# Patient Record
Sex: MALE | Race: WHITE | NOT HISPANIC OR LATINO | Employment: OTHER | URBAN - METROPOLITAN AREA
[De-identification: names, ages, dates, MRNs, and addresses within clinical notes are randomized per-mention and may not be internally consistent; named-entity substitution may affect disease eponyms.]

---

## 2017-01-01 ENCOUNTER — COMMUNICATION - HEALTHEAST (OUTPATIENT)
Dept: INTERNAL MEDICINE | Facility: CLINIC | Age: 72
End: 2017-01-01

## 2017-01-09 ENCOUNTER — RECORDS - HEALTHEAST (OUTPATIENT)
Dept: ADMINISTRATIVE | Facility: OTHER | Age: 72
End: 2017-01-09

## 2017-01-17 ENCOUNTER — COMMUNICATION - HEALTHEAST (OUTPATIENT)
Dept: INTERNAL MEDICINE | Facility: CLINIC | Age: 72
End: 2017-01-17

## 2017-01-23 ENCOUNTER — RECORDS - HEALTHEAST (OUTPATIENT)
Dept: ADMINISTRATIVE | Facility: OTHER | Age: 72
End: 2017-01-23

## 2017-02-11 ENCOUNTER — COMMUNICATION - HEALTHEAST (OUTPATIENT)
Dept: INTERNAL MEDICINE | Facility: CLINIC | Age: 72
End: 2017-02-11

## 2017-02-12 ENCOUNTER — COMMUNICATION - HEALTHEAST (OUTPATIENT)
Dept: INTERNAL MEDICINE | Facility: CLINIC | Age: 72
End: 2017-02-12

## 2017-03-04 ENCOUNTER — COMMUNICATION - HEALTHEAST (OUTPATIENT)
Dept: INTERNAL MEDICINE | Facility: CLINIC | Age: 72
End: 2017-03-04

## 2017-03-28 ENCOUNTER — COMMUNICATION - HEALTHEAST (OUTPATIENT)
Dept: INTERNAL MEDICINE | Facility: CLINIC | Age: 72
End: 2017-03-28

## 2017-04-17 ENCOUNTER — RECORDS - HEALTHEAST (OUTPATIENT)
Dept: ADMINISTRATIVE | Facility: OTHER | Age: 72
End: 2017-04-17

## 2017-04-19 ENCOUNTER — COMMUNICATION - HEALTHEAST (OUTPATIENT)
Dept: INTERNAL MEDICINE | Facility: CLINIC | Age: 72
End: 2017-04-19

## 2017-04-29 ENCOUNTER — AMBULATORY - HEALTHEAST (OUTPATIENT)
Dept: INTERNAL MEDICINE | Facility: CLINIC | Age: 72
End: 2017-04-29

## 2017-04-29 ENCOUNTER — COMMUNICATION - HEALTHEAST (OUTPATIENT)
Dept: SCHEDULING | Facility: CLINIC | Age: 72
End: 2017-04-29

## 2017-05-15 ENCOUNTER — COMMUNICATION - HEALTHEAST (OUTPATIENT)
Dept: INTERNAL MEDICINE | Facility: CLINIC | Age: 72
End: 2017-05-15

## 2017-06-01 ENCOUNTER — COMMUNICATION - HEALTHEAST (OUTPATIENT)
Dept: INTERNAL MEDICINE | Facility: CLINIC | Age: 72
End: 2017-06-01

## 2017-06-19 ENCOUNTER — COMMUNICATION - HEALTHEAST (OUTPATIENT)
Dept: INTERNAL MEDICINE | Facility: CLINIC | Age: 72
End: 2017-06-19

## 2017-07-01 ENCOUNTER — COMMUNICATION - HEALTHEAST (OUTPATIENT)
Dept: INTERNAL MEDICINE | Facility: CLINIC | Age: 72
End: 2017-07-01

## 2017-08-11 ENCOUNTER — COMMUNICATION - HEALTHEAST (OUTPATIENT)
Dept: INTERNAL MEDICINE | Facility: CLINIC | Age: 72
End: 2017-08-11

## 2017-08-21 ENCOUNTER — COMMUNICATION - HEALTHEAST (OUTPATIENT)
Dept: INTERNAL MEDICINE | Facility: CLINIC | Age: 72
End: 2017-08-21

## 2017-08-30 ENCOUNTER — AMBULATORY - HEALTHEAST (OUTPATIENT)
Dept: INTERNAL MEDICINE | Facility: CLINIC | Age: 72
End: 2017-08-30

## 2017-08-30 ENCOUNTER — COMMUNICATION - HEALTHEAST (OUTPATIENT)
Dept: SCHEDULING | Facility: CLINIC | Age: 72
End: 2017-08-30

## 2017-08-30 ENCOUNTER — OFFICE VISIT - HEALTHEAST (OUTPATIENT)
Dept: INTERNAL MEDICINE | Facility: CLINIC | Age: 72
End: 2017-08-30

## 2017-08-30 DIAGNOSIS — D64.9 ANEMIA: ICD-10-CM

## 2017-08-30 DIAGNOSIS — L82.1 SEBORRHEIC KERATOSIS: ICD-10-CM

## 2017-08-30 DIAGNOSIS — R31.29 MICROSCOPIC HEMATURIA: ICD-10-CM

## 2017-08-30 DIAGNOSIS — Z00.00 PREVENTATIVE HEALTH CARE: ICD-10-CM

## 2017-08-30 DIAGNOSIS — M79.2 NEUROPATHIC PAIN, LEG, LEFT: ICD-10-CM

## 2017-08-30 DIAGNOSIS — I10 ESSENTIAL HYPERTENSION: ICD-10-CM

## 2017-08-30 DIAGNOSIS — K21.9 GERD (GASTROESOPHAGEAL REFLUX DISEASE): ICD-10-CM

## 2017-08-30 LAB
CHOLEST SERPL-MCNC: 172 MG/DL
FASTING STATUS PATIENT QL REPORTED: YES
HDLC SERPL-MCNC: 32 MG/DL
LDLC SERPL CALC-MCNC: 88 MG/DL
PSA SERPL-MCNC: 4.2 NG/ML (ref 0–6.5)
TRIGL SERPL-MCNC: 259 MG/DL

## 2017-08-31 ENCOUNTER — COMMUNICATION - HEALTHEAST (OUTPATIENT)
Dept: INTERNAL MEDICINE | Facility: CLINIC | Age: 72
End: 2017-08-31

## 2017-09-09 ENCOUNTER — COMMUNICATION - HEALTHEAST (OUTPATIENT)
Dept: INTERNAL MEDICINE | Facility: CLINIC | Age: 72
End: 2017-09-09

## 2017-09-10 ENCOUNTER — COMMUNICATION - HEALTHEAST (OUTPATIENT)
Dept: INTERNAL MEDICINE | Facility: CLINIC | Age: 72
End: 2017-09-10

## 2017-09-11 ENCOUNTER — COMMUNICATION - HEALTHEAST (OUTPATIENT)
Dept: INTERNAL MEDICINE | Facility: CLINIC | Age: 72
End: 2017-09-11

## 2017-10-21 ENCOUNTER — COMMUNICATION - HEALTHEAST (OUTPATIENT)
Dept: INTERNAL MEDICINE | Facility: CLINIC | Age: 72
End: 2017-10-21

## 2017-12-14 ENCOUNTER — COMMUNICATION - HEALTHEAST (OUTPATIENT)
Dept: INTERNAL MEDICINE | Facility: CLINIC | Age: 72
End: 2017-12-14

## 2017-12-14 DIAGNOSIS — K21.9 GERD (GASTROESOPHAGEAL REFLUX DISEASE): ICD-10-CM

## 2018-02-11 ENCOUNTER — COMMUNICATION - HEALTHEAST (OUTPATIENT)
Dept: INTERNAL MEDICINE | Facility: CLINIC | Age: 73
End: 2018-02-11

## 2018-02-26 ENCOUNTER — RECORDS - HEALTHEAST (OUTPATIENT)
Dept: ADMINISTRATIVE | Facility: OTHER | Age: 73
End: 2018-02-26

## 2018-02-26 ENCOUNTER — COMMUNICATION - HEALTHEAST (OUTPATIENT)
Dept: INTERNAL MEDICINE | Facility: CLINIC | Age: 73
End: 2018-02-26

## 2018-03-06 ENCOUNTER — RECORDS - HEALTHEAST (OUTPATIENT)
Dept: ADMINISTRATIVE | Facility: OTHER | Age: 73
End: 2018-03-06

## 2018-03-08 ENCOUNTER — COMMUNICATION - HEALTHEAST (OUTPATIENT)
Dept: INTERNAL MEDICINE | Facility: CLINIC | Age: 73
End: 2018-03-08

## 2018-03-16 ENCOUNTER — COMMUNICATION - HEALTHEAST (OUTPATIENT)
Dept: INTERNAL MEDICINE | Facility: CLINIC | Age: 73
End: 2018-03-16

## 2018-03-20 ENCOUNTER — COMMUNICATION - HEALTHEAST (OUTPATIENT)
Dept: INTERNAL MEDICINE | Facility: CLINIC | Age: 73
End: 2018-03-20

## 2018-03-23 ENCOUNTER — RECORDS - HEALTHEAST (OUTPATIENT)
Dept: ADMINISTRATIVE | Facility: OTHER | Age: 73
End: 2018-03-23

## 2018-05-01 ENCOUNTER — COMMUNICATION - HEALTHEAST (OUTPATIENT)
Dept: INTERNAL MEDICINE | Facility: CLINIC | Age: 73
End: 2018-05-01

## 2018-05-01 DIAGNOSIS — G47.00 INSOMNIA: ICD-10-CM

## 2018-05-01 DIAGNOSIS — G25.81 RLS (RESTLESS LEGS SYNDROME): ICD-10-CM

## 2018-05-08 ENCOUNTER — COMMUNICATION - HEALTHEAST (OUTPATIENT)
Dept: INTERNAL MEDICINE | Facility: CLINIC | Age: 73
End: 2018-05-08

## 2018-05-15 ENCOUNTER — AMBULATORY - HEALTHEAST (OUTPATIENT)
Dept: INTERNAL MEDICINE | Facility: CLINIC | Age: 73
End: 2018-05-15

## 2018-05-16 ENCOUNTER — OFFICE VISIT - HEALTHEAST (OUTPATIENT)
Dept: INTERNAL MEDICINE | Facility: CLINIC | Age: 73
End: 2018-05-16

## 2018-05-16 DIAGNOSIS — K62.5 RECTAL BLEEDING: ICD-10-CM

## 2018-05-16 DIAGNOSIS — M25.812 SHOULDER IMPINGEMENT, LEFT: ICD-10-CM

## 2018-05-16 LAB
ANION GAP SERPL CALCULATED.3IONS-SCNC: 9 MMOL/L (ref 5–18)
BASOPHILS # BLD AUTO: 0.1 THOU/UL (ref 0–0.2)
BASOPHILS NFR BLD AUTO: 1 % (ref 0–2)
BUN SERPL-MCNC: 23 MG/DL (ref 8–28)
CALCIUM SERPL-MCNC: 9.4 MG/DL (ref 8.5–10.5)
CHLORIDE BLD-SCNC: 107 MMOL/L (ref 98–107)
CO2 SERPL-SCNC: 23 MMOL/L (ref 22–31)
CREAT SERPL-MCNC: 1 MG/DL (ref 0.7–1.3)
EOSINOPHIL # BLD AUTO: 0.1 THOU/UL (ref 0–0.4)
EOSINOPHIL NFR BLD AUTO: 2 % (ref 0–6)
ERYTHROCYTE [DISTWIDTH] IN BLOOD BY AUTOMATED COUNT: 12.5 % (ref 11–14.5)
GFR SERPL CREATININE-BSD FRML MDRD: >60 ML/MIN/1.73M2
GLUCOSE BLD-MCNC: 100 MG/DL (ref 70–125)
HCT VFR BLD AUTO: 44.2 % (ref 40–54)
HGB BLD-MCNC: 14.9 G/DL (ref 14–18)
LYMPHOCYTES # BLD AUTO: 1 THOU/UL (ref 0.8–4.4)
LYMPHOCYTES NFR BLD AUTO: 20 % (ref 20–40)
MCH RBC QN AUTO: 31.6 PG (ref 27–34)
MCHC RBC AUTO-ENTMCNC: 33.7 G/DL (ref 32–36)
MCV RBC AUTO: 94 FL (ref 80–100)
MONOCYTES # BLD AUTO: 0.6 THOU/UL (ref 0–0.9)
MONOCYTES NFR BLD AUTO: 12 % (ref 2–10)
NEUTROPHILS # BLD AUTO: 3.3 THOU/UL (ref 2–7.7)
NEUTROPHILS NFR BLD AUTO: 64 % (ref 50–70)
PLATELET # BLD AUTO: 187 THOU/UL (ref 140–440)
PMV BLD AUTO: 10.1 FL (ref 8.5–12.5)
POTASSIUM BLD-SCNC: 4.3 MMOL/L (ref 3.5–5)
RBC # BLD AUTO: 4.71 MILL/UL (ref 4.4–6.2)
SODIUM SERPL-SCNC: 139 MMOL/L (ref 136–145)
WBC: 5.1 THOU/UL (ref 4–11)

## 2018-05-16 ASSESSMENT — MIFFLIN-ST. JEOR: SCORE: 1770.23

## 2018-05-17 LAB
ATRIAL RATE - MUSE: 57 BPM
DIASTOLIC BLOOD PRESSURE - MUSE: NORMAL MMHG
INTERPRETATION ECG - MUSE: NORMAL
P AXIS - MUSE: 32 DEGREES
PR INTERVAL - MUSE: 218 MS
QRS DURATION - MUSE: 92 MS
QT - MUSE: 422 MS
QTC - MUSE: 410 MS
R AXIS - MUSE: -12 DEGREES
SYSTOLIC BLOOD PRESSURE - MUSE: NORMAL MMHG
T AXIS - MUSE: -5 DEGREES
VENTRICULAR RATE- MUSE: 57 BPM

## 2018-05-22 ENCOUNTER — RECORDS - HEALTHEAST (OUTPATIENT)
Dept: ADMINISTRATIVE | Facility: OTHER | Age: 73
End: 2018-05-22

## 2018-06-04 ENCOUNTER — RECORDS - HEALTHEAST (OUTPATIENT)
Dept: ADMINISTRATIVE | Facility: OTHER | Age: 73
End: 2018-06-04

## 2018-06-13 ENCOUNTER — COMMUNICATION - HEALTHEAST (OUTPATIENT)
Dept: INTERNAL MEDICINE | Facility: CLINIC | Age: 73
End: 2018-06-13

## 2018-06-27 ENCOUNTER — COMMUNICATION - HEALTHEAST (OUTPATIENT)
Dept: INTERNAL MEDICINE | Facility: CLINIC | Age: 73
End: 2018-06-27

## 2018-06-27 DIAGNOSIS — G47.00 INSOMNIA: ICD-10-CM

## 2018-07-05 ENCOUNTER — RECORDS - HEALTHEAST (OUTPATIENT)
Dept: ADMINISTRATIVE | Facility: OTHER | Age: 73
End: 2018-07-05

## 2018-07-18 ENCOUNTER — RECORDS - HEALTHEAST (OUTPATIENT)
Dept: ADMINISTRATIVE | Facility: OTHER | Age: 73
End: 2018-07-18

## 2018-07-23 ENCOUNTER — COMMUNICATION - HEALTHEAST (OUTPATIENT)
Dept: INTERNAL MEDICINE | Facility: CLINIC | Age: 73
End: 2018-07-23

## 2018-07-24 ENCOUNTER — AMBULATORY - HEALTHEAST (OUTPATIENT)
Dept: INTERNAL MEDICINE | Facility: CLINIC | Age: 73
End: 2018-07-24

## 2018-07-25 ENCOUNTER — OFFICE VISIT - HEALTHEAST (OUTPATIENT)
Dept: INTERNAL MEDICINE | Facility: CLINIC | Age: 73
End: 2018-07-25

## 2018-07-25 DIAGNOSIS — H26.9 CATARACT: ICD-10-CM

## 2018-07-25 DIAGNOSIS — K21.9 GERD (GASTROESOPHAGEAL REFLUX DISEASE): ICD-10-CM

## 2018-07-25 DIAGNOSIS — G25.81 RLS (RESTLESS LEGS SYNDROME): ICD-10-CM

## 2018-07-25 ASSESSMENT — MIFFLIN-ST. JEOR: SCORE: 1769.68

## 2018-08-14 ENCOUNTER — COMMUNICATION - HEALTHEAST (OUTPATIENT)
Dept: INTERNAL MEDICINE | Facility: CLINIC | Age: 73
End: 2018-08-14

## 2018-08-14 DIAGNOSIS — G47.00 INSOMNIA: ICD-10-CM

## 2018-08-15 ENCOUNTER — RECORDS - HEALTHEAST (OUTPATIENT)
Dept: ADMINISTRATIVE | Facility: OTHER | Age: 73
End: 2018-08-15

## 2018-08-18 ENCOUNTER — COMMUNICATION - HEALTHEAST (OUTPATIENT)
Dept: INTERNAL MEDICINE | Facility: CLINIC | Age: 73
End: 2018-08-18

## 2018-09-17 ENCOUNTER — COMMUNICATION - HEALTHEAST (OUTPATIENT)
Dept: INTERNAL MEDICINE | Facility: CLINIC | Age: 73
End: 2018-09-17

## 2018-09-17 DIAGNOSIS — G25.81 RLS (RESTLESS LEGS SYNDROME): ICD-10-CM

## 2018-09-18 ENCOUNTER — COMMUNICATION - HEALTHEAST (OUTPATIENT)
Dept: INTERNAL MEDICINE | Facility: CLINIC | Age: 73
End: 2018-09-18

## 2018-09-18 DIAGNOSIS — G47.00 INSOMNIA: ICD-10-CM

## 2018-09-19 ENCOUNTER — RECORDS - HEALTHEAST (OUTPATIENT)
Dept: ADMINISTRATIVE | Facility: OTHER | Age: 73
End: 2018-09-19

## 2018-12-05 ENCOUNTER — RECORDS - HEALTHEAST (OUTPATIENT)
Dept: ADMINISTRATIVE | Facility: OTHER | Age: 73
End: 2018-12-05

## 2018-12-14 ENCOUNTER — COMMUNICATION - HEALTHEAST (OUTPATIENT)
Dept: INTERNAL MEDICINE | Facility: CLINIC | Age: 73
End: 2018-12-14

## 2018-12-14 DIAGNOSIS — K21.9 GERD (GASTROESOPHAGEAL REFLUX DISEASE): ICD-10-CM

## 2019-02-28 ENCOUNTER — MEDICAL CORRESPONDENCE (OUTPATIENT)
Dept: HEALTH INFORMATION MANAGEMENT | Facility: CLINIC | Age: 74
End: 2019-02-28

## 2019-02-28 ENCOUNTER — TRANSFERRED RECORDS (OUTPATIENT)
Dept: HEALTH INFORMATION MANAGEMENT | Facility: CLINIC | Age: 74
End: 2019-02-28

## 2019-03-07 ENCOUNTER — OFFICE VISIT (OUTPATIENT)
Dept: OPHTHALMOLOGY | Facility: CLINIC | Age: 74
End: 2019-03-07
Attending: OPHTHALMOLOGY
Payer: COMMERCIAL

## 2019-03-07 DIAGNOSIS — Z96.1 PSEUDOPHAKIA OF BOTH EYES: Primary | ICD-10-CM

## 2019-03-07 DIAGNOSIS — H35.40 PERIPHERAL RETINAL THINNING: ICD-10-CM

## 2019-03-07 PROCEDURE — 92250 FUNDUS PHOTOGRAPHY W/I&R: CPT | Mod: ZF | Performed by: OPHTHALMOLOGY

## 2019-03-07 PROCEDURE — G0463 HOSPITAL OUTPT CLINIC VISIT: HCPCS | Mod: ZF

## 2019-03-07 PROCEDURE — 92134 CPTRZ OPH DX IMG PST SGM RTA: CPT | Mod: ZF | Performed by: OPHTHALMOLOGY

## 2019-03-07 ASSESSMENT — VISUAL ACUITY
OS_SC: 20/50
OS_SC+: +2
METHOD: SNELLEN - LINEAR
OS_PH_SC: 20/25
OD_SC: 20/20

## 2019-03-07 ASSESSMENT — CUP TO DISC RATIO
OS_RATIO: 0.55
OD_RATIO: 0.45

## 2019-03-07 ASSESSMENT — TONOMETRY
OS_IOP_MMHG: 15
OD_IOP_MMHG: 18
IOP_METHOD: TONOPEN

## 2019-03-07 ASSESSMENT — CONF VISUAL FIELD
OS_NORMAL: 1
OD_NORMAL: 1

## 2019-03-07 ASSESSMENT — SLIT LAMP EXAM - LIDS
COMMENTS: NORMAL
COMMENTS: NORMAL

## 2019-03-07 ASSESSMENT — EXTERNAL EXAM - LEFT EYE: OS_EXAM: NORMAL

## 2019-03-07 ASSESSMENT — EXTERNAL EXAM - RIGHT EYE: OD_EXAM: NORMAL

## 2019-03-07 NOTE — LETTER
3/7/2019     RE: Toni Collado  [address] [facility] [address]     Dear Colleague,    Thank you for referring your patient, Toni Collado, to the EYE CLINIC at [facility]. Please see a copy of my visit note below.    I have confirmed the patient's and reviewed Past Medical History, Past Surgical History, Social History, Family History, Problem List, Medication List and agree with Tech note.    CC: consult [facility] eye clinic     HPI: concern retina abnormality left eye.  Patient had mild trauma left eye as 17 year old with baseball     Assessment/plan:   1.  Pseudophakia both eyes    - Optos and Optical Coherence Tomography Scan both eyes show normal retinal nerve fiber layer both eyes and thinner retina left eye compared to right eye.    - Mostly due to changes in outer retina and IS/OS junction abnormalities    - schedule ffERG both eyes     RTC 3-4 weeks after ffERG    Rahel Martinez MD PhD.  Professor & Chair

## 2019-03-07 NOTE — PROGRESS NOTES
I have confirmed the patient's and reviewed Past Medical History, Past Surgical History, Social History, Family History, Problem List, Medication List and agree with Tech note.    CC: consult [facility] eye [facility]     HPI: concern retina abnormality left eye.  Patient had mild trauma left eye as 17 year old with baseball     Assessment/plan:   1.  Pseudophakia both eyes    - Optos and Optical Coherence Tomography Scan both eyes show normal retinal nerve fiber layer both eyes and thinner retina left eye compared to right eye.    - Mostly due to changes in outer retina and IS/OS junction abnormalities    - schedule ffERG both eyes     RTC 3-4 weeks after ffERG    Rahel Martinez MD PhD.  Professor & Chair

## 2019-03-07 NOTE — NURSING NOTE
Chief Complaints and History of Present Illnesses   Patient presents with     Retinal Evaluation     Chief Complaint(s) and History of Present Illness(es)     Retinal Evaluation     Laterality: both eyes    Quality: missing vision and hazy    Severity: severe    Frequency: constantly    Associated symptoms: floaters and photophobia.  Negative for flashes and eye pain    Pain scale: 0/10              Comments     Referred Dr Yu /[facility]  LE vision look like milky haze, letters are missing or washed out in a word x 6-8 months  Latanoprost every day RESHMA Eastman COA 10:32 AM March 7, 2019

## 2019-03-12 ENCOUNTER — COMMUNICATION - HEALTHEAST (OUTPATIENT)
Dept: INTERNAL MEDICINE | Facility: CLINIC | Age: 74
End: 2019-03-12

## 2019-03-12 DIAGNOSIS — K21.9 GERD (GASTROESOPHAGEAL REFLUX DISEASE): ICD-10-CM

## 2019-03-18 DIAGNOSIS — H35.40 PERIPHERAL RETINAL THINNING: Primary | ICD-10-CM

## 2019-03-18 DIAGNOSIS — H35.40 PERIPHERAL RETINAL DEGENERATION: ICD-10-CM

## 2019-03-19 ENCOUNTER — ALLIED HEALTH/NURSE VISIT (OUTPATIENT)
Dept: OPHTHALMOLOGY | Facility: CLINIC | Age: 74
End: 2019-03-19
Attending: OPHTHALMOLOGY
Payer: COMMERCIAL

## 2019-03-19 DIAGNOSIS — H35.40 PERIPHERAL RETINAL THINNING: ICD-10-CM

## 2019-03-19 DIAGNOSIS — H35.50 RETINAL DYSTROPHY: Primary | ICD-10-CM

## 2019-03-19 PROCEDURE — 40000269 ZZH STATISTIC NO CHARGE FACILITY FEE: Mod: ZF

## 2019-03-19 PROCEDURE — 92274 MULTIFOCAL ERG W/I&R: CPT | Mod: ZF

## 2019-03-19 RX ORDER — MELOXICAM 15 MG/1
1 TABLET ORAL DAILY PRN
Refills: 0 | COMMUNITY
Start: 2018-09-18 | End: 2022-03-09

## 2019-03-19 RX ORDER — TRAZODONE HYDROCHLORIDE 100 MG/1
1 TABLET ORAL AT BEDTIME
Refills: 2 | COMMUNITY
Start: 2019-02-03 | End: 2022-05-09

## 2019-03-19 RX ORDER — CITALOPRAM HYDROBROMIDE 40 MG/1
1 TABLET ORAL DAILY
COMMUNITY
Start: 2019-03-12 | End: 2022-03-08

## 2019-03-19 RX ORDER — PRAMIPEXOLE DIHYDROCHLORIDE 0.5 MG/1
1 TABLET ORAL AT BEDTIME
COMMUNITY
Start: 2019-03-12 | End: 2022-03-08

## 2019-03-19 ASSESSMENT — VISUAL ACUITY
METHOD: SNELLEN - LINEAR
OS_SC: 20/50
OS_PH_SC: 20/30
OD_SC+: -1
OS_SC+: +
OS_PH_SC+: +
OD_SC: 20/20

## 2019-03-19 NOTE — NURSING NOTE
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  PATY w/[facility] 03-07-19.  No interval changes.  Left eye w/\"washed out\" vision.  No difficulty w/color vision.  Very light sensitive, right>>left.  Using \"HD\" gls to help reduce glare/flare from car headlights and to help reduce discomfort d/t light sensitivity.  Even uses sungls indoors d/t light sensitivity.      About 1 1/2 to 2 yrs ago, he noted that he could see clearly w/right eye but that left eye field of vision had decreased in size so that he had to move eye/head to read a complete line of words.  Maybe the left side of the line/page was better visualized, otherwise vision seemed to be \"milky\" throughout.  S/P CE/IOL both eyes 07 and  with no improvement to being possibly worse left eye.  Eye MDs thought maybe glaucoma at one time.    H/O baseball to face at age 17, breaking nose, but no other eye involvement.  Both eyes alternating dominant. Good vision and equal both eyes; very first gls only when presbyopic.  Travel[place] vacation outside [place] ([place], [place], [place], [place] 6 yrs ago and [place] recently[place]  [place] in last 10 yrs ([place], [place], [place], [place], [place]).  No major health concerns.  Single incident of fever (104 degrees) yrs and yrs ago.      Scheduled to return to Ret[facility] ([facility]) 04-03-19; will await results.  "

## 2019-03-19 NOTE — PROGRESS NOTES
"3/19/2019    STANDARD ERG REPORT    Referring: Dr. Gutierrez Pereira    RE:  Toni Collado  MRN:  [id number]  :  1945    ERG Date:  3/19/2019    CLINICAL HISTORY: Toni Collado is a 73 year old year-old patient with diagnosis of retinal thinning left eye, referred by Dr. SPENCER for a full field electroretinogram (ERG).The patient repots  Left eye with \"washed out\" vision.  No difficulty with color vision.  Very light sensitive, right>>left.  Using \"HD\" gls to help reduce glare/flare from car headlights and to help reduce discomfort due to light sensitivity.  About 1 1/2 to 2 yrs ago, he noted that he could see clearly with right eye but that left eye field of vision had decreased in size so that he had to move eye/head to read a complete line of words.  Maybe the left side of the line/page was better visualized, otherwise vision seemed to be \"milky\" throughout.  S/P CE/IOL both eyes  and  with no improvement to being possibly worse left eye.    IMPRESSION:  Suspicious for cone-liyah dystrophy                  Visual Acuity Right Eye : 20/20-1       w/o gls & +IOL    Visual Acuity Left Eye : 20/50+, PH 20/30+      w/o gls & +IOL                         ALL AVERAGED  Data for Full-Field ERG Right Eye   Left Eye    Dark-Adapted Patient Normal Patient   0.01 ERG (liyah) amplitude( v) 144* 59.1-302.6 52   0.01 ERG (liyah) implicit time(ms) 79* 69.7-108.3 70.7   MMMMM      3.0 ERG (combined) a-wave amplitude( v) -123 -207.0 to -59.6 -119   3.0 ERG (combined) a-wave implicit time(ms) 16.6 14.2-21.4 17.5   3.0 ERG (combined) b-wave amplitude( v) 230 99.6-401.4 87   3.0 ERG (combined) b-wave implicit time(ms) 49.1 30.1-54.2 49.1   MMMMM      3.0 Oscillatory Potentials   Present     Light-Adapted      3.0 Flicker (30-Hz) amplitude( v) 32(38) 71.8-121.8 10(12)   3.0 Flicker (30-Hz) implicit time(ms) 26.6(59.9) 19.4-34.3 33.3(67.4)         3.0 ERG (cone) a-wave amplitude( v) -12 -50.5 to -17.3 -13   3.0 ERG (cone) " a-wave implicit time(ms) 13.3 15.1-19.2 16.6   3.0 ERG (cone) b-wave amplitude( v) 39 80.8-168.1 11   3.0 ERG (cone) b-wave implicit time(ms) 30 25.3-33.3 25            * =manipulated cursors           parentheses=cursors at selected second peaks      INTERPRETATION:      This full field electroretinogram was performed according to ISCEV standards using JellynoteION E3 system and DTL fiber recording electrodes. The patient tolerated the testing well. The waveforms are fairly reproducible and well formed. The normative values provided above represent the 95% confidence limits for a normal individual the age of the patient. The patient s responses are averaged.   There is asymmetry of the responses in between both eyes. In addition for the DA 3.0 and 10.0ERGs there seem to be a negative deflection just after b-wave of unclear significace.  In dark adapted conditions, The liyah-specific responses have normal amplitude and implicit time right eye and reduced amplitude with normal implicit time left eye. The maximal response, a combined liyah and cone responses, have normal amplitude and implicit time right eye and a reduced b-wave amplitude with normal implicit time left eye.  The bright flash (scotopic 10.0) response is not electronegative.  The oscillatory potentials are present bilaterally, but reduced left eye.     In light adapted conditions, the 30-Hz flicker responses have a decreased amplitude and the implicit time is normal in both eyes.  The single photopic response have a decreased amplitude and and the implicit time is normal in both eyes.    Conclusion: This electroretinogram is not normal and shows asymmetry of the responses between both eyes, with the right eye having greater amplitudes compared to left eye. There is decreased amplitude mainly of the cone responses in both eyes. 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 The differential diagnosis includes:  post-inflammatory retinopathy, toxic retinopathy and Autoimmune Retinopathy. Consideration could be given to drawing blood for the retinal dystrophy panel, with hopes of determining the mutations accounting for this retinal dystrophy.    Clinical correlation is recommended.    A  repeat electroretinogram could be considered in 1-2 years or earlier if the clinical situation changes.     Dear Gutierrez, thank you for the opportunity to provide electrophysiologic services for this patient.  Please do not hesitate to call if there should be any questions regarding these results.    Mary Short MD     Retina Service   Department of Ophthalmology & Visual Neurosciences   [facility]  Phone: [phone]   Fax: [phone]

## 2019-03-19 NOTE — LETTER
"3/19/2019    STANDARD ERG REPORT    Referring: Dr. Gutierrez Martinez    RE:  Toni Collado  MRN:  [id number]  :  1945    ERG Date:  3/19/2019    CLINICAL HISTORY: Toni Collado is a 73-year-old patient with diagnosis of retinal thinning left eye, referred by Dr. SPENCER for a full-field electroretinogram (ERG).The patient reports left eye with \"washed out\" vision. No difficulty with color vision.  Very light sensitive, right>>left.  Using \"HD\" gls to help reduce glare/flare from car headlights and to help reduce discomfort due to light sensitivity.  About 1 1/2 to 2 yrs ago, he noted that he could see clearly with right eye but that left eye field of vision had decreased in size so that he had to move eye/head to read a complete line of words.  Maybe the left side of the line/page was better visualized, otherwise vision seemed to be \"milky\" throughout.  S/P CE/IOL both eyes  and  with no improvement to being possibly worse left eye.    IMPRESSION:  Suspicious for cone-liyah dystrophy                  Visual Acuity: Right Eye: 20/20-1       w/o gls & +IOL      Left Eye: 20/50+, PH 20/30+      w/o gls & +IOL                                             ALL AVERAGED  Data for Full-Field ERG Right Eye   Left Eye    Dark-Adapted Patient Normal Patient   0.01 ERG (liyah) amplitude( v) 144* 59.1-302.6 52   0.01 ERG (liyah) implicit time(ms) 79* 69.7-108.3 70.7   MMMMM      3.0 ERG (combined) a-wave amplitude( v) -123 -207.0 to -59.6 -119   3.0 ERG (combined) a-wave implicit time(ms) 16.6 14.2-21.4 17.5   3.0 ERG (combined) b-wave amplitude( v) 230 99.6-401.4 87   3.0 ERG (combined) b-wave implicit time(ms) 49.1 30.1-54.2 49.1   MMMMM      3.0 Oscillatory Potentials  Present  Present Reduced     Light-Adapted      3.0 Flicker (30-Hz) amplitude( v) 32(38) 71.8-121.8 10(12)   3.0 Flicker (30-Hz) implicit time(ms) 26.6(59.9) 19.4-34.3 33.3(67.4)         3.0 ERG (cone) a-wave amplitude( v) -12 -50.5 to -17.3 -13   3.0 " ERG (cone) a-wave implicit time(ms) 13.3 15.1-19.2 16.6   3.0 ERG (cone) b-wave amplitude( v) 39 80.8-168.1 11   3.0 ERG (cone) b-wave implicit time(ms) 30 25.3-33.3 25                    * =manipulated cursors                   parentheses=cursors at selected second peaks      INTERPRETATION:  This full-field electroretinogram was performed according to ISCEV standards using the [employer]Rocket E3 system and DTL fiber-recording electrodes. The patient tolerated the testing well. The waveforms are fairly reproducible and well formed. The normative values provided above represent the 95% confidence limits for a normal individual the age of the patient. The patient s responses are averaged. There is asymmetry of the responses in between both eyes. In addition for the DA 3.0 and 10.0 ERGs, there seem to be a negative deflection just after b-wave of unclear significance.    In dark-adapted conditions, the liyah-specific responses have normal amplitude and implicit time right eye and reduced amplitude with normal implicit time left eye. The maximal response, a combined liyah and cone response, has normal amplitude and implicit time right eye and a reduced b-wave amplitude with normal implicit time left eye. The bright flash (scotopic 10.0) response is not electronegative. The oscillatory potentials are present bilaterally, but reduced left eye.   In light-adapted conditions, the 30-Hz flicker responses have a decreased amplitude and the implicit time is normal in both eyes.  The single photopic response has a decreased amplitude and the implicit time is normal in both eyes.    CONCLUSION: This electroretinogram is not normal and shows asymmetry of the responses between both eyes, with the right eye having greater amplitudes compared to left eye. There is decreased amplitude mainly of the cone responses in both eyes. 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 The differential  diagnoses include: post-inflammatory retinopathy, toxic retinopathy, and autoimmune retinopathy. Consideration could be given to drawing blood for the retinal dystrophy panel with hopes of determining the mutations accounting for this retinal dystrophy. Clinical correlation is recommended.    A repeat electroretinogram could be considered in 1-2 years, or earlier if the clinical situation changes.     Gutierrez, thank you for the opportunity to provide electrophysiologic services for this patient.  Please do not hesitate to call if there should be any questions regarding these results.    Sincerely,    Mary Short MD     Retina Service   Department of Ophthalmology & Visual Neurosciences   [facility]  Phone: [phone]   Fax: [phone]     ccl:  MD Alexy Huston MD

## 2019-03-29 ENCOUNTER — COMMUNICATION - HEALTHEAST (OUTPATIENT)
Dept: INTERNAL MEDICINE | Facility: CLINIC | Age: 74
End: 2019-03-29

## 2019-04-03 ENCOUNTER — OFFICE VISIT (OUTPATIENT)
Dept: OPHTHALMOLOGY | Facility: CLINIC | Age: 74
End: 2019-04-03
Attending: OPHTHALMOLOGY
Payer: COMMERCIAL

## 2019-04-03 DIAGNOSIS — H35.50 RETINAL DYSTROPHY: Primary | ICD-10-CM

## 2019-04-03 PROCEDURE — G0463 HOSPITAL OUTPT CLINIC VISIT: HCPCS | Mod: ZF

## 2019-04-03 ASSESSMENT — TONOMETRY
OD_IOP_MMHG: 18
IOP_METHOD: TONOPEN
OS_IOP_MMHG: 20

## 2019-04-03 ASSESSMENT — VISUAL ACUITY
OS_SC: 20/40
OD_SC: 20/20
METHOD: SNELLEN - LINEAR
OS_PH_SC: 20/25

## 2019-04-03 ASSESSMENT — CONF VISUAL FIELD
OS_NORMAL: 1
OD_NORMAL: 1

## 2019-04-03 NOTE — PROGRESS NOTES
"here for ffERG results  Discussed for ten minutes and patient will return to clinic one year for repeat testing.    Return to clinic one year for Exam/OCT and fundus autofluorescence (30 degrees) and may do optos fluorescein angiography transits left eye to explore retinal thinning further     Rahel Martinez MD PhD.  Professor & Chair      STANDARD ERG REPORT     Referring: Dr. Gutierrez Martinez     RE:        Toni Collado  MRN:    [id number]  :     1945     ERG Date:  3/19/2019     CLINICAL HISTORY: Toni Collado is a 73-year-old patient with diagnosis of retinal thinning left eye, referred by Dr. SPENCER for a full-field electroretinogram (ERG).The patient reports left eye with \"washed out\" vision. No difficulty with color vision.  Very light sensitive, right>>left.  Using \"HD\" gls to help reduce glare/flare from car headlights and to help reduce discomfort due to light sensitivity.  About 1 1/2 to 2 yrs ago, he noted that he could see clearly with right eye but that left eye field of vision had decreased in size so that he had to move eye/head to read a complete line of words.  Maybe the left side of the line/page was better visualized, otherwise vision seemed to be \"milky\" throughout.  S/P CE/IOL both eyes  and  with no improvement to being possibly worse left eye.     IMPRESSION:  Suspicious for cone-liyah dystrophy                                                                                                                                                                                               Visual Acuity:   Right Eye:         20/20-1                                                           w/o gls & +IOL                                 Left Eye:            20/50+, PH 20/30+                                                          w/o gls & +IOL                                                                                                                                       "                ALL AVERAGED  Data for Full-Field ERG Right Eye   Left Eye    Dark-Adapted Patient Normal Patient   0.01 ERG (liyah) amplitude( v) 144* 59.1-302.6 52   0.01 ERG (liyah) implicit time(ms) 79* 69.7-108.3 70.7   MMMMM         3.0 ERG (combined) a-wave amplitude( v) -123 -207.0 to -59.6 -119   3.0 ERG (combined) a-wave implicit time(ms) 16.6 14.2-21.4 17.5   3.0 ERG (combined) b-wave amplitude( v) 230 99.6-401.4 87   3.0 ERG (combined) b-wave implicit time(ms) 49.1 30.1-54.2 49.1   MMMMM         3.0 Oscillatory Potentials  Present  Present Reduced     Light-Adapted         3.0 Flicker (30-Hz) amplitude( v) 32(38) 71.8-121.8 10(12)   3.0 Flicker (30-Hz) implicit time(ms) 26.6(59.9) 19.4-34.3 33.3(67.4)             3.0 ERG (cone) a-wave amplitude( v) -12 -50.5 to -17.3 -13   3.0 ERG (cone) a-wave implicit time(ms) 13.3 15.1-19.2 16.6   3.0 ERG (cone) b-wave amplitude( v) 39 80.8-168.1 11   3.0 ERG (cone) b-wave implicit time(ms) 30 25.3-33.3 25                    * =manipulated cursors                   parentheses=cursors at selected second peaks        INTERPRETATION:  This full-field electroretinogram was performed according to ISCEV standards using the ESPION E3 system and DTL fiber-recording electrodes. The patient tolerated the testing well. The waveforms are fairly reproducible and well formed. The normative values provided above represent the 95% confidence limits for a normal individual the age of the patient. The patient s responses are averaged. There is asymmetry of the responses in between both eyes. In addition for the DA 3.0 and 10.0 ERGs, there seem to be a negative deflection just after b-wave of unclear significance.     In dark-adapted conditions, the liyah-specific responses have normal amplitude and implicit time right eye and reduced amplitude with normal implicit time left eye. The maximal response, a combined liyah and cone response, has normal amplitude and implicit time right eye and a  reduced b-wave amplitude with normal implicit time left eye. The bright flash (scotopic 10.0) response is not electronegative. The oscillatory potentials are present bilaterally, but reduced left eye.   In light-adapted conditions, the 30-Hz flicker responses have a decreased amplitude and the implicit time is normal in both eyes.  The single photopic response has a decreased amplitude and the implicit time is normal in both eyes.     CONCLUSION: This electroretinogram is not normal and shows asymmetry of the responses between both eyes, with the right eye having greater amplitudes compared to left eye. There is decreased amplitude mainly of the cone responses in both eyes. 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 The differential diagnoses include: post-inflammatory retinopathy, toxic retinopathy, and autoimmune retinopathy. Consideration could be given to drawing blood for the retinal dystrophy panel with hopes of determining the mutations accounting for this retinal dystrophy. Clinical correlation is recommended.     A repeat electroretinogram could be considered in 1-2 years, or earlier if the clinical situation changes.     Gutierrez, thank you for the opportunity to provide electrophysiologic services for this patient.  Please do not hesitate to call if there should be any questions regarding these results.     Sincerely,     Mary Short MD     Retina Service   Department of Ophthalmology & Visual Neurosciences   [facility]  Phone: [phone]   Fax: [phone]

## 2019-04-03 NOTE — NURSING NOTE
Chief Complaints and History of Present Illnesses   Patient presents with     Follow Up     Chief Complaint(s) and History of Present Illness(es)     Follow Up     Laterality: both eyes    Associated symptoms: floaters and glare.  Negative for eye pain and flashes    Pain scale: 0/10              Comments     Discuss fferg results  Latanoprost every day RESHMA KEMP 9:37 AM April 3, 2019

## 2019-06-11 ENCOUNTER — COMMUNICATION - HEALTHEAST (OUTPATIENT)
Dept: INTERNAL MEDICINE | Facility: CLINIC | Age: 74
End: 2019-06-11

## 2019-06-11 DIAGNOSIS — G25.81 RLS (RESTLESS LEGS SYNDROME): ICD-10-CM

## 2019-07-18 ENCOUNTER — OFFICE VISIT - HEALTHEAST (OUTPATIENT)
Dept: INTERNAL MEDICINE | Facility: CLINIC | Age: 74
End: 2019-07-18

## 2019-07-18 ENCOUNTER — COMMUNICATION - HEALTHEAST (OUTPATIENT)
Dept: INTERNAL MEDICINE | Facility: CLINIC | Age: 74
End: 2019-07-18

## 2019-07-18 DIAGNOSIS — G25.81 RLS (RESTLESS LEGS SYNDROME): ICD-10-CM

## 2019-07-18 DIAGNOSIS — G47.33 OSA (OBSTRUCTIVE SLEEP APNEA): ICD-10-CM

## 2019-07-18 DIAGNOSIS — M25.812 SHOULDER IMPINGEMENT, LEFT: ICD-10-CM

## 2019-07-18 DIAGNOSIS — I10 ESSENTIAL HYPERTENSION: ICD-10-CM

## 2019-07-18 DIAGNOSIS — K62.5 RECTAL BLEEDING: ICD-10-CM

## 2019-07-18 DIAGNOSIS — G47.00 INSOMNIA: ICD-10-CM

## 2019-07-18 DIAGNOSIS — K21.9 GERD (GASTROESOPHAGEAL REFLUX DISEASE): ICD-10-CM

## 2019-07-18 DIAGNOSIS — Z00.00 MEDICARE ANNUAL WELLNESS VISIT, SUBSEQUENT: ICD-10-CM

## 2019-07-18 DIAGNOSIS — Z12.5 ENCOUNTER FOR SCREENING FOR MALIGNANT NEOPLASM OF PROSTATE: ICD-10-CM

## 2019-07-18 DIAGNOSIS — E66.812 OBESITY, CLASS II, BMI 35-39.9: ICD-10-CM

## 2019-07-18 LAB
ALBUMIN SERPL-MCNC: 4.2 G/DL (ref 3.5–5)
ALBUMIN UR-MCNC: NEGATIVE MG/DL
ALP SERPL-CCNC: 67 U/L (ref 45–120)
ALT SERPL W P-5'-P-CCNC: 47 U/L (ref 0–45)
ANION GAP SERPL CALCULATED.3IONS-SCNC: 11 MMOL/L (ref 5–18)
APPEARANCE UR: CLEAR
AST SERPL W P-5'-P-CCNC: 30 U/L (ref 0–40)
ATRIAL RATE - MUSE: 60 BPM
BACTERIA #/AREA URNS HPF: ABNORMAL HPF
BASOPHILS # BLD AUTO: 0.1 THOU/UL (ref 0–0.2)
BASOPHILS NFR BLD AUTO: 1 % (ref 0–2)
BILIRUB DIRECT SERPL-MCNC: 0.2 MG/DL
BILIRUB SERPL-MCNC: 0.5 MG/DL (ref 0–1)
BILIRUB UR QL STRIP: NEGATIVE
BUN SERPL-MCNC: 15 MG/DL (ref 8–28)
CALCIUM SERPL-MCNC: 9.7 MG/DL (ref 8.5–10.5)
CHLORIDE BLD-SCNC: 104 MMOL/L (ref 98–107)
CHOLEST SERPL-MCNC: 196 MG/DL
CO2 SERPL-SCNC: 24 MMOL/L (ref 22–31)
COLOR UR AUTO: YELLOW
CREAT SERPL-MCNC: 0.93 MG/DL (ref 0.7–1.3)
DIASTOLIC BLOOD PRESSURE - MUSE: NORMAL MMHG
EOSINOPHIL # BLD AUTO: 0.2 THOU/UL (ref 0–0.4)
EOSINOPHIL NFR BLD AUTO: 2 % (ref 0–6)
ERYTHROCYTE [DISTWIDTH] IN BLOOD BY AUTOMATED COUNT: 12.4 % (ref 11–14.5)
ERYTHROCYTE [SEDIMENTATION RATE] IN BLOOD BY WESTERGREN METHOD: 9 MM/HR (ref 0–15)
FASTING STATUS PATIENT QL REPORTED: YES
GFR SERPL CREATININE-BSD FRML MDRD: >60 ML/MIN/1.73M2
GLUCOSE BLD-MCNC: 88 MG/DL (ref 70–125)
GLUCOSE UR STRIP-MCNC: NEGATIVE MG/DL
HCT VFR BLD AUTO: 43.8 % (ref 40–54)
HDLC SERPL-MCNC: 39 MG/DL
HGB BLD-MCNC: 14.9 G/DL (ref 14–18)
HGB UR QL STRIP: NEGATIVE
INTERPRETATION ECG - MUSE: NORMAL
KETONES UR STRIP-MCNC: NEGATIVE MG/DL
LDLC SERPL CALC-MCNC: 116 MG/DL
LEUKOCYTE ESTERASE UR QL STRIP: ABNORMAL
LYMPHOCYTES # BLD AUTO: 1.4 THOU/UL (ref 0.8–4.4)
LYMPHOCYTES NFR BLD AUTO: 21 % (ref 20–40)
MCH RBC QN AUTO: 32 PG (ref 27–34)
MCHC RBC AUTO-ENTMCNC: 34 G/DL (ref 32–36)
MCV RBC AUTO: 94 FL (ref 80–100)
MONOCYTES # BLD AUTO: 0.6 THOU/UL (ref 0–0.9)
MONOCYTES NFR BLD AUTO: 10 % (ref 2–10)
NEUTROPHILS # BLD AUTO: 4.4 THOU/UL (ref 2–7.7)
NEUTROPHILS NFR BLD AUTO: 66 % (ref 50–70)
NITRATE UR QL: NEGATIVE
P AXIS - MUSE: 25 DEGREES
PH UR STRIP: 7 [PH] (ref 5–8)
PLATELET # BLD AUTO: 182 THOU/UL (ref 140–440)
PMV BLD AUTO: 7.8 FL (ref 7–10)
POTASSIUM BLD-SCNC: 4.5 MMOL/L (ref 3.5–5)
PR INTERVAL - MUSE: 210 MS
PROT SERPL-MCNC: 6.6 G/DL (ref 6–8)
PSA SERPL-MCNC: 6.3 NG/ML (ref 0–6.5)
QRS DURATION - MUSE: 94 MS
QT - MUSE: 432 MS
QTC - MUSE: 432 MS
R AXIS - MUSE: -8 DEGREES
RBC # BLD AUTO: 4.66 MILL/UL (ref 4.4–6.2)
RBC #/AREA URNS AUTO: ABNORMAL HPF
SODIUM SERPL-SCNC: 139 MMOL/L (ref 136–145)
SP GR UR STRIP: 1.01 (ref 1–1.03)
SQUAMOUS #/AREA URNS AUTO: ABNORMAL LPF
SYSTOLIC BLOOD PRESSURE - MUSE: NORMAL MMHG
T AXIS - MUSE: -7 DEGREES
TRIGL SERPL-MCNC: 207 MG/DL
TSH SERPL DL<=0.005 MIU/L-ACNC: 1.9 UIU/ML (ref 0.3–5)
UROBILINOGEN UR STRIP-ACNC: ABNORMAL
VENTRICULAR RATE- MUSE: 60 BPM
WBC #/AREA URNS AUTO: ABNORMAL HPF
WBC: 6.6 THOU/UL (ref 4–11)

## 2019-07-18 ASSESSMENT — MIFFLIN-ST. JEOR: SCORE: 1779.32

## 2019-07-19 ENCOUNTER — RECORDS - HEALTHEAST (OUTPATIENT)
Dept: ADMINISTRATIVE | Facility: OTHER | Age: 74
End: 2019-07-19

## 2019-07-19 LAB — BACTERIA SPEC CULT: NO GROWTH

## 2019-08-11 ENCOUNTER — COMMUNICATION - HEALTHEAST (OUTPATIENT)
Dept: INTERNAL MEDICINE | Facility: CLINIC | Age: 74
End: 2019-08-11

## 2019-08-11 DIAGNOSIS — G47.00 INSOMNIA: ICD-10-CM

## 2019-09-08 ENCOUNTER — COMMUNICATION - HEALTHEAST (OUTPATIENT)
Dept: INTERNAL MEDICINE | Facility: CLINIC | Age: 74
End: 2019-09-08

## 2019-09-08 DIAGNOSIS — G25.81 RLS (RESTLESS LEGS SYNDROME): ICD-10-CM

## 2019-09-30 ENCOUNTER — COMMUNICATION - HEALTHEAST (OUTPATIENT)
Dept: INTERNAL MEDICINE | Facility: CLINIC | Age: 74
End: 2019-09-30

## 2019-09-30 DIAGNOSIS — G47.00 INSOMNIA: ICD-10-CM

## 2019-11-11 ENCOUNTER — COMMUNICATION - HEALTHEAST (OUTPATIENT)
Dept: INTERNAL MEDICINE | Facility: CLINIC | Age: 74
End: 2019-11-11

## 2019-11-11 DIAGNOSIS — M25.812 SHOULDER IMPINGEMENT, LEFT: ICD-10-CM

## 2020-02-28 ENCOUNTER — RECORDS - HEALTHEAST (OUTPATIENT)
Dept: ADMINISTRATIVE | Facility: OTHER | Age: 75
End: 2020-02-28

## 2020-03-11 ENCOUNTER — HEALTH MAINTENANCE LETTER (OUTPATIENT)
Age: 75
End: 2020-03-11

## 2020-03-26 ENCOUNTER — COMMUNICATION - HEALTHEAST (OUTPATIENT)
Dept: INTERNAL MEDICINE | Facility: CLINIC | Age: 75
End: 2020-03-26

## 2020-03-26 DIAGNOSIS — M25.812 SHOULDER IMPINGEMENT, LEFT: ICD-10-CM

## 2020-07-19 ENCOUNTER — COMMUNICATION - HEALTHEAST (OUTPATIENT)
Dept: INTERNAL MEDICINE | Facility: CLINIC | Age: 75
End: 2020-07-19

## 2020-07-19 DIAGNOSIS — G47.00 INSOMNIA: ICD-10-CM

## 2020-09-07 ENCOUNTER — COMMUNICATION - HEALTHEAST (OUTPATIENT)
Dept: INTERNAL MEDICINE | Facility: CLINIC | Age: 75
End: 2020-09-07

## 2020-09-07 DIAGNOSIS — G47.00 INSOMNIA: ICD-10-CM

## 2020-09-07 DIAGNOSIS — G25.81 RLS (RESTLESS LEGS SYNDROME): ICD-10-CM

## 2020-09-27 ENCOUNTER — COMMUNICATION - HEALTHEAST (OUTPATIENT)
Dept: INTERNAL MEDICINE | Facility: CLINIC | Age: 75
End: 2020-09-27

## 2020-09-27 DIAGNOSIS — K21.9 GERD (GASTROESOPHAGEAL REFLUX DISEASE): ICD-10-CM

## 2020-09-30 ENCOUNTER — COMMUNICATION - HEALTHEAST (OUTPATIENT)
Dept: INTERNAL MEDICINE | Facility: CLINIC | Age: 75
End: 2020-09-30

## 2020-09-30 DIAGNOSIS — M25.812 SHOULDER IMPINGEMENT, LEFT: ICD-10-CM

## 2020-12-05 ENCOUNTER — COMMUNICATION - HEALTHEAST (OUTPATIENT)
Dept: INTERNAL MEDICINE | Facility: CLINIC | Age: 75
End: 2020-12-05

## 2020-12-05 DIAGNOSIS — K21.9 GERD (GASTROESOPHAGEAL REFLUX DISEASE): ICD-10-CM

## 2020-12-05 DIAGNOSIS — G47.00 INSOMNIA: ICD-10-CM

## 2020-12-05 DIAGNOSIS — G25.81 RLS (RESTLESS LEGS SYNDROME): ICD-10-CM

## 2020-12-07 ENCOUNTER — TRANSFERRED RECORDS (OUTPATIENT)
Dept: HEALTH INFORMATION MANAGEMENT | Facility: CLINIC | Age: 75
End: 2020-12-07

## 2020-12-16 ENCOUNTER — OFFICE VISIT - HEALTHEAST (OUTPATIENT)
Dept: INTERNAL MEDICINE | Facility: CLINIC | Age: 75
End: 2020-12-16

## 2020-12-16 DIAGNOSIS — G47.9 SLEEP DISORDER: ICD-10-CM

## 2020-12-16 DIAGNOSIS — E66.01 MORBID OBESITY (H): ICD-10-CM

## 2020-12-16 DIAGNOSIS — G47.33 OSA (OBSTRUCTIVE SLEEP APNEA): ICD-10-CM

## 2020-12-16 DIAGNOSIS — R97.20 ELEVATED PROSTATE SPECIFIC ANTIGEN (PSA): ICD-10-CM

## 2021-01-03 ENCOUNTER — HEALTH MAINTENANCE LETTER (OUTPATIENT)
Age: 76
End: 2021-01-03

## 2021-03-16 ENCOUNTER — COMMUNICATION - HEALTHEAST (OUTPATIENT)
Dept: FAMILY MEDICINE | Facility: CLINIC | Age: 76
End: 2021-03-16

## 2021-03-16 DIAGNOSIS — M25.812 SHOULDER IMPINGEMENT, LEFT: ICD-10-CM

## 2021-03-19 ENCOUNTER — COMMUNICATION - HEALTHEAST (OUTPATIENT)
Dept: INTERNAL MEDICINE | Facility: CLINIC | Age: 76
End: 2021-03-19

## 2021-03-19 DIAGNOSIS — G47.00 INSOMNIA: ICD-10-CM

## 2021-03-19 DIAGNOSIS — G25.81 RLS (RESTLESS LEGS SYNDROME): ICD-10-CM

## 2021-03-19 DIAGNOSIS — K21.9 GERD (GASTROESOPHAGEAL REFLUX DISEASE): ICD-10-CM

## 2021-04-12 ENCOUNTER — OFFICE VISIT - HEALTHEAST (OUTPATIENT)
Dept: INTERNAL MEDICINE | Facility: CLINIC | Age: 76
End: 2021-04-12

## 2021-04-12 DIAGNOSIS — Z86.0100 HISTORY OF COLONIC POLYPS: ICD-10-CM

## 2021-04-12 DIAGNOSIS — Z11.4 ENCOUNTER FOR SCREENING FOR HIV: ICD-10-CM

## 2021-04-12 DIAGNOSIS — Z11.59 ENCOUNTER FOR HEPATITIS C SCREENING TEST FOR LOW RISK PATIENT: ICD-10-CM

## 2021-04-12 DIAGNOSIS — K21.9 GASTROESOPHAGEAL REFLUX DISEASE WITHOUT ESOPHAGITIS: ICD-10-CM

## 2021-04-12 DIAGNOSIS — Z13.220 SCREENING FOR HYPERLIPIDEMIA: ICD-10-CM

## 2021-04-12 DIAGNOSIS — H93.13 TINNITUS OF BOTH EARS: ICD-10-CM

## 2021-04-12 DIAGNOSIS — E66.01 MORBID OBESITY (H): ICD-10-CM

## 2021-04-12 DIAGNOSIS — G47.33 OSA (OBSTRUCTIVE SLEEP APNEA): ICD-10-CM

## 2021-04-12 DIAGNOSIS — G25.81 RLS (RESTLESS LEGS SYNDROME): ICD-10-CM

## 2021-04-12 LAB
ALBUMIN SERPL-MCNC: 4.4 G/DL (ref 3.5–5)
ALP SERPL-CCNC: 64 U/L (ref 45–120)
ALT SERPL W P-5'-P-CCNC: 39 U/L (ref 0–45)
ANION GAP SERPL CALCULATED.3IONS-SCNC: 13 MMOL/L (ref 5–18)
AST SERPL W P-5'-P-CCNC: 28 U/L (ref 0–40)
BASOPHILS # BLD AUTO: 0.1 THOU/UL (ref 0–0.2)
BASOPHILS NFR BLD AUTO: 1 % (ref 0–2)
BILIRUB SERPL-MCNC: 0.7 MG/DL (ref 0–1)
BUN SERPL-MCNC: 16 MG/DL (ref 8–28)
CALCIUM SERPL-MCNC: 9.5 MG/DL (ref 8.5–10.5)
CHLORIDE BLD-SCNC: 102 MMOL/L (ref 98–107)
CHOLEST SERPL-MCNC: 202 MG/DL
CO2 SERPL-SCNC: 25 MMOL/L (ref 22–31)
CREAT SERPL-MCNC: 1.1 MG/DL (ref 0.7–1.3)
EOSINOPHIL # BLD AUTO: 0.1 THOU/UL (ref 0–0.4)
EOSINOPHIL NFR BLD AUTO: 2 % (ref 0–6)
ERYTHROCYTE [DISTWIDTH] IN BLOOD BY AUTOMATED COUNT: 12.9 % (ref 11–14.5)
FASTING STATUS PATIENT QL REPORTED: YES
GFR SERPL CREATININE-BSD FRML MDRD: >60 ML/MIN/1.73M2
GLUCOSE BLD-MCNC: 91 MG/DL (ref 70–125)
HCT VFR BLD AUTO: 45 % (ref 40–54)
HDLC SERPL-MCNC: 39 MG/DL
HGB BLD-MCNC: 15.1 G/DL (ref 14–18)
HIV 1+2 AB+HIV1 P24 AG SERPL QL IA: NEGATIVE
IMM GRANULOCYTES # BLD: 0 THOU/UL
IMM GRANULOCYTES NFR BLD: 0 %
LDLC SERPL CALC-MCNC: 130 MG/DL
LYMPHOCYTES # BLD AUTO: 1.3 THOU/UL (ref 0.8–4.4)
LYMPHOCYTES NFR BLD AUTO: 19 % (ref 20–40)
MCH RBC QN AUTO: 31.1 PG (ref 27–34)
MCHC RBC AUTO-ENTMCNC: 33.6 G/DL (ref 32–36)
MCV RBC AUTO: 93 FL (ref 80–100)
MONOCYTES # BLD AUTO: 0.7 THOU/UL (ref 0–0.9)
MONOCYTES NFR BLD AUTO: 10 % (ref 2–10)
NEUTROPHILS # BLD AUTO: 4.5 THOU/UL (ref 2–7.7)
NEUTROPHILS NFR BLD AUTO: 68 % (ref 50–70)
PLATELET # BLD AUTO: 191 THOU/UL (ref 140–440)
PMV BLD AUTO: 9.1 FL (ref 7–10)
POTASSIUM BLD-SCNC: 4.3 MMOL/L (ref 3.5–5)
PROT SERPL-MCNC: 7.3 G/DL (ref 6–8)
RBC # BLD AUTO: 4.86 MILL/UL (ref 4.4–6.2)
SODIUM SERPL-SCNC: 140 MMOL/L (ref 136–145)
TRIGL SERPL-MCNC: 165 MG/DL
WBC: 6.6 THOU/UL (ref 4–11)

## 2021-04-12 ASSESSMENT — MIFFLIN-ST. JEOR: SCORE: 1774.79

## 2021-04-13 LAB — HCV AB SERPL QL IA: NEGATIVE

## 2021-04-14 ENCOUNTER — COMMUNICATION - HEALTHEAST (OUTPATIENT)
Dept: INTERNAL MEDICINE | Facility: CLINIC | Age: 76
End: 2021-04-14

## 2021-04-25 ENCOUNTER — HEALTH MAINTENANCE LETTER (OUTPATIENT)
Age: 76
End: 2021-04-25

## 2021-05-27 NOTE — TELEPHONE ENCOUNTER
"Orders being requested: C-pap machine  Reason service is needed/diagnosis: Insomnia- Patient stated my machine displayed the message \"This motor has exceeded its life limit.\" and I fear it will break down soon as it has been used for 15 years.   When are orders needed by: asap  Where to send Orders: [facility]   Okay to leave detailed message?  Yes    Please is also scheduling an appointment for a RHM to discuss if needed in this appointment.       "

## 2021-05-27 NOTE — TELEPHONE ENCOUNTER
Spoke with Lady at [facility], the patient is able to get a new CPAP machine, but will need a face to face visit with Dr. Corrales, that states something about how the patient uses and benefits from his CPAP use.  Then he will need a prescription for a new CPAP machine with all the supplies.      Spoke with the patient and relayed message from [facility].  He verbalized understanding and has scheduled an annual wellness visit with Dr. Corrales for 5/22/19.  Patient had no further questions at this time.  Teagan MARI CMA/LYLE....................11:04 AM

## 2021-05-28 ENCOUNTER — RECORDS - HEALTHEAST (OUTPATIENT)
Dept: ADMINISTRATIVE | Facility: CLINIC | Age: 76
End: 2021-05-28

## 2021-05-29 NOTE — TELEPHONE ENCOUNTER
RN cannot approve Refill Request    RN can NOT refill this medication overdue for office visits and/or labs.    Marty Medina, [facility] Triage/Med Refill 6/12/2019    Requested Prescriptions   Pending Prescriptions Disp Refills     pramipexole (MIRAPEX) 0.5 MG tablet [Pharmacy Med Name: PRAMIPEXOLE 0.5MG TABLETS] 180 tablet 0     Sig: TAKE 1 TABLET BY MOUTH TWICE DAILY FOR RESTLESS LEG       Parkinson's Meds I Refill Protocol Failed - 6/11/2019 12:24 PM        Failed - PCP or prescribing provider visit in past 6 months      Last office visit with prescriber/PCP: Visit date not found OR same dept: Visit date not found OR same specialty: 8/30/2017 Carlos Corrales MD Last physical: Visit date not found Last MTM visit: Visit date not found     Next appt within 3 mo: Visit date not found  Next physical within 3 mo: Visit date not found  Prescriber OR PCP: Carlos Corrales MD  Last diagnosis associated with med order: There are no diagnoses linked to this encounter.  If protocol passes may refill for 6 months if within 3 months of last provider visit (or a total of 9 months).

## 2021-05-30 NOTE — TELEPHONE ENCOUNTER
Who is calling:  The patient   Reason for Call:  The patient states that he is at [facility]. The patient states that the visit notes need to state that he uses the CPAP and benefits from the CPAP. The patient states that [facility] will be faxing over an order to Dr. Corrales that needs to be signed and also faxed to Rhonda, along with visit notes.   Date of last appointment with primary care: 4/3/2019  Okay to leave a detailed message: Yes

## 2021-05-30 NOTE — PROGRESS NOTES
Assessment and Plan:        1. Medicare annual wellness visit, subsequent  Completed  - [facility](CBC and Differential)  - Erythrocyte Sedimentation Rate  - Urinalysis-UC if Indicated  - Basic Metabolic Panel  - Hepatic Profile  - Lipid Cascade  - Thyroid Goodwell  - PSA (Prostatic-Specific Antigen), Annual Screen  - Electrocardiogram Perform - Clinic  - [facility] (CBC with Diff)    2. Insomnia  Chronic.  Doing well.  This is also helping his neck pain  - traZODone (DESYREL) 100 MG tablet; Take 1 tablet (100 mg total) by mouth at bedtime.  Dispense: 90 tablet; Refill: 3    3. GERD (gastroesophageal reflux disease)  Controlled.  - omeprazole (PRILOSEC) 20 MG capsule; Take 1 capsule (20 mg total) by mouth daily.  Dispense: 90 capsule; Refill: 3    4. Shoulder impingement, left  May restart meloxicam as needed no contraindication  - meloxicam (MOBIC) 15 MG tablet; Take 1 tablet (15 mg total) by mouth daily as needed for pain.  Dispense: 90 tablet; Refill: 0    5. Essential hypertension  Normotensive.  I would like him to check his blood pressure weekly  - Electrocardiogram Perform - Clinic    6. RLS (restless legs syndrome)  Good response to Mirapex unremarkable exam  - pramipexole (MIRAPEX) 0.5 MG tablet; TAKE 1 TABLET BY MOUTH  DAILY FOR RESTLESS LEG  Dispense: 180 tablet; Refill: 0    7. Encounter for screening for malignant neoplasm of prostate   Unremarkable exam  - PSA (Prostatic-Specific Antigen), Annual Screen    8. Rectal bleeding  May be every 4 to 6 months.  He needs colonoscopy.  He did not do last year  - Ambulatory referral for Colonoscopy    9. BRITTNI (obstructive sleep apnea)  Needs new CPAP machine-Rx written    10. Obesity, Class II, BMI 35-39.9  Diet exercise weight loss-discussed    The patient's current medical problems were reviewed.      In addition to the wellness examination additional time was spent addressing the following listed problems.   Above issues    In doing so, this provider spent greater than 25  min. face-to-face time with the patient and/or his family.  More than half this time was spent in counseling and or coordination of care which was consistent with the nature of this patient's problems which are listed and described in the assessment and plan.      May start meloxicam  Laboratory  Routine ECG  Colonoscopy  Reassured regarding spider varicosities.  Medication refill  CPAP Rx-machine was 20 years-he is using it faithfully and it is helping    Carlos Corrales MD  Internal medicine  [facility]  [phone]  [email or web address]      The following health maintenance schedule was reviewed with the patient and provided in printed form in the after visit summary:   Health Maintenance   Topic Date Due     PNEUMOCOCCAL POLYSACCHARIDE VACCINE AGE 65 AND OVER  09/09/2011     PNEUMOCOCCAL CONJUGATE VACCINE FOR ADULTS (PCV13 OR PREVNAR)  09/09/2011     ZOSTER VACCINES (2 of 3) 02/04/2013     TD 18+ HE  09/09/2014     FALL RISK ASSESSMENT  05/16/2019     INFLUENZA VACCINE RULE BASED (1) 08/01/2019     ADVANCE DIRECTIVES DISCUSSED WITH PATIENT  11/13/2020     COLONOSCOPY  04/25/2024        Subjective:   Chief Complaint: Toni Collado is an 73 y.o. male here for an Annual Wellness visit.   HPI: In for interval exam    Feeling well    Active retired  He is having more neck pain and some hand pain and shoulder pain.  This is old.  Meloxicam which he is taking intermittently helps.  No contraindication to taking it more frequently.  He had some neck injections in the past.    Cataract surgery went well.  Right eye perfect left eye improved but not as much as right    Hypertension-There are no cardiovascular, respiratory, neurologic complaints.No claudication.There is no orthostasis.Patient is compliant with medications.  Medications reviewed.   No side effects from medication.  Sleep apnea-CPAP machine regularly use is beneficial.  Machine 20 years old.  Noticed came up on  machine end of life    GERD-controlled with PPI    Restless leg syndrome.  Taking Mirapex without sedation side effects or sudden sleep.  Very effective.    Review of Systems: Does have intermittent rectal bleeding  Maybe every 4 to 6 months  Painless  Red  When wipes  Did not have colonoscopy as recommended last year    No hematuria    Please see above.  The rest of the review of systems are negative for all systems.    Patient Care Team:  Carlos Corrales MD as PCP - General (Internal Medicine)     Patient Active Problem List   Diagnosis     Insomnia     RLS (restless legs syndrome)     Past Medical History:   Diagnosis Date     Bulbar polio 1952    residual dysphagia     Bulbar polio 1952     Cervical osteoarthritis      Chronic anxiety      Chronic depression      Familial tremor      GERD (gastroesophageal reflux disease)      GERD (gastroesophageal reflux disease)      Hallux limitus of left foot      Obesity      RLS (restless legs syndrome)      Sleep apnea, obstructive     CPAP     Tremor       Past Surgical History:   Procedure Laterality Date     RHINOPLASTY       SEPTOPLASTY       SHOULDER SURGERY Right     reconstructive surgery with hardware     torn meniscus Left     knee arthroscopy     TOTAL KNEE ARTHROPLASTY Left     unicompartmental     UMBILICAL HERNIA REPAIR        Family History   Problem Relation Age of Onset     Heart failure Father         ihd     Heart failure Mother         valvular heart disease     Cancer Brother         renal     Heart failure Brother         ihd     Thyroid disease Sister      Myelodysplastic syndrome Father       Social History     Socioeconomic History     Marital status:      Spouse name: Not on file     Number of children: Not on file     Years of education: Not on file     Highest education level: Not on file   Occupational History     Not on file   Social Needs     Financial resource strain: Not on file     Food insecurity:     Worry: Not on file      "Inability: Not on file     Transportation needs:     Medical: Not on file     Non-medical: Not on file   Tobacco Use     Smoking status: Former Smoker     Last attempt to quit: 1975     Years since quittin.6     Smokeless tobacco: Never Used   Substance and Sexual Activity     Alcohol use: Yes     Comment: occasional     Drug use: No     Sexual activity: Not on file   Lifestyle     Physical activity:     Days per week: Not on file     Minutes per session: Not on file     Stress: Not on file   Relationships     Social connections:     Talks on phone: Not on file     Gets together: Not on file     Attends Taoist service: Not on file     Active member of club or organization: Not on file     Attends meetings of clubs or organizations: Not on file     Relationship status: Not on file     Intimate partner violence:     Fear of current or ex partner: Not on file     Emotionally abused: Not on file     Physically abused: Not on file     Forced sexual activity: Not on file   Other Topics Concern     Not on file   Social History Narrative    WIFE-EJSSIE RETIRED    SON [employer]ERMAN [employer]ODR[employer]GUEZDADIANA    RETIRED , [place]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 90 tablet 3     multivitamin therapeutic (THERAGRAN) tablet Take 1 tablet by mouth daily.       omeprazole (PRILOSEC) 20 MG capsule TAKE 1 CAPSULE BY MOUTH DAILY 90 capsule 1     pramipexole (MIRAPEX) 0.5 MG tablet TAKE 1 TABLET BY MOUTH TWICE DAILY FOR RESTLESS LEG (Patient taking differently: TAKE 1 TABLET BY MOUTH  DAILY FOR RESTLESS LEG) 180 tablet 0     traZODone (DESYREL) 100 MG tablet Take 1 tablet (100 mg total) by mouth at bedtime. 90 tablet 3     No current facility-administered medications for this visit.       Objective:   Vital Signs:   Visit Vitals  /78   Pulse 65   Ht 5' 6.5\" " (1.689 m)   Wt (!) 240 lb (108.9 kg)   SpO2 96%   BMI 38.16 kg/m         VisionScreening:  No exam data present     PHYSICAL EXAM  Very pleasant  Well-groomed    Patient is overweight    Skin: Normal. No rash or lesion  Lymph Nodes: None palpable-including neck, axilla, inguinal, epitrochlear.  Head:  Normocephalic.    Eyes: Midline.  Equal size., full ROM.  External exams normal.  No icterus  Ears:  Normal pinnae, canals, and TM's.    Nose:  Patent, without deformity.    Throat:  Moist mucous membranes without lesions, erythema, or exudate.    Neck: No palpable masses, lymphadenopathy or tenderness.No thyromegaly or goiter.  No thyroid nodule.  Carotid Arteries:  No Bruit.  Carotid upstroke normal  Chest Wall: No deformity or pain elicited on compression.  Respiratory:  Normal respiratory effort.  Lungs are clear with good breath sounds.  No dullness.  No wheezing.  Heart: Regular rhythm.  Normal sounding S1, S2 without S3, S4, murmurs, rubs, or gallops.    Abdomen:  The abdomen was rounded, soft and nontender without guarding rebound or masses.  There are normal bowel sounds.  There is no hepatosplenomegaly.  There is no palpable enlargement of the aorta.  Genitalia:  Circumcised male without penile or testicular lesions.  No hernia.  Rectal/Prostate:  No external lesions.  Sphincter tone normal.  No palpable rectal lesions.  There are no hemorrhoids noted   Prostate 2/4 BPH without nodule, smooth, nontender without palpable lesions.    Extremities:  Full ROM without limitation, deformity or edema.    4+ pulses.  Patient has superficial spider varicosities.  Neurologic-intact- No focal deficit.  Speech clear.  Coordination normal.  Strength symmetric  Some osteoarthritic changes.  No synovitis        Assessment Results 7/18/2019   Activities of Daily Living No help needed   Instrumental Activities of Daily Living No help needed   Get Up and Go Score Less than 12 seconds   Mini Cog Total Score 5   Some recent data  might be hidden     A Mini-Cog score of 0-2 suggests the possibility of dementia, score of 3-5 suggests no dementia    Identified Health Risks:     The patient was provided with written information regarding signs of hearing loss.  Information regarding advance directives (living hirsch), including where he can download the appropriate form, was provided to the patient via the AVS.

## 2021-05-30 NOTE — TELEPHONE ENCOUNTER
Requested information along with ov have been faxed. Patient has been notified.    Amena Espinal, CMA

## 2021-05-31 NOTE — TELEPHONE ENCOUNTER
Refill Approved    Rx renewed per Medication Renewal Policy. Medication was last renewed on 7/18/2019 with 3 refills. ? Should have refills remaining. Order retransmitted.  Last office visit: 7/18/2019 with PCP Dr COURTNEY Corrales.      Gloria Moeller, [facility] Triage/Med Refill 8/11/2019     Requested Prescriptions   Pending Prescriptions Disp Refills     traZODone (DESYREL) 100 MG tablet [Pharmacy Med Name: TRAZODONE 100MG TABLETS] 90 tablet 0     Sig: TAKE 1 TABLET(100 MG) BY MOUTH AT BEDTIME       Tricyclics/Misc Antidepressant/Antianxiety Meds Refill Protocol Passed - 8/11/2019  3:17 AM        Passed - PCP or prescribing provider visit in last year     Last office visit with prescriber/PCP: 8/30/2017 Carlos Corrales MD OR same dept: Visit date not found OR same specialty: 8/30/2017 Carlos Corrales MD  Last physical: 7/18/2019 Last MTM visit: Visit date not found   Next visit within 3 mo: Visit date not found  Next physical within 3 mo: Visit date not found  Prescriber OR PCP: Carlos Corrales MD  Last diagnosis associated with med order: 1. Insomnia  - traZODone (DESYREL) 100 MG tablet [Pharmacy Med Name: TRAZODONE 100MG TABLETS]; TAKE 1 TABLET(100 MG) BY MOUTH AT BEDTIME  Dispense: 90 tablet; Refill: 0    If protocol passes may refill for 12 months if within 3 months of last provider visit (or a total of 15 months).

## 2021-06-01 VITALS — HEIGHT: 67 IN | BODY MASS INDEX: 37.22 KG/M2 | WEIGHT: 237.12 LBS

## 2021-06-01 VITALS — WEIGHT: 237 LBS | BODY MASS INDEX: 37.2 KG/M2 | HEIGHT: 67 IN

## 2021-06-01 NOTE — TELEPHONE ENCOUNTER
Refill Approved    Rx renewed per Medication Renewal Policy. Medication was last renewed on 7/18/19  OV 7/18/19 .    Dawna Unger, [facility] Triage/Med Refill 9/8/2019     Requested Prescriptions   Pending Prescriptions Disp Refills     pramipexole (MIRAPEX) 0.5 MG tablet [Pharmacy Med Name: PRAMIPEXOLE 0.5MG TABLETS] 180 tablet 0     Sig: TAKE 1 TABLET BY MOUTH TWICE DAILY FOR RESTLESS LEG       Parkinson's Meds I Refill Protocol Passed - 9/8/2019 12:50 PM        Passed - PCP or prescribing provider visit in past 6 months      Last office visit with prescriber/PCP: Visit date not found OR same dept: Visit date not found OR same specialty: 8/30/2017 Carlos Corrales MD Last physical: 7/18/2019 Last MTM visit: Visit date not found     Next appt within 3 mo: Visit date not found  Next physical within 3 mo: Visit date not found  Prescriber OR PCP: Carlos Corrales MD  Last diagnosis associated with med order: There are no diagnoses linked to this encounter.  If protocol passes may refill for 6 months if within 3 months of last provider visit (or a total of 9 months).

## 2021-06-01 NOTE — TELEPHONE ENCOUNTER
Refill Approved    Rx renewed per Medication Renewal Policy. Medication was last renewed on 9/18/18.    Stacy Rucker, [facility] Triage/Med Refill 9/30/2019     Requested Prescriptions   Pending Prescriptions Disp Refills     citalopram (CELEXA) 40 MG tablet 90 tablet 3     Sig: Take 1 tablet (40 mg total) by mouth daily.       SSRI Refill Protocol  Passed - 9/30/2019  2:20 PM        Passed - PCP or prescribing provider visit in last year     Last office visit with prescriber/PCP: 8/30/2017 Carlos Corrales MD OR same dept: Visit date not found OR same specialty: 8/30/2017 Carlos Corrales MD  Last physical: 7/18/2019 Last MTM visit: Visit date not found   Next visit within 3 mo: Visit date not found  Next physical within 3 mo: Visit date not found  Prescriber OR PCP: Carlos Corrales MD  Last diagnosis associated with med order: 1. Insomnia  - citalopram (CELEXA) 40 MG tablet; Take 1 tablet (40 mg total) by mouth daily.  Dispense: 90 tablet; Refill: 3    If protocol passes may refill for 12 months if within 3 months of last provider visit (or a total of 15 months).

## 2021-06-03 VITALS — BODY MASS INDEX: 37.67 KG/M2 | WEIGHT: 240 LBS | HEIGHT: 67 IN

## 2021-06-03 NOTE — TELEPHONE ENCOUNTER
RN cannot approve Refill Request    RN can NOT refill this medication med is not covered by policy/route to provider. Last office visit: 8/30/2017 Carlos Corrales MD Last Physical: 7/18/2019 Last MTM visit: Visit date not found Last visit same specialty: 8/30/2017 Carlos Corrales MD.  Next visit within 3 mo: Visit date not found  Next physical within 3 mo: Visit date not found      Carmen Morgan, [facility] Triage/Med Refill 11/11/2019    Requested Prescriptions   Pending Prescriptions Disp Refills     meloxicam (MOBIC) 15 MG tablet [Pharmacy Med Name: MELOXICAM 15MG TABLETS] 90 tablet 0     Sig: TAKE 1 TABLET(15 MG) BY MOUTH DAILY AS NEEDED FOR PAIN       There is no refill protocol information for this order

## 2021-06-05 VITALS
HEIGHT: 67 IN | HEART RATE: 64 BPM | DIASTOLIC BLOOD PRESSURE: 80 MMHG | SYSTOLIC BLOOD PRESSURE: 130 MMHG | OXYGEN SATURATION: 94 % | WEIGHT: 239 LBS | BODY MASS INDEX: 37.51 KG/M2

## 2021-06-07 NOTE — TELEPHONE ENCOUNTER
RN cannot approve Refill Request    RN can NOT refill this medication med is not covered by policy/route to provider     . Last office visit: 8/30/2017 Carlos Corrales MD Last Physical: 7/18/2019 Last MTM visit: Visit date not found Last visit same specialty: 8/30/2017 Carlos Corrales MD.  Next visit within 3 mo: Visit date not found  Next physical within 3 mo: Visit date not found      Stacy Rucker, [facility] Triage/Med Refill 3/27/2020    Requested Prescriptions   Pending Prescriptions Disp Refills     meloxicam (MOBIC) 15 MG tablet [Pharmacy Med Name: MELOXICAM 15MG TABLETS] 90 tablet 0     Sig: TAKE 1 TABLET(15 MG) BY MOUTH DAILY AS NEEDED FOR PAIN       There is no refill protocol information for this order

## 2021-06-09 NOTE — TELEPHONE ENCOUNTER
Former patient of Dr COURTNEY Corrales  & has not established care with another provider.  Please assign refill request to covering provider per Clinic standard process.    RN cannot approve Refill Request    RN can NOT refill this medication Provider no longer at this clinic.  . Last office visit: 8/30/2017 Carlos Corrales MD Last Physical: 7/18/2019 Last MTM visit: Visit date not found Last visit same specialty: 8/30/2017 Carlos Corrales MD.  Next visit within 3 mo: Visit date not found  Next physical within 3 mo: Visit date not found      Gloria Moeller, [facility] Triage/Med Refill 7/19/2020    Requested Prescriptions   Pending Prescriptions Disp Refills     traZODone (DESYREL) 100 MG tablet [Pharmacy Med Name: TRAZODONE 100MG TABLETS] 90 tablet 3     Sig: TAKE 1 TABLET(100 MG) BY MOUTH AT BEDTIME       Tricyclics/Misc Antidepressant/Antianxiety Meds Refill Protocol Failed - 7/19/2020 11:10 AM        Failed - PCP or prescribing provider visit in last year     Last office visit with prescriber/PCP: 8/30/2017 Carlos Corrales MD OR same dept: Visit date not found OR same specialty: 8/30/2017 Carlos Corrales MD  Last physical: 7/18/2019 Last MTM visit: Visit date not found   Next visit within 3 mo: Visit date not found  Next physical within 3 mo: Visit date not found  Prescriber OR PCP: Carlos Corrales MD  Last diagnosis associated with med order: 1. Insomnia  - traZODone (DESYREL) 100 MG tablet [Pharmacy Med Name: TRAZODONE 100MG TABLETS]; TAKE 1 TABLET(100 MG) BY MOUTH AT BEDTIME  Dispense: 90 tablet; Refill: 3    If protocol passes may refill for 12 months if within 3 months of last provider visit (or a total of 15 months).

## 2021-06-11 NOTE — TELEPHONE ENCOUNTER
RN cannot approve Refill Request    RN can NOT refill this medication Protocol failed and NO refill given. Last office visit: 8/30/2017 Carlos Corrales MD Last Physical: 7/18/2019 Last MTM visit: Visit date not found Last visit same specialty: 8/30/2017 Carlos Corrales MD.  Next visit within 3 mo: Visit date not found  Next physical within 3 mo: Visit date not found      Stacy Rucker, [facility] Connection Triage/Med Refill 9/8/2020    Requested Prescriptions   Pending Prescriptions Disp Refills     pramipexole (MIRAPEX) 0.5 MG tablet [Pharmacy Med Name: PRAMIPEXOLE 0.5MG TABLETS] 180 tablet 0     Sig: TAKE 1 TABLET BY MOUTH TWICE DAILY FOR RESTLESS LEG       Parkinson's Meds I Refill Protocol Failed - 9/7/2020  5:07 PM        Failed - PCP or prescribing provider visit in past 6 months      Last office visit with prescriber/PCP: Visit date not found OR same dept: Visit date not found OR same specialty: 8/30/2017 Carlos Corrales MD Last physical: Visit date not found Last MTM visit: Visit date not found     Next appt within 3 mo: Visit date not found  Next physical within 3 mo: Visit date not found  Prescriber OR PCP: Carlos Corrales MD  Last diagnosis associated with med order: 1. Insomnia  - citalopram (CELEXA) 40 MG tablet [Pharmacy Med Name: CITALOPRAM 40MG TABLETS]; TAKE 1 TABLET(40 MG) BY MOUTH DAILY  Dispense: 90 tablet; Refill: 3    If protocol passes may refill for 6 months if within 3 months of last provider visit (or a total of 9 months).             Pramipexole/Ropinirole Refill Protocol Failed - 9/7/2020  5:07 PM        Failed - PCP or prescribing provider visit in past 6 months      Last office visit with prescriber/PCP: Visit date not found OR same dept: Visit date not found OR same specialty: 8/30/2017 Carlos Corrales MD Last physical: Visit date not found Last MTM visit: Visit date not found         Next appt within 3 mo: Visit date not found  Next physical within 3 mo: Visit date not  found  Prescriber OR PCP: Carlos Corrales MD  Last diagnosis associated with med order: 1. Insomnia  - citalopram (CELEXA) 40 MG tablet [Pharmacy Med Name: CITALOPRAM 40MG TABLETS]; TAKE 1 TABLET(40 MG) BY MOUTH DAILY  Dispense: 90 tablet; Refill: 3     If protocol passes may refill for 6 months if within 3 months of last provider visit (or a total of 9 months).                 citalopram (CELEXA) 40 MG tablet [Pharmacy Med Name: CITALOPRAM 40MG TABLETS] 90 tablet 3     Sig: TAKE 1 TABLET(40 MG) BY MOUTH DAILY       SSRI Refill Protocol  Failed - 9/7/2020  5:07 PM        Failed - PCP or prescribing provider visit in last year     Last office visit with prescriber/PCP: 8/30/2017 Carlos Corrales MD OR same dept: Visit date not found OR same specialty: 8/30/2017 Carlos Corrales MD  Last physical: 7/18/2019 Last MTM visit: Visit date not found   Next visit within 3 mo: Visit date not found  Next physical within 3 mo: Visit date not found  Prescriber OR PCP: Carlos Corrales MD  Last diagnosis associated with med order: 1. Insomnia  - citalopram (CELEXA) 40 MG tablet [Pharmacy Med Name: CITALOPRAM 40MG TABLETS]; TAKE 1 TABLET(40 MG) BY MOUTH DAILY  Dispense: 90 tablet; Refill: 3    If protocol passes may refill for 12 months if within 3 months of last provider visit (or a total of 15 months).

## 2021-06-11 NOTE — TELEPHONE ENCOUNTER
RN cannot approve Refill Request    RN can NOT refill this medication No established PCP. Last office visit: 8/30/2017 Carlos Corrales MD Last Physical: 7/18/2019 Last MTM visit: Visit date not found Last visit same specialty: 8/30/2017 Carlos Corrales MD.  Next visit within 3 mo: Visit date not found  Next physical within 3 mo: Visit date not found      Radha Fabian, [facility] Triage/Med Refill 9/28/2020    Requested Prescriptions   Pending Prescriptions Disp Refills     omeprazole (PRILOSEC) 20 MG capsule [Pharmacy Med Name: OMEPRAZOLE 20MG CAPSULES] 90 capsule 3     Sig: TAKE 1 CAPSULE(20 MG) BY MOUTH DAILY       GI Medications Refill Protocol Failed - 9/27/2020  7:17 PM        Failed - PCP or prescribing provider visit in last 12 or next 3 months.     Last office visit with prescriber/PCP: 8/30/2017 Carlos Corrales MD OR same dept: Visit date not found OR same specialty: 8/30/2017 Carlos Corrales MD  Last physical: 7/18/2019 Last MTM visit: Visit date not found   Next visit within 3 mo: Visit date not found  Next physical within 3 mo: Visit date not found  Prescriber OR PCP: Carlos Corrales MD  Last diagnosis associated with med order: 1. GERD (gastroesophageal reflux disease)  - omeprazole (PRILOSEC) 20 MG capsule [Pharmacy Med Name: OMEPRAZOLE 20MG CAPSULES]; TAKE 1 CAPSULE(20 MG) BY MOUTH DAILY  Dispense: 90 capsule; Refill: 3    If protocol passes may refill for 12 months if within 3 months of last provider visit (or a total of 15 months).

## 2021-06-11 NOTE — TELEPHONE ENCOUNTER
Former patient of Dr COURTNEY Corrales & has not established care with another provider.  Please assign refill request to covering provider per Clinic standard process.    RN cannot approve Refill Request    RN can NOT refill this medication med is not covered by policy/route to provider. Last office visit: 8/30/2017 Carlos Corrales MD Last Physical: 7/18/2019 Last MTM visit: Visit date not found Last visit same specialty: 8/30/2017 Carlos Corrales MD.  Next visit within 3 mo: Visit date not found  Next physical within 3 mo: Visit date not found      Gloria Moeller, [facility] Triage/Med Refill 9/30/2020    Requested Prescriptions   Pending Prescriptions Disp Refills     meloxicam (MOBIC) 15 MG tablet [Pharmacy Med Name: MELOXICAM 15MG TABLETS] 90 tablet 0     Sig: TAKE 1 TABLET(15 MG) BY MOUTH DAILY AS NEEDED FOR PAIN       There is no refill protocol information for this order

## 2021-06-12 NOTE — PROGRESS NOTES
OFFICE VISIT NOTE  Toni Collado   71 y.o. male            Assessment/Plan for  Toni Collado is a 71 y.o. male.  No Patient Care Coordination Note on file.       1. Essential hypertension  Controlled    2. GERD (gastroesophageal reflux disease)  Concerns about omeprazole.  Change to Zantac.  Off PPI he has terrific reflux.  Wife uses Carafate  - ranitidine (ZANTAC) 300 MG tablet; Take 1 tablet (300 mg total) by mouth at bedtime.  Dispense: 30 tablet; Refill: 1    3. Neuropathic pain, leg, left  On gabapentin-may discontinue if desires    4. Seborrheic keratosis  Reassured.  He has a lot of moles.  Suggest he has preventative visit with dermatologist    5. Preventative health care  Lab to be drawn  Schedule wellness exam  - HM1(CBC and Differential)  - Basic Metabolic Panel  - Hepatic Profile  - Lipid Cascade  - Thyroid Bandera  - PSA (Prostatic-Specific Antigen), Annual Screen  - Urinalysis-UC if Indicated  - HM1 (CBC with Diff)  - Culture, Urine         Plan:  Same medication  Routine lab  DC omeprazole  Start Zantac 301 at bedtime  Recommendation consultation complete body exam  Recommend wellness exam    There are no Patient Instructions on file for this visit.    Diagnoses and all orders for this visit:    Essential hypertension    GERD (gastroesophageal reflux disease)  -     ranitidine (ZANTAC) 300 MG tablet; Take 1 tablet (300 mg total) by mouth at bedtime.  Dispense: 30 tablet; Refill: 1    Neuropathic pain, leg, left    Seborrheic keratosis    Preventative health care  -     HM1(CBC and Differential)  -     Basic Metabolic Panel  -     Hepatic Profile  -     Lipid Cascade  -     Thyroid Bandera  -     PSA (Prostatic-Specific Antigen), Annual Screen  -     Urinalysis-UC if Indicated  -     HM1 (CBC with Diff)  -     Culture, Urine        Medications after visit  Current Outpatient Prescriptions   Medication Sig Dispense Refill     aspirin 81 MG EC tablet Take 81 mg by mouth daily.       citalopram  (CELEXA) 40 MG tablet TAKE 1 TABLET BY MOUTH DAILY 90 tablet 3     gabapentin (NEURONTIN) 300 MG capsule Take 1 capsule (300 mg total) by mouth at bedtime. 5 capsule 0     meloxicam (MOBIC) 15 MG tablet TAKE 1 TABLET BY MOUTH DAILY 90 tablet 0     multivitamin therapeutic (THERAGRAN) tablet Take 1 tablet by mouth daily.       omeprazole (PRILOSEC) 20 MG capsule TAKE 1 CAPSULE BY MOUTH DAILY 90 capsule 0     pramipexole (MIRAPEX) 0.5 MG tablet Take 0.5 mg by mouth bedtime.       ranitidine (ZANTAC) 300 MG tablet Take 1 tablet (300 mg total) by mouth at bedtime. 30 tablet 1     traZODone (DESYREL) 50 MG tablet Take 100 mg by mouth bedtime.        No current facility-administered medications for this visit.                       Carlos Corrales MD  Internal medicine  [facility]  [phone]  [email or web address]      This is an electronically verified report by Carlos Corrales M.D.  (Note created with Dragon voice recognition and unintended spelling errors and word substitutions may occur)               Subjective:   Chief Complaint:  No chief complaint on file.    Have not seen for some time  His foot surgery went well    Patient is retired  Enjoying [facility]  Sold home  Will be traveling in the trailer this winter  Excited about the adventure    Hypertension-There are no cardiovascular, respiratory, neurologic complaints.No claudication.There is no orthostasis.Patient is compliant with medications.  Medications reviewed.   No side effects from medication.    Patient doing well from emotional standpoint on citalopram, gabapentin, trazodone.    He does have a left leg neuropathy.  Is not sure the gabapentin has helped much.  This was discussed.    Review of Systems:     Extensive 10-point review of systems was performed. Please see the HPI for problem specific pertinent review of systems.     Patient does note appetite good bowels are regular    Otherwise, the following systems are  noncontributory including constitutional, eyes, ears, nose and throat, cardiovascular, respiratory, gastrointestinal, genitourinary, musculoskeletal,neurological, skin and/or breast, endocrine, hematologic/lymph, allergic/immunologic and psychiatric.              Medications:  Current Outpatient Prescriptions on File Prior to Visit   Medication Sig     aspirin 81 MG EC tablet Take 81 mg by mouth daily.     citalopram (CELEXA) 40 MG tablet TAKE 1 TABLET BY MOUTH DAILY     gabapentin (NEURONTIN) 300 MG capsule Take 1 capsule (300 mg total) by mouth at bedtime.     meloxicam (MOBIC) 15 MG tablet TAKE 1 TABLET BY MOUTH DAILY     multivitamin therapeutic (THERAGRAN) tablet Take 1 tablet by mouth daily.     omeprazole (PRILOSEC) 20 MG capsule TAKE 1 CAPSULE BY MOUTH DAILY     pramipexole (MIRAPEX) 0.5 MG tablet Take 0.5 mg by mouth bedtime.     traZODone (DESYREL) 50 MG tablet Take 100 mg by mouth bedtime.      [DISCONTINUED] citalopram (CELEXA) 40 MG tablet TAKE 1 TABLET BY MOUTH DAILY     [DISCONTINUED] citalopram (CELEXA) 40 MG tablet Take 1 tablet (40 mg total) by mouth daily.     [DISCONTINUED] gabapentin (NEURONTIN) 300 MG capsule TAKE 2 CAPSULES BY MOUTH EVERY NIGHT AT BEDTIME     [DISCONTINUED] meloxicam (MOBIC) 15 MG tablet TAKE 1 TABLET BY MOUTH DAILY     [DISCONTINUED] metoprolol succinate (TOPROL-XL) 50 MG 24 hr tablet Take 50 mg by mouth daily.     [DISCONTINUED] pramipexole (MIRAPEX) 0.5 MG tablet TAKE 1 TABLET BY MOUTH TWICE DAILY FOR RESTLESS LEG     [DISCONTINUED] traZODone (DESYREL) 100 MG tablet TAKE 1 TABLET BY MOUTH EVERY NIGHT AT BEDTIME     [DISCONTINUED] traZODone (DESYREL) 100 MG tablet TAKE 1 TABLET BY MOUTH EVERY NIGHT AT BEDTIME     No current facility-administered medications on file prior to visit.             Allergies:  Allergies   Allergen Reactions     Amoxicillin Other (See Comments)     Facial and neck flushing, felt hot       PSFHx: Tobacco Status:  He  reports that he quit smoking  about 41 years ago. He does not have any smokeless tobacco history on file.   Alcohol Status:    History   Alcohol Use     Yes     Comment: occasional       reports that he quit smoking about 41 years ago. He does not have any smokeless tobacco history on file. He reports that he drinks alcohol. He reports that he does not use illicit drugs.    Objective:    /80  Weight:   Wt Readings from Last 3 Encounters:   11/19/15 (!) 235 lb (106.6 kg)   11/13/15 (!) 235 lb 0.6 oz (106.6 kg)     BP Readings from Last 3 Encounters:   08/30/17 124/80   11/13/15 126/70         General-appears well, no acute distress.  Skin: Normal. No rash or lesion  Head:  Normocephalic, symmetric  Speech-clear  Eyes: Eyes midline full EOM.  External exams normal.  No icterus  Neck:  No palpable masses, lymphadenopathy or tenderness. No thyromegaly or goiter  Carotid Arteries:  Equal pulsations bilateral.No Bruit, normal upstroke  Chest Wall: No deformity or pain elicited on compression.  Respiratory:  Normal respiratory effort.  Lungs are clear with good breath sounds.  No dullness.  No wheezing.  Heart: Regular rhythm.  Normal sounding S1, S2 without S3, S4, murmurs, rubs, or gallops.  Extremities-no edema excellent pulses.         Review of clinical lab tests  Lab Results   Component Value Date    WBC 4.9 08/30/2017    HGB 12.4 (L) 08/30/2017    HCT 36.3 (L) 08/30/2017     08/30/2017     11/13/2015    K 4.2 11/13/2015     11/13/2015    CREATININE 1.04 11/13/2015    BUN 23 (H) 11/13/2015    CO2 25 11/13/2015    INR 1.12 (H) 12/22/2011       Glucose   Date/Time Value Ref Range Status   11/13/2015 08:53  74 - 125 mg/dL Final   12/22/2011 08:25  70 - 125 mg/dL Final     Comment:          Fasting Glucose reference range is 70-99 mg/dL per       American Diabetes Association (ADA) guidelines.     Recent Results (from the past 24 hour(s))   Urinalysis-UC if Indicated   Result Value Ref Range    Color, UA Yellow  Colorless, Yellow, Straw, Light Yellow    Clarity, UA Clear Clear    Glucose, UA Negative Negative    Bilirubin, UA Negative Negative    Ketones, UA Negative Negative    Specific Gravity, UA 1.010 1.005 - 1.030    Blood, UA Large (!) Negative    pH, UA 7.0 5.0 - 8.0    Protein, UA Negative Negative mg/dL    Urobilinogen, UA 0.2 E.U./dL 0.2 E.U./dL, 1.0 E.U./dL    Nitrite, UA Negative Negative    Leukocytes, UA Trace (!) Negative    Bacteria, UA Few (!) None Seen hpf    RBC, UA 5-10 (!) None Seen, 0-2 hpf    WBC, UA 0-5 None Seen, 0-5 hpf    Squam Epithel, UA 0-5 None Seen, 0-5 lpf   HM1 (CBC with Diff)   Result Value Ref Range    WBC 4.9 4.0 - 11.0 thou/uL    RBC 3.94 (L) 4.40 - 6.20 mill/uL    Hemoglobin 12.4 (L) 14.0 - 18.0 g/dL    Hematocrit 36.3 (L) 40.0 - 54.0 %    MCV 92 80 - 100 fL    MCH 31.6 27.0 - 34.0 pg    MCHC 34.2 32.0 - 36.0 g/dL    RDW 13.1 11.0 - 14.5 %    Platelets 156 140 - 440 thou/uL    MPV 7.7 7.0 - 10.0 fL    Neutrophils % 65 50 - 70 %    Lymphocytes % 19 (L) 20 - 40 %    Monocytes % 12 (H) 2 - 10 %    Eosinophils % 3 0 - 6 %    Basophils % 1 0 - 2 %    Neutrophils Absolute 3.2 2.0 - 7.7 thou/uL    Lymphocytes Absolute 0.9 0.8 - 4.4 thou/uL    Monocytes Absolute 0.6 0.0 - 0.9 thou/uL    Eosinophils Absolute 0.2 0.0 - 0.4 thou/uL    Basophils Absolute 0.0 0.0 - 0.2 thou/uL     It is noted that the patient pinched himself or providing years urinary specimen he had some bleeding.  This will be rechecked.  We will also add a ferritin.  Colonoscopy -2014  Does have GERD      Results for ELENI MEDEIROS (MRN [id number]) as of 8/30/2017 15:41   Ref. Range 12/19/2011 06:18 12/19/2011 18:16 12/20/2011 06:39 12/22/2011 20:25 12/22/2011 21:40 11/13/2015 08:53 8/30/2017 10:01   Hemoglobin Latest Ref Range: 14.0 - 18.0 g/dL  13.4 (L) 13.1 (L) 13.3 (L)  15.0 12.4 (L)

## 2021-06-12 NOTE — PROGRESS NOTES
FYI  Patient pinched himself while providing a specimen and had some bleeding.  Will need to recheck UA in 2 weeks.  If persistent hematuriaâ  evaluate

## 2021-06-13 NOTE — PROGRESS NOTES
"Toni Collado is a 75 y.o. male who is being evaluated via a billable telephone visit.      The patient has been notified of following:     \"This telephone visit will be conducted via a call between you and your physician/provider. We have found that certain health care needs can be provided without the need for a physical exam.  This service lets us provide the care you need with a short phone conversation.  If a prescription is necessary we can send it directly to your pharmacy.  If lab work is needed we can place an order for that and you can then stop by our lab to have the test done at a later time.    Telephone visits are billed at different rates depending on your insurance coverage. During this emergency period, for some insurers they may be billed the same as an in-person visit.  Please reach out to your insurance provider with any questions.    If during the course of the call the physician/provider feels a telephone visit is not appropriate, you will not be charged for this service.\"    Patient has given verbal consent to a Telephone visit? Yes Valentine Rasheed CMA    What phone number would you like to be contacted at? [phone]    Patient would like to receive their AVS by AVS Preference: Raimundo.    Additional provider notes:   To establish care.  He feels well.  No voiding symptoms.  No new problems.  He works out every other day and tolerates that well.  Medications reviewed and refilled.  He wants AWV and will see in the spring and prn.     Assessment/Plan:    1. Sleep disorder  Ongoing use of trazodone.     2. Morbid obesity    3. BRITTNI (obstructive sleep apnea)  Using CPAP     4. Elevated prostate specific antigen (PSA)  Asymptomatic.      Phone call duration:  9 minutes    Payam Toney MD  "

## 2021-06-13 NOTE — TELEPHONE ENCOUNTER
RN cannot approve Refill Request    RN can NOT refill this medication No established PCP. Last office visit: Visit date not found Last Physical: Visit date not found Last MTM visit: Visit date not found Last visit same specialty: 8/30/2017 Carlos Corrales MD.  Next visit within 3 mo: Visit date not found  Next physical within 3 mo: Visit date not found      Radha Fabian, [facility] Triage/Med Refill 12/5/2020    Requested Prescriptions   Pending Prescriptions Disp Refills     citalopram (CELEXA) 40 MG tablet [Pharmacy Med Name: CITALOPRAM 40MG TABLETS] 90 tablet 0     Sig: TAKE 1 TABLET(40 MG) BY MOUTH DAILY       SS[facility] Refill Protocol  Failed - 12/5/2020  8:55 AM        Failed - PCP or prescribing provider visit in last year     Last office visit with prescriber/PCP: Visit date not found OR same dept: Visit date not found OR same specialty: 8/30/2017 Carlos Corrales MD  Last physical: Visit date not found Last MTM visit: Visit date not found   Next visit within 3 mo: Visit date not found  Next physical within 3 mo: Visit date not found  Prescriber OR PCP: Cesar Lewis MD  Last diagnosis associated with med order: 1. Insomnia  - citalopram (CELEXA) 40 MG tablet [Pharmacy Med Name: CITALOPRAM 40MG TABLETS]; TAKE 1 TABLET(40 MG) BY MOUTH DAILY  Dispense: 90 tablet; Refill: 0    2. RLS (restless legs syndrome)  - pramipexole (MIRAPEX) 0.5 MG tablet [Pharmacy Med Name: PRAMIPEXOLE 0.5MG TABLETS]; TAKE 1 TABLET BY MOUTH TWICE DAILY FOR RESTLESS LEG  Dispense: 180 tablet; Refill: 0    If protocol passes may refill for 12 months if within 3 months of last provider visit (or a total of 15 months).                pramipexole (MIRAPEX) 0.5 MG tablet [Pharmacy Med Name: PRAMIPEXOLE 0.5MG TABLETS] 180 tablet 0     Sig: TAKE 1 TABLET BY MOUTH TWICE DAILY FOR RESTLESS LEG.       Parkinson's Meds I Refill Protocol Failed - 12/5/2020  8:55 AM        Failed - PCP or prescribing provider visit in past 6 months       Last office visit with prescriber/PCP: Visit date not found OR same dept: Visit date not found OR same specialty: 8/30/2017 Carlos Corrales MD Last physical: Visit date not found Last MTM visit: Visit date not found     Next appt within 3 mo: Visit date not found  Next physical within 3 mo: Visit date not found  Prescriber OR PCP: Cesar Lewis MD  Last diagnosis associated with med order: 1. Insomnia  - citalopram (CELEXA) 40 MG tablet [Pharmacy Med Name: CITALOPRAM 40MG TABLETS]; TAKE 1 TABLET(40 MG) BY MOUTH DAILY  Dispense: 90 tablet; Refill: 0    2. RLS (restless legs syndrome)  - pramipexole (MIRAPEX) 0.5 MG tablet [Pharmacy Med Name: PRAMIPEXOLE 0.5MG TABLETS]; TAKE 1 TABLET BY MOUTH TWICE DAILY FOR RESTLESS LEG  Dispense: 180 tablet; Refill: 0    If protocol passes may refill for 6 months if within 3 months of last provider visit (or a total of 9 months).             Pramipexole/Ropinirole Refill Protocol Failed - 12/5/2020  8:55 AM        Failed - PCP or prescribing provider visit in past 6 months      Last office visit with prescriber/PCP: Visit date not found OR same dept: Visit date not found OR same specialty: 8/30/2017 Carlos Corrales MD Last physical: Visit date not found Last MTM visit: Visit date not found         Next appt within 3 mo: Visit date not found  Next physical within 3 mo: Visit date not found  Prescriber OR PCP: Cesar Lewis MD  Last diagnosis associated with med order: 1. Insomnia  - citalopram (CELEXA) 40 MG tablet [Pharmacy Med Name: CITALOPRAM 40MG TABLETS]; TAKE 1 TABLET(40 MG) BY MOUTH DAILY  Dispense: 90 tablet; Refill: 0    2. RLS (restless legs syndrome)  - pramipexole (MIRAPEX) 0.5 MG tablet [Pharmacy Med Name: PRAMIPEXOLE 0.5MG TABLETS]; TAKE 1 TABLET BY MOUTH TWICE DAILY FOR RESTLESS LEG  Dispense: 180 tablet; Refill: 0     If protocol passes may refill for 6 months if within 3 months of last provider visit (or a total of 9 months).

## 2021-06-15 NOTE — TELEPHONE ENCOUNTER
Medication: Meloxicam  Last Date Filled: 10/01/20  Last appointment addressing medication: 12/16/20  Last B/P:  BP Readings from Last 3 Encounters:   07/18/19 138/88   07/25/18 132/78   05/16/18 122/74     Last labs pertaining to refill:07/18/19      Pend medication and associate diagnosis before routing to Provider for review.       If patient has not been seen in over 1 year, pend 30 day supply and notify patient they are due for an appointment before any further refills.

## 2021-06-16 PROBLEM — K21.9 GASTROESOPHAGEAL REFLUX DISEASE WITHOUT ESOPHAGITIS: Status: ACTIVE | Noted: 2021-04-12

## 2021-06-16 PROBLEM — G47.9 SLEEP DISORDER: Status: ACTIVE | Noted: 2020-12-16

## 2021-06-16 PROBLEM — H93.13 TINNITUS OF BOTH EARS: Status: ACTIVE | Noted: 2021-04-12

## 2021-06-16 PROBLEM — Z86.0100 HISTORY OF COLONIC POLYPS: Status: ACTIVE | Noted: 2021-04-12

## 2021-06-16 PROBLEM — G25.81 RLS (RESTLESS LEGS SYNDROME): Status: ACTIVE | Noted: 2018-05-01

## 2021-06-16 PROBLEM — R97.20 ELEVATED PROSTATE SPECIFIC ANTIGEN (PSA): Status: ACTIVE | Noted: 2020-12-16

## 2021-06-16 PROBLEM — E66.01 MORBID OBESITY (H): Status: ACTIVE | Noted: 2020-12-16

## 2021-06-16 NOTE — TELEPHONE ENCOUNTER
"Patient calling - says he has been trying to get refills of medications.  Was a past patient of Dr. Corrales.  Had a virtual visit with Dr. Toney in December to establish care.  Was told Dr. Toney will refill his medications.  Patient is planning to schedule physical this spring with Dr. Toney    Medications - citalopram, omeprazole, pramipexole and trazodone - placed through nurse triage protocol failed.  Page placed to on-call provider to get okay to refill.    5:42 placed page to on-call provider Dr. Payam Toney via Smart Web.  Per Dr. Downing.  Per Dr. Toney - okay to fill all four medications for 90 day supply with 3 refills.    Refills sent to pharmacy.    5\"51 pm Patient notified that refills were sent.    Bridget Caballero RN  Triage Nurse Advisor    "

## 2021-06-16 NOTE — TELEPHONE ENCOUNTER
Reason for Call:  Medication or medication refill:    Do you use a [facility]?  Name of the pharmacy and phone number for the current request:   [facility]    Name of the medication requested:  Omeprazole  Trazadone  Citalopram  Mirapex  PATIENT IS COMPLETELY OUT OF THE ABOVE MEDS      Other request: ASPA    Can we leave a detailed message on this number? Yes    Phone number patient can be reached at: Cell number on file:    Telephone Information:   Mobile [phone]       Best Time: ASAP    Call taken on 3/19/2021 at 1:50 PM by Leeann Duffy

## 2021-06-16 NOTE — PROGRESS NOTES
Assessment and Plan:     Patient has been advised of split billing requirements and indicates understanding: Yes     1. BRITTNI (obstructive sleep apnea)  Ongoing CPAP  - HM1(CBC and Differential)  - Comprehensive Metabolic Panel    2. RLS (restless legs syndrome)  On Mirapex.     3. Obesity (BMI 35.0-39.9)  We discussed weight loss target, and healthy exercise.     4. Gastroesophageal reflux disease   On chronic PPI therapy.     5. History of colonic polyps  Normal colonoscopy 2016.  Due 2026.     6. Encounter for screening for HIV  - HIV Antigen/Antibody Screening [place]    7. Encounter for hepatitis C screening test for low risk patient  - Hepatitis C Antibody (Anti-HCV)    8. Screening for hyperlipidemia  - Lipid Profile    9. Tinnitus of both ears  Chronic symptoms, no progression.     10. Elevated PSA  4.2 2017, 6.2 2/2019,  Asymptomatic.  Will follow.    11. Immunization  Td and pneumovax 23 today.         The patient's current medical problems were reviewed.    I have had an Advance Directives discussion with the patient.  The following health maintenance schedule was reviewed with the patient and provided in printed form in the after visit summary:   Health Maintenance Due   Topic Date Due     HEPATITIS C SCREENING  Never done     Pneumococcal Vaccine: 65+ Years (1 of 1 - PPSV23) Never done     TD 18+ HE  09/09/2014     MEDICARE ANNUAL WELLNESS VISIT  07/18/2020     ADVANCE CARE PLANNING  11/13/2020     ZOSTER VACCINES (3 of 3) 10/29/2020      Subjective:   Chief Complaint: Toni Collado is an 75 y.o. male here for an Annual Wellness visit.   HPI:  He feels well overall. No new dyspnea or cough, or back or knee or hip pain. No voiding or bladder problems. Using CPAP.    Review of Systems:  Please see above.  The rest of the review of systems are negative for all systems.    Patient Care Team:  Payam Toney MD as Assigned PCP     Patient Active Problem List   Diagnosis     RLS (restless legs syndrome)      BRITTNI (obstructive sleep apnea)     Sleep disorder     Obesity (BMI 35.0-39.9) with comorbidity (H)     Elevated prostate specific antigen (PSA)     Gastroesophageal reflux disease without esophagitis     History of colonic polyps     Past Medical History:   Diagnosis Date     Adenomatous colon polyp 07/2019     Bulbar polio 1952    residual dysphagia     Cervical osteoarthritis      Chronic anxiety      Chronic depression      Elevated prostate specific antigen (PSA) 12/16/2020     Familial tremor      Gastroesophageal reflux disease without esophagitis 04/12/2021     Hallux limitus of left foot      Localized, primary osteoarthritis of hand      Obesity      RLS (restless legs syndrome)      Sleep apnea, obstructive     CPAP     Sleep disorder 12/16/2020     Tremor       Past Surgical History:   Procedure Laterality Date     KNEE ARTHROSCOPY       RADIOFREQUENCY ABLATION NERVES      cervical spine     RHINOPLASTY       SEPTOPLASTY       SHOULDER SURGERY Right     reconstructive surgery with hardware     TOTAL KNEE ARTHROPLASTY Left     unicompartmental     UMBILICAL HERNIA REPAIR        Family History   Problem Relation Age of Onset     Heart failure Father         ihd     Myelodysplastic syndrome Father      Heart failure Mother         valvular heart disease     Cancer Brother         renal     Heart failure Brother         ihd     Thyroid disease Sister       Social History     Socioeconomic History     Marital status:      Spouse name: Not on file     Number of children: Not on file     Years of education: Not on file     Highest education level: Not on file   Occupational History     Not on file   Social Needs     Financial resource strain: Not on file     Food insecurity     Worry: Not on file     Inability: Not on file     Transportation needs     Medical: Not on file     Non-medical: Not on file   Tobacco Use     Smoking status: Former Smoker     Quit date: 11/19/1975     Years since quitting:  "45.4     Smokeless tobacco: Never Used   Substance and Sexual Activity     Alcohol use: Yes     Comment: occasional     Drug use: No     Sexual activity: Not on file   Lifestyle     Physical activity     Days per week: Not on file     Minutes per session: Not on file     Stress: Not on file   Relationships     Social connections     Talks on phone: Not on file     Gets together: Not on file     Attends Buddhism service: Not on file     Active member of club or organization: Not on file     Attends meetings of clubs or organizations: Not on file     Relationship status: Not on file     Intimate partner violence     Fear of current or ex partner: Not on file     Emotionally abused: Not on file     Physically abused: Not on file     Forced sexual activity: Not on file   Other Topics Concern     Not on file   Social History Narrative    WIFE-JESSIE RETIRED    [employer]    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 90 tablet 3     clindamycin (CLEOCIN) 150 MG capsule Take 150 mg by mouth 3 (three) times a day.       multivitamin therapeutic (THERAGRAN) tablet Take 1 tablet by mouth daily.       omeprazole (PRILOSEC) 20 MG capsule TAKE 1 CAPSULE(20 MG) BY MOUTH DAILY 90 capsule 3     pramipexole (MIRAPEX) 0.5 MG tablet TAKE 1 TABLET BY MOUTH TWICE DAILY FOR RESTLESS  tablet 3     traZODone (DESYREL) 100 MG tablet Take 1 tablet (100 mg total) by mouth at bedtime. 90 tablet 3     meloxicam (MOBIC) 15 MG tablet TAKE 1 TABLET(15 MG) BY MOUTH DAILY AS NEEDED FOR PAIN 90 tablet 3     Objective:   Vital Signs:   Visit Vitals  /80 (Patient Site: Left Arm, Patient Position: Sitting, Cuff Size: Adult Regular)   Pulse 64   Ht 5' 6.5\" (1.689 m)   Wt (!) 239 lb (108.4 kg)   SpO2 94%   BMI 38.00 kg/m       PHYSICAL " "EXAM:  General Appearance: In no acute distress  /80 (Patient Site: Left Arm, Patient Position: Sitting, Cuff Size: Adult Regular)   Pulse 64   Ht 5' 6.5\" (1.689 m)   Wt (!) 239 lb (108.4 kg)   SpO2 94%   BMI 38.00 kg/m    NECK:  without cervical or axillary adenopathy  RESPIRATORY: Clear   CARDIOVASCULAR: S1, S2  ABDOMEN: soft, flat, and non-tender  EXTREMITIES: No joint swelling, no ulcer or edema  NEUROLOGIC: No arm or leg  weakness, speech is clear, gait is normal  PSYCHIATRIC: Oriented X 3, without confusion, behavior and affect normal, thinking is clear    Assessment Results 4/12/2021   Activities of Daily Living No help needed   Instrumental Activities of Daily Living No help needed   Get Up and Go Score -   Mini Cog Total Score 5   Some recent data might be hidden     A Mini-Cog score of 0-2 suggests the possibility of dementia, score of 3-5 suggests no dementia    Identified Health Risks:     Information regarding advance directives (living hirsch), including where he can download the appropriate form, was provided to the patient via the AVS.       "

## 2021-06-17 NOTE — PATIENT INSTRUCTIONS - HE
Patient Instructions by Carlos Corrales MD at 7/18/2019 10:40 AM     Author: Carlos Corrales MD Service: -- Author Type: Physician    Filed: 7/18/2019 11:21 AM Encounter Date: 7/18/2019 Status: Signed    : Carlos Corrales MD (Physician)         Patient Education   Signs of Hearing Loss  Hearing loss is a problem shared by many people. In fact, it is one of the most common health conditions, particularly as people age. Most people over age 65 have some hearing loss, and by age 80, almost everyone does. Because hearing loss usually occurs slowly over the years, you may not realize your hearing ability has gotten worse.       Have your hearing checked  Contact your [facility] care provider if you:    Have to strain to hear normal conversation.    Have to watch other peoples faces very carefully to follow what theyre saying.    Need to ask people to repeat what theyve said.    Often misunderstand what people are saying.    Turn the volume of the television or radio up so high that others complain.    Feel that people are mumbling when theyre talking to you.    Find that the effort to hear leaves you feeling tired and irritated.    Notice, when using the phone, that you hear better with 1 ear than the other.    1464-6476 The [employer]. [address] [place] [address], [place] [address]. All rights reserved. This information is not intended as a substitute for professional medical care. Always follow your healthcare professional's instructions.         Patient Education   Understanding Advance Care Planning  Advance care planning is the process of deciding ones own future medical care. It helps ensure that if you cant speak for yourself, your wishes can still be carried out. The plan is a series of legal documents that note a persons wishes. The documents vary by state. Advance care planning may be done when a person has a serious illness that is expected to get worse. It may be done before major surgery. And it  can help you and your family be prepared in case of a major illness or injury. Advance care planning helps with making decisions at these times.       A health care proxy is a person who acts as the voice of a patient when the patient cant speak for himself or herself. The name of this role varies by state. It may be called a Durable Medical Power of  or Durable Power of  for Healthcare. It may be called an agent, surrogate, or advocate. Or it may be called a representative or decision maker. It is an official duty that is identified by a legal document. The document also varies by state.    Why Is Advance Care Planning Important?  If a person communicates their healthcare wishes:    They will be given medical care that matches their values and goals.    Their family members will not be forced to make decisions in a crisis with no guidance.  Creating a Plan  Making an advance care plan is often done in 3 steps:    Thinking about ones wishes. To create an advance care plan, you should think about what kind of medical treatment you would want if you lose the ability to communicate. Are there any situations in which you would refuse or stop treatment? Are there therapies you would want or not want? And whom do you want to make decisions for you? There are many places to learn more about how to plan for your care. Ask your doctor or  for resources.    Picking a health care proxy. This means choosing a trusted person to speak for you only when you cant speak for yourself. When you cannot make medical decisions, your proxy makes sure the instructions in your advance care plan are followed. A proxy does not make decisions based on his or her own opinions. They must put aside those opinions and values if needed, and carry out your wishes.    Filling out the legal documents. There are several kinds of legal documents for advance care planning. Each one tells health care providers your wishes. The  documents may vary by state. They must be signed and may need to be witnessed or notarized. You can cancel or change them whenever you wish. Depending on your state, the documents may include a Healthcare Proxy form, Living Will, Durable Medical Power of , Advance Directive, or others.  The Familys Role  The best help a family can give is to support their loved ones wishes. Open and honest communication is vital. Family should express any concerns they have about the patients choices while the patient can still make decisions.    1571-1068 The [employer]. [address] [place] [address]. All rights reserved. This information is not intended as a substitute for professional medical care. Always follow your healthcare professional's instructions.         Also, [email or web address] offers a free, downloadable health care directive that allows you to share your treatment choices and personal preferences if you cannot communicate your wishes. It also allows you to appoint another person (called a health care agent) to make health care decisions if you are unable to do so. You can download an advance directive by going here: [email or web address]     Patient Education   Personalized Prevention Plan  You are due for the preventive services outlined below.  Your care team is available to assist you in scheduling these services.  If you have already completed any of these items, please share that information with your care team to update in your medical record.  Health Maintenance   Topic Date Due   ? PNEUMOCOCCAL POLYSACCHARIDE VACCINE AGE 65 AND OVER  09/09/2011   ? PNEUMOCOCCAL CONJUGATE VACCINE FOR ADULTS (PCV13 OR PREVNAR)  09/09/2011   ? ZOSTER VACCINES (2 of 3) 02/04/2013   ? TD 18+ HE  09/09/2014   ? FALL RISK ASSESSMENT  05/16/2019   ? INFLUENZA VACCINE RULE BASED (1) 08/01/2019   ? ADVANCE DIRECTIVES DISCUSSED WITH PATIENT  11/13/2020   ? COLONOSCOPY   04/25/2024

## 2021-06-18 NOTE — PROGRESS NOTES
Preoperative Exam    Scheduled Procedure: L shoulder arthroscopy and decompression for impingent syndrome  Surgery Date:  05/22/18  Surgery Location: [facility], fax [phone]    Surgeon:  Dr Moncada    Assessment/Plan:     1. Shoulder impingement, left     - meloxicam (MOBIC) 15 MG tablet; Take 1 tablet (15 mg total) by mouth daily as needed for pain.  Dispense: 90 tablet; Refill: 0  - HM1(CBC and Differential)  - Basic Metabolic Panel  - Electrocardiogram Perform - Clinic  - HM1 (CBC with Diff)     2.  2 isolated episodes of rectal bleeding.  He needs colonoscopy.  Patient notes he will schedule this.  After the surgery.        Plan 1.  Medically cleared for surgery  2.  Hold aspirin  3.  Hold meloxicam  4.  Continue other medication.    Dr. Encarnacion thank you for this consultation.  If you have any questions or concerns please do not hesitate to contact me    Carlos Corrales MD  Internal medicine  [facility]  [phone]  [email or web address]                Surgical Procedure Risk: Low (reported cardiac risk generally < 1%)  Have you had prior anesthesia?: Yes  Have you or any family members had a previous anesthesia reaction:  No  Do you or any family members have a history of a clotting or bleeding disorder?: No  Cardiac Risk Assessment: no increased risk for major cardiac complications    Patient approved for surgery with general or local anesthesia.    Please Note:  Patient uses a CPAP machine.    Functional Status: Independent  Patient plans to recover at home with family.     Subjective:      Toni Collado is a 72 y.o. male who presents for a preoperative consultation.  Left shoulder impingement  Has tolerated surgery in past without complications    All other systems reviewed and are negative, other than those listed in the HPI.    Pertinent History  Do you have difficulty breathing or chest pain after walking up a flight of  stairs: No  History of obstructive sleep apnea: Yes: yes. uses cpap  Steroid use in the last 6 months: No  Frequent Aspirin/NSAID use: Yes: on mobic  Prior Blood Transfusion: No  Prior Blood Transfusion Reaction: No  If for some reason prior to, during or after the procedure, if it is medically indicated, would you be willing to have a blood transfusion?:  There is no transfusion refusal.    Current Outpatient Prescriptions   Medication Sig Dispense Refill     citalopram (CELEXA) 40 MG tablet TAKE 1 TABLET BY MOUTH DAILY 90 tablet 3     meloxicam (MOBIC) 15 MG tablet TAKE 1 TABLET BY MOUTH DAILY 90 tablet 0     multivitamin therapeutic (THERAGRAN) tablet Take 1 tablet by mouth daily.       omeprazole (PRILOSEC) 20 MG capsule TAKE 1 CAPSULE BY MOUTH DAILY 90 capsule 2     pramipexole (MIRAPEX) 0.5 MG tablet TAKE 1 TABLET BY MOUTH TWICE DAILY FOR RESTLESS  tablet 0     traZODone (DESYREL) 100 MG tablet TAKE 1 TABLET BY MOUTH EVERY NIGHT AT BEDTIME 90 tablet 0     aspirin 81 MG EC tablet Take 81 mg by mouth daily.       gabapentin (NEURONTIN) 300 MG capsule Take 1 capsule (300 mg total) by mouth at bedtime. 5 capsule 0     pramipexole (MIRAPEX) 0.5 MG tablet Take 0.5 mg by mouth bedtime.       pramipexole (MIRAPEX) 0.5 MG tablet TAKE 1 TABLET BY MOUTH TWICE DAILY FOR RESTLESS  tablet 0     ranitidine (ZANTAC) 300 MG tablet Take 1 tablet (300 mg total) by mouth at bedtime. 30 tablet 1     traZODone (DESYREL) 50 MG tablet Take 100 mg by mouth bedtime.        No current facility-administered medications for this visit.         Allergies   Allergen Reactions     Amoxicillin Other (See Comments)     Facial and neck flushing, felt hot       Patient Active Problem List   Diagnosis     Insomnia     RLS (restless legs syndrome)       Past Medical History:   Diagnosis Date     Bulbar polio 1952    residual dysphagia     Bulbar polio 1952     Cervical osteoarthritis      Chronic anxiety      Chronic depression       "Familial tremor      GERD (gastroesophageal reflux disease)      GERD (gastroesophageal reflux disease)      Hallux limitus of left foot      Obesity      RLS (restless legs syndrome)      Sleep apnea, obstructive     CPAP     Tremor        Past Surgical History:   Procedure Laterality Date     RHINOPLASTY       SEPTOPLASTY       SHOULDER SURGERY Right     reconstructive surgery with hardware     torn meniscus Left     knee arthroscopy     TOTAL KNEE ARTHROPLASTY Left     unicompartmental     UMBILICAL HERNIA REPAIR         Social History     Social History     Marital status:      Spouse name: N/A     Number of children: N/A     Years of education: N/A     Occupational History     Not on file.     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 No rash or lesion  Lymph Nodes: None palpable-including neck, axilla, inguinal, epitrochlear.  Head:  Normocephalic.    Eyes: Midline.  Equal size., full ROM.  External exams normal.  No icterus  Ears:  Normal pinnae, canals, and TM's.    Nose:  Patent, without deformity.    Throat:  Moist mucous membranes without lesions, erythema, or exudate.  Normal dentition  Neck: Neck is thick no palpable masses, lymphadenopathy or tenderness.No thyromegaly or goiter.  No thyroid nodule.  Carotid Arteries:  " No Bruit.  Carotid upstroke normal  Chest Wall: No deformity or pain elicited on compression.  Respiratory:  Normal respiratory effort.  Lungs are clear with good breath sounds.  No dullness.  No wheezing.  Heart: Regular rhythm.  Normal sounding S1, S2 without S3, S4, murmurs, rubs, or gallops.    Abdomen:  The abdomen was flat, soft and nontender without guarding rebound or masses.  There are normal bowel sounds.  There is no hepatosplenomegaly.  There is no palpable enlargement of the aorta.    Extremities:  Full ROM without limitation, deformity or edema.    4+ pulses   There are no Patient Instructions on file for this visit.    EKG: Normal sinus rhythm.  Mild sinus bradycardia rate 57.  Normal cardiogram.    Labs:  Laboratory ordered      Recent Results (from the past 24 hour(s))   Electrocardiogram Perform - Clinic    Collection Time: 05/16/18 11:29 AM   Result Value Ref Range    SYSTOLIC BLOOD PRESSURE  mmHg    DIASTOLIC BLOOD PRESSURE  mmHg    VENTRICULAR RATE 57 BPM    ATRIAL RATE 57 BPM    P-R INTERVAL 218 ms    QRS DURATION 92 ms    Q-T INTERVAL 422 ms    QTC CALCULATION (BEZET) 410 ms    P Axis 32 degrees    R AXIS -12 degrees    T AXIS -5 degrees    MUSE DIAGNOSIS       Sinus bradycardia with 1st degree A-V block  Otherwise normal ECG  No previous ECGs available         Immunization History   Administered Date(s) Administered     Hep A, historic 01/20/2011, 03/06/2015     Hep B, historic 01/20/2011, 03/06/2015     Influenza high dose, seasonal 11/13/2015, 10/19/2016     Td,adult,historic,unspecified 09/09/2004     ZOSTER, LIVE 12/10/2012           Electronically signed by Carlos Corrales MD 05/16/18 10:29 AM

## 2021-06-18 NOTE — PATIENT INSTRUCTIONS - HE
Patient Instructions by Payam Toney MD at 4/12/2021  2:25 PM     Author: Payam Toney MD Service: -- Author Type: Physician    Filed: 4/12/2021  2:53 PM Encounter Date: 4/12/2021 Status: Signed    : Payam Toney MD (Physician)         Patient Education   Signs of Hearing Loss  Hearing loss is a problem shared by many people. In fact, it is one of the most common health conditions, particularly as people age. Most people over age 65 have some hearing loss, and by age 80, almost everyone does. Because hearing loss usually occurs slowly over the years, you may not realize your hearing ability has gotten worse.       Have your hearing checked  Contact your [facility] care provider if you:    Have to strain to hear normal conversation.    Have to watch other peoples faces very carefully to follow what theyre saying.    Need to ask people to repeat what theyve said.    Often misunderstand what people are saying.    Turn the volume of the television or radio up so high that others complain.    Feel that people are mumbling when theyre talking to you.    Find that the effort to hear leaves you feeling tired and irritated.    Notice, when using the phone, that you hear better with 1 ear than the other.    0726-1398 [employer]. [address] [place] [address]. All rights reserved. This information is not intended as a substitute for professional medical care. Always follow your healthcare professional's instructions.         Patient Education   Understanding Advance Care Planning  Advance care planning is the process of deciding ones own future medical care. It helps ensure that if you cant speak for yourself, your wishes can still be carried out. The plan is a series of legal documents that note a persons wishes. The documents vary by state. Advance care planning may be done when a person has a serious illness that is expected to get worse. It may be done before major surgery. And it  can help you and your family be prepared in case of a major illness or injury. Advance care planning helps with making decisions at these times.       A health care proxy is a person who acts as the voice of a patient when the patient cant speak for himself or herself. The name of this role varies by state. It may be called a Durable Medical Power of  or Durable Power of  for Healthcare. It may be called an agent, surrogate, or advocate. Or it may be called a representative or decision maker. It is an official duty that is identified by a legal document. The document also varies by state.    Why Is Advance Care Planning Important?  If a person communicates their healthcare wishes:    They will be given medical care that matches their values and goals.    Their family members will not be forced to make decisions in a crisis with no guidance.  Creating a Plan  Making an advance care plan is often done in 3 steps:    Thinking about ones wishes. To create an advance care plan, you should think about what kind of medical treatment you would want if you lose the ability to communicate. Are there any situations in which you would refuse or stop treatment? Are there therapies you would want or not want? And whom do you want to make decisions for you? There are many places to learn more about how to plan for your care. Ask your doctor or  for resources.    Picking a health care proxy. This means choosing a trusted person to speak for you only when you cant speak for yourself. When you cannot make medical decisions, your proxy makes sure the instructions in your advance care plan are followed. A proxy does not make decisions based on his or her own opinions. They must put aside those opinions and values if needed, and carry out your wishes.    Filling out the legal documents. There are several kinds of legal documents for advance care planning. Each one tells health care providers your wishes. The  documents may vary by state. They must be signed and may need to be witnessed or notarized. You can cancel or change them whenever you wish. Depending on your state, the documents may include a Healthcare Proxy form, Living Will, Durable Medical Power of , Advance Directive, or others.  The Familys Role  The best help a family can give is to support their loved ones wishes. Open and honest communication is vital. Family should express any concerns they have about the patients choices while the patient can still make decisions.    6091-8708 The [employer]. [address] [place] [address]. All rights reserved. This information is not intended as a substitute for professional medical care. Always follow your healthcare professional's instructions.         Also, [facility] offers a free, downloadable health care directive that allows you to share your treatment choices and personal preferences if you cannot communicate your wishes. It also allows you to appoint another person (called a health care agent) to make health care decisions if you are unable to do so. You can download an advance directive by going here: [email or web address]     Patient Education   Personalized Prevention Plan  You are due for the preventive services outlined below.  Your care team is available to assist you in scheduling these services.  If you have already completed any of these items, please share that information with your care team to update in your medical record.  Health Maintenance Due   Topic Date Due   ? HEPATITIS C SCREENING  Never done   ? Pneumococcal Vaccine: 65+ Years (1 of 1 - PPSV23) Never done   ? TD 18+ HE  09/09/2014   ? MEDICARE ANNUAL WELLNESS VISIT  07/18/2020   ? ADVANCE CARE PLANNING  11/13/2020   ? ZOSTER VACCINES (3 of 3) 10/29/2020

## 2021-06-19 NOTE — PROGRESS NOTES
Preoperative Exam    Scheduled Procedure: L cataract followed by right cataract in 2 weeks  Surgery Date:  07/26/18  Surgery Location: [facility]    Surgeon:  Dr Chan    Assessment/Plan:     1. Cataract  Bilateral  Healthy man  Medically cleared for surgery  Does not require lab or ECG    Restless leg syndrome.  On Rx    3. GERD (gastroesophageal reflux disease)  Controlled with Rx    Surgical Procedure Risk: Low (reported cardiac risk generally < 1%)  Have you had prior anesthesia?: Yes  Have you or any family members had a previous anesthesia reaction:  No  Do you or any family members have a history of a clotting or bleeding disorder?: No  Cardiac Risk Assessment: no increased risk for major cardiac complications    Patient approved for surgery with general or local anesthesia.    Please Note:  Patient uses a CPAP machine.    Functional Status: Independent  Patient plans to recover at home with family.         Carlos Corrales MD  Internal medicine  [facility]  [phone]  [email or web address]       Subjective:      Toni Collado is a 72 y.o. male who presents for a preoperative consultation.  He is to have sequential cataract surgery.  For surgery left eye.  Dr. Ayleen Chan  He has been feeling well    No recent infection    He has recovered from his left shoulder surgery    GERD-on treatment with PPI.  Minimal if any reflux.  Food goes down easily.  No dysphasia with either liquids or solids.  No coughing.  No aspiration. No chest pain or epigastric pain. No indigestion or dyspepsia.  No melena nor hematochezia.  No hoarseness.  No side effects from medication.  No diarrhea or headache.    All other systems reviewed and are negative, other than those listed in the HPI.    Pertinent History  Do you have difficulty breathing or chest pain after walking up a flight of stairs: No  History of obstructive sleep apnea: Yes: Uses cpap  Steroid use in the last  6 months: No  Frequent Aspirin/NSAID use: No  Prior Blood Transfusion: No  Prior Blood Transfusion Reaction: No  If for some reason prior to, during or after the procedure, if it is medically indicated, would you be willing to have a blood transfusion?:  There is no transfusion refusal.    Current Outpatient Prescriptions   Medication Sig Dispense Refill     citalopram (CELEXA) 40 MG tablet TAKE 1 TABLET BY MOUTH DAILY 90 tablet 0     meloxicam (MOBIC) 15 MG tablet Take 1 tablet (15 mg total) by mouth daily as needed for pain. 90 tablet 0     multivitamin therapeutic (THERAGRAN) tablet Take 1 tablet by mouth daily.       omeprazole (PRILOSEC) 20 MG capsule TAKE 1 CAPSULE BY MOUTH DAILY 90 capsule 2     pramipexole (MIRAPEX) 0.5 MG tablet TAKE 1 TABLET BY MOUTH TWICE DAILY FOR RESTLESS  tablet 0     traZODone (DESYREL) 100 MG tablet TAKE 1 TABLET BY MOUTH EVERY NIGHT AT BEDTIME 90 tablet 0     No current facility-administered medications for this visit.         Allergies   Allergen Reactions     Amoxicillin Other (See Comments)     Facial and neck flushing, felt hot       Patient Active Problem List   Diagnosis     Insomnia     RLS (restless legs syndrome)       Past Medical History:   Diagnosis Date     Bulbar polio 1952    residual dysphagia     Bulbar polio 1952     Cervical osteoarthritis      Chronic anxiety      Chronic depression      Familial tremor      GERD (gastroesophageal reflux disease)      GERD (gastroesophageal reflux disease)      Hallux limitus of left foot      Obesity      RLS (restless legs syndrome)      Sleep apnea, obstructive     CPAP     Tremor        Past Surgical History:   Procedure Laterality Date     RHINOPLASTY       SEPTOPLASTY       SHOULDER SURGERY Right     reconstructive surgery with hardware     torn meniscus Left     knee arthroscopy     TOTAL KNEE ARTHROPLASTY Left     unicompartmental     UMBILICAL HERNIA REPAIR         Social History     Social History     Marital  "status:      Spouse name: N/A     Number of children: N/A     Years of education: N/A     Occupational History     Not on file.     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 No rash or lesion  Head:  Normocephalic, symmetric  Speech-clear  Eyes: Eyes midline full EOM.  External exams normal.  No icterus  Throat normal  Neck:  No palpable masses, lymphadenopathy or tenderness. No thyromegaly or goiter  Carotid Arteries:  Equal pulsations bilateral.No Bruit, normal upstroke  Chest Wall: No deformity or pain elicited on compression.  Respiratory:  Normal respiratory effort.  Lungs are clear with good breath sounds.  No dullness.  No wheezing.  Heart: Regular rhythm.  Normal sounding S1, S2 without S3, S4, murmurs, rubs, or gallops.  Extremities-no edema good pulses  Gait normal.       There are no Patient Instructions on file for this visit.        Labs:      Immunization History   Administered Date(s) Administered     Hep A, historic 01/20/2011, 03/06/2015     Hep B, historic 01/20/2011, 03/06/2015     Influenza high dose, seasonal 11/13/2015, 10/19/2016     Td,adult,historic,unspecified 09/09/2004     ZOSTER, LIVE 12/10/2012     Lab Results   Component Value Date    WBC 5.1 05/16/2018    HGB 14.9 05/16/2018    HCT 44.2 05/16/2018     05/16/2018    CHOL 172 08/30/2017 "    TRIG 259 (H) 08/30/2017    HDL 32 (L) 08/30/2017    ALT 31 08/30/2017    AST 26 08/30/2017     05/16/2018    K 4.3 05/16/2018     05/16/2018    CREATININE 1.00 05/16/2018    BUN 23 05/16/2018    CO2 23 05/16/2018    TSH 2.80 08/30/2017    PSA 4.2 08/30/2017    INR 1.12 (H) 12/22/2011     Glucose 100          Electronically signed by Carlos Corrales MD 07/25/18 9:31 AM

## 2021-06-21 NOTE — LETTER
Letter by Payam Toney MD at      Author: Payam Toney MD Service: -- Author Type: --    Filed:  Encounter Date: 4/14/2021 Status: (Other)         Toni Collado  [address] [facility] [address]     April 14, 2021     Dear Mr. Collado,    Below are the results from your recent visit:    Resulted Orders   Comprehensive Metabolic Panel   Result Value Ref Range    Sodium 140 136 - 145 mmol/L    Potassium 4.3 3.5 - 5.0 mmol/L    Chloride 102 98 - 107 mmol/L    CO2 25 22 - 31 mmol/L    Anion Gap, Calculation 13 5 - 18 mmol/L    Glucose 91 70 - 125 mg/dL    BUN 16 8 - 28 mg/dL    Creatinine 1.10 0.70 - 1.30 mg/dL    GFR MDRD Af Amer >60 >60 mL/min/1.73m2    GFR MDRD Non Af Amer >60 >60 mL/min/1.73m2    Bilirubin, Total 0.7 0.0 - 1.0 mg/dL    Calcium 9.5 8.5 - 10.5 mg/dL    Protein, Total 7.3 6.0 - 8.0 g/dL    Albumin 4.4 3.5 - 5.0 g/dL    Alkaline Phosphatase 64 45 - 120 U/L    AST 28 0 - 40 U/L    ALT 39 0 - 45 U/L    Narrative    Fasting Glucose reference range is 70-99 mg/dL per  American Diabetes Association (ADA) guidelines.   Lipid Profile   Result Value Ref Range    Triglycerides 165 (H) <=149 mg/dL    Cholesterol 202 (H) <=199 mg/dL    LDL Calculated 130 (H) <=129 mg/dL    HDL Cholesterol 39 (L) >=40 mg/dL    Patient Fasting > 8hrs? Yes    HIV Antigen/Antibody Screening Cascade   Result Value Ref Range    HIV Antigen / Antibody Negative Negative    Narrative    Method is Abbott HIV Ag/Ab for the detection of HIV p24 antigen, HIV-1 antibodies and HIV-2 antibodies.   Hepatitis C Antibody (Anti-HCV)   Result Value Ref Range    Hepatitis C Ab Negative Negative   HM1 (CBC with Diff)   Result Value Ref Range    WBC 6.6 4.0 - 11.0 thou/uL    RBC 4.86 4.40 - 6.20 mill/uL    Hemoglobin 15.1 14.0 - 18.0 g/dL    Hematocrit 45.0 40.0 - 54.0 %    MCV 93 80 - 100 fL    MCH 31.1 27.0 - 34.0 pg    MCHC 33.6 32.0 - 36.0 g/dL    RDW 12.9 11.0 - 14.5 %    Platelets 191 140 - 440 thou/uL    MPV 9.1 7.0 - 10.0 fL     Neutrophils % 68 50 - 70 %    Lymphocytes % 19 (L) 20 - 40 %    Monocytes % 10 2 - 10 %    Eosinophils % 2 0 - 6 %    Basophils % 1 0 - 2 %    Immature Granulocyte % 0 <=0 %    Neutrophils Absolute 4.5 2.0 - 7.7 thou/uL    Lymphocytes Absolute 1.3 0.8 - 4.4 thou/uL    Monocytes Absolute 0.7 0.0 - 0.9 thou/uL    Eosinophils Absolute 0.1 0.0 - 0.4 thou/uL    Basophils Absolute 0.1 0.0 - 0.2 thou/uL    Immature Granulocyte Absolute 0.0 <=0.0 thou/uL       Your tests are satisfactory.  The cholesterol tests are elevated some, a bit more than previous. They are not high enough to clearly recommend medication treatment.  Diet and exercise, of course, would help.  We can follow this over time.     Please call with questions or contact us using [facility].    Sincerely,        Electronically signed by Payam Toney MD

## 2021-06-25 NOTE — TELEPHONE ENCOUNTER
Refill Approved    Rx renewed per Medication Renewal Policy. Medication was last renewed on 12/16/18.    Saul Gabriel, [facility] Triage/Med Refill 3/16/2019     Requested Prescriptions   Pending Prescriptions Disp Refills     omeprazole (PRILOSEC) 20 MG capsule [Pharmacy Med Name: OMEPRAZOLE 20MG CAPSULES] 90 capsule 0     Sig: TAKE 1 CAPSULE BY MOUTH DAILY    GI Medications Refill Protocol Passed - 3/12/2019 12:50 PM       Passed - PCP or prescribing provider visit in last 12 or next 3 months.    Last office visit with prescriber/PCP: 8/30/2017 Carlos Corrales MD OR same dept: Visit date not found OR same specialty: 8/30/2017 Carlos Corrales MD  Last physical: 7/25/2018 Last MTM visit: Visit date not found   Next visit within 3 mo: Visit date not found  Next physical within 3 mo: Visit date not found  Prescriber OR PCP: aCrlos Corrales MD  Last diagnosis associated with med order: 1. GERD (gastroesophageal reflux disease)  - omeprazole (PRILOSEC) 20 MG capsule [Pharmacy Med Name: OMEPRAZOLE 20MG CAPSULES]; TAKE 1 CAPSULE BY MOUTH DAILY  Dispense: 90 capsule; Refill: 0    If protocol passes may refill for 12 months if within 3 months of last provider visit (or a total of 15 months).

## 2021-07-03 NOTE — ADDENDUM NOTE
Addendum Note by Matt Agarwal MLT at 9/5/2017  7:33 AM     Author: Matt Agarwal MLT Service: -- Author Type:     Filed: 9/5/2017  7:33 AM Encounter Date: 8/30/2017 Status: Signed    : Matt Agarwal MLT ()    Addended by: MATT AGARWAL on: 9/5/2017 07:33 AM        Modules accepted: Orders

## 2021-08-24 ENCOUNTER — NURSE TRIAGE (OUTPATIENT)
Dept: NURSING | Facility: CLINIC | Age: 76
End: 2021-08-24

## 2021-08-24 NOTE — TELEPHONE ENCOUNTER
Pt is calling.    Was on vacation 4 weeks ago. Now for the last 4 weeks he has been having bouts of dizziness, vertigo.  Gets up to stand. Walked into the kitchen to take his medications. Took his meds, and then started to become dizzy, had to hang on to something. Was unable to walk. When this happens, it occur for only seconds to 1 minute, then goes away.  He has bouts of this off and on. Has to get up slowly.  It is now occurring when he is sitting up in bed, or if he rolls over in bed, or bends to pick things when sitting.  Normal vital signs. Denies hypertension, HA, nausea, vomiting, URI symptoms, ear pain.   Care advice reviewed. When to call back reviewed per care advice, and he verbalized understanding. I advised him to schedule an appointment to be seen for evaluation. If new symptoms occur or this does not go away, I encouraged him to call back sooner.    COVID 19 Nurse Triage Plan/Patient Instructions    Please be aware that novel coronavirus (COVID-19) may be circulating in the community. If you develop symptoms such as fever, cough, or SOB or if you have concerns about the presence of another infection including coronavirus (COVID-19), please contact your health care provider or visit [email or web address].     Disposition/Instructions    In-Person Visit with provider recommended. Reference Visit Selection Guide.    Thank you for taking steps to prevent the spread of this virus.  o Limit your contact with others.  o Wear a simple mask to cover your cough.  o Wash your hands well and often.    Resources    [facility]: About COVID-19: [email or web address]    CDC: What to Do If You're Sick: www.cdc.gov/coronavirus/2019-ncov/about/steps-when-sick.html    CDC: Ending Home Isolation: www.cdc.gov/coronavirus/2019-ncov/hcp/disposition-in-home-patients.html     CDC: Caring for Someone: www.cdc.gov/coronavirus/2019-ncov/if-you-are-sick/care-for-someone.html     [facility]: Interim Guidance for  Hospital Discharge to Home: www.health.[facility].[place]/diseases/coronavirus/hcp/hospdischarge.pdf    [facility] clinical trials (COVID-19 research studies): [email or web address]/umn-clinical-trials     Below are the COVID-19 hotlines at the [place] [facility] ([facility]). Interpreters are available.   o For health questions: Call 713-141-7194 or 1-900.480.6139 (7 a.m. to 7 p.m.)  o For questions about schools and childcare: Call 468-859-3490 or 1-100.168.3135 (7 a.m. to 7 p.m.)     Reason for Disposition    Dizziness not present now, but is a chronic symptom (recurrent or ongoing AND lasting > 4 weeks)    Additional Information    Negative: Shock suspected (e.g., cold/pale/clammy skin, too weak to stand, low BP, rapid pulse)    Negative: Difficult to awaken or acting confused (e.g., disoriented, slurred speech)    Negative: Fainted, and still feels dizzy afterwards    Negative: Severe difficulty breathing (e.g., struggling for each breath, speaks in single words)    Negative: Overdose (accidental or intentional) of medications    Negative: New neurologic deficit that is present now: * Weakness of the face, arm, or leg on one side of the body * Numbness of the face, arm, or leg on one side of the body * Loss of speech or garbled speech    Negative: Heart beating < 50 beats per minute OR > 140 beats per minute    Negative: Sounds like a life-threatening emergency to the triager    Negative: Chest pain    Negative: Rectal bleeding, bloody stool, or tarry-black stool    Negative: Vomiting is the main symptom    Negative: Diarrhea is the main symptom    Negative: Headache is the main symptom    Negative: Heat exhaustion suspected (i.e., dehydration from heat exposure)    Negative: Patient states that he/she is having an anxiety/panic attack    Negative: SEVERE dizziness (e.g., unable to stand, requires support to walk, feels like passing out now)    Negative: SEVERE headache or neck pain    Negative:  Spinning or tilting sensation (vertigo) present now and one or more stroke risk factors (i.e., hypertension, diabetes, prior stroke/TIA, heart attack, age over 60) (Exception: prior physician evaluation for this AND no different/worse than usual)    Negative: Loss of vision or double vision    Negative: Extra heart beats OR irregular heart beating (i.e., 'palpitations')    Negative: Difficulty breathing    Negative: Drinking very little and has signs of dehydration (e.g., no urine > 12 hours, very dry mouth, very lightheaded)    Negative: Follows bleeding (e.g., stomach, rectum, vagina) (Exception: became dizzy from sight of small amount blood)    Negative: Patient sounds very sick or weak to the triager    Negative: Lightheadedness (dizziness) present now, after 2 hours of rest and fluids    Negative: Spinning or tilting sensation (vertigo) present now    Negative: Fever > 103 F (39.4 C)    Negative: Fever > 100.0 F (37.8 C) and has diabetes mellitus or a weak immune system (e.g., HIV positive, cancer chemotherapy, organ transplant, splenectomy, chronic steroids)    Negative: Vomiting occurs with dizziness    Negative: Patient wants to be seen    Negative: Taking a medicine that could cause dizziness (e.g., blood pressure medications, diuretics)    Negative: Diabetes    Protocols used: DIZZINESS-A-OH    Raeann Nelson RN  [facility] Nurse Advisor  8/24/2021 at 2:51 PM

## 2021-10-10 ENCOUNTER — HEALTH MAINTENANCE LETTER (OUTPATIENT)
Age: 76
End: 2021-10-10

## 2022-03-08 DIAGNOSIS — G25.81 RLS (RESTLESS LEGS SYNDROME): Primary | ICD-10-CM

## 2022-03-08 DIAGNOSIS — G47.00 INSOMNIA: ICD-10-CM

## 2022-03-08 DIAGNOSIS — K21.9 GERD (GASTROESOPHAGEAL REFLUX DISEASE): ICD-10-CM

## 2022-03-09 NOTE — TELEPHONE ENCOUNTER
Outpatient Medication Detail     Disp Refills Start End SARAH   pramipexole (MIRAPEX) 0.5 MG tablet 180 tablet 3 3/19/2021  No   Sig: TAKE 1 TABLET BY MOUTH TWICE DAILY FOR RESTLESS LEG   Sent to pharmacy as: pramipexole 0.5 mg tablet (MIRAPEX)   Cosign for Ordering: Accepted by Payam Toney MD on 3/20/2021 10:16 AM   E-Prescribing Status: Receipt confirmed by pharmacy (3/19/2021  5:50 PM CDT)       pramipexole (MIRAPEX) 0.5 MG tablet [[id number]]    Electronically signed by: Bridget Caballero RN on 03/19/21 1749 Status: Active   Ordering user: Bridget Caballero RN 03/19/21 1749 Ordering provider: Payam Toney MD   Authorized by: Payam Toney MD   Cosigning events  Electronically cosigned by Payam Toney MD 03/20/21 1016 for Ordering   Frequency:  03/19/21 - Until Discontinued Released by: Bridget Caballero RN 03/19/21 1749   Diagnoses  RLS (restless legs syndrome) [G25.81]     Outpatient Medication Detail     Disp Refills Start End SARAH   citalopram (CELEXA) 40 MG tablet 90 tablet 3 3/19/2021  No   Sig - Route: Take 1 tablet (40 mg total) by mouth daily. - Oral   Sent to pharmacy as: citalopram 40 mg tablet (celeXA)   Cosign for Ordering: Accepted by Payam Toney MD on 3/20/2021 10:16 AM   E-Prescribing Status: Receipt confirmed by pharmacy (3/19/2021  5:50 PM CDT)       citalopram (CELEXA) 40 MG tablet [[id number]]    Electronically signed by: Bridget Caballero RN on 03/19/21 1749 Status: Active   Ordering user: Bridget Caballero RN 03/19/21 1749 Ordering provider: Payam Toney MD   Authorized by: Payam Toney MD   Cosigning events  Electronically cosigned by Payam Toney MD 03/20/21 1016 for Ordering   Frequency: DAILY 03/19/21 - Until Discontinued Released by: Bridget Caballero, RN 03/19/21 1749   Diagnoses  Insomnia [G47.00]     Outpatient Medication Detail     Disp Refills Start End SARAH   omeprazole (PRILOSEC) 20 MG capsule 90 capsule 3 3/19/2021  No   Sig: TAKE 1 CAPSULE(20 MG) BY MOUTH  "DAILY   Sent to pharmacy as: omeprazole 20 mg capsule,delayed release (PriLOSEC)   Cosign for Ordering: Accepted by Payam Toney MD on 3/20/2021 10:16 AM   E-Prescribing Status: Receipt confirmed by pharmacy (3/19/2021  5:50 PM CDT)       omeprazole (PRILOSEC) 20 MG capsule [[id number]]    Electronically signed by: Bridget Caballero RN on 03/19/21 1749 Status: Active   Ordering user: Bridget Caballero RN 03/19/21 1749 Ordering provider: Payam Toney MD   Authorized by: Payam Toney MD   Cosigning events  Electronically cosigned by Payam Toney MD 03/20/21 1016 for Ordering   Frequency:  03/19/21 - Until Discontinued Released by: Bridget Caballero RN 03/19/21 1749   Diagnoses  GERD (gastroesophageal reflux disease) [K21.9]       Former patient of [facility] & has not established care with another provider.  Please assign refill request to covering provider per clinic standard process.    Routing refill request to provider for review/approval because:  Patient needs to be seen because it has been more than 6 months since last office visit.  No PCP    Last office visit provider:  4/12/21     Requested Prescriptions   Pending Prescriptions Disp Refills     pramipexole (MIRAPEX) 0.5 MG tablet 180 tablet      Sig: Take 1 tablet (0.5 mg) by mouth At Bedtime For restless leg syndrome.       Antiparkinson's Agents Protocol Failed - 3/8/2022  8:38 AM        Failed - Recent (6 mo) or future (30 days) visit within the authorizing provider's specialty     Patient had office visit in the last 6 months or has a visit in the next 30 days with authorizing provider or within the authorizing provider's specialty.  See \"Patient Info\" tab in inbasket, or \"Choose Columns\" in Meds & Orders section of the refill encounter.            Passed - Blood pressure under 140/90 in past 12 months     BP Readings from Last 3 Encounters:   04/12/21 130/80                 Passed - CBC on record in past 12 months     Recent Labs   Lab Test " "04/12/21  1519   WBC 6.6   RBC 4.86   HGB 15.1   HCT 45.0                    Passed - ALT on record in past 12 months         Recent Labs   Lab Test 04/12/21  1519   ALT 39             Passed - Serum Creatinine on file in past 12 months     Recent Labs   Lab Test 04/12/21  1519   CR 1.10       Ok to refill medication if creatinine is low          Passed - Medication is active on med list        Passed - Patient is age 18 or older           citalopram (CELEXA) 40 MG tablet 90 tablet      Sig: Take 1 tablet (40 mg) by mouth daily       SSRIs Protocol Passed - 3/8/2022  8:38 AM        Passed - Recent (12 mo) or future (30 days) visit within the authorizing provider's specialty     Patient has had an office visit with the authorizing provider or a provider within the authorizing providers department within the previous 12 mos or has a future within next 30 days. See \"Patient Info\" tab in inbasket, or \"Choose Columns\" in Meds & Orders section of the refill encounter.              Passed - Medication is active on med list        Passed - Patient is age 18 or older           omeprazole (PRILOSEC) 20 MG DR capsule 90 capsule      Sig: Take 1 capsule (20 mg) by mouth daily       PPI Protocol Passed - 3/8/2022  8:38 AM        Passed - Not on Clopidogrel (unless Pantoprazole ordered)        Passed - No diagnosis of osteoporosis on record        Passed - Recent (12 mo) or future (30 days) visit within the authorizing provider's specialty     Patient has had an office visit with the authorizing provider or a provider within the authorizing providers department within the previous 12 mos or has a future within next 30 days. See \"Patient Info\" tab in inbasket, or \"Choose Columns\" in Meds & Orders section of the refill encounter.              Passed - Medication is active on med list        Passed - Patient is age 18 or older             Vijay Chavez RN 03/09/22 10:49 AM  "

## 2022-03-12 RX ORDER — PRAMIPEXOLE DIHYDROCHLORIDE 0.5 MG/1
0.5 TABLET ORAL AT BEDTIME
Qty: 180 TABLET | Refills: 3 | Status: SHIPPED | OUTPATIENT
Start: 2022-03-12 | End: 2022-05-09

## 2022-03-12 RX ORDER — CITALOPRAM HYDROBROMIDE 40 MG/1
40 TABLET ORAL DAILY
Qty: 90 TABLET | Refills: 3 | Status: SHIPPED | OUTPATIENT
Start: 2022-03-12 | End: 2023-03-17

## 2022-05-09 DIAGNOSIS — G25.81 RLS (RESTLESS LEGS SYNDROME): ICD-10-CM

## 2022-05-09 RX ORDER — PRAMIPEXOLE DIHYDROCHLORIDE 0.5 MG/1
0.5 TABLET ORAL AT BEDTIME
Qty: 180 TABLET | Refills: 3 | Status: SHIPPED | OUTPATIENT
Start: 2022-05-09 | End: 2022-05-17

## 2022-05-09 RX ORDER — TRAZODONE HYDROCHLORIDE 100 MG/1
100 TABLET ORAL AT BEDTIME
Qty: 90 TABLET | Refills: 3 | Status: SHIPPED | OUTPATIENT
Start: 2022-05-09 | End: 2023-05-09

## 2022-05-09 NOTE — TELEPHONE ENCOUNTER
Reason for Call:  Medication or medication refill:    Do you use a [facility]?  Name of the pharmacy and phone number for the current request:    [facility] [place], [place]    Name of the medication requested:   Pramipexole 0.5mg 1 tab twice daily #180  Trazodone      Other request: n/a    Can we leave a detailed message on this number? YES    Phone number patient can be reached at: Home number on file [phone] (home)    Best Time: anytime    Call taken on 5/9/2022 at 9:52 AM by Leeann Duffy

## 2022-05-17 DIAGNOSIS — G25.81 RLS (RESTLESS LEGS SYNDROME): ICD-10-CM

## 2022-05-18 RX ORDER — PRAMIPEXOLE DIHYDROCHLORIDE 0.5 MG/1
1 TABLET ORAL AT BEDTIME
Qty: 180 TABLET | Refills: 3 | Status: SHIPPED | OUTPATIENT
Start: 2022-05-18 | End: 2023-06-05

## 2022-05-21 ENCOUNTER — HEALTH MAINTENANCE LETTER (OUTPATIENT)
Age: 77
End: 2022-05-21

## 2022-06-10 DIAGNOSIS — M47.812 OSTEOARTHRITIS OF CERVICAL SPINE, UNSPECIFIED SPINAL OSTEOARTHRITIS COMPLICATION STATUS: ICD-10-CM

## 2022-06-11 NOTE — TELEPHONE ENCOUNTER
"Routing refill request to provider for review/approval because:  Labs not current:  AST, ALT, CBC, Cr  Patient needs to be seen because it has been more than 1 year since last office visit.    Last Written Prescription Date:  3/12/22  Last Fill Quantity: 90,  # refills: 0   Last office visit provider:  4/12/21     Requested Prescriptions   Pending Prescriptions Disp Refills     meloxicam (MOBIC) 15 MG tablet 90 tablet 0     Sig: Take 1 tablet (15 mg) by mouth daily as needed for pain       NSAID Medications Failed - 6/10/2022  1:35 PM        Failed - Blood pressure under 140/90 in past 12 months     BP Readings from Last 3 Encounters:   04/12/21 130/80                 Failed - Normal ALT on file in past 12 months     Recent Labs   Lab Test 04/12/21  1519   ALT 39             Failed - Normal AST on file in past 12 months     Recent Labs   Lab Test 04/12/21  1519   AST 28             Failed - Patient is age 6-64 years        Failed - Normal CBC on file in past 12 months     Recent Labs   Lab Test 04/12/21  1519   WBC 6.6   RBC 4.86   HGB 15.1   HCT 45.0                    Failed - Normal serum creatinine on file in past 12 months     Recent Labs   Lab Test 04/12/21  1519   CR 1.10       Ok to refill medication if creatinine is low          Passed - Recent (12 mo) or future (30 days) visit within the authorizing provider's specialty     Patient has had an office visit with the authorizing provider or a provider within the authorizing providers department within the previous 12 mos or has a future within next 30 days. See \"Patient Info\" tab in inbasket, or \"Choose Columns\" in Meds & Orders section of the refill encounter.              Passed - Medication is active on med list             Radha Rui 06/11/22 1:03 PM  "

## 2022-06-13 RX ORDER — MELOXICAM 15 MG/1
15 TABLET ORAL DAILY PRN
Qty: 90 TABLET | Refills: 0 | Status: SHIPPED | OUTPATIENT
Start: 2022-06-13 | End: 2022-09-13

## 2022-06-15 ENCOUNTER — OFFICE VISIT (OUTPATIENT)
Dept: INTERNAL MEDICINE | Facility: CLINIC | Age: 77
End: 2022-06-15
Payer: COMMERCIAL

## 2022-06-15 VITALS
BODY MASS INDEX: 36.6 KG/M2 | SYSTOLIC BLOOD PRESSURE: 140 MMHG | HEART RATE: 61 BPM | DIASTOLIC BLOOD PRESSURE: 78 MMHG | OXYGEN SATURATION: 97 % | WEIGHT: 230.2 LBS

## 2022-06-15 DIAGNOSIS — M16.12 PRIMARY OSTEOARTHRITIS OF LEFT HIP: ICD-10-CM

## 2022-06-15 DIAGNOSIS — N52.9 ERECTILE DYSFUNCTION, UNSPECIFIED ERECTILE DYSFUNCTION TYPE: Primary | ICD-10-CM

## 2022-06-15 DIAGNOSIS — Z01.818 PRE-OP EVALUATION: ICD-10-CM

## 2022-06-15 DIAGNOSIS — G47.33 OSA (OBSTRUCTIVE SLEEP APNEA): ICD-10-CM

## 2022-06-15 PROBLEM — E66.01 MORBID OBESITY (H): Status: RESOLVED | Noted: 2020-12-16 | Resolved: 2022-06-15

## 2022-06-15 PROBLEM — G47.9 SLEEP DISORDER: Status: RESOLVED | Noted: 2020-12-16 | Resolved: 2022-06-15

## 2022-06-15 LAB
ABO/RH(D): NORMAL
ANION GAP SERPL CALCULATED.3IONS-SCNC: 13 MMOL/L (ref 5–18)
ANTIBODY SCREEN: NEGATIVE
BUN SERPL-MCNC: 16 MG/DL (ref 8–28)
CALCIUM SERPL-MCNC: 9.5 MG/DL (ref 8.5–10.5)
CHLORIDE BLD-SCNC: 103 MMOL/L (ref 98–107)
CO2 SERPL-SCNC: 23 MMOL/L (ref 22–31)
CREAT SERPL-MCNC: 0.96 MG/DL (ref 0.7–1.3)
ERYTHROCYTE [DISTWIDTH] IN BLOOD BY AUTOMATED COUNT: 12.9 % (ref 10–15)
GFR SERPL CREATININE-BSD FRML MDRD: 82 ML/MIN/1.73M2
GLUCOSE BLD-MCNC: 86 MG/DL (ref 70–125)
HCT VFR BLD AUTO: 44.2 % (ref 40–53)
HGB BLD-MCNC: 15 G/DL (ref 13.3–17.7)
MCH RBC QN AUTO: 30.9 PG (ref 26.5–33)
MCHC RBC AUTO-ENTMCNC: 33.9 G/DL (ref 31.5–36.5)
MCV RBC AUTO: 91 FL (ref 78–100)
PLATELET # BLD AUTO: 188 10E3/UL (ref 150–450)
POTASSIUM BLD-SCNC: 3.8 MMOL/L (ref 3.5–5)
RBC # BLD AUTO: 4.85 10E6/UL (ref 4.4–5.9)
SODIUM SERPL-SCNC: 139 MMOL/L (ref 136–145)
SPECIMEN EXPIRATION DATE: NORMAL
WBC # BLD AUTO: 6.4 10E3/UL (ref 4–11)

## 2022-06-15 PROCEDURE — 86900 BLOOD TYPING SEROLOGIC ABO: CPT | Performed by: INTERNAL MEDICINE

## 2022-06-15 PROCEDURE — 80048 BASIC METABOLIC PNL TOTAL CA: CPT | Performed by: INTERNAL MEDICINE

## 2022-06-15 PROCEDURE — 86901 BLOOD TYPING SEROLOGIC RH(D): CPT | Performed by: INTERNAL MEDICINE

## 2022-06-15 PROCEDURE — 99214 OFFICE O/P EST MOD 30 MIN: CPT | Performed by: INTERNAL MEDICINE

## 2022-06-15 PROCEDURE — 86850 RBC ANTIBODY SCREEN: CPT | Performed by: INTERNAL MEDICINE

## 2022-06-15 PROCEDURE — 36415 COLL VENOUS BLD VENIPUNCTURE: CPT | Performed by: INTERNAL MEDICINE

## 2022-06-15 PROCEDURE — 85027 COMPLETE CBC AUTOMATED: CPT | Performed by: INTERNAL MEDICINE

## 2022-06-15 RX ORDER — TADALAFIL 20 MG/1
20 TABLET ORAL DAILY PRN
Qty: 20 TABLET | Refills: 11 | Status: SHIPPED | OUTPATIENT
Start: 2022-06-15 | End: 2023-06-07

## 2022-06-15 NOTE — PROGRESS NOTES
[facility]  [address] [place] [address]  Phone: [phone]  Fax: [phone]  Primary Provider: Shine Berman  Pre-op Performing Provider: SHINE BERMAN    PREOPERATIVE EVALUATION:  Today's date: 6/15/2022    Toni Collado is a 76 year old male who presents for a preoperative evaluation.    Surgical Information:  Surgery/Procedure: Left hip replacement  Surgery Location: [facility]  Surgeon: Dr Connolly  Surgery Date: 6/22/22  Time of Surgery: TBD  Where patient plans to recover: At home with family  Fax number for surgical facility: [phone]    Type of Anesthesia Anticipated: to be determined    Assessment & Plan     The proposed surgical procedure is considered LOW risk.    Pre-op evaluation  He is medically stable.  I find no contraindication to his proposed total hip arthroplasty and I recommend proceeding as planned.  If his systolic Bp is mildly elevated, that does not pose an additional risk.  He has no history of heart disease, and Bp today is 144/82.    - ABO/Rh type and screen  - CBC with platelets  - Basic metabolic panel  (Ca, Cl, CO2, Creat, Gluc, K, Na, BUN)    BRITTNI (obstructive sleep apnea)  He uses CPAP    Primary osteoarthritis of left hip    RECOMMENDATION:  APPROVAL GIVEN to proceed with proposed procedure, without further diagnostic evaluation.    Subjective     HPI related to upcoming procedure: he consents to having MEREDITH.  He has been well.  Does have intermittent mild cervicalgia and cervical radiculopathy symptoms.  No actual arm or hand weakness.  No unusual dypsnea or cough, or fever, or bowel or bladder symptoms.  Hip osteoarthritis symptoms are significant.     Preop Questions 6/15/2022   1. Have you ever had a heart attack or stroke? No   2. Have you ever had surgery on your heart or blood vessels, such as a stent placement, a coronary artery bypass, or surgery on an artery in your head, neck,  heart, or legs? No   3. Do you have chest pain with activity? No   4. Do you have a history of  heart failure? No   5. Do you currently have a cold, bronchitis or symptoms of other infection? No   6. Do you have a cough, shortness of breath, or wheezing? No   7. Do you or anyone in your family have previous history of blood clots? No   8. Do you or does anyone in your family have a serious bleeding problem such as prolonged bleeding following surgeries or cuts? No   9. Have you ever had problems with anemia or been told to take iron pills? No   10. Have you had any abnormal blood loss such as black, tarry or bloody stools? No   11. Have you ever had a blood transfusion? No   12. Are you willing to have a blood transfusion if it is medically needed before, during, or after your surgery? Yes   13. Have you or any of your relatives ever had problems with anesthesia? No   14. Do you have sleep apnea, excessive snoring or daytime drowsiness? YES    14a. Do you have a CPAP machine? Yes   15. Do you have any artifical heart valves or other implanted medical devices like a pacemaker, defibrillator, or continuous glucose monitor? No   16. Do you have artificial joints? YES    17. Are you allergic to latex? No     Review of Systems  See HPI    Patient Active Problem List    Diagnosis Date Noted     Gastroesophageal reflux disease without esophagitis 04/12/2021     Priority: Medium     History of colonic polyps 04/12/2021     Priority: Medium     Tinnitus of both ears 04/12/2021     Priority: Medium     Sleep disorder 12/16/2020     Priority: Medium     Obesity (BMI 35.0-39.9) with comorbidity (H) 12/16/2020     Priority: Medium     Elevated prostate specific antigen (PSA) 12/16/2020     Priority: Medium     BRITTNI (obstructive sleep apnea) 07/18/2019     Priority: Medium     RLS (restless legs syndrome) 05/01/2018     Priority: Medium      Past Medical History:   Diagnosis Date     Adenomatous colon polyp 07/2019     Arthritis   "   mostly fingers and neck     Bulbar polio     residual dysphagia     Cervical osteoarthritis      Chronic anxiety      Chronic depression      Elevated prostate specific antigen (PSA) 2020     Familial tremor      Gastroesophageal reflux disease without esophagitis 2021     Hallux limitus of left foot      Localized, primary osteoarthritis of hand      Obesity      RLS (restless legs syndrome)      Sleep apnea, obstructive     CPAP     Sleep disorder 2020     Tremor      Past Surgical History:   Procedure Laterality Date     ARTHROSCOPY KNEE       CATARACT IOL, RT/LT Bilateral      and      HERNIA REPAIR, UMBILICAL       KNEE SURGERY Left     \"bone on bone\" d/t arthritis     RADIOFREQUENCY ABLATION NERVES      cervical spine     RHINOPLASTY       SEPTOPLASTY       SHOULDER SURGERY Bilateral     right (pitching injury), left (bone spur)     SHOULDER SURGERY Right     reconstructive surgery with hardware     TOE SURGERY Left     left big toe d/t arthritis     TOTAL KNEE ARTHROPLASTY Left     unicompartmental     Current Outpatient Medications   Medication Sig Dispense Refill     citalopram (CELEXA) 40 MG tablet Take 1 tablet (40 mg) by mouth daily 90 tablet 3     meloxicam (MOBIC) 15 MG tablet Take 1 tablet (15 mg) by mouth daily as needed for pain 90 tablet 0     Multiple Vitamins-Minerals (MULTIVITAMIN PO) Take 1 tablet by mouth daily       omeprazole (PRILOSEC) 20 MG DR capsule Take 1 capsule (20 mg) by mouth daily 90 capsule 3     pramipexole (MIRAPEX) 0.5 MG tablet Take 2 tablets (1 mg) by mouth At Bedtime For restless leg syndrome. 180 tablet 3     traZODone (DESYREL) 100 MG tablet Take 1 tablet (100 mg) by mouth At Bedtime 90 tablet 3       Allergies   Allergen Reactions     Amoxicillin Other (See Comments)     Facial and neck flushing, felt hot        Social History     Tobacco Use     Smoking status: Former Smoker     Quit date: 1975     Years since quittin.6 "     Smokeless tobacco: Never Used   Substance Use Topics     Alcohol use: Yes     Comment: Alcoholic Drinks/day: occasional     Family History   Problem Relation Age of Onset     Heart Failure Father         ihd     Myelodysplastic syndrome Father      Heart Failure Mother         valvular heart disease     Cancer Brother         renal     Heart Failure Brother         ihd     Thyroid Disease Sister      History   Drug Use No     Objective     BP (!) 140/78 (BP Location: Left arm, Patient Position: Sitting, Cuff Size: Adult Large)   Pulse 61   Wt 104.4 kg (230 lb 3.2 oz)   SpO2 97%   BMI 36.60 kg/m      PHYSICAL EXAM:  General Appearance: In no acute distress  BP (!) 140/78 (BP Location: Left arm, Patient Position: Sitting, Cuff Size: Adult Large)   Pulse 61   Wt 104.4 kg (230 lb 3.2 oz)   SpO2 97%   BMI 36.60 kg/m    EYES: Clear, fundi are unremarkable, discs flat   HEENT: nose and throat clear, ears normal   NECK:  without cervical or axillary adenopathy  RESPIRATORY: Clear to auscultation  CARDIOVASCULAR: S1, S2  ABDOMEN: soft, flat, and non-tender, without mass, rebound, or guarding  RECTAL: deferred  GENITOURINARY: normal testes and phallus  EXTREMITIES: No joint swelling, no ulcer or edema  NEUROLOGIC: No arm or leg  weakness, speech is clear, gait is normal  PSYCHIATRIC: Oriented X 3, without confusion, behavior and affect normal, thinking is clear    Diagnostics:  Recent Results (from the past 48 hour(s))   CBC with platelets    Collection Time: 06/15/22  3:15 PM   Result Value Ref Range    WBC Count 6.4 4.0 - 11.0 10e3/uL    RBC Count 4.85 4.40 - 5.90 10e6/uL    Hemoglobin 15.0 13.3 - 17.7 g/dL    Hematocrit 44.2 40.0 - 53.0 %    MCV 91 78 - 100 fL    MCH 30.9 26.5 - 33.0 pg    MCHC 33.9 31.5 - 36.5 g/dL    RDW 12.9 10.0 - 15.0 %    Platelet Count 188 150 - 450 10e3/uL   Basic metabolic panel  (Ca, Cl, CO2, Creat, Gluc, K, Na, BUN)    Collection Time: 06/15/22  3:15 PM   Result Value Ref Range     Sodium 139 136 - 145 mmol/L    Potassium 3.8 3.5 - 5.0 mmol/L    Chloride 103 98 - 107 mmol/L    Carbon Dioxide (CO2) 23 22 - 31 mmol/L    Anion Gap 13 5 - 18 mmol/L    Urea Nitrogen 16 8 - 28 mg/dL    Creatinine 0.96 0.70 - 1.30 mg/dL    Calcium 9.5 8.5 - 10.5 mg/dL    Glucose 86 70 - 125 mg/dL    GFR Estimate 82 >60 mL/min/1.73m2      Revised Cardiac Risk Index (RCRI):  The patient has the following serious cardiovascular risks for perioperative complications:   - No serious cardiac risks = 0 points     RCRI Interpretation: 0 points: Class I (very low risk - 0.4% complication rate)     Signed Electronically by: Payam Toney MD  Copy of this evaluation report is provided to requesting physician.

## 2022-06-30 ENCOUNTER — E-VISIT (OUTPATIENT)
Dept: INTERNAL MEDICINE | Facility: CLINIC | Age: 77
End: 2022-06-30
Payer: COMMERCIAL

## 2022-06-30 DIAGNOSIS — B35.1 ONYCHOMYCOSIS: Primary | ICD-10-CM

## 2022-06-30 PROCEDURE — 99207 PR NON-BILLABLE SERV PER CHARTING: CPT | Performed by: INTERNAL MEDICINE

## 2022-07-01 NOTE — TELEPHONE ENCOUNTER
Provider E-Visit time total (minutes): onychomycosis of toes.  He is s/p hip replacement surgery one week. We discussed the risks and limited benefits of medical treatment, particularly when healing from hip replacement.  He decided not to proceed with treatment at this time.  Dr. Dawna MD

## 2022-09-12 DIAGNOSIS — M47.812 OSTEOARTHRITIS OF CERVICAL SPINE, UNSPECIFIED SPINAL OSTEOARTHRITIS COMPLICATION STATUS: ICD-10-CM

## 2022-09-12 ASSESSMENT — ENCOUNTER SYMPTOMS
FEVER: 0
HEARTBURN: 0
DYSURIA: 0
DIZZINESS: 0
ARTHRALGIAS: 0
NERVOUS/ANXIOUS: 0
EYE PAIN: 0
FREQUENCY: 0
HEADACHES: 0
MYALGIAS: 0
ABDOMINAL PAIN: 0
SHORTNESS OF BREATH: 0
DIARRHEA: 0
HEMATOCHEZIA: 0
WEAKNESS: 0
COUGH: 0
PARESTHESIAS: 0
CHILLS: 0
PALPITATIONS: 0
SORE THROAT: 0
JOINT SWELLING: 0
HEMATURIA: 0
CONSTIPATION: 0

## 2022-09-12 ASSESSMENT — ACTIVITIES OF DAILY LIVING (ADL): CURRENT_FUNCTION: NO ASSISTANCE NEEDED

## 2022-09-13 RX ORDER — MELOXICAM 15 MG/1
15 TABLET ORAL DAILY PRN
Qty: 90 TABLET | Refills: 0 | Status: SHIPPED | OUTPATIENT
Start: 2022-09-13 | End: 2022-12-22

## 2022-09-13 NOTE — TELEPHONE ENCOUNTER
"Routing refill request to provider for review/approval because:  Labs not current:  Multiple  BP not in range.    Last Written Prescription Date:  6/13/22  Last Fill Quantity: 90,  # refills: 0   Last office visit provider:  6/15/22     Requested Prescriptions   Pending Prescriptions Disp Refills     meloxicam (MOBIC) 15 MG tablet 90 tablet 0     Sig: Take 1 tablet (15 mg) by mouth daily as needed for pain       NSAID Medications Failed - 9/13/2022  7:54 AM        Failed - Blood pressure under 140/90 in past 12 months     BP Readings from Last 3 Encounters:   06/15/22 (!) 140/78   04/12/21 130/80                 Failed - Normal ALT on file in past 12 months     Recent Labs   Lab Test 04/12/21  1519   ALT 39             Failed - Normal AST on file in past 12 months     Recent Labs   Lab Test 04/12/21  1519   AST 28             Failed - Patient is age 6-64 years        Passed - Recent (12 mo) or future (30 days) visit within the authorizing provider's specialty     Patient has had an office visit with the authorizing provider or a provider within the authorizing providers department within the previous 12 mos or has a future within next 30 days. See \"Patient Info\" tab in inbasket, or \"Choose Columns\" in Meds & Orders section of the refill encounter.              Passed - Normal CBC on file in past 12 months     Recent Labs   Lab Test 06/15/22  1515   WBC 6.4   RBC 4.85   HGB 15.0   HCT 44.2                    Passed - Medication is active on med list        Passed - Normal serum creatinine on file in past 12 months     Recent Labs   Lab Test 06/15/22  1515   CR 0.96       Ok to refill medication if creatinine is low               Vijay Chavez RN 09/13/22 7:55 AM  "

## 2022-09-18 ENCOUNTER — HEALTH MAINTENANCE LETTER (OUTPATIENT)
Age: 77
End: 2022-09-18

## 2022-09-19 ENCOUNTER — OFFICE VISIT (OUTPATIENT)
Dept: INTERNAL MEDICINE | Facility: CLINIC | Age: 77
End: 2022-09-19
Payer: COMMERCIAL

## 2022-09-19 VITALS
RESPIRATION RATE: 18 BRPM | WEIGHT: 230.6 LBS | TEMPERATURE: 98.4 F | SYSTOLIC BLOOD PRESSURE: 136 MMHG | BODY MASS INDEX: 36.19 KG/M2 | DIASTOLIC BLOOD PRESSURE: 75 MMHG | HEART RATE: 62 BPM | OXYGEN SATURATION: 98 % | HEIGHT: 67 IN

## 2022-09-19 DIAGNOSIS — E66.01 MORBID OBESITY (H): ICD-10-CM

## 2022-09-19 DIAGNOSIS — R73.03 PREDIABETES: ICD-10-CM

## 2022-09-19 DIAGNOSIS — G25.81 RLS (RESTLESS LEGS SYNDROME): ICD-10-CM

## 2022-09-19 DIAGNOSIS — Z13.220 SCREENING FOR HYPERLIPIDEMIA: ICD-10-CM

## 2022-09-19 DIAGNOSIS — G47.33 OSA (OBSTRUCTIVE SLEEP APNEA): Primary | ICD-10-CM

## 2022-09-19 DIAGNOSIS — R97.20 ELEVATED PROSTATE SPECIFIC ANTIGEN (PSA): ICD-10-CM

## 2022-09-19 DIAGNOSIS — Z86.0100 HISTORY OF COLONIC POLYPS: ICD-10-CM

## 2022-09-19 DIAGNOSIS — Z23 ENCOUNTER FOR IMMUNIZATION: ICD-10-CM

## 2022-09-19 DIAGNOSIS — K21.9 GASTROESOPHAGEAL REFLUX DISEASE WITHOUT ESOPHAGITIS: ICD-10-CM

## 2022-09-19 PROBLEM — F32.A CHRONIC DEPRESSION: Status: ACTIVE | Noted: 2022-09-19

## 2022-09-19 PROBLEM — D12.6 ADENOMATOUS COLON POLYP: Status: ACTIVE | Noted: 2019-07-01

## 2022-09-19 PROBLEM — I83.92 VARICOSE VEINS OF LEFT LOWER EXTREMITY: Status: ACTIVE | Noted: 2022-09-19

## 2022-09-19 LAB
ALBUMIN SERPL BCG-MCNC: 4.6 G/DL (ref 3.5–5.2)
ALP SERPL-CCNC: 75 U/L (ref 40–129)
ALT SERPL W P-5'-P-CCNC: 26 U/L (ref 10–50)
ANION GAP SERPL CALCULATED.3IONS-SCNC: 11 MMOL/L (ref 7–15)
AST SERPL W P-5'-P-CCNC: 28 U/L (ref 10–50)
BILIRUB SERPL-MCNC: 0.4 MG/DL
BUN SERPL-MCNC: 19.7 MG/DL (ref 8–23)
CALCIUM SERPL-MCNC: 9.7 MG/DL (ref 8.8–10.2)
CHLORIDE SERPL-SCNC: 102 MMOL/L (ref 98–107)
CHOLEST SERPL-MCNC: 202 MG/DL
CREAT SERPL-MCNC: 1.01 MG/DL (ref 0.67–1.17)
DEPRECATED HCO3 PLAS-SCNC: 25 MMOL/L (ref 22–29)
GFR SERPL CREATININE-BSD FRML MDRD: 77 ML/MIN/1.73M2
GLUCOSE SERPL-MCNC: 101 MG/DL (ref 70–99)
HBA1C MFR BLD: 5.2 % (ref 0–5.6)
HDLC SERPL-MCNC: 40 MG/DL
LDLC SERPL CALC-MCNC: 120 MG/DL
NONHDLC SERPL-MCNC: 162 MG/DL
POTASSIUM SERPL-SCNC: 4.4 MMOL/L (ref 3.4–5.3)
PROT SERPL-MCNC: 7.1 G/DL (ref 6.4–8.3)
SODIUM SERPL-SCNC: 138 MMOL/L (ref 136–145)
TRIGL SERPL-MCNC: 209 MG/DL

## 2022-09-19 PROCEDURE — 99213 OFFICE O/P EST LOW 20 MIN: CPT | Mod: 25 | Performed by: INTERNAL MEDICINE

## 2022-09-19 PROCEDURE — 80061 LIPID PANEL: CPT | Performed by: INTERNAL MEDICINE

## 2022-09-19 PROCEDURE — 90677 PCV20 VACCINE IM: CPT | Performed by: INTERNAL MEDICINE

## 2022-09-19 PROCEDURE — 83036 HEMOGLOBIN GLYCOSYLATED A1C: CPT | Performed by: INTERNAL MEDICINE

## 2022-09-19 PROCEDURE — 36415 COLL VENOUS BLD VENIPUNCTURE: CPT | Performed by: INTERNAL MEDICINE

## 2022-09-19 PROCEDURE — 80053 COMPREHEN METABOLIC PANEL: CPT | Performed by: INTERNAL MEDICINE

## 2022-09-19 PROCEDURE — G0009 ADMIN PNEUMOCOCCAL VACCINE: HCPCS | Performed by: INTERNAL MEDICINE

## 2022-09-19 PROCEDURE — G0439 PPPS, SUBSEQ VISIT: HCPCS | Performed by: INTERNAL MEDICINE

## 2022-09-19 ASSESSMENT — ACTIVITIES OF DAILY LIVING (ADL): CURRENT_FUNCTION: NO ASSISTANCE NEEDED

## 2022-09-19 NOTE — LETTER
September 19, 2022      Toni Collado  [address] [facility] [address]        Dear ,    We are writing to inform you of your test results.    Your tests are good. The slightly elevated glucose (blood sugar) is not significant. The A1c does not reveal pre-diabetes.  The cholesterol levels are only mildly high. Further weight loss will help.  I don't think you need medication for the elevated cholesterol levels.      Resulted Orders   Comprehensive metabolic panel   Result Value Ref Range    Sodium 138 136 - 145 mmol/L    Potassium 4.4 3.4 - 5.3 mmol/L    Chloride 102 98 - 107 mmol/L    Carbon Dioxide (CO2) 25 22 - 29 mmol/L    Anion Gap 11 7 - 15 mmol/L    Urea Nitrogen 19.7 8.0 - 23.0 mg/dL    Creatinine 1.01 0.67 - 1.17 mg/dL    Calcium 9.7 8.8 - 10.2 mg/dL    Glucose 101 (H) 70 - 99 mg/dL    Alkaline Phosphatase 75 40 - 129 U/L    AST 28 10 - 50 U/L    ALT 26 10 - 50 U/L    Protein Total 7.1 6.4 - 8.3 g/dL    Albumin 4.6 3.5 - 5.2 g/dL    Bilirubin Total 0.4 <=1.2 mg/dL    GFR Estimate 77 >60 mL/min/1.73m2      Comment:      Effective December 21, 2021 eGFRcr in adults is calculated using the 2021 CKD-EPI creatinine equation which includes age and gender (Gabbie rosenbaum al., NEJM, DOI: 10.1056/LPWGjm1719623)   Hemoglobin A1c   Result Value Ref Range    Hemoglobin A1C 5.2 0.0 - 5.6 %      Comment:      Normal <5.7%   Prediabetes 5.7-6.4%    Diabetes 6.5% or higher     Note: Adopted from ADA consensus guidelines.   Lipid Profile (Chol, Trig, HDL, LDL calc)   Result Value Ref Range    Cholesterol 202 (H) <200 mg/dL    Triglycerides 209 (H) <150 mg/dL    Direct Measure HDL 40 >=40 mg/dL    LDL Cholesterol Calculated 120 (H) <=100 mg/dL    Non HDL Cholesterol 162 (H) <130 mg/dL    Narrative    Cholesterol  Desirable:  <200 mg/dL    Triglycerides  Normal:  Less than 150 mg/dL  Borderline High:  150-199 mg/dL  High:  200-499 mg/dL  Very High:  Greater than or equal to 500 mg/dL    Direct Measure HDL  Female:   Greater than or equal to 50 mg/dL   Male:  Greater than or equal to 40 mg/dL    LDL Cholesterol  Desirable:  <100mg/dL  Above Desirable:  100-129 mg/dL   Borderline High:  130-159 mg/dL   High:  160-189 mg/dL   Very High:  >= 190 mg/dL    Non HDL Cholesterol  Desirable:  130 mg/dL  Above Desirable:  130-159 mg/dL  Borderline High:  160-189 mg/dL  High:  190-219 mg/dL  Very High:  Greater than or equal to 220 mg/dL       If you have any questions or concerns, please call the clinic at the number listed above.       Sincerely,      Payam Toney MD

## 2022-09-19 NOTE — PROGRESS NOTES
"SUBJECTIVE:   Toni is a 77 year old who presents for Preventive Visit.    HPI:  Has mild, chronic dysphagia, \"I have to be careful\".  Has lost weight - 9 pounds - intentionally and overall feels well.  Uses his CPAP and taking medications as prescribed.  Will be going to the [place] again this winter.  No new bowel or bladder issues.  Mild post void dribbling, unchanged. Mild discomfort LLE varicosities, not new.  Has seasonal and perennial allergic rhinitis.  No unusual dyspnea or cough.      Patient has been advised of split billing requirements and indicates understanding: Yes  Are you in the first 12 months of your Medicare coverage?  No    Healthy Habits:     In general, how would you rate your overall health?  Excellent    Frequency of exercise:  2-3 days/week    Duration of exercise:  15-30 minutes    Do you usually eat at least 4 servings of fruit and vegetables a day, include whole grains    & fiber and avoid regularly eating high fat or \"junk\" foods?  Yes    Taking medications regularly:  Yes    Ability to successfully perform activities of daily living:  No assistance needed    Home Safety:  No safety concerns identified    Hearing Impairment:  Difficulty following a conversation in a noisy restaurant or crowded room, feel that people are mumbling or not speaking clearly, need to ask people to speak up or repeat themselves, difficulty understanding soft or whispered speech and difficulty understanding speech on the telephone    In the past 6 months, have you been bothered by leaking of urine?  No    In general, how would you rate your overall mental or emotional health?  Excellent      PHQ-2 Total Score: 0    Additional concerns today:  No    Do you feel safe in your environment? Yes    Have you ever done Advance Care Planning? (For example, a Health Directive, POLST, or a discussion with a medical provider or your loved ones about your wishes): No, advance care planning information given to patient " to review.  Patient declined advance care planning discussion at this time.    Cognitive Screening   1) Repeat 3 items (Leader, Season, Table)    2) Clock draw: NORMAL  3) 3 item recall: Recalls 3 objects  Results: 3 items recalled: COGNITIVE IMPAIRMENT LESS LIKELY    Mini-CogTM Copyright S Neil. Licensed by the author for use in [facility]; reprinted with permission (anabelle@[facility]). All rights reserved.      Do you have sleep apnea, excessive snoring or daytime drowsiness?: yes    Reviewed and updated as needed this visit by clinical staff   Tobacco             Reviewed and updated as needed this visit by Provider     Social History     Tobacco Use     Smoking status: Former Smoker     Quit date: 1975     Years since quittin.8     Smokeless tobacco: Never Used   Substance Use Topics     Alcohol use: Yes     Comment: Alcoholic Drinks/day: occasional     Alcohol Use 2022   Prescreen: >3 drinks/day or >7 drinks/week? No     Current providers sharing in care for this patient include:   Patient Care Team:  Payam Toney MD as PCP - General (Internal Medicine)  Kurtis Raya MD as MD (Ophthalmology)  Alexy Yu MD as Referring Physician (Ophthalmology)    The following health maintenance items are reviewed in Epic and correct as of today:  Health Maintenance   Topic Date Due     ANNUAL REVIEW OF HM ORDERS  Never done     MEDICARE ANNUAL WELLNESS VISIT  2022     INFLUENZA VACCINE (1) 2022     Pneumococcal Vaccine: 65+ Years (2 - PCV) 2022     FALL RISK ASSESSMENT  2023     LIPID  [id number]     ADVANCE CARE PLANNING  2026     DTAP/TDAP/TD IMMUNIZATION (2 - Td or Tdap) 2031     HEPATITIS C SCREENING  Completed     PHQ-2 (once per calendar year)  Completed     ZOSTER IMMUNIZATION  Completed     COVID-19 Vaccine  Completed     IPV IMMUNIZATION  Aged Out     MENINGITIS IMMUNIZATION  Aged Out     HEPATITIS B IMMUNIZATION  Aged Out  "    See HPI    OBJECTIVE:     PHYSICAL EXAM:  General Appearance: In no acute distress  /75 (BP Location: Left arm, Patient Position: Sitting, Cuff Size: Adult Large)   Pulse 62   Temp 98.4  F (36.9  C) (Oral)   Resp 18   Ht 1.689 m (5' 6.5\")   Wt 104.6 kg (230 lb 9.6 oz)   SpO2 98%   BMI 36.66 kg/m    EYES: Clear, fundi are unremarkable, discs flat   HEENT: nose and throat clear, ears normal   NECK:  without cervical or axillary adenopathy  RESPIRATORY: Clear  CARDIOVASCULAR: S1, S2  ABDOMEN: soft, flat, and non-tender  EXTREMITIES: No joint swelling, no ulcer or edema, LLE calf varicosities, no inflammation or thrombosis  NEUROLOGIC: No arm or leg  weakness, speech is clear, gait is normal    ASSESSMENT / PLAN:     Toni was seen today for physical and recheck medication.    Diagnoses and all orders for this visit:    BRITTNI (obstructive sleep apnea)  Ongoing CPAP    Morbid obesity  Improving with intentional weight loss     Gastroesophageal reflux disease without esophagitis  Symptoms are controlled with PPI therapy.   -     Comprehensive metabolic panel    History of colonic polyps  He is due and wants to have colonoscopy on return next May    RLS (restless legs syndrome)  satifactory ongoing treatment.     Encounter for immunization  -     Pneumococcal 20 Valent Conjugate (PCV20)    Prediabetes  He wants screening, given his obesity.   -     Hemoglobin A1c; Future    Screening for hyperlipidemia  -     Lipid Profile (Chol, Trig, HDL, LDL calc); Future    Dysphagia  Mild, intermittent, for solids.  He is very careful with chewing, and cutting into small pieces.  Will get EGD on return from [place].      COUNSELING:  Reviewed preventive health counseling, as reflected in patient instructions       Regular exercise       Healthy diet/nutrition       Colon cancer screening    Estimated body mass index is 36.66 kg/m  as calculated from the following:    Height as of this encounter: 1.689 m (5' " "6.5\").    Weight as of this encounter: 104.6 kg (230 lb 9.6 oz).    Weight management plan: Discussed healthy diet and exercise guidelines    He reports that he quit smoking about 46 years ago. He has never used smokeless tobacco.      Appropriate preventive services were discussed with this patient, including applicable screening as appropriate for cardiovascular disease, diabetes, osteopenia/osteoporosis, and glaucoma.  As appropriate for age/gender, discussed screening for colorectal cancer, prostate cancer, breast cancer, and cervical cancer. Checklist reviewing preventive services available has been given to the patient.    Reviewed patients plan of care and provided an AVS. The Basic Care Plan (routine screening as documented in Health Maintenance) for Toni meets the Care Plan requirement. This Care Plan has been established and reviewed with the Patient.    Counseling Resources:  ATP IV Guidelines  Pooled Cohorts Equation Calculator  Breast Cancer Risk Calculator  Breast Cancer: Medication to Reduce Risk  FRAX Risk Assessment  ICSI Preventive Guidelines  Dietary Guidelines for Americans, 2010  USDA's MyPlate  ASA Prophylaxis  Lung CA Screening    Payam Toney MD  [facility]    Identified Health Risks:  "

## 2022-09-29 ENCOUNTER — MYC MEDICAL ADVICE (OUTPATIENT)
Dept: INTERNAL MEDICINE | Facility: CLINIC | Age: 77
End: 2022-09-29

## 2022-10-13 ENCOUNTER — VIRTUAL VISIT (OUTPATIENT)
Dept: FAMILY MEDICINE | Facility: CLINIC | Age: 77
End: 2022-10-13
Payer: COMMERCIAL

## 2022-10-13 DIAGNOSIS — R05.9 COUGH, UNSPECIFIED TYPE: ICD-10-CM

## 2022-10-13 DIAGNOSIS — U07.1 SARS-COV-2 POSITIVE: Primary | ICD-10-CM

## 2022-10-13 PROCEDURE — 99213 OFFICE O/P EST LOW 20 MIN: CPT | Mod: CS | Performed by: FAMILY MEDICINE

## 2022-10-13 RX ORDER — CODEINE PHOSPHATE AND GUAIFENESIN 10; 100 MG/5ML; MG/5ML
1-2 SOLUTION ORAL EVERY 4 HOURS PRN
Qty: 120 ML | Refills: 0 | Status: SHIPPED | OUTPATIENT
Start: 2022-10-13 | End: 2023-06-07

## 2022-10-13 ASSESSMENT — ENCOUNTER SYMPTOMS
RHINORRHEA: 1
PSYCHIATRIC NEGATIVE: 1
ENDOCRINE NEGATIVE: 1
WHEEZING: 0
NEUROLOGICAL NEGATIVE: 1
GASTROINTESTINAL NEGATIVE: 1
CONSTITUTIONAL NEGATIVE: 1
ALLERGIC/IMMUNOLOGIC NEGATIVE: 1
SHORTNESS OF BREATH: 0
COUGH: 1
HEMATOLOGIC/LYMPHATIC NEGATIVE: 1
CARDIOVASCULAR NEGATIVE: 1
MUSCULOSKELETAL NEGATIVE: 1

## 2022-10-13 NOTE — PROGRESS NOTES
Toni is a 77 year old who is being evaluated via a billable video visit.      How would you like to obtain your AVS? MyChart  If the video visit is dropped, the invitation should be resent by: Text to cell phone: [phone]  Will anyone else be joining your video visit? No        Assessment & Plan     SARS-CoV-2 positive  Patient is a 77 yr old male here for cough, fatigue, nasal congestion. He reports that he tested positive for COVID. He is open to the antiviral. Recommend rest, increase in fluids. Also patient to monitor oxygen levels.  - nirmatrelvir and ritonavir (PAXLOVID) therapy pack; Take 3 tablets by mouth 2 times daily for 5 days    Cough, unspecified type  - guaiFENesin-codeine (ROBITUSSIN AC) 100-10 MG/5ML solution; Take 5-10 mLs by mouth every 4 hours as needed for cough    [id number]}     FUTURE APPOINTMENTS:       - Follow-up visit in 2 weeks or sooner as needed.    Return in about 2 weeks (around 10/27/2022), or if symptoms worsen or fail to improve, for Follow up.    Joel Myers MD  [facility]    Subjective   Toni is a 77 year old, presenting for the following health issues:  Covid Concern (Tested positive for Covid 2 days ago , Symptoms started 2-3 days ago . Cough is hurting his chest)      HPI       COVID-19 Symptom Review  How many days ago did these symptoms start? 2-3 days ago     Are any of the following symptoms significant for you?    New or worsening difficulty breathing? No    Worsening cough? Yes, I am coughing up mucus.and sometimes it is dry . Cough is causing pain in his chest when he coughs .     Fever or chills? Yes, the highest temperature was 100.3 - was yesterday . No fever this A.M.     Headache: YES- minor     Sore throat: YES- first thing in A.M. then gets better    Chest pain: YES- only when coughing     Diarrhea: No    Body aches? YES    What treatments has patient tried? none   Does patient live in a nursing home, group home, or  shelter? No  Does patient have a way to get food/medications during quarantined? Yes, I have a friend or family member who can help me.            Review of Systems   Constitutional: Negative.    HENT: Positive for congestion and rhinorrhea.    Respiratory: Positive for cough. Negative for shortness of breath and wheezing.    Cardiovascular: Negative.    Gastrointestinal: Negative.    Endocrine: Negative.    Genitourinary: Negative.    Musculoskeletal: Negative.    Skin: Negative.    Allergic/Immunologic: Negative.    Neurological: Negative.    Hematological: Negative.    Psychiatric/Behavioral: Negative.             Objective           Vitals:  No vitals were obtained today due to virtual visit.    Physical Exam   GENERAL: Healthy, alert and no distress  EYES: Eyes grossly normal to inspection.  No discharge or erythema, or obvious scleral/conjunctival abnormalities.  RESP: No audible wheeze, cough, or visible cyanosis.  No visible retractions or increased work of breathing.    SKIN: Visible skin clear. No significant rash, abnormal pigmentation or lesions.  PSYCH: Mentation appears normal, affect normal/bright, judgement and insight intact, normal speech and appearance well-groomed.            Video-Visit Details    Video Start Time: 9:52 AM    Type of service:  Video Visit    Video End Time:9:56 AM    Originating Location (pt. Location): Home    Distant Location (provider location):  [facility]     Platform used for Video Visit: [employer]

## 2022-12-21 DIAGNOSIS — M47.812 OSTEOARTHRITIS OF CERVICAL SPINE, UNSPECIFIED SPINAL OSTEOARTHRITIS COMPLICATION STATUS: ICD-10-CM

## 2022-12-22 RX ORDER — MELOXICAM 15 MG/1
15 TABLET ORAL DAILY PRN
Qty: 90 TABLET | Refills: 2 | Status: ON HOLD | OUTPATIENT
Start: 2022-12-22 | End: 2024-07-16

## 2022-12-22 NOTE — TELEPHONE ENCOUNTER
"Routing refill request to provider for review/approval because:  Age warning    Last Written Prescription Date:  9/13/22  Last Fill Quantity: 90,  # refills: 0   Last office visit provider:  9/19/22     Requested Prescriptions   Pending Prescriptions Disp Refills     meloxicam (MOBIC) 15 MG tablet 90 tablet 0     Sig: Take 1 tablet (15 mg) by mouth daily as needed for pain       NSAID Medications Failed - 12/22/2022  8:48 AM        Failed - Patient is age 6-64 years        Passed - Blood pressure under 140/90 in past 12 months     BP Readings from Last 3 Encounters:   09/19/22 136/75   06/15/22 (!) 140/78   04/12/21 130/80                 Passed - Normal ALT on file in past 12 months     Recent Labs   Lab Test 09/19/22  1052   ALT 26             Passed - Normal AST on file in past 12 months     Recent Labs   Lab Test 09/19/22  1052   AST 28             Passed - Recent (12 mo) or future (30 days) visit within the authorizing provider's specialty     Patient has had an office visit with the authorizing provider or a provider within the authorizing providers department within the previous 12 mos or has a future within next 30 days. See \"Patient Info\" tab in inbasket, or \"Choose Columns\" in Meds & Orders section of the refill encounter.              Passed - Normal CBC on file in past 12 months     Recent Labs   Lab Test 06/15/22  1515   WBC 6.4   RBC 4.85   HGB 15.0   HCT 44.2                    Passed - Medication is active on med list        Passed - Normal serum creatinine on file in past 12 months     Recent Labs   Lab Test 09/19/22  1052   CR 1.01       Ok to refill medication if creatinine is low               Vijay Chavez, RN 12/22/22 8:49 AM  "

## 2023-03-17 DIAGNOSIS — G47.00 INSOMNIA: ICD-10-CM

## 2023-03-17 RX ORDER — CITALOPRAM HYDROBROMIDE 40 MG/1
40 TABLET ORAL DAILY
Qty: 90 TABLET | Refills: 2 | Status: SHIPPED | OUTPATIENT
Start: 2023-03-17 | End: 2023-09-07

## 2023-03-17 NOTE — TELEPHONE ENCOUNTER
"Last Written Prescription Date:  3/12/22  Last Fill Quantity: 90,  # refills: 3   Last office visit provider:  10/13/22     Requested Prescriptions   Pending Prescriptions Disp Refills     citalopram (CELEXA) 40 MG tablet 90 tablet 3     Sig: Take 1 tablet (40 mg) by mouth daily       SSRIs Protocol Passed - 3/17/2023 12:07 PM        Passed - Recent (12 mo) or future (30 days) visit within the authorizing provider's specialty     Patient has had an office visit with the authorizing provider or a provider within the authorizing providers department within the previous 12 mos or has a future within next 30 days. See \"Patient Info\" tab in inbasket, or \"Choose Columns\" in Meds & Orders section of the refill encounter.              Passed - Medication is active on med list        Passed - Patient is age 18 or older           Overridden by Payam Toney MD on Dec 22, 2022 8:52 AM   Drug-Drug   1. SELECTIVE SEROTONIN REUPTAKE INHIBITORS / NSAIDS [Level: Major]   Other Orders: meloxicam (MOBIC) 15 MG tablet         Overridden by Payam Toney MD on Sep 13, 2022 3:51 PM   Drug-Drug   1. SELECTIVE SEROTONIN REUPTAKE INHIBITORS / NSAIDS [Level: Major]   Other Orders: meloxicam (MOBIC) 15 MG tablet         Overridden by Payam Toney MD on Jun 13, 2022 11:07 AM   Drug-Drug   1. SELECTIVE SEROTONIN REUPTAKE INHIBITORS / NSAIDS [Level: Major]   Other Orders: meloxicam (MOBIC) 15 MG tablet         Overridden by Payam Toney MD on May 9, 2022 4:08 PM   Drug-Drug   1. SELECTIVE SEROTONIN REUPTAKE INHIBITORS / SEROTONERGIC NON-OPIOID CNS DEPRESSANTS [Level: Major]   Other Orders: traZODone (DESYREL) 100 MG tablet         Overridden by Payam Toney MD on Mar 12, 2022 10:11 AM   Drug-Drug   1. SELECTIVE SEROTONIN REUPTAKE INHIBITORS / NSAIDS [Level: Major]   Other Orders: meloxicam (MOBIC) 15 MG tablet         Overridden by Payam Toney MD on Mar 12, 2022 10:11 AM   Drug-Drug   1. SELECTIVE SEROTONIN REUPTAKE " INHIBITORS / NSAIDS [Level: Major]   Other Orders: meloxicam (MOBIC) 15 MG tablet         Overridden by Payam Toney MD on Mar 12, 2022 10:10 AM   Drug-Drug   1. SELECTIVE SEROTONIN REUPTAKE INHIBITORS / SEROTONERGIC NON-OPIOID CNS DEPRESSANTS [Level: Major]   Other Orders: traZODone (DESYREL) 100 MG tablet      2. SELECTIVE SEROTONIN REUPTAKE INHIBITORS / NSAIDS [Level: Major]   Other Orders: meloxicam (MOBIC) 15 MG tablet        LEONEL ESPAÑA RN 03/17/23 3:22 PM

## 2023-05-09 DIAGNOSIS — K21.9 GERD (GASTROESOPHAGEAL REFLUX DISEASE): ICD-10-CM

## 2023-05-09 DIAGNOSIS — G25.81 RLS (RESTLESS LEGS SYNDROME): ICD-10-CM

## 2023-05-09 RX ORDER — TRAZODONE HYDROCHLORIDE 100 MG/1
100 TABLET ORAL AT BEDTIME
Qty: 90 TABLET | Refills: 1 | Status: SHIPPED | OUTPATIENT
Start: 2023-05-09 | End: 2023-10-23

## 2023-05-09 NOTE — TELEPHONE ENCOUNTER
"Prescription approved per [facility] Refill Protocol.    Last Written Prescription Date:  3/1/22 - Dr. Toney   Last Fill Quantity: 90,  # refills: 3   Last office visit provider:  9/19/2022 - physical with Dr. Toney      Requested Prescriptions   Pending Prescriptions Disp Refills     omeprazole (PRILOSEC) 20 MG DR capsule 90 capsule 3     Sig: Take 1 capsule (20 mg) by mouth daily       PPI Protocol Passed - 5/9/2023  9:56 AM        Passed - Not on Clopidogrel (unless Pantoprazole ordered)        Passed - No diagnosis of osteoporosis on record        Passed - Recent (12 mo) or future (30 days) visit within the authorizing provider's specialty     Patient has had an office visit with the authorizing provider or a provider within the authorizing providers department within the previous 12 mos or has a future within next 30 days. See \"Patient Info\" tab in inbasket, or \"Choose Columns\" in Meds & Orders section of the refill encounter.              Passed - Medication is active on med list        Passed - Patient is age 18 or older        "

## 2023-05-09 NOTE — TELEPHONE ENCOUNTER
"Routing refill request to provider for review/approval because:  Medication interaction - needs provider review.    Drug-Drug: citalopram and traZODone  Additive serotonergic effects may occur during coadministration of selective serotonin reuptake inhibitors (SSRIs) and Serotonergic Non-Opioid CNS Depressants, and the risk of developing serotonin syndrome may be increased.  DetailsOverride reason  Press F5 to access options      traZODone (DESYREL) 100 MG tablet  Prescription. Reordered. Long-term.  citalopram (CELEXA) 40 MG tabletPrescription. Active. Long-term.        Last Written Prescription Date:  5/9/22 - Dr. Toney  Last Fill Quantity: 90,  # refills: 3   Last office visit provider:  9/19/2022 - physical with Dr. Toney         traZODone (DESYREL) 100 MG tablet 90 tablet 3     Sig: Take 1 tablet (100 mg) by mouth At Bedtime       Serotonin Modulators Passed - 5/9/2023  9:56 AM        Passed - Recent (12 mo) or future (30 days) visit within the authorizing provider's specialty     Patient has had an office visit with the authorizing provider or a provider within the authorizing providers department within the previous 12 mos or has a future within next 30 days. See \"Patient Info\" tab in inbasket, or \"Choose Columns\" in Meds & Orders section of the refill encounter.              Passed - Medication is active on med list        Passed - Patient is age 18 or older             Clarissa Norris RN 05/09/23 11:48 AM  "

## 2023-06-05 DIAGNOSIS — G25.81 RLS (RESTLESS LEGS SYNDROME): ICD-10-CM

## 2023-06-05 RX ORDER — PRAMIPEXOLE DIHYDROCHLORIDE 1 MG/1
1 TABLET ORAL 2 TIMES DAILY
Qty: 180 TABLET | Refills: 3 | Status: SHIPPED | OUTPATIENT
Start: 2023-06-05 | End: 2024-06-09

## 2023-06-05 NOTE — TELEPHONE ENCOUNTER
"Routing refill request to provider for review/approval because:  Patient reports taking differently than sig    Last Written Prescription Date:  5/18/2022  Last Fill Quantity: 180,  # refills: 3   Last office visit provider:  9/19/2022     Requested Prescriptions   Pending Prescriptions Disp Refills     pramipexole (MIRAPEX) 0.5 MG tablet 180 tablet 3     Sig: Take 2 tablets (1 mg) by mouth At Bedtime For restless leg syndrome.       Antiparkinson's Agents Protocol Failed - 6/5/2023  2:55 PM        Failed - Recent (6 mo) or future (30 days) visit within the authorizing provider's specialty     Patient had office visit in the last 6 months or has a visit in the next 30 days with authorizing provider or within the authorizing provider's specialty.  See \"Patient Info\" tab in inbasket, or \"Choose Columns\" in Meds & Orders section of the refill encounter.            Passed - Blood pressure under 140/90 in past 12 months     BP Readings from Last 3 Encounters:   09/19/22 136/75   06/15/22 (!) 140/78   04/12/21 130/80                 Passed - CBC on record in past 12 months     Recent Labs   Lab Test 06/15/22  1515   WBC 6.4   RBC 4.85   HGB 15.0   HCT 44.2                    Passed - ALT on record in past 12 months         Recent Labs   Lab Test 09/19/22  1052   ALT 26             Passed - Serum Creatinine on file in past 12 months     Recent Labs   Lab Test 09/19/22  1052   CR 1.01       Ok to refill medication if creatinine is low          Passed - Medication is active on med list        Passed - Patient is age 18 or older             Naveed Calderón RN 06/05/23 3:20 PM  "

## 2023-06-07 ENCOUNTER — VIRTUAL VISIT (OUTPATIENT)
Dept: PEDIATRICS | Facility: CLINIC | Age: 78
End: 2023-06-07
Payer: COMMERCIAL

## 2023-06-07 DIAGNOSIS — H81.13 BENIGN PAROXYSMAL POSITIONAL VERTIGO DUE TO BILATERAL VESTIBULAR DISORDER: Primary | ICD-10-CM

## 2023-06-07 DIAGNOSIS — F32.A CHRONIC DEPRESSION: ICD-10-CM

## 2023-06-07 DIAGNOSIS — E66.01 MORBID OBESITY (H): ICD-10-CM

## 2023-06-07 DIAGNOSIS — Z13.220 SCREENING CHOLESTEROL LEVEL: ICD-10-CM

## 2023-06-07 DIAGNOSIS — Z12.11 SPECIAL SCREENING FOR MALIGNANT NEOPLASMS, COLON: ICD-10-CM

## 2023-06-07 DIAGNOSIS — G47.00 INSOMNIA, UNSPECIFIED TYPE: ICD-10-CM

## 2023-06-07 DIAGNOSIS — R13.12 OROPHARYNGEAL DYSPHAGIA: ICD-10-CM

## 2023-06-07 DIAGNOSIS — F41.9 ANXIETY: ICD-10-CM

## 2023-06-07 DIAGNOSIS — A80.30: ICD-10-CM

## 2023-06-07 PROBLEM — Z01.818 PRE-OP EVALUATION: Status: RESOLVED | Noted: 2022-06-15 | Resolved: 2023-06-07

## 2023-06-07 PROCEDURE — 99214 OFFICE O/P EST MOD 30 MIN: CPT | Mod: VID | Performed by: INTERNAL MEDICINE

## 2023-06-07 RX ORDER — CITALOPRAM HYDROBROMIDE 20 MG/1
20 TABLET ORAL DAILY
Qty: 90 TABLET | Refills: 3 | Status: SHIPPED | OUTPATIENT
Start: 2023-06-07 | End: 2023-11-07

## 2023-06-07 RX ORDER — MECLIZINE HYDROCHLORIDE 25 MG/1
25 TABLET ORAL 3 TIMES DAILY PRN
Qty: 30 TABLET | Refills: 2 | Status: SHIPPED | OUTPATIENT
Start: 2023-06-07 | End: 2023-11-22

## 2023-06-07 ASSESSMENT — ANXIETY QUESTIONNAIRES
GAD7 TOTAL SCORE: 2
1. FEELING NERVOUS, ANXIOUS, OR ON EDGE: NOT AT ALL
7. FEELING AFRAID AS IF SOMETHING AWFUL MIGHT HAPPEN: NOT AT ALL
IF YOU CHECKED OFF ANY PROBLEMS ON THIS QUESTIONNAIRE, HOW DIFFICULT HAVE THESE PROBLEMS MADE IT FOR YOU TO DO YOUR WORK, TAKE CARE OF THINGS AT HOME, OR GET ALONG WITH OTHER PEOPLE: NOT DIFFICULT AT ALL
2. NOT BEING ABLE TO STOP OR CONTROL WORRYING: NOT AT ALL
6. BECOMING EASILY ANNOYED OR IRRITABLE: NOT AT ALL
GAD7 TOTAL SCORE: 2
5. BEING SO RESTLESS THAT IT IS HARD TO SIT STILL: SEVERAL DAYS
3. WORRYING TOO MUCH ABOUT DIFFERENT THINGS: NOT AT ALL

## 2023-06-07 ASSESSMENT — PATIENT HEALTH QUESTIONNAIRE - PHQ9: 5. POOR APPETITE OR OVEREATING: SEVERAL DAYS

## 2023-06-07 NOTE — PROGRESS NOTES
"Toni is a 77 year old who is being evaluated via a billable video visit.      How would you like to obtain your AVS? MyChart  If the video visit is dropped, the invitation should be resent by: Text to cell phone: [phone]  Will anyone else be joining your video visit? No      New patient.      Has a concern today.  Gets some \"crystals that break off in his ear\", and cause him some intermittent dizziness. This has been going on and off for last 1 year, once weekly. No meds.  No over-the-counter medications.  Lasts for a few minutes only. Does Epley maneuvers, and this helps.  This last time, did the maneuver 4 times before it went away.    Also, he has additional complaints of trouble swallowing; Has history of bulbar polio.  Would like referral to gastroenterology.       Assessment & Plan     Benign paroxysmal positional vertigo due to bilateral vestibular disorder    - meclizine (ANTIVERT) 25 MG tablet; Take 1 tablet (25 mg) by mouth 3 times daily as needed for dizziness  Trial of meclizine; already did physical therapy and does Epley.    Bulbar polio  Mostly resolved symptoms of dysarthria; may be contributing to his dysmotility issuese; but will refer to esophagogastroduodenoscopy anyways.     Chronic depression  Excellent BETO today; will taper to 20 mg and follow up in 3-5 months at complete physcial exam.     Morbid obesity (H)  Weight loss succesful.     Oropharyngeal dysphagia  As above.   - Adult GI  Referral - Procedure Only; Future    Special screening for malignant neoplasms, colon    - Colonoscopy Screening  Referral; Future    Screening cholesterol level    - Lipid panel reflex to direct LDL Fasting; Future  - Comprehensive metabolic panel (BMP + Alb, Alk Phos, ALT, AST, Total. Bili, TP); Future  - Hemoglobin A1c; Future             BMI:   Estimated body mass index is 36.66 kg/m  as calculated from the following:    Height as of 9/19/22: 1.689 m (5' 6.5\").    Weight as of " "9/19/22: 104.6 kg (230 lb 9.6 oz).   Weight management plan: Patient was referred to their PCP to discuss a diet and exercise plan.    Patient Instructions   It was good talking with you earlier today.  Here's a summary of what we discussed:    1)  You are due for COVID bivalent booster.     2)  You're due for a Colonoscopy; they will call you to set it up.     3).  Set up a fasting lab draw. You can schedule this by calling  or using your [employer] account, if you have it..    4)  Let's try some meclizine as needed for dizziness.    5)  They will contact you for an upper esophagogastroduodenoscopy.  Avoid alcohol, caffeine, tobacco, spicy foods, citrus foods, aspirin and anti-inflammatories like ibuprofen (\"Advil\" and \"Motrin\") or naproxen (\"Aleve\").     6)  Let's decrease your citalopram to 20 mg daily.      Follow-up in 3-6 months for a recheck.      Yordan Hernandez MD  Internal Medicine and Pediatrics         Yordan Hernandez MD  [facility] MONY Muñoz is a 77 year old, presenting for the following health issues:  No chief complaint on file.        9/19/2022     9:13 AM   Additional Questions   Roomed by evelin   Accompanied by aurora     HPI             Review of Systems   CONSTITUTIONAL: NEGATIVE for fever, chills, change in weight  INTEGUMENTARY/SKIN: NEGATIVE for worrisome rashes, moles or lesions  EYES: NEGATIVE for vision changes or irritation  ENT/MOUTH: NEGATIVE for ear, mouth and throat problems  RESP: NEGATIVE for significant cough or SOB  BREAST: NEGATIVE for masses, tenderness or discharge  CV: NEGATIVE for chest pain, palpitations or peripheral edema  GI: NEGATIVE for nausea, abdominal pain, heartburn, or change in bowel habits  : NEGATIVE for frequency, dysuria, or hematuria  MUSCULOSKELETAL: NEGATIVE for significant arthralgias or myalgia  NEURO: NEGATIVE for weakness, dizziness or paresthesias  ENDOCRINE: NEGATIVE for temperature intolerance, skin/hair " changes  HEME: NEGATIVE for bleeding problems  PSYCHIATRIC: NEGATIVE for changes in mood or affect      Objective           Vitals:  No vitals were obtained today due to virtual visit.    Physical Exam   GENERAL: Healthy, alert and no distress  EYES: Eyes grossly normal to inspection.  No discharge or erythema, or obvious scleral/conjunctival abnormalities.  RESP: No audible wheeze, cough, or visible cyanosis.  No visible retractions or increased work of breathing.    SKIN: Visible skin clear. No significant rash, abnormal pigmentation or lesions.  NEURO: Cranial nerves grossly intact.  Mentation and speech appropriate for age.  PSYCH: Mentation appears normal, affect normal/bright, judgement and insight intact, normal speech and appearance well-groomed.                Video-Visit Details    Type of service:  Video Visit   Video Start Time: 8:30  Video End Time:8:58 AM    Originating Location (pt. Location): Home    Distant Location (provider location):  Off-site  Platform used for Video Visit: [employer]

## 2023-06-07 NOTE — PATIENT INSTRUCTIONS
"It was good talking with you earlier today.  Here's a summary of what we discussed:    1)  You are due for COVID bivalent booster.     2)  You're due for a Colonoscopy; they will call you to set it up.     3).  Set up a fasting lab draw. You can schedule this by calling  or using your [facility] account, if you have it..    4)  Let's try some meclizine as needed for dizziness.    5)  They will contact you for an upper esophagogastroduodenoscopy.  Avoid alcohol, caffeine, tobacco, spicy foods, citrus foods, aspirin and anti-inflammatories like ibuprofen (\"Advil\" and \"Motrin\") or naproxen (\"Aleve\").     6)  Let's decrease your citalopram to 20 mg daily.      Follow-up in 3-6 months for a recheck.      Yordan Hernandez MD  Internal Medicine and Pediatrics     "

## 2023-07-13 ENCOUNTER — TELEPHONE (OUTPATIENT)
Dept: GASTROENTEROLOGY | Facility: CLINIC | Age: 78
End: 2023-07-13
Payer: COMMERCIAL

## 2023-07-13 NOTE — TELEPHONE ENCOUNTER
"Endoscopy Scheduling Screen    Have you had a positive Covid test in the last 14 days?  No    Are you active on MyChart?   Yes    What insurance is in the chart?  Other:  [employer]     Ordering/Referring Provider: VICKI KELLER   (If ordering provider performs procedure, schedule with ordering provider unless otherwise instructed. )    BMI: Estimated body mass index is 36.66 kg/m  as calculated from the following:    Height as of 9/19/22: 1.689 m (5' 6.5\").    Weight as of 9/19/22: 104.6 kg (230 lb 9.6 oz).     Sedation Ordered  moderate sedation.   If patient BMI > 50 do not schedule in [facility].    Are you taking any prescription medications for pain?   No    Are you taking methadone or Suboxone?  No    Do you have a history of malignant hyperthermia or adverse reaction to anesthesia?  No    (Females) Are you currently pregnant?   No     Have you been diagnosed or told you have pulmonary hypertension?   No    Do you have an LVAD?  No    Have you been told you have moderate to severe sleep apnea?  Yes (RN Review required for scheduling unless scheduling in Hospital.)    Have you been told you have COPD, asthma, or any other lung disease?  No    Do you have any heart conditions?  No     Have you ever had or are you awaiting a heart or lung transplant?   No    Have you had a stroke or transient ischemic attack (TIA aka \"mini stroke\" in the last 6 months?   No    Have you been diagnosed with or been told you have cirrhosis of the liver?   No    Are you currently on dialysis?   No    Do you need assistance transferring?   No    BMI: Estimated body mass index is 36.66 kg/m  as calculated from the following:    Height as of 9/19/22: 1.689 m (5' 6.5\").    Weight as of 9/19/22: 104.6 kg (230 lb 9.6 oz).     Is patients BMI > 40 and scheduling location [facility]?  No    Do you take the medication Phentermine, Ozempic or Wegovy?  No    Do you take the medication Naltrexone?  No    Do you take blood thinners?  No      Prep   Are " you currently on dialysis or do you have chronic kidney disease?  No    Do you have a diagnosis of diabetes?  No    Do you have a diagnosis of cystic fibrosis (CF)?  No    On a regular basis do you go 3 -5 days between bowel movements?  No    BMI > 40?  No    Preferred Pharmacy:    [id number] - [place], [place] - [address] [facility]  [address] [address] [facility] [address] [place] [address]  Phone: [phone] Fax: [phone]      Final Scheduling Details   Colonoscopy prep sent?  MiraLAX (No Mag Citrate)    Procedure scheduled  Colonoscopy / Upper endoscopy (EGD)    Surgeon:  TAMI      Date of procedure:  09/13/2023     Schedule PAC:   No    Location  [facility]    Sedation   Moderate Sedation    Patient Reminders:    You will receive a call from a Nurse to review instructions and health history.  This assessment must be completed prior to your procedure.  Failure to complete the Nurse assessment may result in the procedure being cancelled.       On the day of your procedure, please designate an adult(s) who can drive you home stay with you for the next 24 hours. The medicines used in the exam will make you sleepy. You will not be able to drive.       You cannot take public transportation, ride share services, or non-medical taxi service without a responsible caregiver.  Medical transport services are allowed with the requirement that a responsible caregiver will receive you at your destination.  We require that drivers and caregivers are confirmed prior to your procedure.

## 2023-07-19 ENCOUNTER — LAB (OUTPATIENT)
Dept: LAB | Facility: CLINIC | Age: 78
End: 2023-07-19
Payer: COMMERCIAL

## 2023-07-19 DIAGNOSIS — Z13.220 SCREENING CHOLESTEROL LEVEL: ICD-10-CM

## 2023-07-19 LAB
ALBUMIN SERPL BCG-MCNC: 4.4 G/DL (ref 3.5–5.2)
ALP SERPL-CCNC: 60 U/L (ref 40–129)
ALT SERPL W P-5'-P-CCNC: 27 U/L (ref 0–70)
ANION GAP SERPL CALCULATED.3IONS-SCNC: 12 MMOL/L (ref 7–15)
AST SERPL W P-5'-P-CCNC: 33 U/L (ref 0–45)
BILIRUB SERPL-MCNC: 0.5 MG/DL
BUN SERPL-MCNC: 19.8 MG/DL (ref 8–23)
CALCIUM SERPL-MCNC: 9.3 MG/DL (ref 8.8–10.2)
CHLORIDE SERPL-SCNC: 105 MMOL/L (ref 98–107)
CHOLEST SERPL-MCNC: 189 MG/DL
CREAT SERPL-MCNC: 1.09 MG/DL (ref 0.67–1.17)
DEPRECATED HCO3 PLAS-SCNC: 23 MMOL/L (ref 22–29)
GFR SERPL CREATININE-BSD FRML MDRD: 70 ML/MIN/1.73M2
GLUCOSE SERPL-MCNC: 96 MG/DL (ref 70–99)
HBA1C MFR BLD: 5.4 % (ref 0–5.6)
HDLC SERPL-MCNC: 39 MG/DL
LDLC SERPL CALC-MCNC: 120 MG/DL
NONHDLC SERPL-MCNC: 150 MG/DL
POTASSIUM SERPL-SCNC: 4.3 MMOL/L (ref 3.4–5.3)
PROT SERPL-MCNC: 6.9 G/DL (ref 6.4–8.3)
SODIUM SERPL-SCNC: 140 MMOL/L (ref 136–145)
TRIGL SERPL-MCNC: 152 MG/DL

## 2023-07-19 PROCEDURE — 80053 COMPREHEN METABOLIC PANEL: CPT

## 2023-07-19 PROCEDURE — 36415 COLL VENOUS BLD VENIPUNCTURE: CPT

## 2023-07-19 PROCEDURE — 83036 HEMOGLOBIN GLYCOSYLATED A1C: CPT

## 2023-07-19 PROCEDURE — 80061 LIPID PANEL: CPT

## 2023-09-07 SDOH — ECONOMIC STABILITY: INCOME INSECURITY: HOW HARD IS IT FOR YOU TO PAY FOR THE VERY BASICS LIKE FOOD, HOUSING, MEDICAL CARE, AND HEATING?: NOT VERY HARD

## 2023-09-07 SDOH — ECONOMIC STABILITY: INCOME INSECURITY: IN THE LAST 12 MONTHS, WAS THERE A TIME WHEN YOU WERE NOT ABLE TO PAY THE MORTGAGE OR RENT ON TIME?: NO

## 2023-09-07 SDOH — ECONOMIC STABILITY: FOOD INSECURITY: WITHIN THE PAST 12 MONTHS, THE FOOD YOU BOUGHT JUST DIDN'T LAST AND YOU DIDN'T HAVE MONEY TO GET MORE.: NEVER TRUE

## 2023-09-07 SDOH — ECONOMIC STABILITY: TRANSPORTATION INSECURITY
IN THE PAST 12 MONTHS, HAS THE LACK OF TRANSPORTATION KEPT YOU FROM MEDICAL APPOINTMENTS OR FROM GETTING MEDICATIONS?: NO

## 2023-09-07 SDOH — ECONOMIC STABILITY: TRANSPORTATION INSECURITY
IN THE PAST 12 MONTHS, HAS LACK OF TRANSPORTATION KEPT YOU FROM MEETINGS, WORK, OR FROM GETTING THINGS NEEDED FOR DAILY LIVING?: NO

## 2023-09-07 SDOH — ECONOMIC STABILITY: FOOD INSECURITY: WITHIN THE PAST 12 MONTHS, YOU WORRIED THAT YOUR FOOD WOULD RUN OUT BEFORE YOU GOT MONEY TO BUY MORE.: NEVER TRUE

## 2023-09-07 SDOH — HEALTH STABILITY: PHYSICAL HEALTH: ON AVERAGE, HOW MANY DAYS PER WEEK DO YOU ENGAGE IN MODERATE TO STRENUOUS EXERCISE (LIKE A BRISK WALK)?: 3 DAYS

## 2023-09-07 SDOH — HEALTH STABILITY: PHYSICAL HEALTH: ON AVERAGE, HOW MANY MINUTES DO YOU ENGAGE IN EXERCISE AT THIS LEVEL?: 50 MIN

## 2023-09-07 ASSESSMENT — SOCIAL DETERMINANTS OF HEALTH (SDOH)
DO YOU BELONG TO ANY CLUBS OR ORGANIZATIONS SUCH AS CHURCH GROUPS UNIONS, FRATERNAL OR ATHLETIC GROUPS, OR SCHOOL GROUPS?: NO
IN A TYPICAL WEEK, HOW MANY TIMES DO YOU TALK ON THE PHONE WITH FAMILY, FRIENDS, OR NEIGHBORS?: MORE THAN THREE TIMES A WEEK
HOW OFTEN DO YOU ATTEND CHURCH OR RELIGIOUS SERVICES?: NEVER
HOW OFTEN DO YOU GET TOGETHER WITH FRIENDS OR RELATIVES?: ONCE A WEEK

## 2023-09-07 ASSESSMENT — LIFESTYLE VARIABLES
AUDIT-C TOTAL SCORE: 0
HOW OFTEN DO YOU HAVE SIX OR MORE DRINKS ON ONE OCCASION: NEVER
SKIP TO QUESTIONS 9-10: 1
HOW OFTEN DO YOU HAVE A DRINK CONTAINING ALCOHOL: NEVER
HOW MANY STANDARD DRINKS CONTAINING ALCOHOL DO YOU HAVE ON A TYPICAL DAY: PATIENT DOES NOT DRINK

## 2023-09-13 ENCOUNTER — HOSPITAL ENCOUNTER (OUTPATIENT)
Facility: CLINIC | Age: 78
Discharge: HOME | End: 2023-09-13
Attending: INTERNAL MEDICINE | Admitting: INTERNAL MEDICINE
Payer: COMMERCIAL

## 2023-09-13 VITALS
HEART RATE: 72 BPM | SYSTOLIC BLOOD PRESSURE: 126 MMHG | OXYGEN SATURATION: 94 % | DIASTOLIC BLOOD PRESSURE: 85 MMHG | TEMPERATURE: 97.5 F | RESPIRATION RATE: 20 BRPM

## 2023-09-13 LAB
COLONOSCOPY: NORMAL
UPPER GI ENDOSCOPY: NORMAL

## 2023-09-13 PROCEDURE — 250N000011 HC RX IP 250 OP 636: Performed by: INTERNAL MEDICINE

## 2023-09-13 PROCEDURE — 99153 MOD SED SAME PHYS/QHP EA: CPT | Performed by: INTERNAL MEDICINE

## 2023-09-13 PROCEDURE — 88305 TISSUE EXAM BY PATHOLOGIST: CPT | Mod: TC | Performed by: INTERNAL MEDICINE

## 2023-09-13 PROCEDURE — G0500 MOD SEDAT ENDO SERVICE >5YRS: HCPCS | Performed by: INTERNAL MEDICINE

## 2023-09-13 PROCEDURE — 250N000013 HC RX MED GY IP 250 OP 250 PS 637: Performed by: INTERNAL MEDICINE

## 2023-09-13 PROCEDURE — 250N000009 HC RX 250: Performed by: INTERNAL MEDICINE

## 2023-09-13 PROCEDURE — 43239 EGD BIOPSY SINGLE/MULTIPLE: CPT | Performed by: INTERNAL MEDICINE

## 2023-09-13 PROCEDURE — 45380 COLONOSCOPY AND BIOPSY: CPT | Mod: PT | Performed by: INTERNAL MEDICINE

## 2023-09-13 RX ORDER — ONDANSETRON 2 MG/ML
4 INJECTION INTRAMUSCULAR; INTRAVENOUS EVERY 6 HOURS PRN
Status: DISCONTINUED | OUTPATIENT
Start: 2023-09-13 | End: 2023-09-13 | Stop reason: HOSPADM

## 2023-09-13 RX ORDER — NALOXONE HYDROCHLORIDE 0.4 MG/ML
0.2 INJECTION, SOLUTION INTRAMUSCULAR; INTRAVENOUS; SUBCUTANEOUS
Status: DISCONTINUED | OUTPATIENT
Start: 2023-09-13 | End: 2023-09-13 | Stop reason: HOSPADM

## 2023-09-13 RX ORDER — PROCHLORPERAZINE MALEATE 5 MG
5 TABLET ORAL EVERY 6 HOURS PRN
Status: DISCONTINUED | OUTPATIENT
Start: 2023-09-13 | End: 2023-09-13 | Stop reason: HOSPADM

## 2023-09-13 RX ORDER — ATROPINE SULFATE 0.1 MG/ML
1 INJECTION INTRAVENOUS
Status: DISCONTINUED | OUTPATIENT
Start: 2023-09-13 | End: 2023-09-13 | Stop reason: HOSPADM

## 2023-09-13 RX ORDER — EPINEPHRINE 1 MG/ML
0.1 INJECTION, SOLUTION INTRAMUSCULAR; SUBCUTANEOUS
Status: DISCONTINUED | OUTPATIENT
Start: 2023-09-13 | End: 2023-09-13 | Stop reason: HOSPADM

## 2023-09-13 RX ORDER — ONDANSETRON 2 MG/ML
4 INJECTION INTRAMUSCULAR; INTRAVENOUS
Status: DISCONTINUED | OUTPATIENT
Start: 2023-09-13 | End: 2023-09-13 | Stop reason: HOSPADM

## 2023-09-13 RX ORDER — NALOXONE HYDROCHLORIDE 0.4 MG/ML
0.4 INJECTION, SOLUTION INTRAMUSCULAR; INTRAVENOUS; SUBCUTANEOUS
Status: DISCONTINUED | OUTPATIENT
Start: 2023-09-13 | End: 2023-09-13 | Stop reason: HOSPADM

## 2023-09-13 RX ORDER — SIMETHICONE 40MG/0.6ML
133 SUSPENSION, DROPS(FINAL DOSAGE FORM)(ML) ORAL
Status: COMPLETED | OUTPATIENT
Start: 2023-09-13 | End: 2023-09-13

## 2023-09-13 RX ORDER — ONDANSETRON 4 MG/1
4 TABLET, ORALLY DISINTEGRATING ORAL EVERY 6 HOURS PRN
Status: DISCONTINUED | OUTPATIENT
Start: 2023-09-13 | End: 2023-09-13 | Stop reason: HOSPADM

## 2023-09-13 RX ORDER — FENTANYL CITRATE 50 UG/ML
50-100 INJECTION, SOLUTION INTRAMUSCULAR; INTRAVENOUS EVERY 5 MIN PRN
Status: DISCONTINUED | OUTPATIENT
Start: 2023-09-13 | End: 2023-09-13 | Stop reason: HOSPADM

## 2023-09-13 RX ORDER — DIPHENHYDRAMINE HYDROCHLORIDE 50 MG/ML
25-50 INJECTION INTRAMUSCULAR; INTRAVENOUS
Status: DISCONTINUED | OUTPATIENT
Start: 2023-09-13 | End: 2023-09-13 | Stop reason: HOSPADM

## 2023-09-13 RX ORDER — FLUMAZENIL 0.1 MG/ML
0.2 INJECTION, SOLUTION INTRAVENOUS
Status: DISCONTINUED | OUTPATIENT
Start: 2023-09-13 | End: 2023-09-13 | Stop reason: HOSPADM

## 2023-09-13 RX ORDER — LIDOCAINE 40 MG/G
CREAM TOPICAL
Status: DISCONTINUED | OUTPATIENT
Start: 2023-09-13 | End: 2023-09-13 | Stop reason: HOSPADM

## 2023-09-13 RX ADMIN — FENTANYL CITRATE 50 MCG: 0.05 INJECTION, SOLUTION INTRAMUSCULAR; INTRAVENOUS at 10:01

## 2023-09-13 RX ADMIN — TOPICAL ANESTHETIC 0.5 ML: 200 SPRAY DENTAL; PERIODONTAL at 10:05

## 2023-09-13 RX ADMIN — FENTANYL CITRATE 25 MCG: 0.05 INJECTION, SOLUTION INTRAMUSCULAR; INTRAVENOUS at 10:30

## 2023-09-13 RX ADMIN — MIDAZOLAM 1 MG: 1 INJECTION INTRAMUSCULAR; INTRAVENOUS at 10:01

## 2023-09-13 RX ADMIN — MIDAZOLAM 1 MG: 1 INJECTION INTRAMUSCULAR; INTRAVENOUS at 10:05

## 2023-09-13 RX ADMIN — Medication 133 MG: at 10:26

## 2023-09-13 ASSESSMENT — ACTIVITIES OF DAILY LIVING (ADL)
ADLS_ACUITY_SCORE: 35
ADLS_ACUITY_SCORE: 35

## 2023-09-13 NOTE — PROCEDURES
"PRE-PROCEDURE H&P    CHIEF COMPLAINT / REASON FOR PROCEDURE:  dysphagia and history of polyps    PERTINENT HISTORY :    Past Medical History:   Diagnosis Date    Adenomatous colon polyp 07/2019    Asymptomatic varicose veins of both lower extremities     Bulbar polio 1952    residual dysphagia    Cervical osteoarthritis     Chronic anxiety     Chronic depression     Familial tremor     Gastroesophageal reflux disease without esophagitis 04/12/2021    Hallux limitus of left foot     Localized, primary osteoarthritis of hand     Oropharyngeal dysphagia     Pre-op evaluation 06/15/2022    Primary osteoarthritis of left hip 06/15/2022    RLS (restless legs syndrome)     Sleep apnea, obstructive     CPAP    Status post total replacement of left hip     Varicose veins of left lower extremity       Past Surgical History:   Procedure Laterality Date    ARTHROSCOPY KNEE      CATARACT IOL, RT/LT Bilateral      and     HERNIA REPAIR, UMBILICAL      KNEE SURGERY Left     \"bone on bone\" d/t arthritis    RADIOFREQUENCY ABLATION NERVES      cervical spine    RHINOPLASTY      SEPTOPLASTY      SHOULDER SURGERY Bilateral     right (pitching injury), left (bone spur)    SHOULDER SURGERY Right     reconstructive surgery with hardware    TOE SURGERY Left     left big toe d/t arthritis    TOTAL KNEE ARTHROPLASTY Left     unicompartmental         Bleeding tendencies:  No    Relevant Family History:  NONE     Relevant Social History:  NONE      A relevant review of systems was performed and was negative      ALLERGIES/SENSITIVITIES:   Allergies   Allergen Reactions    Amoxicillin Other (See Comments)     Facial and neck flushing, felt hot       CURRENT MEDICATIONS:   No current outpatient medications on file.        PRE-SEDATION ASSESSMENT:    Lung Exam:  normal  Heart Exam:  normal  Airway Exam: normal  Previous reaction to anesthesia/sedation:   No  Sedation plan based on assessment: Moderate (conscious) sedation  ASA " Classification:  2 - Mild systemic disease        IMPRESSION:  dysphagia and history of polyps    PLAN:  EGD and Colonoscopy     Yue Cline MD  [facility]  Office: [phone]

## 2023-09-14 LAB
PATH REPORT.COMMENTS IMP SPEC: NORMAL
PATH REPORT.COMMENTS IMP SPEC: NORMAL
PATH REPORT.FINAL DX SPEC: NORMAL
PATH REPORT.GROSS SPEC: NORMAL
PATH REPORT.MICROSCOPIC SPEC OTHER STN: NORMAL
PATH REPORT.RELEVANT HX SPEC: NORMAL
PHOTO IMAGE: NORMAL

## 2023-09-14 PROCEDURE — 88305 TISSUE EXAM BY PATHOLOGIST: CPT | Mod: 26 | Performed by: PATHOLOGY

## 2023-10-13 ENCOUNTER — TRANSFERRED RECORDS (OUTPATIENT)
Dept: HEALTH INFORMATION MANAGEMENT | Facility: CLINIC | Age: 78
End: 2023-10-13
Payer: COMMERCIAL

## 2023-10-17 ENCOUNTER — TRANSCRIBE ORDERS (OUTPATIENT)
Dept: OTHER | Age: 78
End: 2023-10-17

## 2023-10-17 DIAGNOSIS — K21.00 GASTROESOPHAGEAL REFLUX DISEASE WITH ESOPHAGITIS WITHOUT HEMORRHAGE: Primary | ICD-10-CM

## 2023-10-17 DIAGNOSIS — R13.10 DYSPHAGIA, UNSPECIFIED TYPE: ICD-10-CM

## 2023-10-23 DIAGNOSIS — G25.81 RLS (RESTLESS LEGS SYNDROME): ICD-10-CM

## 2023-10-23 RX ORDER — TRAZODONE HYDROCHLORIDE 100 MG/1
100 TABLET ORAL AT BEDTIME
Qty: 90 TABLET | Refills: 0 | Status: SHIPPED | OUTPATIENT
Start: 2023-10-23 | End: 2023-11-07

## 2023-10-31 DIAGNOSIS — K21.9 GERD (GASTROESOPHAGEAL REFLUX DISEASE): ICD-10-CM

## 2023-10-31 SDOH — HEALTH STABILITY: PHYSICAL HEALTH: ON AVERAGE, HOW MANY DAYS PER WEEK DO YOU ENGAGE IN MODERATE TO STRENUOUS EXERCISE (LIKE A BRISK WALK)?: 3 DAYS

## 2023-10-31 SDOH — HEALTH STABILITY: PHYSICAL HEALTH: ON AVERAGE, HOW MANY MINUTES DO YOU ENGAGE IN EXERCISE AT THIS LEVEL?: 50 MIN

## 2023-10-31 ASSESSMENT — SOCIAL DETERMINANTS OF HEALTH (SDOH)
HOW OFTEN DO YOU GET TOGETHER WITH FRIENDS OR RELATIVES?: ONCE A WEEK
IN A TYPICAL WEEK, HOW MANY TIMES DO YOU TALK ON THE PHONE WITH FAMILY, FRIENDS, OR NEIGHBORS?: MORE THAN THREE TIMES A WEEK
DO YOU BELONG TO ANY CLUBS OR ORGANIZATIONS SUCH AS CHURCH GROUPS UNIONS, FRATERNAL OR ATHLETIC GROUPS, OR SCHOOL GROUPS?: NO
HOW OFTEN DO YOU ATTEND CHURCH OR RELIGIOUS SERVICES?: NEVER

## 2023-10-31 ASSESSMENT — ENCOUNTER SYMPTOMS
SHORTNESS OF BREATH: 0
ABDOMINAL PAIN: 0
DIZZINESS: 0
CHILLS: 0
PARESTHESIAS: 0
EYE PAIN: 0
COUGH: 0
HEMATURIA: 0
DIARRHEA: 0
MYALGIAS: 0
JOINT SWELLING: 0
ARTHRALGIAS: 0
HEARTBURN: 0
SORE THROAT: 0
HEADACHES: 0
FEVER: 0
FREQUENCY: 0
DYSURIA: 0
HEMATOCHEZIA: 0
NERVOUS/ANXIOUS: 0
PALPITATIONS: 0
WEAKNESS: 0
NAUSEA: 0
CONSTIPATION: 0

## 2023-10-31 ASSESSMENT — LIFESTYLE VARIABLES
HOW MANY STANDARD DRINKS CONTAINING ALCOHOL DO YOU HAVE ON A TYPICAL DAY: PATIENT DOES NOT DRINK
AUDIT-C TOTAL SCORE: 0
HOW OFTEN DO YOU HAVE A DRINK CONTAINING ALCOHOL: NEVER
SKIP TO QUESTIONS 9-10: 1
HOW OFTEN DO YOU HAVE SIX OR MORE DRINKS ON ONE OCCASION: NEVER

## 2023-10-31 ASSESSMENT — ACTIVITIES OF DAILY LIVING (ADL): CURRENT_FUNCTION: NO ASSISTANCE NEEDED

## 2023-11-07 ENCOUNTER — OFFICE VISIT (OUTPATIENT)
Dept: PEDIATRICS | Facility: CLINIC | Age: 78
End: 2023-11-07
Attending: INTERNAL MEDICINE
Payer: COMMERCIAL

## 2023-11-07 VITALS
WEIGHT: 236.1 LBS | SYSTOLIC BLOOD PRESSURE: 128 MMHG | TEMPERATURE: 97.6 F | HEART RATE: 68 BPM | DIASTOLIC BLOOD PRESSURE: 78 MMHG | BODY MASS INDEX: 37.94 KG/M2 | RESPIRATION RATE: 18 BRPM | HEIGHT: 66 IN | OXYGEN SATURATION: 98 %

## 2023-11-07 DIAGNOSIS — E78.00 HYPERCHOLESTEREMIA: ICD-10-CM

## 2023-11-07 DIAGNOSIS — M19.042 PRIMARY OSTEOARTHRITIS OF BOTH HANDS: ICD-10-CM

## 2023-11-07 DIAGNOSIS — Z29.11 NEED FOR VACCINATION AGAINST RESPIRATORY SYNCYTIAL VIRUS: ICD-10-CM

## 2023-11-07 DIAGNOSIS — G47.33 SLEEP APNEA, OBSTRUCTIVE: ICD-10-CM

## 2023-11-07 DIAGNOSIS — Z23 NEED FOR TDAP VACCINATION: ICD-10-CM

## 2023-11-07 DIAGNOSIS — N52.9 ERECTILE DYSFUNCTION, UNSPECIFIED ERECTILE DYSFUNCTION TYPE: ICD-10-CM

## 2023-11-07 DIAGNOSIS — Z00.00 ENCOUNTER FOR MEDICARE ANNUAL WELLNESS EXAM: Primary | ICD-10-CM

## 2023-11-07 DIAGNOSIS — Z12.5 SCREENING FOR PROSTATE CANCER: ICD-10-CM

## 2023-11-07 DIAGNOSIS — R97.20 ELEVATED PROSTATE SPECIFIC ANTIGEN (PSA): ICD-10-CM

## 2023-11-07 DIAGNOSIS — G25.81 RLS (RESTLESS LEGS SYNDROME): ICD-10-CM

## 2023-11-07 DIAGNOSIS — K21.9 GASTROESOPHAGEAL REFLUX DISEASE WITHOUT ESOPHAGITIS: ICD-10-CM

## 2023-11-07 DIAGNOSIS — M19.041 PRIMARY OSTEOARTHRITIS OF BOTH HANDS: ICD-10-CM

## 2023-11-07 PROBLEM — E66.01 MORBID OBESITY (H): Status: RESOLVED | Noted: 2022-09-19 | Resolved: 2023-11-07

## 2023-11-07 LAB — PSA SERPL DL<=0.01 NG/ML-MCNC: 7.9 NG/ML (ref 0–6.5)

## 2023-11-07 PROCEDURE — 99397 PER PM REEVAL EST PAT 65+ YR: CPT | Performed by: INTERNAL MEDICINE

## 2023-11-07 PROCEDURE — 36415 COLL VENOUS BLD VENIPUNCTURE: CPT | Performed by: INTERNAL MEDICINE

## 2023-11-07 PROCEDURE — G0103 PSA SCREENING: HCPCS | Performed by: INTERNAL MEDICINE

## 2023-11-07 PROCEDURE — 99214 OFFICE O/P EST MOD 30 MIN: CPT | Mod: 25 | Performed by: INTERNAL MEDICINE

## 2023-11-07 RX ORDER — ATORVASTATIN CALCIUM 20 MG/1
20 TABLET, FILM COATED ORAL DAILY
Qty: 90 TABLET | Refills: 3 | Status: SHIPPED | OUTPATIENT
Start: 2023-11-07 | End: 2024-03-04

## 2023-11-07 RX ORDER — TRAZODONE HYDROCHLORIDE 100 MG/1
100 TABLET ORAL AT BEDTIME
Qty: 90 TABLET | Refills: 3 | Status: SHIPPED | OUTPATIENT
Start: 2023-11-07

## 2023-11-07 RX ORDER — SILDENAFIL CITRATE 20 MG/1
40 TABLET ORAL DAILY PRN
Qty: 30 TABLET | Refills: 5 | Status: ON HOLD | OUTPATIENT
Start: 2023-11-07 | End: 2024-07-16

## 2023-11-07 ASSESSMENT — ENCOUNTER SYMPTOMS
WEAKNESS: 0
DIZZINESS: 0
ARTHRALGIAS: 0
CONSTIPATION: 0
NERVOUS/ANXIOUS: 0
ABDOMINAL PAIN: 0
PALPITATIONS: 0
PARESTHESIAS: 0
SHORTNESS OF BREATH: 0
HEADACHES: 0
JOINT SWELLING: 0
HEMATURIA: 0
HEARTBURN: 0
MYALGIAS: 0
CHILLS: 0
EYE PAIN: 0
COUGH: 0
HEMATOCHEZIA: 0
DYSURIA: 0
FREQUENCY: 0
SORE THROAT: 0
NAUSEA: 0
FEVER: 0
DIARRHEA: 0

## 2023-11-07 ASSESSMENT — PAIN SCALES - GENERAL: PAINLEVEL: NO PAIN (0)

## 2023-11-07 ASSESSMENT — ACTIVITIES OF DAILY LIVING (ADL): CURRENT_FUNCTION: NO ASSISTANCE NEEDED

## 2023-11-07 NOTE — PROGRESS NOTES
"SUBJECTIVE:   Toni is a 78 year old who presents for Preventive Visit.      11/7/2023     1:47 PM   Additional Questions   Roomed by Marie ROMAN   Accompanied by HELENA         11/7/2023     1:47 PM   Patient Reported Additional Medications   Patient reports taking the following new medications None       Are you in the first 12 months of your Medicare coverage?  No    Healthy Habits:     In general, how would you rate your overall health?  Good    Frequency of exercise:  2-3 days/week    Do you usually eat at least 4 servings of fruit and vegetables a day, include whole grains    & fiber and avoid regularly eating high fat or \"junk\" foods?  Yes    Barriers to taking medications:  Other    Ability to successfully perform activities of daily living:  No assistance needed    Home Safety:  No safety concerns identified    Hearing Impairment:  Difficulty following a conversation in a noisy restaurant or crowded room, need to ask people to speak up or repeat themselves and difficulty understanding soft or whispered speech    In the past 6 months, have you been bothered by leaking of urine?  No    In general, how would you rate your overall mental or emotional health?  Good    Additional concerns today:  No      Today's PHQ-2 Score:       11/6/2023     9:16 AM   PHQ-2 ( 1999 [employer])   Q1: Little interest or pleasure in doing things 0   Q2: Feeling down, depressed or hopeless 0   PHQ-2 Score 0   Q1: Little interest or pleasure in doing things Not at all   Q2: Feeling down, depressed or hopeless Not at all   PHQ-2 Score 0       Has not done the barium swallow test.     Has anxiety.  Is tapering off the citalopram; well control.     Exercises by walking and weights.      Diet is generally healthy. Lots of fruit and nuts.  Lots of fiber.      Seeing dentist and eye doc.  Left eye issues; feels like light is \"washed out.\"    No issues with hearing.   Has ongoing tinnitus, and issues with higher pitches.     The 10-year ASCVD risk " score (Opal MACDONALD, et al., 2019) is: 29.2%    Values used to calculate the score:      Age: 78 years      Sex: Male      Is Non- : No      Diabetic: No      Tobacco smoker: No      Systolic Blood Pressure: 128 mmHg      Is BP treated: No      HDL Cholesterol: 39 mg/dL      Total Cholesterol: 189 mg/dL     Have you ever done Advance Care Planning? (For example, a Health Directive, POLST, or a discussion with a medical provider or your loved ones about your wishes): No, advance care planning information given to patient to review.  Patient plans to discuss their wishes with loved ones or provider.         Fall risk  Fallen 2 or more times in the past year?: Yes  Any fall with injury in the past year?: No    Cognitive Screening   1) Repeat 3 items (Leader, Season, Table)    2) Clock draw: NORMAL  3) 3 item recall:Recalls 2 objects   Results: NORMAL clock, 1-2 items recalled: COGNITIVE IMPAIRMENT LESS LIKELY    Mini-CogTM Copyright ELIZA Trejo. Licensed by the author for use in [facility]; reprinted with permission (anabelle@[employer]). All rights reserved.      Do you have sleep apnea, excessive snoring or daytime drowsiness? : no    Reviewed and updated as needed this visit by clinical staff   Tobacco  Allergies  Meds              Reviewed and updated as needed this visit by Provider                 Social History     Tobacco Use     Smoking status: Former     Types: Cigarettes     Quit date: 1975     Years since quittin.0     Smokeless tobacco: Never   Substance Use Topics     Alcohol use: Not Currently             10/31/2023    11:14 AM   Alcohol Use   Prescreen: >3 drinks/day or >7 drinks/week? No     Do you have a current opioid prescription? No  Do you use any other controlled substances or medications that are not prescribed by a provider? None        Patient Care Team:  Yordan Hernandez MD as PCP - General (Internal Medicine - Pediatrics)  Kurtis Raya MD as  MD (Ophthalmology)  Alexy Yu MD as Referring Physician (Ophthalmology)  Payam Toney MD as Assigned PCP    The following health maintenance items are reviewed in Epic and correct as of today:  Health Maintenance   Topic Date Due     RSV VACCINE (Pregnancy & 60+) (1 - 1-dose 60+ series) Never done     DTAP/TDAP/TD IMMUNIZATION (1 - Tdap) 04/13/2021     INFLUENZA VACCINE (1) 09/01/2023     COVID-19 Vaccine (5 - 2023-24 season) 09/01/2023     MEDICARE ANNUAL WELLNESS VISIT  09/19/2023     ANNUAL REVIEW OF HM ORDERS  10/13/2023     FALL RISK ASSESSMENT  11/07/2024     ADVANCE CARE PLANNING  09/19/2027     LIPID  07/19/2028     HEPATITIS C SCREENING  Completed     PHQ-2 (once per calendar year)  Completed     Pneumococcal Vaccine: 65+ Years  Completed     ZOSTER IMMUNIZATION  Completed     IPV IMMUNIZATION  Aged Out     HPV IMMUNIZATION  Aged Out     MENINGITIS IMMUNIZATION  Aged Out     RSV MONOCLONAL ANTIBODY  Aged Out     COLORECTAL CANCER SCREENING  Discontinued     Lab work is in process  Labs reviewed in EPIC          Review of Systems   Constitutional:  Negative for chills and fever.   HENT:  Positive for hearing loss. Negative for congestion, ear pain and sore throat.    Eyes:  Negative for pain and visual disturbance.   Respiratory:  Negative for cough and shortness of breath.    Cardiovascular:  Negative for chest pain, palpitations and peripheral edema.   Gastrointestinal:  Negative for abdominal pain, constipation, diarrhea, heartburn, hematochezia and nausea.   Genitourinary:  Positive for impotence and urgency. Negative for dysuria, frequency, genital sores, hematuria and penile discharge.   Musculoskeletal:  Negative for arthralgias, joint swelling and myalgias.   Skin:  Negative for rash.   Neurological:  Negative for dizziness, weakness, headaches and paresthesias.   Psychiatric/Behavioral:  Negative for mood changes. The patient is not nervous/anxious.          OBJECTIVE:   /78  "(BP Location: Right arm, Patient Position: Sitting, Cuff Size: Adult Large)   Pulse 68   Temp 97.6  F (36.4  C) (Tympanic)   Resp 18   Ht 1.676 m (5' 6\")   Wt 107.1 kg (236 lb 1.6 oz)   SpO2 98%   BMI 38.11 kg/m   Estimated body mass index is 38.11 kg/m  as calculated from the following:    Height as of this encounter: 1.676 m (5' 6\").    Weight as of this encounter: 107.1 kg (236 lb 1.6 oz).  Physical Exam  GENERAL: healthy, alert and no distress  EYES: Eyes grossly normal to inspection, PERRL and conjunctivae and sclerae normal  HENT: ear canals and TM's normal, nose and mouth without ulcers or lesions  NECK: no adenopathy, no asymmetry, masses, or scars and thyroid normal to palpation  RESP: lungs clear to auscultation - no rales, rhonchi or wheezes  CV: regular rate and rhythm, normal S1 S2, no S3 or S4, no murmur, click or rub, no peripheral edema and peripheral pulses strong  ABDOMEN: soft, nontender, no hepatosplenomegaly, no masses and bowel sounds normal  MS: no gross musculoskeletal defects noted, no edema  SKIN: no suspicious lesions or rashes  NEURO: Normal strength and tone, mentation intact and speech normal  PSYCH: mentation appears normal, affect normal/bright    Diagnostic Test Results:  Labs reviewed in Epic    ASSESSMENT / PLAN:   (Z00.00) Encounter for Medicare annual wellness exam  (primary encounter diagnosis)  Comment:   Plan: Discussed diet, exercise, testicular self exam, blood pressure, cholesterol, and need for cancer surveillance at appropriate ages.     (Z23) Need for Tdap vaccination  Comment:   Plan:     (Z29.11) Need for vaccination against respiratory syncytial virus  Comment:   Plan:     (M19.041,  M19.042) Primary osteoarthritis of both hands  Comment:   Plan: Orthopedic  Referral, OFFICE/OUTPT         VISIT,EST,LEVL IV        Limiting range of motion.  Follow up with orthopedics for possible injection of finger joints.     (Z12.5) Screening for prostate " cancer  Comment:   Plan: PSA, screen        High in 2019, but never followed up.     (E78.00) Hypercholesteremia  Comment:   Plan: atorvastatin (LIPITOR) 20 MG tablet,         OFFICE/OUTPT VISIT,EST,LEVL IV        Begin statin, given his ASCVD risk.     (G25.81) RLS (restless legs syndrome)  Comment:   Plan: traZODone (DESYREL) 100 MG tablet, OFFICE/OUTPT        VISIT,EST,LEVL IV        Ongoing mirapex and trazodone.     (G47.33) Sleep apnea, obstructive  Comment:   Plan: OFFICE/OUTPT VISIT,EST,LEVL IV        On continuous positive airway pressure.     (K21.9) Gastroesophageal reflux disease without esophagitis  Comment:   Plan: omeprazole (PRILOSEC) 20 MG DR capsule,         OFFICE/OUTPT VISIT,EST,LEVL IV        Tolerating proton pump inhibitor well.     (N52.9) Erectile dysfunction, unspecified erectile dysfunction type  Comment:   Plan: sildenafil (REVATIO) 20 MG tablet, OFFICE/OUTPT        VISIT,EST,LEVL IV        As needed sildenafil.     (Z68.38) BMI 38.0-38.9,adult  Comment:   Plan: weight loss discussed and encouraged.     Patient has been advised of split billing requirements and indicates understanding: Yes      COUNSELING:  Reviewed preventive health counseling, as reflected in patient instructions       Regular exercise       Healthy diet/nutrition       Vision screening       Hearing screening        He reports that he quit smoking about 48 years ago. His smoking use included cigarettes. He has never used smokeless tobacco.      Appropriate preventive services were discussed with this patient, including applicable screening as appropriate for fall prevention, nutrition, physical activity, Tobacco-use cessation, weight loss and cognition.  Checklist reviewing preventive services available has been given to the patient.    Reviewed patients plan of care and provided an AVS. The Basic Care Plan (routine screening as documented in Health Maintenance) for Toni meets the Care Plan requirement. This Care  Plan has been established and reviewed with the Patient.          Yordan Hernandez MD  [facility]    Identified Health Risks:  I have reviewed Opioid Use Disorder and Substance Use Disorder risk factors and made any needed referrals.   The patient was provided with written information regarding signs of hearing loss.

## 2023-11-07 NOTE — LETTER
November 9, 2023      Toni Collado  [address] [facility] [address]        Toni,     Your prostate specific antigen (PSA) was indeed high again, at 7.9.  Usually with these kinds of elevations I have you see a urologist.  If it's okay with you, I'll place a referral and they will be contacting you soon to schedule.     Hope you're well.     Yordan Hernandez MD   Internal Medicine and Pediatrics           Resulted Orders   PSA, screen   Result Value Ref Range    Prostate Specific Antigen Screen 7.90 (H) 0.00 - 6.50 ng/mL    Narrative    This result is obtained using the Roche Elecsys total PSA method on the jania e801 immunoassay analyzer. Results obtained with different assay methods or kits cannot be used interchangeably.

## 2023-11-07 NOTE — PATIENT INSTRUCTIONS
"Let's stop the citalopram.    Let's begin you on a low dose of atorvastatin, 20 mg daily.    Up to date on your colonoscopy.    Prostate specific antigen (PSA) labs today.    Lab work today:  We can do labs in the exam room today, or you can get them done downstairs in the lab.      If you are going downstairs:  Directions:  As you walk through the first floor, you'll see (on the right) first the pharmacy, then some bathrooms, then the \"Lab and Imaging\" area. Give them your name at the window there and wait for them to call you.     Shots are all up to date.        Patient Education   Personalized Prevention Plan  You are due for the preventive services outlined below.  Your care team is available to assist you in scheduling these services.  If you have already completed any of these items, please share that information with your care team to update in your medical record.  Health Maintenance Due   Topic Date Due    Diptheria Tetanus Pertussis (DTAP/TDAP/TD) Vaccine (1 - Tdap) 04/13/2021    Flu Vaccine (1) 09/01/2023    COVID-19 Vaccine (5 - 2023-24 season) 09/01/2023    Annual Wellness Visit  09/19/2023    ANNUAL REVIEW OF HM ORDERS  10/13/2023     Hearing Loss: Care Instructions  Overview     Hearing loss is a sudden or slow decrease in how well you hear. It can range from slight to profound. Permanent hearing loss can occur with aging. It also can happen when you are exposed long-term to loud noise. Examples include listening to loud music, riding motorcycles, or being around other loud machines.  Hearing loss can affect your work and home life. It can make you feel lonely or depressed. You may feel that you have lost your independence. But hearing aids and other devices can help you hear better and feel connected to others.  Follow-up care is a key part of your treatment and safety. Be sure to make and go to all appointments, and call your doctor if you are having problems. It's also a good idea to know your " test results and keep a list of the medicines you take.  How can you care for yourself at home?  Avoid loud noises whenever possible. This helps keep your hearing from getting worse.  Always wear hearing protection around loud noises.  Wear a hearing aid as directed.  A professional can help you pick a hearing aid that will work best for you.  You can also get hearing aids over the counter for mild to moderate hearing loss.  Have hearing tests as your doctor suggests. They can show whether your hearing has changed. Your hearing aid may need to be adjusted.  Use other devices as needed. These may include:  Telephone amplifiers and hearing aids that can connect to a television, stereo, radio, or microphone.  Devices that use lights or vibrations. These alert you to the doorbell, a ringing telephone, or a baby monitor.  Television closed-captioning. This shows the words at the bottom of the screen. Most new TVs can do this.  TTY (text telephone). This lets you type messages back and forth on the telephone instead of talking or listening. These devices are also called TDD. When messages are typed on the keyboard, they are sent over the phone line to a receiving TTY. The message is shown on a monitor.  Use text messaging, social media, and email if it is hard for you to communicate by telephone.  Try to learn a listening technique called speechreading. It is not lipreading. You pay attention to people's gestures, expressions, posture, and tone of voice. These clues can help you understand what a person is saying. Face the person you are talking to, and have them face you. Make sure the lighting is good. You need to see the other person's face clearly.  Think about counseling if you need help to adjust to your hearing loss.  When should you call for help?  Watch closely for changes in your health, and be sure to contact your doctor if:    You think your hearing is getting worse.     You have new symptoms, such as  "dizziness or nausea.   Where can you learn more?  Go to https://www.[employer].net/patiented  Enter R798 in the search box to learn more about \"Hearing Loss: Care Instructions.\"  Current as of: February 28, 2023               Content Version: 13.8    2468-4778 [employer].   Care instructions adapted under license by your healthcare professional. If you have questions about a medical condition or this instruction, always ask your healthcare professional. [employer], [employer] disclaims any warranty or liability for your use of this information.         "

## 2023-11-09 DIAGNOSIS — K21.9 GASTROESOPHAGEAL REFLUX DISEASE WITHOUT ESOPHAGITIS: ICD-10-CM

## 2023-11-10 NOTE — PROGRESS NOTES
ASSESSMENT & PLAN    Toni was seen today for pain and pain.    Diagnoses and all orders for this visit:    Primary osteoarthritis of both hands  -     Orthopedic  Referral  -     XR Hand Bilateral G/E 3 Views; Future      This issue is chronic and Worsening. Toni presents to clinic today with bilateral finger pain, decreased range of motion due to his overall severe osteoarthritis.  Radiographs taken in clinic today were reviewed with the patient and show osteoarthritis, most severe at the left second PIP joint and at all DIP joints of both hands.  By exam, he has limited range of motion at all of those joints due to these known bony changes.  Given the severity of his arthritis, he would gain little benefit from conservative measures such as hand therapy or corticosteroid injections.  We discussed the above findings and the following plan in detail:  -Referral to hand surgery team placed today  -He can continue to use over-the-counter pain medications as needed  -He can follow-up in our clinic as needed      Jr Schmitt [facility] [place]    -----  Chief Complaint   Patient presents with    Right Hand - Pain    Left Hand - Pain       SUBJECTIVE  Toni Collado is a/an 78 year old male who is seen in consultation at the request of  Yordan Hernandez M.D. for evaluation of bilateral hand pain.     The patient is seen by themselves.  The patient is Right handed    Onset: 20+ years(s) ago. Reports insidious onset without acute precipitating event. Overuse and limited motion.   Location of Pain: bilateral - left index finger and left pinky finger are the most painful and has the least ROM  Worsened by: weather changes   Better with: rest   Treatments tried: rest/activity avoidance, ice, heat, other medications: meloxicam, home exercises, and tiger balm/icyhot  Associated symptoms: numbness and tingling    Orthopedic/Surgical history: NO  Social History/Occupation:  "retired      REVIEW OF SYSTEMS:  Review of systems negative unless mentioned in HPI     OBJECTIVE:  /76   Ht 1.676 m (5' 6\")   Wt 107 kg (236 lb)   BMI 38.09 kg/m     General: healthy, alert and in no distress  Skin: no suspicious lesions or rash.  CV: distal perfusion intact   Resp: normal respiratory effort without conversational dyspnea   Psych: normal mood and affect  Gait: NORMAL  Neuro: Normal light sensory exam of B/l Upper extremity     Hand Exam    Left  Right   Inspection No atrophy, erythema , echymosis, or deformity  No atrophy, erythema , echymosis, or deformity    Palpation         Tenderness over    No tenderness to palpation of radial styloid, DRUJ, TFCC, scaphoid/snuffbox   No tenderness to palpation of radial styloid, DRUJ, TFCC, scaphoid/snuffbox   Range of Motion        1st digit IP flex/ext Normal Normal      DIP flexion/extension  Limited ROM all digits Limited Rom all digits      PIP flexion/extension  2nd digit with no motion at PIP due to OA  Normal       MCP flexion/extension Normal Normal   Strength      1st digit IP flex/ext Full Full    DIP flexion/extension Full Full    PIP flexion/extension  Full Full    MCP flexion/extension Full Full   Pain with resisted None None   Vascular   Intact  Intact    Special Tests   1st MCP valgus 0/30 normal   Neg CMC grind  No triggering evident  Able to make 'OK' sign (AIN)  Intact resisted abduction of digits    Sensation Intact to median, ulnar, and radial nerve distributions          RADIOLOGY:  Final results and radiologist's interpretation, available in the [facility] health record.  Images were reviewed with the patient in the office today.  My personal interpretation of the performed imaging: Severe osteophytosis with bony bridging and joint space narrowing of the DIP joints of both hands.  On the left hand there is total joint space loss with bony remodeling and osteophytosis of the second PIP joint.  "

## 2023-11-13 ENCOUNTER — OFFICE VISIT (OUTPATIENT)
Dept: ORTHOPEDICS | Facility: CLINIC | Age: 78
End: 2023-11-13
Attending: INTERNAL MEDICINE
Payer: COMMERCIAL

## 2023-11-13 ENCOUNTER — TELEPHONE (OUTPATIENT)
Dept: ORTHOPEDICS | Facility: CLINIC | Age: 78
End: 2023-11-13

## 2023-11-13 ENCOUNTER — ANCILLARY PROCEDURE (OUTPATIENT)
Dept: GENERAL RADIOLOGY | Facility: CLINIC | Age: 78
End: 2023-11-13
Attending: STUDENT IN AN ORGANIZED HEALTH CARE EDUCATION/TRAINING PROGRAM
Payer: COMMERCIAL

## 2023-11-13 VITALS
SYSTOLIC BLOOD PRESSURE: 136 MMHG | BODY MASS INDEX: 37.93 KG/M2 | WEIGHT: 236 LBS | DIASTOLIC BLOOD PRESSURE: 76 MMHG | HEIGHT: 66 IN

## 2023-11-13 DIAGNOSIS — M19.041 PRIMARY OSTEOARTHRITIS OF BOTH HANDS: Primary | ICD-10-CM

## 2023-11-13 DIAGNOSIS — M19.042 PRIMARY OSTEOARTHRITIS OF BOTH HANDS: ICD-10-CM

## 2023-11-13 DIAGNOSIS — M19.041 PRIMARY OSTEOARTHRITIS OF BOTH HANDS: ICD-10-CM

## 2023-11-13 DIAGNOSIS — M19.042 PRIMARY OSTEOARTHRITIS OF BOTH HANDS: Primary | ICD-10-CM

## 2023-11-13 PROCEDURE — 99203 OFFICE O/P NEW LOW 30 MIN: CPT | Performed by: STUDENT IN AN ORGANIZED HEALTH CARE EDUCATION/TRAINING PROGRAM

## 2023-11-13 PROCEDURE — 73130 X-RAY EXAM OF HAND: CPT | Mod: TC | Performed by: RADIOLOGY

## 2023-11-13 NOTE — TELEPHONE ENCOUNTER
Dr. Schmitt requested staff contact patient to schedule a consultation with Dr. Nails.     Writer called patient and left voicemail for patient to return call.     If patient returns call please schedule them an appointment with Dr. Nails for evaluation for OA of left hand.     Camila Oliver MSA, ATC  Certified Athletic Trainer

## 2023-11-13 NOTE — LETTER
11/13/2023         RE: Toni Collado  [address] [facility] [address]        Dear Colleague,    Thank you for referring your patient, Toni Collado, to the [facility]. Please see a copy of my visit note below.    ASSESSMENT & PLAN    Toni was seen today for pain and pain.    Diagnoses and all orders for this visit:    Primary osteoarthritis of both hands  -     Orthopedic  Referral  -     XR Hand Bilateral G/E 3 Views; Future      This issue is chronic and Worsening. Toni presents to clinic today with bilateral finger pain, decreased range of motion due to his overall severe osteoarthritis.  Radiographs taken in clinic today were reviewed with the patient and show osteoarthritis, most severe at the left second PIP joint and at all DIP joints of both hands.  By exam, he has limited range of motion at all of those joints due to these known bony changes.  Given the severity of his arthritis, he would gain little benefit from conservative measures such as hand therapy or corticosteroid injections.  We discussed the above findings and the following plan in detail:  -Referral to hand surgery team placed today  -He can continue to use over-the-counter pain medications as needed  -He can follow-up in our clinic as needed      Jr Schmitt,   [facility]    -----  Chief Complaint   Patient presents with     Right Hand - Pain     Left Hand - Pain       SUBJECTIVE  Toni Collado is a/an 78 year old male who is seen in consultation at the request of  Yordan Hernandez M.D. for evaluation of bilateral hand pain.     The patient is seen by themselves.  The patient is Right handed    Onset: 20+ years(s) ago. Reports insidious onset without acute precipitating event. Overuse and limited motion.   Location of Pain: bilateral - left index finger and left pinky finger are the most painful and has the least ROM  Worsened by:  "weather changes   Better with: rest   Treatments tried: rest/activity avoidance, ice, heat, other medications: meloxicam, home exercises, and tiger balm/icyhot  Associated symptoms: numbness and tingling    Orthopedic/Surgical history: NO  Social History/Occupation: retired      REVIEW OF SYSTEMS:  Review of systems negative unless mentioned in HPI     OBJECTIVE:  /76   Ht 1.676 m (5' 6\")   Wt 107 kg (236 lb)   BMI 38.09 kg/m     General: healthy, alert and in no distress  Skin: no suspicious lesions or rash.  CV: distal perfusion intact   Resp: normal respiratory effort without conversational dyspnea   Psych: normal mood and affect  Gait: NORMAL  Neuro: Normal light sensory exam of B/l Upper extremity     Hand Exam    Left  Right   Inspection No atrophy, erythema , echymosis, or deformity  No atrophy, erythema , echymosis, or deformity    Palpation         Tenderness over    No tenderness to palpation of radial styloid, DRUJ, TFCC, scaphoid/snuffbox   No tenderness to palpation of radial styloid, DRUJ, TFCC, scaphoid/snuffbox   Range of Motion        1st digit IP flex/ext Normal Normal      DIP flexion/extension  Limited ROM all digits Limited Rom all digits      PIP flexion/extension  2nd digit with no motion at PIP due to OA  Normal       MCP flexion/extension Normal Normal   Strength      1st digit IP flex/ext Full Full    DIP flexion/extension Full Full    PIP flexion/extension  Full Full    MCP flexion/extension Full Full   Pain with resisted None None   Vascular   Intact  Intact    Special Tests   1st MCP valgus 0/30 normal   Neg CMC grind  No triggering evident  Able to make 'OK' sign (AIN)  Intact resisted abduction of digits    Sensation Intact to median, ulnar, and radial nerve distributions          RADIOLOGY:  Final results and radiologist's interpretation, available in the [facility] health record.  Images were reviewed with the patient in the office today.  My personal interpretation of the " performed imaging: Severe osteophytosis with bony bridging and joint space narrowing of the DIP joints of both hands.  On the left hand there is total joint space loss with bony remodeling and osteophytosis of the second PIP joint.      Again, thank you for allowing me to participate in the care of your patient.        Sincerely,        Jr Schmitt, DO

## 2023-11-14 NOTE — TELEPHONE ENCOUNTER
Called and spoke to patient regarding setting up an appointment with Dr Nails. Patient would like the [place] location.    Patient is scheduled on 11/21 @ 8:45 am with Dr Nails in [place].     Patient was appreciative of call.    Chelsie Mejia, ANDRES, LAT, ATC  Certified Athletic Trainer

## 2023-11-20 NOTE — PROGRESS NOTES
Orthopaedic Surgery Hand and Upper Extremity Clinic H&P Note:  Date: Nov 21, 2023    Patient Name: Toni Collado  MRN: [id number]    Consult requested by: Yordan Hernandez MD      CHIEF COMPLAINT: bilateral hand pain    Dominant Hand:right   Occupation: retired       HPI:  Mr. Toni Collado is a 78 year old male right hand dominant who presents with bilateral hand osteoarthritis. His left index finger is most botherosme. It is deviated ulnarly and stiff with ROM. No injury or trauma that he can recall.  It is interfering with his  and golf game.  He also endorses pain at the PIP joint of the left small finger, worsening with cold weather.  The patient also has Heberden's nodes, deformities, and pain at multiple DIP joints.  However his range of motion is relatively well-preserved.  The pain also worsens with cold weather. Treatment to date: Voltaren, IcyHot, meloxicam, BioFreeze.    History of ganglion cyst excision from left wrist 40+ years ago.  Patient enjoys fishing and golfing.      PMH  Diabetes: no  Thyroid Problems: no  Smoking: no      PAST MEDICAL HISTORY:  Past Medical History:   Diagnosis Date    Adenomatous colon polyp 07/2019    Asymptomatic varicose veins of both lower extremities     Bulbar polio 1952    residual dysphagia    Cervical osteoarthritis     Chronic anxiety     Chronic depression     Familial tremor     Gastroesophageal reflux disease without esophagitis 04/12/2021    Hallux limitus of left foot     Localized, primary osteoarthritis of hand     Oropharyngeal dysphagia     Pre-op evaluation 06/15/2022    Primary osteoarthritis of left hip 06/15/2022    RLS (restless legs syndrome)     Sleep apnea, obstructive     CPAP    Status post total replacement of left hip     Varicose veins of left lower extremity        PAST SURGICAL HISTORY:  Past Surgical History:   Procedure Laterality Date    ARTHROSCOPY KNEE      CATARACT IOL, RT/LT Bilateral      and     COLONOSCOPY N/A  "2023    Procedure: Colonoscopy with polypectomies using cold snare;  Surgeon: Yue Cline MD;  Location:  GI    ESOPHAGOSCOPY, GASTROSCOPY, DUODENOSCOPY (EGD), COMBINED N/A 2023    Procedure: Esophagoscopy, gastroscopy, duodenoscopy (EGD), combined with biopsy using cold forcep;  Surgeon: Yue Cline MD;  Location: [facility]    HERNIA REPAIR, UMBILICAL      KNEE SURGERY Left     \"bone on bone\" d/t arthritis    RADIOFREQUENCY ABLATION NERVES      cervical spine    RHINOPLASTY      SEPTOPLASTY      SHOULDER SURGERY Bilateral     right (pitching injury), left (bone spur)    SHOULDER SURGERY Right     reconstructive surgery with hardware    TOE SURGERY Left     left big toe d/t arthritis    TOTAL KNEE ARTHROPLASTY Left     unicompartmental       MEDICATIONS:  Current Outpatient Medications   Medication    atorvastatin (LIPITOR) 20 MG tablet    meclizine (ANTIVERT) 25 MG tablet    meloxicam (MOBIC) 15 MG tablet    Multiple Vitamins-Minerals (MULTIVITAMIN PO)    omeprazole (PRILOSEC) 20 MG DR capsule    pramipexole (MIRAPEX) 1 MG tablet    sildenafil (REVATIO) 20 MG tablet    traZODone (DESYREL) 100 MG tablet     No current facility-administered medications for this visit.       ALLERGIES:     Allergies   Allergen Reactions    Amoxicillin Other (See Comments)     Facial and neck flushing, felt hot    Seasonal Allergies        FAMILY HISTORY:  No pertinent family history    SOCIAL HISTORY:  Social History     Tobacco Use    Smoking status: Former     Types: Cigarettes     Quit date: 1975     Years since quittin.0    Smokeless tobacco: Never   Vaping Use    Vaping Use: Never used   Substance Use Topics    Alcohol use: Not Currently    Drug use: No       The patient's past medical, family, and social history was reviewed and confirmed.    REVIEW OF SYMPTOMS:      General: Negative   Eyes: Negative   Ear, Nose and Throat: Negative   Respiratory: Negative   Cardiovascular: Negative "   Gastrointestinal: Negative   Genito-urinary: Negative   Musculoskeletal: Negative  Neurological: Negative   Psychological: Negative  HEME: Negative   ENDO: Negative   SKIN: Negative    VITALS:  There were no vitals filed for this visit.    EXAM:  General: NAD, A&Ox3  HEENT: NC/AT  CV: RRR by peripheral pulse  Pulmonary: Non-labored breathing on RA  LUE:  Ulnarly deviated deformity and hypertrophy of the left index finger PIP joint.  Range of motion limited from -30 of extension to 45 degrees of flexion.  Tip to palm distance approximately 2.5 to 3 cm  No deformity and full range of motion of left index finger PIP joint, tender to palpation  Radial deviation of the right index DIP joint.  Hypertrophy of multiple Heberden's nodes diffusely throughout other DIP joints.  Intact FDP, FDS, EDC, EPL, FPL, hand intrinsics  Sensation intact to light touch median, radial, ulnar nerve distributions  Warm well-perfused, capillary fill less than 2 seconds    RUE:  Radially deviated deformity of the middle finger DIP joint.  Hypertrophy of multiple Heberden's nodes diffusely throughout other DIP joints.  Intact FDP, FDS, EDC, EPL, FPL, hand intrinsics  Sensation intact to light touch median, radial, ulnar nerve distributions  Warm well-perfused, capillary fill less than 2 seconds       IMAGING:    X-rays of bilateral hands demonstrates ulnarly deviated deformity with severe arthrosis the left index finger PIP joint.  Minimal degenerative change of the left small finger PIP joint.  Diffuse and severe degenerative change arthrosis of all DIP joints with radial deviation of the right middle and left middle DIP joint.       I have personally reviewed the above images and labs.         IMPRESSION AND RECOMMENDATIONS:  Mr. Toni Collado is a 78 year old male right hand dominant with bilateral hand osteoarthritis, most affecting the left index PIP joint and left small finger PIP joint.      Diffuse arthrosis of all DIP joints,  however range of motion is relatively well-preserved, aside from stiffness, these do not bother the patient that much.    I showed the patient his radiographs and discussed treatment options.  For the PIP joint, the patient's main goal is to improve range of motion.  It also gets quite sore with activities.  We discussed treatment options, including steroid injections, PIP joint arthroplasty, and PIP joint fusion.    I discussed the risks and benefits of PIP joint arthroplasty.  I discussed the high risk of postoperative stiffness and implant related complications.  His range of motion is already quite poor, so I think it is hard to make him worse.  I discussed that if it does fail or becomes painful, he would be a candidate for PIP joint fusion.    The patient would like to proceed with surgery. The indications for surgery were discussed with the patient. The benefits, risks, and alternatives of operative management were discussed in detail with the patient. The patient understands that the risks of surgery include, but are not limited to: infection, bleeding, injury to nearby structures (such as nerves, blood vessels, and tendons), hardware failure/irritation or breakage, need for additional surgery, pain, stiffness, scarring, need for rehabilitation, and anesthetic complications.  Patient expressed understanding and elected to proceed with surgery. All questions were answered to the patient's satisfaction.    Case request placed, MAC + local.    The patient does not have much degenerative change seen on radiographs of his left small finger PIP joint.  I therefore have recommended a steroid injection.  After obtaining consent, I cleansed the skin over the dorsum of the small finger PIP joint with chlorhexidine.  I then anesthetized skin with ethyl chloride spray after which I injected 0.5 mL of betamethasone 6 mg/mL and 0.5 cc of 1% lidocaine into the left small finger PIP joint.  Patient tolerated this well  without complication.    I currently do not recommend any intervention for his DIP joints as they are functioning well.  We can consider steroid injections in the future.  He is not interested in infusions at this time.    All questions answered.  The patient voiced understanding and agreement.    Small Joint Injection/Arthrocentesis: L small PIP    Date/Time: 11/21/2023 9:23 AM    Performed by: Jasson Nails MD  Authorized by: Jasson Nails MD  Indications:  Pain  Needle Size:  27 G  Guidance: landmark     Approach:  Lateral  Location:  Small finger    Site:  L small PIP                    Medications:  6 mg betamethasone acet & sod phos 6 (3-3) MG/ML; 0.5 mL lidocaine 1 %        Outcome:  Tolerated well, no immediate complications  Procedure discussed: discussed risks, benefits, and alternatives    Consent Given by:  Patient  Timeout: timeout called immediately prior to procedure    Prep: patient was prepped and draped in usual sterile fashion            Jasson Nails MD    Hand, Upper Extremity & Microvascular Surgery  Department of Orthopaedic Surgery  [facility]

## 2023-11-21 ENCOUNTER — TELEPHONE (OUTPATIENT)
Dept: ORTHOPEDICS | Facility: CLINIC | Age: 78
End: 2023-11-21

## 2023-11-21 ENCOUNTER — OFFICE VISIT (OUTPATIENT)
Dept: ORTHOPEDICS | Facility: CLINIC | Age: 78
End: 2023-11-21
Payer: COMMERCIAL

## 2023-11-21 VITALS
SYSTOLIC BLOOD PRESSURE: 147 MMHG | DIASTOLIC BLOOD PRESSURE: 84 MMHG | HEIGHT: 66 IN | WEIGHT: 236.2 LBS | BODY MASS INDEX: 37.96 KG/M2

## 2023-11-21 DIAGNOSIS — M15.2 OSTEOARTHRITIS OF PROXIMAL INTERPHALANGEAL (PIP) JOINT OF LEFT LITTLE FINGER: ICD-10-CM

## 2023-11-21 DIAGNOSIS — M15.2 OSTEOARTHRITIS OF PROXIMAL INTERPHALANGEAL (PIP) JOINT OF LEFT INDEX FINGER: Primary | ICD-10-CM

## 2023-11-21 PROCEDURE — 20600 DRAIN/INJ JOINT/BURSA W/O US: CPT | Mod: LT | Performed by: STUDENT IN AN ORGANIZED HEALTH CARE EDUCATION/TRAINING PROGRAM

## 2023-11-21 PROCEDURE — 99204 OFFICE O/P NEW MOD 45 MIN: CPT | Mod: 25 | Performed by: STUDENT IN AN ORGANIZED HEALTH CARE EDUCATION/TRAINING PROGRAM

## 2023-11-21 RX ORDER — LIDOCAINE HYDROCHLORIDE 10 MG/ML
0.5 INJECTION, SOLUTION INFILTRATION; PERINEURAL
Status: DISCONTINUED | OUTPATIENT
Start: 2023-11-21 | End: 2024-02-10

## 2023-11-21 RX ORDER — BETAMETHASONE SODIUM PHOSPHATE AND BETAMETHASONE ACETATE 3; 3 MG/ML; MG/ML
6 INJECTION, SUSPENSION INTRA-ARTICULAR; INTRALESIONAL; INTRAMUSCULAR; SOFT TISSUE
Status: DISCONTINUED | OUTPATIENT
Start: 2023-11-21 | End: 2024-02-10

## 2023-11-21 RX ADMIN — LIDOCAINE HYDROCHLORIDE 0.5 ML: 10 INJECTION, SOLUTION INFILTRATION; PERINEURAL at 09:23

## 2023-11-21 RX ADMIN — BETAMETHASONE SODIUM PHOSPHATE AND BETAMETHASONE ACETATE 6 MG: 3; 3 INJECTION, SUSPENSION INTRA-ARTICULAR; INTRALESIONAL; INTRAMUSCULAR; SOFT TISSUE at 09:23

## 2023-11-21 NOTE — TELEPHONE ENCOUNTER
Patient has been scheduled for surgery. Details are below.    Date of Surgery: 06/28/24    Approximate Arrival Time: SURGERY CENTER WILL CALL 3/4 DAYS PRIOR TO CONFIRM A TIME   Surgeon:  DR MAX NGUYỄN     Procedure: ARTHROPLASTY, LEFT INDEX PROXIMAL INTERPHALANGEAL JOINT (Left)     Location: [facility], [address] [place] [address]  Surgery Consult: NA  PreOp Physical: 06/04/24  PostOp: 07/11/24 & HT  Packet Mailed/MyChart Sent: YES   Added to [facility]: YES

## 2023-11-21 NOTE — LETTER
11/21/2023         RE: Toni Collado  [address] [facility] [address]        Dear Colleague,    Thank you for referring your patient, Toni Collado, to the [facility] [place]. Please see a copy of my visit note below.    Orthopaedic Surgery Hand and Upper Extremity Clinic H&P Note:  Date: Nov 21, 2023    Patient Name: Toni Collado  MRN: [id number]    Consult requested by: Yordan Hernandez MD      CHIEF COMPLAINT: bilateral hand pain    Dominant Hand:right   Occupation: retired       HPI:  Mr. Toni Collado is a 78 year old male right hand dominant who presents with bilateral hand osteoarthritis. His left index finger is most botherosme. It is deviated ulnarly and stiff with ROM. No injury or trauma that he can recall.  It is interfering with his  and golf game.  He also endorses pain at the PIP joint of the left small finger, worsening with cold weather.  The patient also has Heberden's nodes, deformities, and pain at multiple DIP joints.  However his range of motion is relatively well-preserved.  The pain also worsens with cold weather. Treatment to date: Voltaren, IcyHot, meloxicam, BioFreeze.    History of ganglion cyst excision from left wrist 40+ years ago.  Patient enjoys fishing and golfing.      PMH  Diabetes: no  Thyroid Problems: no  Smoking: no      PAST MEDICAL HISTORY:  Past Medical History:   Diagnosis Date     Adenomatous colon polyp 07/2019     Asymptomatic varicose veins of both lower extremities      Bulbar polio 1952    residual dysphagia     Cervical osteoarthritis      Chronic anxiety      Chronic depression      Familial tremor      Gastroesophageal reflux disease without esophagitis 04/12/2021     Hallux limitus of left foot      Localized, primary osteoarthritis of hand      Oropharyngeal dysphagia      Pre-op evaluation 06/15/2022     Primary osteoarthritis of left hip 06/15/2022     RLS (restless legs syndrome)      Sleep apnea, obstructive   "   CPAP     Status post total replacement of left hip      Varicose veins of left lower extremity        PAST SURGICAL HISTORY:  Past Surgical History:   Procedure Laterality Date     ARTHROSCOPY KNEE       CATARACT IOL, RT/LT Bilateral      and      COLONOSCOPY N/A 2023    Procedure: Colonoscopy with polypectomies using cold snare;  Surgeon: Yue Cline MD;  Location:  GI     ESOPHAGOSCOPY, GASTROSCOPY, DUODENOSCOPY (EGD), COMBINED N/A 2023    Procedure: Esophagoscopy, gastroscopy, duodenoscopy (EGD), combined with biopsy using cold forcep;  Surgeon: Yue Cline MD;  Location: [facility]     HERNIA REPAIR, UMBILICAL       KNEE SURGERY Left     \"bone on bone\" d/t arthritis     RADIOFREQUENCY ABLATION NERVES      cervical spine     RHINOPLASTY       SEPTOPLASTY       SHOULDER SURGERY Bilateral     right (pitching injury), left (bone spur)     SHOULDER SURGERY Right     reconstructive surgery with hardware     TOE SURGERY Left     left big toe d/t arthritis     TOTAL KNEE ARTHROPLASTY Left     unicompartmental       MEDICATIONS:  Current Outpatient Medications   Medication     atorvastatin (LIPITOR) 20 MG tablet     meclizine (ANTIVERT) 25 MG tablet     meloxicam (MOBIC) 15 MG tablet     Multiple Vitamins-Minerals (MULTIVITAMIN PO)     omeprazole (PRILOSEC) 20 MG DR capsule     pramipexole (MIRAPEX) 1 MG tablet     sildenafil (REVATIO) 20 MG tablet     traZODone (DESYREL) 100 MG tablet     No current facility-administered medications for this visit.       ALLERGIES:     Allergies   Allergen Reactions     Amoxicillin Other (See Comments)     Facial and neck flushing, felt hot     Seasonal Allergies        FAMILY HISTORY:  No pertinent family history    SOCIAL HISTORY:  Social History     Tobacco Use     Smoking status: Former     Types: Cigarettes     Quit date: 1975     Years since quittin.0     Smokeless tobacco: Never   Vaping Use     Vaping Use: Never used "   Substance Use Topics     Alcohol use: Not Currently     Drug use: No       The patient's past medical, family, and social history was reviewed and confirmed.    REVIEW OF SYMPTOMS:      General: Negative   Eyes: Negative   Ear, Nose and Throat: Negative   Respiratory: Negative   Cardiovascular: Negative   Gastrointestinal: Negative   Genito-urinary: Negative   Musculoskeletal: Negative  Neurological: Negative   Psychological: Negative  HEME: Negative   ENDO: Negative   SKIN: Negative    VITALS:  There were no vitals filed for this visit.    EXAM:  General: NAD, A&Ox3  HEENT: NC/AT  CV: RRR by peripheral pulse  Pulmonary: Non-labored breathing on RA  LUE:  Ulnarly deviated deformity and hypertrophy of the left index finger PIP joint.  Range of motion limited from -30 of extension to 45 degrees of flexion.  Tip to palm distance approximately 2.5 to 3 cm  No deformity and full range of motion of left index finger PIP joint, tender to palpation  Radial deviation of the right index DIP joint.  Hypertrophy of multiple Heberden's nodes diffusely throughout other DIP joints.  Intact FDP, FDS, EDC, EPL, FPL, hand intrinsics  Sensation intact to light touch median, radial, ulnar nerve distributions  Warm well-perfused, capillary fill less than 2 seconds    RUE:  Radially deviated deformity of the middle finger DIP joint.  Hypertrophy of multiple Heberden's nodes diffusely throughout other DIP joints.  Intact FDP, FDS, EDC, EPL, FPL, hand intrinsics  Sensation intact to light touch median, radial, ulnar nerve distributions  Warm well-perfused, capillary fill less than 2 seconds       IMAGING:    X-rays of bilateral hands demonstrates ulnarly deviated deformity with severe arthrosis the left index finger PIP joint.  Minimal degenerative change of the left small finger PIP joint.  Diffuse and severe degenerative change arthrosis of all DIP joints with radial deviation of the right middle and left middle DIP joint.       I have  personally reviewed the above images and labs.         IMPRESSION AND RECOMMENDATIONS:  Mr. Toni Collado is a 78 year old male right hand dominant with bilateral hand osteoarthritis, most affecting the left index PIP joint and left small finger PIP joint.      Diffuse arthrosis of all DIP joints, however range of motion is relatively well-preserved, aside from stiffness, these do not bother the patient that much.    I showed the patient his radiographs and discussed treatment options.  For the PIP joint, the patient's main goal is to improve range of motion.  It also gets quite sore with activities.  We discussed treatment options, including steroid injections, PIP joint arthroplasty, and PIP joint fusion.    I discussed the risks and benefits of PIP joint arthroplasty.  I discussed the high risk of postoperative stiffness and implant related complications.  His range of motion is already quite poor, so I think it is hard to make him worse.  I discussed that if it does fail or becomes painful, he would be a candidate for PIP joint fusion.    The patient would like to proceed with surgery. The indications for surgery were discussed with the patient. The benefits, risks, and alternatives of operative management were discussed in detail with the patient. The patient understands that the risks of surgery include, but are not limited to: infection, bleeding, injury to nearby structures (such as nerves, blood vessels, and tendons), hardware failure/irritation or breakage, need for additional surgery, pain, stiffness, scarring, need for rehabilitation, and anesthetic complications.  Patient expressed understanding and elected to proceed with surgery. All questions were answered to the patient's satisfaction.    Case request placed, MAC + local.    The patient does not have much degenerative change seen on radiographs of his left small finger PIP joint.  I therefore have recommended a steroid injection.  After obtaining  consent, I cleansed the skin over the dorsum of the small finger PIP joint with chlorhexidine.  I then anesthetized skin with ethyl chloride spray after which I injected 0.5 mL of betamethasone 6 mg/mL and 0.5 cc of 1% lidocaine into the left small finger PIP joint.  Patient tolerated this well without complication.    I currently do not recommend any intervention for his DIP joints as they are functioning well.  We can consider steroid injections in the future.  He is not interested in infusions at this time.    All questions answered.  The patient voiced understanding and agreement.    Small Joint Injection/Arthrocentesis: L small PIP    Date/Time: 11/21/2023 9:23 AM    Performed by: Jasson Nails MD  Authorized by: Jasson Nails MD  Indications:  Pain  Needle Size:  27 G  Guidance: landmark     Approach:  Lateral  Location:  Small finger    Site:  L small PIP                    Medications:  6 mg betamethasone acet & sod phos 6 (3-3) MG/ML; 0.5 mL lidocaine 1 %        Outcome:  Tolerated well, no immediate complications  Procedure discussed: discussed risks, benefits, and alternatives    Consent Given by:  Patient  Timeout: timeout called immediately prior to procedure    Prep: patient was prepped and draped in usual sterile fashion            Jasson Nails MD    Hand, Upper Extremity & Microvascular Surgery  Department of Orthopaedic Surgery  [facility]          Again, thank you for allowing me to participate in the care of your patient.        Sincerely,        Jasson Nails MD

## 2023-11-22 ENCOUNTER — E-VISIT (OUTPATIENT)
Dept: PEDIATRICS | Facility: CLINIC | Age: 78
End: 2023-11-22
Payer: COMMERCIAL

## 2023-11-22 DIAGNOSIS — H81.13 BENIGN PAROXYSMAL POSITIONAL VERTIGO DUE TO BILATERAL VESTIBULAR DISORDER: ICD-10-CM

## 2023-11-22 DIAGNOSIS — R42 VERTIGO: Primary | ICD-10-CM

## 2023-11-22 PROCEDURE — 99421 OL DIG E/M SVC 5-10 MIN: CPT | Mod: 93 | Performed by: INTERNAL MEDICINE

## 2023-11-22 RX ORDER — MECLIZINE HYDROCHLORIDE 25 MG/1
25 TABLET ORAL 3 TIMES DAILY PRN
Qty: 30 TABLET | Refills: 2 | Status: SHIPPED | OUTPATIENT
Start: 2023-11-22 | End: 2024-02-06

## 2023-11-27 ENCOUNTER — NURSE TRIAGE (OUTPATIENT)
Dept: NURSING | Facility: CLINIC | Age: 78
End: 2023-11-27
Payer: COMMERCIAL

## 2023-11-27 NOTE — TELEPHONE ENCOUNTER
Nurse Triage SBAR    Is this a 2nd Level Triage? YES, LICENSED PRACTITIONER REVIEW IS REQUIRED    Situation: Patient calling with dizziness and lightheadedness.  Consent: not needed    Background: Pt states that he has been experiencing dizziness and lightheadedness for ~ 2 weeks. He thought this may be d/t Atorvastatin as he had just started this med but he quit taking it and is still experiencing the dizziness. Pt has had BPV d/t bilat vestibular disorder but states this seems different and is not as severe.    Assessment: Pt states that this is mostly with position changes but is also happeneing briefly while at rest. Pt has tried Meclazine and PT exercises w/o improvement. Pt has appt sched Fri 12/1.     Does PCP feel this is an appropriate time frame and/or what else would he advise?    Protocol Recommended Disposition:   Go To Office Now    Recommendation: Advised patient to  wait for call back from care team after discussing with PCP . Reviewed concerning symptoms and when to call back.     Routed to provider - please call pt to advise @[phone] (home)       Does the patient meet one of the following criteria for ADS visit consideration? 16+ years old, with an [facility] PCP     TIP  Providers, please consider if this condition is appropriate for management at one of our Acute and Diagnostic Services sites.     If patient is a good candidate, please use dotphrase <dot>triageresponse and select Refer to ADS to document.     Accidentally signed visit please advise and addend.    LEONEL ESPAÑA RN [facility] 11/27/2023 12:46 PM    Reason for Disposition   Lightheadedness (dizziness) present now, after 2 hours of rest and fluids    Additional Information   Negative: SEVERE difficulty breathing (e.g., struggling for each breath, speaks in single words)   Negative: Shock suspected (e.g., cold/pale/clammy skin, too weak to stand, low BP, rapid pulse)   Negative: Difficult to awaken or acting confused  (e.g., disoriented, slurred speech)   Negative: Fainted, and still feels dizzy afterwards   Negative: Overdose (accidental or intentional) of medications   Negative: New neurologic deficit that is present now: * Weakness of the face, arm, or leg on one side of the body * Numbness of the face, arm, or leg on one side of the body * Loss of speech or garbled speech   Negative: Heart beating < 50 beats per minute OR > 140 beats per minute   Negative: Sounds like a life-threatening emergency to the triager   Negative: Chest pain   Negative: Rectal bleeding, bloody stool, or tarry-black stool   Negative: Vomiting is main symptom   Negative: Diarrhea is main symptom   Negative: Headache is main symptom   Negative: Heat exhaustion suspected (i.e., dehydration from heat exposure)   Negative: Patient states that they are having an anxiety or panic attack   Negative: Dizziness from low blood sugar (i.e., < 60 mg/dl or 3.5 mmol/l)   Negative: SEVERE dizziness (e.g., unable to stand, requires support to walk, feels like passing out now)   Negative: SEVERE headache or neck pain   Negative: Spinning or tilting sensation (vertigo) present now and one or more stroke risk factors (i.e., hypertension, diabetes mellitus, prior stroke/TIA, heart attack, age over 60) (Exception: Prior physician evaluation for this AND no different/worse than usual.)   Negative: Neurologic deficit that was brief (now gone), ANY of the following:* Weakness of the face, arm, or leg on one side of the body* Numbness of the face, arm, or leg on one side of the body* Loss of speech or garbled speech   Negative: Loss of vision or double vision  (Exception: Similar to previous migraines.)   Negative: Extra heartbeats, irregular heart beating, or heart is beating very fast (i.e., 'palpitations')   Negative: Difficulty breathing   Negative: Drinking very little and dehydration suspected (e.g., no urine > 12 hours, very dry mouth, very lightheaded)   Negative:  Follows bleeding (e.g., stomach, rectum, vagina)  (Exception: Became dizzy from sight of small amount blood.)   Negative: Patient sounds very sick or weak to the triager    Protocols used: Dizziness-A-OH

## 2023-11-27 NOTE — TELEPHONE ENCOUNTER
Call placed to pt to let him know it's okay to wait until his appt 12/1/23  Pt verbalized  understanding and  agrees to the plan    Thank you  Debi Pastor RN on 11/27/2023 at 2:21 PM

## 2023-12-01 ENCOUNTER — OFFICE VISIT (OUTPATIENT)
Dept: FAMILY MEDICINE | Facility: CLINIC | Age: 78
End: 2023-12-01
Payer: COMMERCIAL

## 2023-12-01 ENCOUNTER — HOSPITAL ENCOUNTER (OUTPATIENT)
Dept: CT IMAGING | Facility: CLINIC | Age: 78
Discharge: HOME | End: 2023-12-01
Attending: PHYSICIAN ASSISTANT | Admitting: PHYSICIAN ASSISTANT
Payer: COMMERCIAL

## 2023-12-01 ENCOUNTER — OFFICE VISIT (OUTPATIENT)
Dept: PEDIATRICS | Facility: CLINIC | Age: 78
End: 2023-12-01
Payer: COMMERCIAL

## 2023-12-01 VITALS
OXYGEN SATURATION: 97 % | RESPIRATION RATE: 18 BRPM | HEART RATE: 68 BPM | BODY MASS INDEX: 37.52 KG/M2 | SYSTOLIC BLOOD PRESSURE: 150 MMHG | DIASTOLIC BLOOD PRESSURE: 84 MMHG | WEIGHT: 236 LBS | TEMPERATURE: 98.1 F

## 2023-12-01 VITALS
RESPIRATION RATE: 18 BRPM | HEART RATE: 63 BPM | BODY MASS INDEX: 37.06 KG/M2 | TEMPERATURE: 98.3 F | SYSTOLIC BLOOD PRESSURE: 130 MMHG | HEIGHT: 67 IN | WEIGHT: 236.1 LBS | DIASTOLIC BLOOD PRESSURE: 80 MMHG | OXYGEN SATURATION: 97 %

## 2023-12-01 DIAGNOSIS — R42 DIZZINESS: ICD-10-CM

## 2023-12-01 DIAGNOSIS — R42 DIZZINESS: Primary | ICD-10-CM

## 2023-12-01 PROBLEM — E66.01 CLASS 2 SEVERE OBESITY DUE TO EXCESS CALORIES WITH SERIOUS COMORBIDITY IN ADULT (H): Status: ACTIVE | Noted: 2023-12-01

## 2023-12-01 PROBLEM — E66.812 CLASS 2 SEVERE OBESITY DUE TO EXCESS CALORIES WITH SERIOUS COMORBIDITY IN ADULT (H): Status: ACTIVE | Noted: 2023-12-01

## 2023-12-01 LAB
ANION GAP SERPL CALCULATED.3IONS-SCNC: 10 MMOL/L (ref 7–15)
BUN SERPL-MCNC: 18.8 MG/DL (ref 8–23)
CALCIUM SERPL-MCNC: 8.8 MG/DL (ref 8.8–10.2)
CHLORIDE SERPL-SCNC: 98 MMOL/L (ref 98–107)
CREAT SERPL-MCNC: 1.03 MG/DL (ref 0.67–1.17)
DEPRECATED HCO3 PLAS-SCNC: 25 MMOL/L (ref 22–29)
EGFRCR SERPLBLD CKD-EPI 2021: 75 ML/MIN/1.73M2
ERYTHROCYTE [DISTWIDTH] IN BLOOD BY AUTOMATED COUNT: 13.2 % (ref 10–15)
GLUCOSE SERPL-MCNC: 101 MG/DL (ref 70–99)
HCT VFR BLD AUTO: 44.1 % (ref 40–53)
HGB BLD-MCNC: 14.8 G/DL (ref 13.3–17.7)
MCH RBC QN AUTO: 31.1 PG (ref 26.5–33)
MCHC RBC AUTO-ENTMCNC: 33.6 G/DL (ref 31.5–36.5)
MCV RBC AUTO: 93 FL (ref 78–100)
PLATELET # BLD AUTO: 161 10E3/UL (ref 150–450)
POTASSIUM SERPL-SCNC: 4.2 MMOL/L (ref 3.4–5.3)
RBC # BLD AUTO: 4.76 10E6/UL (ref 4.4–5.9)
SODIUM SERPL-SCNC: 133 MMOL/L (ref 135–145)
TROPONIN T SERPL HS-MCNC: 16 NG/L
WBC # BLD AUTO: 5.6 10E3/UL (ref 4–11)

## 2023-12-01 PROCEDURE — 70450 CT HEAD/BRAIN W/O DYE: CPT

## 2023-12-01 PROCEDURE — 99207 REFERRAL TO ACUTE AND DIAGNOSTIC SERVICES: CPT | Performed by: GENERAL PRACTICE

## 2023-12-01 PROCEDURE — 84484 ASSAY OF TROPONIN QUANT: CPT | Performed by: PHYSICIAN ASSISTANT

## 2023-12-01 PROCEDURE — 93005 ELECTROCARDIOGRAM TRACING: CPT | Performed by: PHYSICIAN ASSISTANT

## 2023-12-01 PROCEDURE — 99214 OFFICE O/P EST MOD 30 MIN: CPT | Mod: 25 | Performed by: PHYSICIAN ASSISTANT

## 2023-12-01 PROCEDURE — 80048 BASIC METABOLIC PNL TOTAL CA: CPT | Performed by: PHYSICIAN ASSISTANT

## 2023-12-01 PROCEDURE — 85027 COMPLETE CBC AUTOMATED: CPT | Performed by: PHYSICIAN ASSISTANT

## 2023-12-01 PROCEDURE — 36415 COLL VENOUS BLD VENIPUNCTURE: CPT | Performed by: PHYSICIAN ASSISTANT

## 2023-12-01 ASSESSMENT — ENCOUNTER SYMPTOMS
DIZZINESS: 1
LIGHT-HEADEDNESS: 1

## 2023-12-01 ASSESSMENT — PAIN SCALES - GENERAL: PAINLEVEL: NO PAIN (0)

## 2023-12-01 NOTE — PROGRESS NOTES
"  Assessment & Plan     Dizziness  See HPI  Vitals stable today, walking to the clinic accompanied by wife.   Discussed with patient and wife to rule out ischemic stroke and cardiac causes.  Spoke to ADS, will rule out ischemic stroke with heat CT  TTE to rule out ischemic heart disease, less likely.   If work up is negative, can refer to therapy next               BMI:   Estimated body mass index is 37.54 kg/m  as calculated from the following:    Height as of this encounter: 1.689 m (5' 6.5\").    Weight as of this encounter: 107.1 kg (236 lb 1.6 oz).           Tanja Farah MD  [facility] RADHA Turner is a 78 year old, presenting for the following health issues:  Dizziness        12/1/2023     9:12 AM   Additional Questions   Roomed by Citlalli CHAVEZ MA   Accompanied by wife becki         12/1/2023     9:12 AM   Patient Reported Additional Medications   Patient reports taking the following new medications none         Dizziness  For the past 2 weeks, constant lightheadedness  Symptoms have been steady  Chronic tinnitus  Symptoms are different from BPV, tried meclizine and no improvement  Noticed SOB with walking yesterday, no chest pain  Denies weakness  No eye exam for a couple of years and has one in December    Started on atorvastatin, no change in symptoms since stopping  Been taking trazodone for insomnia, will try to cut back if able    History of Present Illness       Reason for visit:  Dizzyness and light headed  Symptom onset:  1-2 weeks ago  Symptoms include:  Dizzyness and light headed  Symptom intensity:  Moderate  Symptom progression:  Staying the same  Had these symptoms before:  No  What makes it worse:  Standing, walking some times worse than others  What makes it better:  No    He eats 2-3 servings of fruits and vegetables daily.He consumes 1 sweetened beverage(s) daily.He exercises with enough effort to increase his heart rate 10 to 19 minutes per day.  He exercises with " "enough effort to increase his heart rate 3 or less days per week.   He is taking medications regularly.                 Review of Systems   Neurological:  Positive for dizziness and light-headedness.   All other systems reviewed and are negative.           Objective    /80 (BP Location: Right arm, Patient Position: Sitting, Cuff Size: Adult Large)   Pulse 63   Temp 98.3  F (36.8  C) (Oral)   Resp 18   Ht 1.689 m (5' 6.5\")   Wt 107.1 kg (236 lb 1.6 oz)   SpO2 97%   BMI 37.54 kg/m    Body mass index is 37.54 kg/m .  Physical Exam  Constitutional:       Appearance: Normal appearance.   HENT:      Head: Normocephalic and atraumatic.      Right Ear: Tympanic membrane, ear canal and external ear normal.      Left Ear: Tympanic membrane, ear canal and external ear normal.      Nose: Nose normal.      Mouth/Throat:      Mouth: Mucous membranes are moist.      Pharynx: Oropharynx is clear.   Eyes:      Extraocular Movements: Extraocular movements intact.      Conjunctiva/sclera: Conjunctivae normal.      Pupils: Pupils are equal, round, and reactive to light.   Cardiovascular:      Rate and Rhythm: Normal rate and regular rhythm.      Pulses: Normal pulses.      Heart sounds: Normal heart sounds.   Pulmonary:      Effort: Pulmonary effort is normal.      Breath sounds: Normal breath sounds.   Abdominal:      General: Abdomen is flat. Bowel sounds are normal.      Palpations: Abdomen is soft.   Musculoskeletal:         General: Normal range of motion.      Cervical back: Normal range of motion and neck supple.   Skin:     General: Skin is warm.      Capillary Refill: Capillary refill takes less than 2 seconds.   Neurological:      General: No focal deficit present.      Mental Status: He is alert and oriented to person, place, and time. Mental status is at baseline.   Psychiatric:         Mood and Affect: Mood normal.         Behavior: Behavior normal.         Thought Content: Thought content normal.         " Judgment: Judgment normal.

## 2023-12-01 NOTE — PROGRESS NOTES
"  {PROVIDER CHARTING PREFERENCE:938335}    Subjective   Johnny is a 78 year old, presenting for the following health issues:  Dizziness (Dizziness X 2 weeks)  {(!) Visit Details have not yet been documented.  Please enter Visit Details and then use this list to pull in documentation. (Optional):430461}    HPI     Dizziness  Onset/Duration: X 2 weeks  Description:   Do you feel faint: YES- during the episode only  Does it feel like the surroundings (bed, room) are moving: No  Unsteady/off balance: YES- occasionally   Have you passed out or fallen: No  Intensity: moderate  Progression of Symptoms: same  Accompanying Signs & Symptoms:  Heart palpitations or chest pain: denies chest pain, while going on a walk with wife, tried to catch up to her and experienced SOB  Nausea, vomiting: YES- light nausea only when dizzy  Weakness or lack of coordination in arms or legs: No  Vision or speech changes: YES- \"foggy vision occasionally\" 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  Tried some head maneuvers at home with no help  Recent bleeding history: No  Any new medications (BP?): YES- Lipitor started X 3 weeks ago, he stopped taking this to see if this was a side effect.  Restarted taking today, as his sx's didn't change or improve when stopping Lipitor  Precipitating factors:   Worse with activity: YES  Worse with head movement: No  Alleviating factors:   Does staying in a fixed position give relief: YES- \"for a while, but then it will hit me again\"  Therapies tried and outcome: Head maneuvers, Meclizine, these didn't help.        Review of Systems   {ROS " COMP (Optional):504033}      Objective    Wt 107 kg (236 lb)   BMI 37.52 kg/m    Body mass index is 37.52 kg/m .  Physical Exam   {Exam List (Optional):108797}    {Diagnostic Test Results (Optional):972392}    {AMBULATORY ATTESTATION (Optional):497993}

## 2023-12-01 NOTE — PROGRESS NOTES
Assessment/Plan:    EKG without arrhythmia or acute ischemic findings; troponin negative. Do not suspect ACS. Pt concerned about possible CAD, advised he should discuss with primary care provider if symptoms persist as a stress test may be needed to rule this out.   CBC, BMP unremarkable.  CT head unremarkable.   Unclear etiology for patient's dizziness at this time; follow up with neurology if symptoms persist. Referral placed.    See patient instructions below.    At the end of the encounter, I discussed results, diagnosis, medications. Discussed red flags for immediate return to clinic/ER, as well as indications for follow up if no improvement. Patient understood and agreed to plan. Patient was stable for discharge.      ICD-10-CM    1. Dizziness  R42 Basic metabolic panel     CBC with platelets     Troponin T, High Sensitivity     EKG 12-lead, tracing only     Basic metabolic panel     CBC with platelets     Troponin T, High Sensitivity     sodium chloride (PF) 0.9% PF flush 3 mL     Adult Neurology  Referral     CANCELED: CT Head w/o & w Contrast            Return in about 2 weeks (around 12/15/2023) for Follow up w/ primary care provider if not better.    HEMANT Charles, MELLISA  [facility]  -----------------------------------------------------------------------------------------------------------------------------------------------------    HPI:  Toni Collado is a 78 year old male who presents for evaluation of the following:    Dizziness  Onset/Duration: X 2 weeks  Description:   Do you feel faint: YES- during the episode only  Does it feel like the surroundings (bed, room) are moving: No  Unsteady/off balance: YES- occasionally   Have you passed out or fallen: No  Intensity: moderate  Progression of Symptoms: same  Accompanying Signs & Symptoms:  Heart palpitations or chest pain: denies chest pain  Nausea, vomiting: YES- light nausea only when dizzy  Weakness or  "lack of coordination in arms or legs: No  Vision or speech changes: No  Numbness or tingling: No  Ringing in ears (Tinnitus): YES- chronic  Hearing Loss: YES-chronic  History:   Head trauma/concussion history: YES- concussion \"a long time ago\" slipped on icy steps  Previous similar symptoms: No, but notes hx of vertigo, \"Crystals in my ears\" these are worse sx's then what he's experiencing now.  Tried some Epley maneuvers at home with no help  Recent bleeding history: No  Any new medications (BP?): YES- Lipitor started X 3 weeks ago, he stopped taking this to see if this was a side effect.  Restarted taking today, as his sx's didn't change or improve when stopping Lipitor  Precipitating factors:   Worse with activity: YES  Worse with head movement: No  Alleviating factors:   Does staying in a fixed position give relief: YES- \"for a while, but then it will hit me again\"  Therapies tried and outcome: Epley maneuvers, Meclizine, these didn't help.    Describes dizziness as feeling like he is \"on a cloud\" and he will be sitting and have to grab onto something to steady himself at times. Dizziness is constant with occasional episodes of increased severity.    Noticed the other day while on a walk with his wife, he felt SOB which was unusual for him.  He has no hx of cardiac disease but family hx of CAD.    Past Medical History:   Diagnosis Date    Adenomatous colon polyp 07/2019    Asymptomatic varicose veins of both lower extremities     Bulbar polio 1952    residual dysphagia    Cervical osteoarthritis     Chronic anxiety     Chronic depression     Familial tremor     Gastroesophageal reflux disease without esophagitis 04/12/2021    Hallux limitus of left foot     Localized, primary osteoarthritis of hand     Oropharyngeal dysphagia     Pre-op evaluation 06/15/2022    Primary osteoarthritis of left hip 06/15/2022    RLS (restless legs syndrome)     Sleep apnea, obstructive     CPAP    Status post total replacement of " left hip     Varicose veins of left lower extremity        Vitals:    12/01/23 1202   BP: (!) 150/84   BP Location: Left arm   Patient Position: Chair   Cuff Size: Adult Large   Pulse: 68   Resp: 18   Temp: 98.1  F (36.7  C)   TempSrc: Oral   SpO2: 97%   Weight: 107 kg (236 lb)       Physical Exam  Vitals and nursing note reviewed.   Cardiovascular:      Rate and Rhythm: Normal rate and regular rhythm.      Heart sounds: Normal heart sounds.   Pulmonary:      Effort: Pulmonary effort is normal.   Neurological:      Mental Status: He is alert.      GCS: GCS eye subscore is 4. GCS verbal subscore is 5. GCS motor subscore is 6.      Cranial Nerves: Cranial nerves 2-12 are intact.      Sensory: Sensation is intact.      Motor: Motor function is intact.      Coordination: Coordination is intact.       Labs/Imaging:  Results for orders placed or performed in visit on 12/01/23 (from the past 24 hour(s))   Basic metabolic panel   Result Value Ref Range    Sodium 133 (L) 135 - 145 mmol/L    Potassium 4.2 3.4 - 5.3 mmol/L    Chloride 98 98 - 107 mmol/L    Carbon Dioxide (CO2) 25 22 - 29 mmol/L    Anion Gap 10 7 - 15 mmol/L    Urea Nitrogen 18.8 8.0 - 23.0 mg/dL    Creatinine 1.03 0.67 - 1.17 mg/dL    GFR Estimate 75 >60 mL/min/1.73m2    Calcium 8.8 8.8 - 10.2 mg/dL    Glucose 101 (H) 70 - 99 mg/dL   CBC with platelets   Result Value Ref Range    WBC Count 5.6 4.0 - 11.0 10e3/uL    RBC Count 4.76 4.40 - 5.90 10e6/uL    Hemoglobin 14.8 13.3 - 17.7 g/dL    Hematocrit 44.1 40.0 - 53.0 %    MCV 93 78 - 100 fL    MCH 31.1 26.5 - 33.0 pg    MCHC 33.6 31.5 - 36.5 g/dL    RDW 13.2 10.0 - 15.0 %    Platelet Count 161 150 - 450 10e3/uL   Troponin T, High Sensitivity   Result Value Ref Range    Troponin T, High Sensitivity 16 <=22 ng/L     No results found for this or any previous visit (from the past 24 hour(s)).    EKG - Reviewed and interpreted by me appears normal, NSR, normal axis, 1st degree AV block, no acute ST/T changes c/w  "ischemia, no LVH by voltage criteria, unchanged from previous tracings    Results for orders placed or performed during the hospital encounter of 12/01/23   CT Head w/o Contrast     Status: None    Narrative    CT HEAD W/O CONTRAST 12/1/2023 1:17 PM    INDICATION: 3 weeks of dizziness- off balance, \"feels like he is on a  cloud\". hx of BPPV, this feels different; Dizziness  TECHNIQUE: CT scan of the head without contrast. Dose reduction  techniques were used.  CONTRAST: None.  COMPARISON: None    FINDINGS:   No intracranial hemorrhage, extraaxial collection, mass effect or CT  evidence of acute infarct.  Normal parenchymal density for age. The  ventricles and sulci are normal for age. Osseous structures are  intact. Unremarkable orbits. Paranasal sinuses are free of significant  disease. Clear mastoid air cells.      Impression    IMPRESSION:  No intracranial hemorrhage, mass, or definite CT evidence of recent  ischemia.    MAMTA LOREDO MD         SYSTEM ID:  [id number]   Results for orders placed or performed in visit on 12/01/23   Basic metabolic panel     Status: Abnormal   Result Value Ref Range    Sodium 133 (L) 135 - 145 mmol/L    Potassium 4.2 3.4 - 5.3 mmol/L    Chloride 98 98 - 107 mmol/L    Carbon Dioxide (CO2) 25 22 - 29 mmol/L    Anion Gap 10 7 - 15 mmol/L    Urea Nitrogen 18.8 8.0 - 23.0 mg/dL    Creatinine 1.03 0.67 - 1.17 mg/dL    GFR Estimate 75 >60 mL/min/1.73m2    Calcium 8.8 8.8 - 10.2 mg/dL    Glucose 101 (H) 70 - 99 mg/dL   CBC with platelets     Status: Normal   Result Value Ref Range    WBC Count 5.6 4.0 - 11.0 10e3/uL    RBC Count 4.76 4.40 - 5.90 10e6/uL    Hemoglobin 14.8 13.3 - 17.7 g/dL    Hematocrit 44.1 40.0 - 53.0 %    MCV 93 78 - 100 fL    MCH 31.1 26.5 - 33.0 pg    MCHC 33.6 31.5 - 36.5 g/dL    RDW 13.2 10.0 - 15.0 %    Platelet Count 161 150 - 450 10e3/uL   Troponin T, High Sensitivity     Status: Normal   Result Value Ref Range    Troponin T, High Sensitivity 16 <=22 ng/L "       There are no Patient Instructions on file for this visit.

## 2023-12-04 ENCOUNTER — HOSPITAL ENCOUNTER (OUTPATIENT)
Dept: GENERAL RADIOLOGY | Facility: CLINIC | Age: 78
Discharge: HOME | End: 2023-12-04
Attending: PHYSICIAN ASSISTANT | Admitting: PHYSICIAN ASSISTANT
Payer: COMMERCIAL

## 2023-12-04 DIAGNOSIS — R13.10 DYSPHAGIA, UNSPECIFIED TYPE: ICD-10-CM

## 2023-12-04 DIAGNOSIS — K21.00 GASTROESOPHAGEAL REFLUX DISEASE WITH ESOPHAGITIS WITHOUT HEMORRHAGE: ICD-10-CM

## 2023-12-04 PROCEDURE — 250N000013 HC RX MED GY IP 250 OP 250 PS 637: Performed by: PHYSICIAN ASSISTANT

## 2023-12-04 PROCEDURE — 74221 X-RAY XM ESOPHAGUS 2CNTRST: CPT

## 2023-12-04 RX ADMIN — ANTACID/ANTIFLATULENT 4 G: 380; 550; 10; 10 GRANULE, EFFERVESCENT ORAL at 08:45

## 2023-12-05 ENCOUNTER — HOSPITAL ENCOUNTER (OUTPATIENT)
Dept: GENERAL RADIOLOGY | Facility: CLINIC | Age: 78
Discharge: HOME | End: 2023-12-05
Attending: PHYSICIAN ASSISTANT
Payer: COMMERCIAL

## 2023-12-05 ENCOUNTER — HOSPITAL ENCOUNTER (OUTPATIENT)
Dept: SPEECH THERAPY | Facility: CLINIC | Age: 78
Discharge: HOME | End: 2023-12-05
Attending: PHYSICIAN ASSISTANT
Payer: COMMERCIAL

## 2023-12-05 DIAGNOSIS — R13.10 DYSPHAGIA, UNSPECIFIED TYPE: ICD-10-CM

## 2023-12-05 DIAGNOSIS — K21.00 GASTROESOPHAGEAL REFLUX DISEASE WITH ESOPHAGITIS WITHOUT HEMORRHAGE: ICD-10-CM

## 2023-12-05 PROCEDURE — 74230 X-RAY XM SWLNG FUNCJ C+: CPT

## 2023-12-05 PROCEDURE — 92611 MOTION FLUOROSCOPY/SWALLOW: CPT | Mod: GN

## 2023-12-05 RX ORDER — BARIUM SULFATE 400 MG/ML
SUSPENSION ORAL ONCE
Status: DISCONTINUED | OUTPATIENT
Start: 2023-12-05 | End: 2023-12-05

## 2023-12-05 NOTE — PROGRESS NOTES
SPEECH LANGUAGE PATHOLOGY EVALUATION    See electronic medical record for Abuse and Falls Screening details.    Subjective Pt presents to OP VFSS with reports of globus sensation at the level of his throat. He acknowledges this occurs significantly with pills but liquids and solids as well. He reports having bulbar polio as a child and had somewhat progressive dysphagia over the years. He completed an esophagram yesterday which reported 1. Small amount of silent aspiration. A video swallowing examination may be helpful for further evaluation. 2. Small hiatal hernia. 3. Mild esophageal dysmotility, possibly related to presbyesophagus.       Presenting condition or subjective complaint:  Dysphagia  Date of onset: 12/5/23     Relevant medical history:   Gastroesophageal reflux disease without esophagitis , bulbar polio with residual dysphagia  Dates & types of surgery:  NA    Prior diagnostic imaging/testing results:     Esophagram on 12/5/23 - which reported 1. Small amount of silent aspiration. A video swallowing examination may be helpful for further evaluation. 2. Small hiatal hernia. 3. Mild esophageal dysmotility, possibly related to presbyesophagus.  VFSS in 2004, unable to locate a report.   Prior therapy history for the same diagnosis, illness or injury: A VFSS was completed in 2004, unable to locate SLP documentation. No apparent SLP services since that time.       Patient goals for therapy: Determine cause and solution for dysphagia      Pain assessment: Pain denied     Objective     SWALLOW EVALUTION  Dysphagia history: Progressive dysphagia with reports of globus sensation, aspiration events (likely daily), and the need to regurgitate and re swallow pills, food, etc.   Current Diet/Method of Nutritional Intake: regular diet        VIDEOFLUOROSCOPIC SWALLOW STUDY  Radiologist: Dr. Taylor  Views Taken: left lateral   Physical location of procedure: [facility]  Patient sitting upright  on chair/stool     VFSS textures trialed:   VFSS Eval: Thin Liquids  Mode of Presentation: cup, straw, self-fed   Order of Presentation: 15, 16, 18, 20, 21 (water w/ barium tablet)  Preparatory Phase: WFL  Oral Phase: WFL  Bolus Location When Swallow Initiated: valleculae  Pharyngeal Phase: impaired hyolaryngel excursion, impaired epiglottic movement, impaired pharyngoesophageal segment opening, residue in pyriform sinus  Rosenbeck's Penetration Aspiration Scale: 2 - contrast enters airway, remains above the vocal cords, no residue remains (penetration)  Response to Aspiration:  NA  Strategies and Compensations: hard swallow, liquid wash, double swallow  Diagnostic Statement: Transient penetration noted t/o exam. Suspect mild pharyngeal deficits d/t hx of bulbar polio, however the potential diverticulum appears the primary deficit impacting passing of material through the upper esophagus.     VFSS Eval: Purees  Mode of Presentation: spoon, self-fed   Order of Presentation: 17  Preparatory Phase: WFL  Oral Phase: WFL  Bolus Location When Swallow Initiated: valleculae  Pharyngeal Phase: impaired hyolaryngel excursion, impaired epiglottic movement, impaired pharyngoesophageal segment opening, residue in pyriform sinus  Rosenbeck s Penetration Aspiration Scale: 1 - no aspiration, contrast does not enter airway  Response to Aspiration:  NA  Strategies and Compensations: liquid wash, double swallow  Diagnostic Statement: No penetration or aspiration. Mild-mod pharyngeal residue. Strategies of consecutive swallows and liquid washes were successful in reducing the residue.     VFSS Eval: Solids  Mode of Presentation: self-fed   Order of Presentation: 19  Preparatory Phase: WFL  Oral Phase: WFL  Bolus Location When Swallow Initiated: valleculae  Pharyngeal Phase: impaired hyolaryngel excursion, impaired epiglottic movement, impaired pharyngoesophageal segment opening, residue in pyriform sinus   Rosenbeck s Penetration  Aspiration Scale: 1 - no aspiration, contrast does not enter airway  Response to Aspiration:  NA  Strategies and Compensations: liquid wash, double swallow  Diagnostic Statement: No penetration or aspiration. Mild-mod pharyngeal residue. Strategies of consecutive swallows and liquid washes were successful in reducing the residue.     ESOPHAGEAL PHASE OF SWALLOW  See esophagram report from 12/4/23. Potential diverticulum impacting pharyngoesophageal phase of swallow.       SWALLOW ASSESSMENT CLINICAL IMPRESSIONS AND RATIONALE  Diet Consistency Recommendations: thin liquids (level 0), regular diet    Recommended Feeding/Eating Techniques: alternate food and liquid intake, double swallow, frequent liquid wash    Medication Administration Recommendations: As per pt preference  Instrumental Assessment Recommendations: instrumental evaluation not recommended at this time,        Assessment & Plan   CLINICAL IMPRESSIONS   Medical Diagnosis:  Dysphagia    Treatment Diagnosis: Dysphagia    Impression/Assessment: Pt is a 78 year old male with dysphagia complaints. The following significant findings have been identified: impaired swallowing, characterized by globus sensation, coughing with intake, and some regurgitation of food, liquids, and pills. Pt participated in VFSS which was notable for pharyngeal residue, primarily in the pyriform sinuses and upper esophagus secondary to what appears to be a diverticulum. This impacts the passing and clearance of liquids and solids through the pharyngoesophageal space which correlates with his symptoms. Transient penetration occurs t/o the evaluation, no aspiration is visualized. Suspect pt does have some aspiration episodes given the results of today's exam. Pharyngeal residue also noted but pt is able to manage this by using consecutive swallows and liquid washes with the occasional need to regurgitate food and re swallow. Pt's swallow is notable for mild pharyngeal deficits  including reduced hyoid excursion and epiglottic inversion - suspect these are baseline for him given hx of bulbar polio. However, the suspected diverticulum is likely the culprit in most of symptoms at this time.      PLAN OF CARE  Treatment Interventions:  No further SLP intervention at this time.    Recommended Referrals to Other Professionals:  ENT for evaluation and potential treatment options of suspected diverticulum.   Education Assessment: Images were reviewed and education was provided to pt during examination. His questions were answered to satisfaction.       Risks and benefits of evaluation/treatment have been explained.   Patient/Family/caregiver agrees with Plan of Care.      Evaluation Time: 20          Signing Clinician: MARYANN Bolanos    [facility]                                                                                   OUTPATIENT SPEECH LANGUAGE PATHOLOGY      PLAN OF TREATMENT FOR OUTPATIENT REHABILITATION   Patient's Last Name, First Name, Toni Coronel YOB: 1945   Provider's Name   [facility]   Medical Record No.  [id number]     Onset Date:  12/5/23 Start of Care Date:  12/5/23     Medical Diagnosis:   Dysphagia      SLP Treatment Diagnosis:   Dysphagia Plan of Treatment   Evaluation only       See note for plan of treatment details and functional goals     Vijaya Romero, MARYANN                         I CERTIFY THE NEED FOR THESE SERVICES FURNISHED UNDER        THIS PLAN OF TREATMENT AND WHILE UNDER MY CARE .             Physician Signature               Date    X_____________________________________________________                  Referring Provider:  Jihan Andrade    Initial Assessment  See Epic Evaluation-

## 2023-12-07 ENCOUNTER — TRANSFERRED RECORDS (OUTPATIENT)
Dept: HEALTH INFORMATION MANAGEMENT | Facility: CLINIC | Age: 78
End: 2023-12-07
Payer: COMMERCIAL

## 2023-12-07 ENCOUNTER — MYC MEDICAL ADVICE (OUTPATIENT)
Dept: PEDIATRICS | Facility: CLINIC | Age: 78
End: 2023-12-07
Payer: COMMERCIAL

## 2023-12-07 DIAGNOSIS — R13.10 DYSPHAGIA, UNSPECIFIED TYPE: Primary | ICD-10-CM

## 2023-12-07 NOTE — TELEPHONE ENCOUNTER
Patient had esophagram and swallow study completed 12/4/23 and 12/5/23. Patient reports still having issues. Any follow up recommendations? Referral to GI?     Jean Paul RENO RN 12/7/2023 at 5:49 PM

## 2023-12-11 ENCOUNTER — TRANSCRIBE ORDERS (OUTPATIENT)
Dept: OTHER | Age: 78
End: 2023-12-11

## 2023-12-11 ENCOUNTER — TRANSFERRED RECORDS (OUTPATIENT)
Dept: HEALTH INFORMATION MANAGEMENT | Facility: CLINIC | Age: 78
End: 2023-12-11
Payer: COMMERCIAL

## 2023-12-11 DIAGNOSIS — Q39.6 ESOPHAGEAL DIVERTICULUM: Primary | ICD-10-CM

## 2023-12-26 DIAGNOSIS — G47.00 INSOMNIA: ICD-10-CM

## 2023-12-27 RX ORDER — CITALOPRAM HYDROBROMIDE 40 MG/1
40 TABLET ORAL DAILY
Qty: 90 TABLET | Refills: 2 | Status: SHIPPED | OUTPATIENT
Start: 2023-12-27 | End: 2023-12-27

## 2023-12-27 NOTE — TELEPHONE ENCOUNTER
Okay.  Then oops!  Can we call the [facility] in [place] and cancel the prescription I just sent in.  Tell them it's been discontinued and to stop sending us refill requests.    Yordan Hernandez MD  Internal Medicine and Pediatrics

## 2023-12-27 NOTE — TELEPHONE ENCOUNTER
Got a refill encounter for celexa, and I refilled it.  However can we see if he has tapered off of this?  He may no longer be on it.

## 2024-01-08 ENCOUNTER — TRANSFERRED RECORDS (OUTPATIENT)
Dept: HEALTH INFORMATION MANAGEMENT | Facility: CLINIC | Age: 79
End: 2024-01-08
Payer: COMMERCIAL

## 2024-01-15 ENCOUNTER — VIRTUAL VISIT (OUTPATIENT)
Dept: UROLOGY | Facility: CLINIC | Age: 79
End: 2024-01-15
Attending: INTERNAL MEDICINE
Payer: COMMERCIAL

## 2024-01-15 DIAGNOSIS — R97.20 ELEVATED PROSTATE SPECIFIC ANTIGEN (PSA): Primary | ICD-10-CM

## 2024-01-15 PROCEDURE — 99203 OFFICE O/P NEW LOW 30 MIN: CPT | Mod: 95 | Performed by: STUDENT IN AN ORGANIZED HEALTH CARE EDUCATION/TRAINING PROGRAM

## 2024-01-15 ASSESSMENT — PAIN SCALES - GENERAL: PAINLEVEL: NO PAIN (0)

## 2024-01-15 NOTE — PATIENT INSTRUCTIONS
PSA to be repeated  I will update results on MyChart  Discussed about further need for testing if PSA continues to be high

## 2024-01-15 NOTE — LETTER
1/15/2024       RE: Toni Collado  [address] [facility] [address]     Dear Colleague,    Thank you for referring your patient, Toni Collado, to the [facility]OLOG[facility] [place] at [facility]. Please see a copy of my visit note below.    Virtual Visit Details    Type of service:  Video Visit   Video Start Time: 10:36 AM  Video End Time:10:43 AM    Originating Location (pt. Location): Home    Distant Location (provider location):  On-site  Platform used for Video Visit: Enoch          Chief Complaint:    Elevated PSA (Prostate Specific Antigen)           Consult or Referral:     Mr. Toni Collado is a 78 year old male seen at the request of Dr. Hernandez.         History of Present Illness:    Toni Collado is a very pleasant 78 year old male who presents with a history of Elevated PSA (Prostate Specific Antigen).      Reviewed previous notes from Dr. Hernandez  History of elevated PSA  No significant lower urinary tract symptoms  No family history of prostate cancer             Past Medical History:     Past Medical History:   Diagnosis Date    Adenomatous colon polyp 07/2019    Asymptomatic varicose veins of both lower extremities     Bulbar polio 1952    residual dysphagia    Cervical osteoarthritis     Chronic anxiety     Chronic depression     Familial tremor     Gastroesophageal reflux disease without esophagitis 04/12/2021    Hallux limitus of left foot     Localized, primary osteoarthritis of hand     Oropharyngeal dysphagia     Pre-op evaluation 06/15/2022    Primary osteoarthritis of left hip 06/15/2022    RLS (restless legs syndrome)     Sleep apnea, obstructive     CPAP    Status post total replacement of left hip     Varicose veins of left lower extremity             Past Surgical History:     Past Surgical History:   Procedure Laterality Date    ARTHROSCOPY KNEE      CATARACT IOL, RT/LT Bilateral      and     COLONOSCOPY  "N/A 9/13/2023    Procedure: Colonoscopy with polypectomies using cold snare;  Surgeon: Yue Cline MD;  Location:  GI    ESOPHAGOSCOPY, GASTROSCOPY, DUODENOSCOPY (EGD), COMBINED N/A 9/13/2023    Procedure: Esophagoscopy, gastroscopy, duodenoscopy (EGD), combined with biopsy using cold forcep;  Surgeon: Yue Cline MD;  Location: [facility]    HERNIA REPAIR, UMBILICAL      KNEE SURGERY Left     \"bone on bone\" d/t arthritis    RADIOFREQUENCY ABLATION NERVES      cervical spine    RHINOPLASTY      SEPTOPLASTY      SHOULDER SURGERY Bilateral     right (pitching injury), left (bone spur)    SHOULDER SURGERY Right     reconstructive surgery with hardware    TOE SURGERY Left     left big toe d/t arthritis    TOTAL KNEE ARTHROPLASTY Left     unicompartmental            Medications     Current Outpatient Medications   Medication    atorvastatin (LIPITOR) 20 MG tablet    meclizine (ANTIVERT) 25 MG tablet    meloxicam (MOBIC) 15 MG tablet    Multiple Vitamins-Minerals (MULTIVITAMIN PO)    omeprazole (PRILOSEC) 20 MG DR capsule    pramipexole (MIRAPEX) 1 MG tablet    sildenafil (REVATIO) 20 MG tablet    traZODone (DESYREL) 100 MG tablet     Current Facility-Administered Medications   Medication    betamethasone acet & sod phos (CELESTONE) injection 6 mg    lidocaine 1 % injection 0.5 mL    sodium chloride (PF) 0.9% PF flush 3 mL            Family History:     Family History   Problem Relation Age of Onset    Heart Failure Mother         valvular heart disease    Heart Failure Father         ihd    Myelodysplastic syndrome Father     Cancer Brother         renal    Heart Failure Brother         ihd    Thyroid Disease Sister     Colon Cancer No family hx of             Social History:     Social History     Socioeconomic History    Marital status:      Spouse name: Not on file    Number of children: Not on file    Years of education: Not on file    Highest education level: Not on file   Occupational " History    Not on file   Tobacco Use    Smoking status: Former     Types: Cigarettes     Quit date: 1975     Years since quittin.1    Smokeless tobacco: Never   Vaping Use    Vaping Use: Never used   Substance and Sexual Activity    Alcohol use: Not Currently    Drug use: No    Sexual activity: Not on file   Other Topics Concern    Not on file   Social History Narrative    WIFE-JESSIE RETIRED SON SHERMAN STEPDAUGHTER RETIRED , [employer] RET[employer] -[employer], takes trailer down to [facility].       Social Determinants of Health     Financial Resource Strain: Low Risk  (2023)    Financial Resource Strain     Within the past 12 months, have you or your family members you live with been unable to get utilities (heat, electricity) when it was really needed?: No   Food Insecurity: Low Risk  (2023)    Food Insecurity     Within the past 12 months, did you worry that your food would run out before you got money to buy more?: No     Within the past 12 months, did the food you bought just not last and you didn t have money to get more?: No   Transportation Needs: Low Risk  (2023)    Transportation Needs     Within the past 12 months, has lack of transportation kept you from medical appointments, getting your medicines, non-medical meetings or appointments, work, or from getting things that you need?: No   Physical Activity: Sufficiently Active (10/31/2023)    Exercise Vital Sign     Days of Exercise per Week: 3 days     Minutes of Exercise per Session: 50 min   Stress: No Stress Concern Present (10/31/2023)    [employer] [place] [employer] - Occupational Stress Questionnaire     Feeling of Stress : Not at all   Social Connections: Moderately Isolated (10/31/2023)    Social Connection and Isolation Panel [NHANES]     Frequency of Communication with Friends and Family: More than three times a week     Frequency of Social Gatherings with Friends  and Family: Once a week     Attends Sabianist Services: Never     Active Member of Clubs or Organizations: No     Attends Club or Organization Meetings: Not on file     Marital Status:    Interpersonal Safety: Low Risk  (11/7/2023)    Interpersonal Safety     Do you feel physically and emotionally safe where you currently live?: Yes     Within the past 12 months, have you been hit, slapped, kicked or otherwise physically hurt by someone?: No     Within the past 12 months, have you been humiliated or emotionally abused in other ways by your partner or ex-partner?: No   Housing Stability: Low Risk  (11/30/2023)    Housing Stability     Do you have housing? : Yes     Are you worried about losing your housing?: No            Allergies:   Amoxicillin and Seasonal allergies         Review of Systems:  From intake questionnaire     Skin: negative  Eyes: negative  Ears/Nose/Throat: negative  Respiratory: No shortness of breath, dyspnea on exertion, cough, or hemoptysis  Cardiovascular: No chest pain or palpitations  Gastrointestinal: negative; no nausea/vomiting, constipation or diarrhea  Genitourinary: as per HPI  Musculoskeletal: negative  Neurologic: negative  Psychiatric: negative  Hematologic/Lymphatic/Immunologic: negative  Endocrine: negative         Physical Exam:   This is a virtual visit    Alert, no acute distress, oriented, conversant    Ears/nose/mouth: mouth:normal, good dentition  Respiratory: no respiratory distress, or pursed lip breathing  Cardiovascular:no obvious jugular venous distension present  Skin: no suspicious lesions or rashes on Visible body parts on the Screen  Neuro: Alert, oriented, speech and mentation normal  Psych: affect and mood normal, alert and oriented to person, place and time      Labs and Pathology:    The following labs were reviewed by me and discussed with the patient:    Significant for   Lab Results   Component Value Date    CR 1.03 12/01/2023    CR 1.09 07/19/2023     CR 1.01 09/19/2022    CR 0.96 06/15/2022    CR 1.10 04/12/2021    CR 0.93 07/18/2019    CR 1.00 05/16/2018     Prostate Specific Antigen Screen   Date Value Ref Range Status   11/07/2023 7.90 (H) 0.00 - 6.50 ng/mL Final   07/18/2019 6.3 0.0 - 6.5 ng/mL Final   08/30/2017 4.2 0.0 - 6.5 ng/mL Final             Imaging:    The following imaging exams were independently viewed and interpreted by me and discussed with patient:               Assessment and Plan:     Assessment:     Elevated prostate specific antigen (PSA)  Discussed in detail about significance of elevated PSA and the risk of prostate cancer  Recommended repeating the PSA in the next available date  If PSA continues to be high we should do further testing like MRI of the prostate and discussed the possible need for a prostate biopsy based on the MRI results  He expressed understanding and was agreeable  Also discussed with him that unless the MRI shows something really significant I would be very keen on considering a biopsy in view of the fact that he is 78 years old and likely does not pose a significant risk in terms of his overall life expectancy if the yield is going to be very low.    - Adult Urology  Referral  - PSA tumor marker [[id number]]    Plan:  MRI prostate if PSA continues to be high    Orders  Orders Placed This Encounter   Procedures    PSA tumor marker [[id number]]         Noel Brannon MD  [facility]OLOGY [facility] [place]                  ==========================    Additional Billing and Coding Information:  Review of external notes as documented above   Review of the result(s) of each unique test - PSA                12 minutes spent by me on the date of the encounter doing chart review, review of test results, interpretation of tests, patient visit, and documentation     ==========================

## 2024-01-15 NOTE — PROGRESS NOTES
Virtual Visit Details    Type of service:  Video Visit   Video Start Time: 10:36 AM  Video End Time:10:43 AM    Originating Location (pt. Location): Home    Distant Location (provider location):  On-site  Platform used for Video Visit: Enoch          Chief Complaint:    Elevated PSA (Prostate Specific Antigen)           Consult or Referral:     Mr. Toni Collado is a 78 year old male seen at the request of Dr. Hernandez.         History of Present Illness:    Toni Collado is a very pleasant 78 year old male who presents with a history of Elevated PSA (Prostate Specific Antigen).      Reviewed previous notes from Dr. Hernandez  History of elevated PSA  No significant lower urinary tract symptoms  No family history of prostate cancer             Past Medical History:     Past Medical History:   Diagnosis Date    Adenomatous colon polyp 07/2019    Asymptomatic varicose veins of both lower extremities     Bulbar polio 1952    residual dysphagia    Cervical osteoarthritis     Chronic anxiety     Chronic depression     Familial tremor     Gastroesophageal reflux disease without esophagitis 04/12/2021    Hallux limitus of left foot     Localized, primary osteoarthritis of hand     Oropharyngeal dysphagia     Pre-op evaluation 06/15/2022    Primary osteoarthritis of left hip 06/15/2022    RLS (restless legs syndrome)     Sleep apnea, obstructive     CPAP    Status post total replacement of left hip     Varicose veins of left lower extremity             Past Surgical History:     Past Surgical History:   Procedure Laterality Date    ARTHROSCOPY KNEE      CATARACT IOL, RT/LT Bilateral      and     COLONOSCOPY N/A 9/13/2023    Procedure: Colonoscopy with polypectomies using cold snare;  Surgeon: Yue Cline MD;  Location:  GI    ESOPHAGOSCOPY, GASTROSCOPY, DUODENOSCOPY (EGD), COMBINED N/A 9/13/2023    Procedure: Esophagoscopy, gastroscopy, duodenoscopy (EGD), combined with biopsy using cold forcep;   "Surgeon: Yeu Cline MD;  Location: [facility]    HERNIA REPAIR, UMBILICAL      KNEE SURGERY Left     \"bone on bone\" d/t arthritis    RADIOFREQUENCY ABLATION NERVES      cervical spine    RHINOPLASTY      SEPTOPLASTY      SHOULDER SURGERY Bilateral     right (pitching injury), left (bone spur)    SHOULDER SURGERY Right     reconstructive surgery with hardware    TOE SURGERY Left     left big toe d/t arthritis    TOTAL KNEE ARTHROPLASTY Left     unicompartmental            Medications     Current Outpatient Medications   Medication    atorvastatin (LIPITOR) 20 MG tablet    meclizine (ANTIVERT) 25 MG tablet    meloxicam (MOBIC) 15 MG tablet    Multiple Vitamins-Minerals (MULTIVITAMIN PO)    omeprazole (PRILOSEC) 20 MG DR capsule    pramipexole (MIRAPEX) 1 MG tablet    sildenafil (REVATIO) 20 MG tablet    traZODone (DESYREL) 100 MG tablet     Current Facility-Administered Medications   Medication    betamethasone acet & sod phos (CELESTONE) injection 6 mg    lidocaine 1 % injection 0.5 mL    sodium chloride (PF) 0.9% PF flush 3 mL            Family History:     Family History   Problem Relation Age of Onset    Heart Failure Mother         valvular heart disease    Heart Failure Father         ihd    Myelodysplastic syndrome Father     Cancer Brother         renal    Heart Failure Brother         ihd    Thyroid Disease Sister     Colon Cancer No family hx of             Social History:     Social History     Socioeconomic History    Marital status:      Spouse name: Not on file    Number of children: Not on file    Years of education: Not on file    Highest education level: Not on file   Occupational History    Not on file   Tobacco Use    Smoking status: Former     Types: Cigarettes     Quit date: 1975     Years since quittin.1    Smokeless tobacco: Never   Vaping Use    Vaping Use: Never used   Substance and Sexual Activity    Alcohol use: Not Currently    Drug use: No    Sexual activity: Not on " file   Other Topics Concern    Not on file   Social History Narrative    WIFE-JESSIE RETIRED SON SHERMAN STEPDAUGHTER RETIRED , [employer], takes trailer down to [facility].       Social Determinants of Health     Financial Resource Strain: Low Risk  (11/30/2023)    Financial Resource Strain     Within the past 12 months, have you or your family members you live with been unable to get utilities (heat, electricity) when it was really needed?: No   Food Insecurity: Low Risk  (11/30/2023)    Food Insecurity     Within the past 12 months, did you worry that your food would run out before you got money to buy more?: No     Within the past 12 months, did the food you bought just not last and you didn t have money to get more?: No   Transportation Needs: Low Risk  (11/30/2023)    Transportation Needs     Within the past 12 months, has lack of transportation kept you from medical appointments, getting your medicines, non-medical meetings or appointments, work, or from getting things that you need?: No   Physical Activity: Sufficiently Active (10/31/2023)    Exercise Vital Sign     Days of Exercise per Week: 3 days     Minutes of Exercise per Session: 50 min   Stress: No Stress Concern Present (10/31/2023)    [employer] [place] [employer] - Occupational Stress Questionnaire     Feeling of Stress : Not at all   Social Connections: Moderately Isolated (10/31/2023)    Social Connection and Isolation Panel [NHANES]     Frequency of Communication with Friends and Family: More than three times a week     Frequency of Social Gatherings with Friends and Family: Once a week     Attends Taoist Services: Never     Active Member of Clubs or Organizations: No     Attends Club or Organization Meetings: Not on file     Marital Status:    Interpersonal Safety: Low Risk  (11/7/2023)    Interpersonal Safety     Do you feel physically and emotionally safe where  you currently live?: Yes     Within the past 12 months, have you been hit, slapped, kicked or otherwise physically hurt by someone?: No     Within the past 12 months, have you been humiliated or emotionally abused in other ways by your partner or ex-partner?: No   Housing Stability: Low Risk  (11/30/2023)    Housing Stability     Do you have housing? : Yes     Are you worried about losing your housing?: No            Allergies:   Amoxicillin and Seasonal allergies         Review of Systems:  From intake questionnaire     Skin: negative  Eyes: negative  Ears/Nose/Throat: negative  Respiratory: No shortness of breath, dyspnea on exertion, cough, or hemoptysis  Cardiovascular: No chest pain or palpitations  Gastrointestinal: negative; no nausea/vomiting, constipation or diarrhea  Genitourinary: as per HPI  Musculoskeletal: negative  Neurologic: negative  Psychiatric: negative  Hematologic/Lymphatic/Immunologic: negative  Endocrine: negative         Physical Exam:   This is a virtual visit    Alert, no acute distress, oriented, conversant    Ears/nose/mouth: mouth:normal, good dentition  Respiratory: no respiratory distress, or pursed lip breathing  Cardiovascular:no obvious jugular venous distension present  Skin: no suspicious lesions or rashes on Visible body parts on the Screen  Neuro: Alert, oriented, speech and mentation normal  Psych: affect and mood normal, alert and oriented to person, place and time      Labs and Pathology:    The following labs were reviewed by me and discussed with the patient:    Significant for   Lab Results   Component Value Date    CR 1.03 12/01/2023    CR 1.09 07/19/2023    CR 1.01 09/19/2022    CR 0.96 06/15/2022    CR 1.10 04/12/2021    CR 0.93 07/18/2019    CR 1.00 05/16/2018     Prostate Specific Antigen Screen   Date Value Ref Range Status   11/07/2023 7.90 (H) 0.00 - 6.50 ng/mL Final   07/18/2019 6.3 0.0 - 6.5 ng/mL Final   08/30/2017 4.2 0.0 - 6.5 ng/mL Final              Imaging:    The following imaging exams were independently viewed and interpreted by me and discussed with patient:               Assessment and Plan:     Assessment:     Elevated prostate specific antigen (PSA)  Discussed in detail about significance of elevated PSA and the risk of prostate cancer  Recommended repeating the PSA in the next available date  If PSA continues to be high we should do further testing like MRI of the prostate and discussed the possible need for a prostate biopsy based on the MRI results  He expressed understanding and was agreeable  Also discussed with him that unless the MRI shows something really significant I would be very keen on considering a biopsy in view of the fact that he is 78 years old and likely does not pose a significant risk in terms of his overall life expectancy if the yield is going to be very low.    - Adult Urology  Referral  - PSA tumor marker [[id number]]    Plan:  MRI prostate if PSA continues to be high    Orders  Orders Placed This Encounter   Procedures    PSA tumor marker [[id number]]         Nole Brannon MD  [facility] UROLOGY CLINIC [place]                  ==========================    Additional Billing and Coding Information:  Review of external notes as documented above   Review of the result(s) of each unique test - PSA                12 minutes spent by me on the date of the encounter doing chart review, review of test results, interpretation of tests, patient visit, and documentation     ==========================

## 2024-01-15 NOTE — NURSING NOTE
Is the patient currently in the state of [place]? YES    Visit mode:VIDEO    If the visit is dropped, the patient can be reconnected by: VIDEO VISIT: Text to cell phone:   Telephone Information:   Mobile [phone]       Will anyone else be joining the visit? NO  (If patient encounters technical issues they should call [phone]    How would you like to obtain your AVS? MyChart    Are changes needed to the allergy or medication list? No    Reason for visit: Consult    Jeannie BRIZUELA

## 2024-01-19 ENCOUNTER — APPOINTMENT (OUTPATIENT)
Dept: LAB | Facility: CLINIC | Age: 79
End: 2024-01-19
Payer: COMMERCIAL

## 2024-01-19 PROCEDURE — 84153 ASSAY OF PSA TOTAL: CPT | Mod: 95 | Performed by: STUDENT IN AN ORGANIZED HEALTH CARE EDUCATION/TRAINING PROGRAM

## 2024-01-19 PROCEDURE — 36415 COLL VENOUS BLD VENIPUNCTURE: CPT | Mod: 95 | Performed by: STUDENT IN AN ORGANIZED HEALTH CARE EDUCATION/TRAINING PROGRAM

## 2024-01-20 LAB — PSA SERPL DL<=0.01 NG/ML-MCNC: 7.72 NG/ML (ref 0–6.5)

## 2024-01-31 PROBLEM — F32.A CHRONIC DEPRESSION: Status: RESOLVED | Noted: 2022-09-19 | Resolved: 2024-01-31

## 2024-01-31 PROBLEM — M16.12 PRIMARY OSTEOARTHRITIS OF LEFT HIP: Status: RESOLVED | Noted: 2022-06-15 | Resolved: 2024-01-31

## 2024-01-31 RX ORDER — METOPROLOL TARTRATE 50 MG
50 TABLET ORAL
COMMUNITY
End: 2024-02-02

## 2024-01-31 RX ORDER — BENZONATATE 200 MG/1
CAPSULE ORAL
COMMUNITY
Start: 2023-03-28 | End: 2024-02-02

## 2024-01-31 RX ORDER — DOXYCYCLINE HYCLATE 100 MG
1 TABLET ORAL
COMMUNITY
Start: 2023-03-28 | End: 2024-02-02

## 2024-01-31 RX ORDER — CHLOPHEDIANOL HCL AND PYRILAMINE MALEATE 12.5; 12.5 MG/5ML; MG/5ML
SOLUTION ORAL
COMMUNITY
Start: 2023-03-28 | End: 2024-02-02

## 2024-01-31 RX ORDER — A/SINGAPORE/GP1908/2015 IVR-180 (AN A/MICHIGAN/45/2015 (H1N1)PDM09-LIKE VIRUS, A/HONG KONG/4801/2014, NYMC X-263B (H3N2) (AN A/HONG KONG/4801/2014-LIKE VIRUS), AND B/BRISBANE/60/2008, WILD TYPE (A B/BRISBANE/60/2008-LIKE VIRUS) 15; 15; 15 UG/.5ML; UG/.5ML; UG/.5ML
INJECTION, SUSPENSION INTRAMUSCULAR
COMMUNITY
Start: 2023-10-18 | End: 2024-02-02

## 2024-01-31 RX ORDER — ASPIRIN 81 MG/1
81 TABLET ORAL
COMMUNITY
End: 2024-02-02

## 2024-01-31 RX ORDER — OMEPRAZOLE 40 MG/1
CAPSULE, DELAYED RELEASE ORAL
COMMUNITY
Start: 2024-01-17 | End: 2024-02-02

## 2024-01-31 RX ORDER — RESPIRATORY SYNCYTIAL VIRUS VACCINE 120MCG/0.5
KIT INTRAMUSCULAR
COMMUNITY
Start: 2023-10-18 | End: 2024-02-02

## 2024-01-31 RX ORDER — COVID-19 VACCINE, MRNA 0.04 MG/.418ML
INJECTION, SUSPENSION INTRAMUSCULAR
COMMUNITY
Start: 2023-10-18 | End: 2024-02-02

## 2024-02-02 ENCOUNTER — NURSE TRIAGE (OUTPATIENT)
Dept: NURSING | Facility: CLINIC | Age: 79
End: 2024-02-02

## 2024-02-02 ENCOUNTER — OFFICE VISIT (OUTPATIENT)
Dept: FAMILY MEDICINE | Facility: CLINIC | Age: 79
End: 2024-02-02
Payer: COMMERCIAL

## 2024-02-02 VITALS
WEIGHT: 239 LBS | OXYGEN SATURATION: 98 % | SYSTOLIC BLOOD PRESSURE: 148 MMHG | DIASTOLIC BLOOD PRESSURE: 82 MMHG | TEMPERATURE: 97.8 F | RESPIRATION RATE: 12 BRPM | HEART RATE: 62 BPM | HEIGHT: 67 IN | BODY MASS INDEX: 37.51 KG/M2

## 2024-02-02 DIAGNOSIS — R07.9 EXERTIONAL CHEST PAIN: ICD-10-CM

## 2024-02-02 DIAGNOSIS — E66.01 CLASS 2 SEVERE OBESITY DUE TO EXCESS CALORIES WITH SERIOUS COMORBIDITY AND BODY MASS INDEX (BMI) OF 38.0 TO 38.9 IN ADULT (H): ICD-10-CM

## 2024-02-02 DIAGNOSIS — G47.33 OSA ON CPAP: ICD-10-CM

## 2024-02-02 DIAGNOSIS — E66.812 CLASS 2 SEVERE OBESITY DUE TO EXCESS CALORIES WITH SERIOUS COMORBIDITY AND BODY MASS INDEX (BMI) OF 38.0 TO 38.9 IN ADULT (H): ICD-10-CM

## 2024-02-02 DIAGNOSIS — I10 ESSENTIAL HYPERTENSION: ICD-10-CM

## 2024-02-02 DIAGNOSIS — K22.5 ZENKER DIVERTICULUM: ICD-10-CM

## 2024-02-02 DIAGNOSIS — Z01.818 PREOPERATIVE EXAMINATION: Primary | ICD-10-CM

## 2024-02-02 PROCEDURE — 93010 ELECTROCARDIOGRAM REPORT: CPT | Performed by: INTERNAL MEDICINE

## 2024-02-02 PROCEDURE — 99214 OFFICE O/P EST MOD 30 MIN: CPT | Mod: 25 | Performed by: FAMILY MEDICINE

## 2024-02-02 PROCEDURE — 93005 ELECTROCARDIOGRAM TRACING: CPT | Performed by: FAMILY MEDICINE

## 2024-02-02 RX ORDER — AMLODIPINE BESYLATE 5 MG/1
5 TABLET ORAL DAILY
Qty: 90 TABLET | Refills: 3 | Status: SHIPPED | OUTPATIENT
Start: 2024-02-02 | End: 2024-02-07

## 2024-02-02 RX ORDER — BRIMONIDINE TARTRATE 2 MG/ML
1 SOLUTION/ DROPS OPHTHALMIC 2 TIMES DAILY
COMMUNITY
Start: 2024-01-25 | End: 2024-08-27

## 2024-02-02 NOTE — PROGRESS NOTES
Preoperative Evaluation  [facility]  [address] [place] [address]  Phone: [phone]  Fax: [phone]  Primary Provider: Yordan Hernandez  Pre-op Performing Provider: TATIANA QUEVEDO  Feb 2, 2024       Johnny is a 78 year old, presenting for the following:  Pre-Op Exam        2/2/2024     9:45 AM   Additional Questions   Roomed by LA     Surgical Information  Surgery/Procedure: ENDOSCOPIC ZENKERS DIVERTICULECTOMY   Surgery Location: [facility]  Surgeon: Dr. Indio Gonzales  Surgery Date: 2/8/24  Time of Surgery: 0715  Where patient plans to recover: At home with family  Fax number for surgical facility: Note does not need to be faxed, will be available electronically in Epic.    Assessment & Plan     The proposed surgical procedure is considered INTERMEDIATE risk.    Preoperative examination  Patient may proceed with Zenker's diverticulum repair.  Patient is low risk for cardiovascular complications.    Zenker diverticulum  Agree with repair.    Essential hypertension  He has previously been on metoprolol, but discontinued years ago.  His pulse is 62, so we will start him on amlodipine 5 mg daily.  - amLODIPine (NORVASC) 5 MG tablet; Take 1 tablet (5 mg) by mouth daily    BRITTNI on CPAP  Controlled.  Continue CPAP    Class 2 severe obesity due to excess calories with serious comorbidity and body mass index (BMI) of 38.0 to 38.9 in adult (H)  Encouraged healthy lifestyle.    Exertional chest pain  EKG with no concerns in clinic.  Lexiscan on 2/5/24: The nuclear stress test is negative for inducible myocardial ischemia or infarction   - NM Lexiscan stress test; Future  - EKG 12-lead, tracing only           Risks and Recommendations  The patient has the following additional risks and recommendations for perioperative complications:   - No identified additional risk factors other than previously addressed    Antiplatelet or Anticoagulation Medication Instructions   - Patient is on no  antiplatelet or anticoagulation medications.    Additional Medication Instructions  Patient is to take all scheduled medications on the day of surgery    Recommendation  APPROVAL GIVEN to proceed with proposed procedure, without further diagnostic evaluation.          Subjective       HPI related to upcoming procedure: Food getting trapped in zenker diverticulum      Chest pain on exertion: Patient is having exertional chest pain with central chest pressure when he walks quickly or ambulates up a flight of stairs.  He states that pressure resolves when he rests and then he is able to start walking again.  Sometimes the pressure does not come back after restarting walking.  He has hypertension, sleep apnea, he is a 78-year-old male with a brother who has had 2 stents at around the age of 50.        1/27/2024    11:04 AM   Preop Questions   1. Have you ever had a heart attack or stroke? No   2. Have you ever had surgery on your heart or blood vessels, such as a stent placement, a coronary artery bypass, or surgery on an artery in your head, neck, heart, or legs? No   3. Do you have chest pain with activity? No   4. Do you have a history of  heart failure? No   5. Do you currently have a cold, bronchitis or symptoms of other infection? No   6. Do you have a cough, shortness of breath, or wheezing? No   7. Do you or anyone in your family have previous history of blood clots? No   8. Do you or does anyone in your family have a serious bleeding problem such as prolonged bleeding following surgeries or cuts? No   9. Have you ever had problems with anemia or been told to take iron pills? No   10. Have you had any abnormal blood loss such as black, tarry or bloody stools? No   11. Have you ever had a blood transfusion? No   12. Are you willing to have a blood transfusion if it is medically needed before, during, or after your surgery? Yes   13. Have you or any of your relatives ever had problems with anesthesia? No   14. Do  you have sleep apnea, excessive snoring or daytime drowsiness? YES - BRITTNI on CPAP   14a. Do you have a CPAP machine? Yes   15. Do you have any artifical heart valves or other implanted medical devices like a pacemaker, defibrillator, or continuous glucose monitor? No   16. Do you have artificial joints? YES - Total left hip, partial left knee, left great toe   17. Are you allergic to latex? No       Health Care Directive  Patient does not have a Health Care Directive or Living Will: Patient states has Advance Directive and will bring in a copy to clinic.    Preoperative Review of    reviewed - no record of controlled substances prescribed.          Patient Active Problem List    Diagnosis Date Noted    Zenker diverticulum 02/02/2024     Priority: Medium    Class 2 severe obesity due to excess calories with serious comorbidity in adult (H) 12/01/2023     Priority: Medium    Primary osteoarthritis of both hands 11/07/2023     Priority: Medium    BMI 38.0-38.9,adult 11/07/2023     Priority: Medium    Benign paroxysmal positional vertigo due to bilateral vestibular disorder 06/07/2023     Priority: Medium    Oropharyngeal dysphagia 09/19/2022     Priority: Medium     From bulbar polio side effects.       Varicose veins of left lower extremity 09/19/2022     Priority: Medium    Gastroesophageal reflux disease without esophagitis 04/12/2021     Priority: Medium    History of colonic polyps 04/12/2021     Priority: Medium    Tinnitus of both ears 04/12/2021     Priority: Medium    Elevated prostate specific antigen (PSA) 12/16/2020     Priority: Medium    BRITTNI on CPAP 07/18/2019     Priority: Medium     CPAP      Adenomatous colon polyp 07/2019     Priority: Medium    RLS (restless legs syndrome) 05/01/2018     Priority: Medium    Bulbar polio 1952     Priority: Medium     residual dysphagia        Past Medical History:   Diagnosis Date    Adenomatous colon polyp 07/2019    Asymptomatic varicose veins of both lower  "extremities     Bulbar polio 1952    residual dysphagia    Cervical osteoarthritis     Chronic anxiety     Chronic depression     Chronic depression 09/19/2022    Familial tremor     Gastroesophageal reflux disease without esophagitis 04/12/2021    Hallux limitus of left foot     Localized, primary osteoarthritis of hand     Oropharyngeal dysphagia     Pre-op evaluation 06/15/2022    Primary osteoarthritis of left hip 06/15/2022    Primary osteoarthritis of left hip 06/15/2022    RLS (restless legs syndrome)     Sleep apnea, obstructive     CPAP    Status post total replacement of left hip     Varicose veins of left lower extremity      Past Surgical History:   Procedure Laterality Date    ABDOMEN SURGERY      ARTHROSCOPY KNEE      CATARACT IOL, RT/LT Bilateral      and     COLONOSCOPY N/A 09/13/2023    Procedure: Colonoscopy with polypectomies using cold snare;  Surgeon: Yue Cline MD;  Location:  GI    ESOPHAGOSCOPY, GASTROSCOPY, DUODENOSCOPY (EGD), COMBINED N/A 09/13/2023    Procedure: Esophagoscopy, gastroscopy, duodenoscopy (EGD), combined with biopsy using cold forcep;  Surgeon: Yue Cline MD;  Location:  GI    HERNIA REPAIR, UMBILICAL      KNEE SURGERY Left     \"bone on bone\" d/t arthritis    RADIOFREQUENCY ABLATION NERVES      cervical spine    RHINOPLASTY      SEPTOPLASTY      SHOULDER SURGERY Bilateral     right (pitching injury), left (bone spur)    SHOULDER SURGERY Right     reconstructive surgery with hardware    TOE SURGERY Left     left big toe d/t arthritis    TOTAL KNEE ARTHROPLASTY Left     unicompartmental     Current Outpatient Medications   Medication Sig Dispense Refill    amLODIPine (NORVASC) 5 MG tablet Take 1 tablet (5 mg) by mouth daily 90 tablet 3    atorvastatin (LIPITOR) 20 MG tablet Take 1 tablet (20 mg) by mouth daily 90 tablet 3    brimonidine (ALPHAGAN) 0.2 % ophthalmic solution Place 1 drop Into the left eye 2 times daily      meloxicam " (MOBIC) 15 MG tablet Take 1 tablet (15 mg) by mouth daily as needed for pain 90 tablet 2    Multiple Vitamins-Minerals (MULTIVITAMIN PO) Take 1 tablet by mouth daily      omeprazole (PRILOSEC) 20 MG DR capsule Take 2 capsules (40 mg) by mouth daily 90 capsule 3    pramipexole (MIRAPEX) 1 MG tablet Take 1 tablet (1 mg) by mouth 2 times daily For restless leg syndrome. 180 tablet 3    sildenafil (REVATIO) 20 MG tablet Take 2 tablets (40 mg) by mouth daily as needed (ED) 30 tablet 5    traZODone (DESYREL) 100 MG tablet Take 1 tablet (100 mg) by mouth at bedtime 90 tablet 3       Allergies   Allergen Reactions    Amoxicillin Other (See Comments) and Unknown     Facial and neck flushing, felt hot    Seasonal Allergies         Social History     Tobacco Use    Smoking status: Former     Types: Cigarettes     Quit date: 1975     Years since quittin.2     Passive exposure: Past    Smokeless tobacco: Never   Substance Use Topics    Alcohol use: Not Currently       History   Drug Use No         Review of Systems    Review of Systems  CONSTITUTIONAL: NEGATIVE for fever, chills, change in weight  INTEGUMENTARY/SKIN: NEGATIVE for worrisome rashes, moles or lesions  EYES: NEGATIVE for vision changes or irritation  ENT/MOUTH: NEGATIVE for ear, mouth and throat problems  RESP:NEGATIVE for significant cough or SOB and cough-non productive  BREAST: NEGATIVE for masses, tenderness or discharge  CV: POSITIVE for chest pain/chest pressure on exertion.  GI: NEGATIVE for nausea, abdominal pain, heartburn, or change in bowel habits  : NEGATIVE for frequency, dysuria, or hematuria  NEURO: NEGATIVE for weakness, dizziness or paresthesias  ENDOCRINE: NEGATIVE for temperature intolerance, skin/hair changes  HEME: NEGATIVE for bleeding problems  PSYCHIATRIC: NEGATIVE for changes in mood or affect  Objective    BP (!) 148/82 (BP Location: Right arm, Patient Position: Sitting, Cuff Size: Adult Large)   Pulse 62   Temp 97.8  F (36.6  " C) (Tympanic)   Resp 12   Ht 1.689 m (5' 6.5\")   Wt 108.4 kg (239 lb)   SpO2 98%   BMI 38.00 kg/m     Estimated body mass index is 38 kg/m  as calculated from the following:    Height as of this encounter: 1.689 m (5' 6.5\").    Weight as of this encounter: 108.4 kg (239 lb).  Physical Exam  GENERAL: alert and no distress  EYES: Eyes grossly normal to inspection, PERRL and conjunctivae and sclerae normal  HENT: ear canals and TM's normal, nose and mouth without ulcers or lesions  NECK: no adenopathy, no asymmetry, masses, or scars  RESP: lungs clear to auscultation - no rales, rhonchi or wheezes  CV: regular rates and rhythm, normal S1 S2, no S3 or S4, no murmur, click or rub, peripheral pulses strong, and trace+ bilateral lower extremity pitting edema to knees    ABDOMEN: soft, nontender, no hepatosplenomegaly, no masses and bowel sounds normal  MS: no gross musculoskeletal defects noted, no edema  SKIN: no suspicious lesions or rashes  NEURO: Normal strength and tone, mentation intact and speech normal  PSYCH: mentation appears normal, affect normal/bright  LYMPH: no cervical, supraclavicular, axillary, or inguinal adenopathy    Recent Labs   Lab Test 12/01/23  1244 07/19/23  1026 09/19/22  1052 06/15/22  1515 06/15/22  1515   HGB 14.8  --   --   --  15.0     --   --   --  188   * 140 138  --  139   POTASSIUM 4.2 4.3 4.4   < > 3.8   CR 1.03 1.09 1.01  --  0.96   A1C  --  5.4 5.2  --   --     < > = values in this interval not displayed.        Diagnostics  No labs were ordered during this visit.   EKG: appears normal, NSR, normal axis, normal intervals, no acute ST/T changes c/w ischemia, no LVH by voltage criteria, unchanged from previous tracings    Revised Cardiac Risk Index (RCRI)  The patient has the following serious cardiovascular risks for perioperative complications:   - No serious cardiac risks = 0 points     RCRI Interpretation: 0 points: Class I (very low risk - 0.4% complication " rate)         Signed Electronically by: Payam Yeh MD  Copy of this evaluation report is provided to requesting physician.

## 2024-02-03 NOTE — TELEPHONE ENCOUNTER
Patient calling. He was Dr. Yeh today and was prescribed amlodipine 5 mg daily for hypertension. He took the first dose and started not feeling well. He checked his blood pressure 150/90, HR was 100. His normal HR is 60's. Normal blood pressure has been 130's -140's over 70's. He was at rest when he started to feel this, and had palpations. He states that he still feels his heart pounding.    MD consult, Dr. SHANNAN Yeh Paged by RN at 7:00 PM  Reason for page: side effects of amlodipine    Provider, Dr. Yeh, returning page to [facility] at 7:01 PM`  Provider Recommendations/Plan:  Try 1/2 tab tomorrow. If it's still a problem, call back.     Instructions relayed to patient, who stated understanding.     Miladys Varela RN  [place] [facility] [facility]  February 2, 2024, 7:12 PM    Reason for Disposition   [1] Taking BP medications AND [2] feels is having side effects (e.g., impotence, cough, dizzy upon standing)    Additional Information   Negative: Difficult to awaken or acting confused (e.g., disoriented, slurred speech)   Negative: SEVERE difficulty breathing (e.g., struggling for each breath, speaks in single words)   Negative: [1] Weakness of the face, arm or leg on one side of the body AND [2] new-onset   Negative: [1] Numbness (i.e., loss of sensation) of the face, arm or leg on one side of the body AND [2] new-onset   Negative: [1] Chest pain lasts > 5 minutes AND [2] history of heart disease (i.e., heart attack, bypass surgery, angina, angioplasty, CHF)   Negative: [1] Chest pain AND [2] took nitrogylcerin AND [3] pain was not relieved   Negative: Sounds like a life-threatening emergency to the triager   Negative: Symptom is main concern (e.g., headache, chest pain)   Negative: Low blood pressure is main concern   Negative: [1] Systolic BP  >= 160 OR Diastolic >= 100 AND [2] cardiac (e.g., breathing difficulty, chest pain) or neurologic symptoms (e.g., new-onset blurred or double vision, unsteady gait)    Negative: [1] Pregnant 20 or more weeks (or postpartum < 6 weeks) AND [2] new hand or face swelling   Negative: [1] Pregnant 20 or more weeks (or postpartum < 6 weeks) AND [2] Systolic BP >= 160 OR Diastolic >= 110   Negative: [1] Systolic BP  >= 200 OR Diastolic >= 120 AND [2] having NO cardiac or neurologic symptoms   Negative: [1] Pregnant 20 or more weeks (or postpartum < 6 weeks) AND [2] Systolic BP  >= 140 OR Diastolic >= 90   Negative: [1] Systolic BP  >= 180 OR Diastolic >= 110 AND [2] missed most recent dose of blood pressure medication   Negative: Systolic BP  >= 180 OR Diastolic >= 110   Negative: Ran out of BP medications   Negative: Systolic BP  >= 160 OR Diastolic >= 100    Protocols used: Blood Pressure - High-A-

## 2024-02-05 ENCOUNTER — NURSE TRIAGE (OUTPATIENT)
Dept: NURSING | Facility: CLINIC | Age: 79
End: 2024-02-05

## 2024-02-05 ENCOUNTER — HOSPITAL ENCOUNTER (OUTPATIENT)
Dept: CARDIOLOGY | Facility: CLINIC | Age: 79
Discharge: HOME | End: 2024-02-05
Attending: FAMILY MEDICINE
Payer: COMMERCIAL

## 2024-02-05 ENCOUNTER — HOSPITAL ENCOUNTER (OUTPATIENT)
Dept: NUCLEAR MEDICINE | Facility: CLINIC | Age: 79
Setting detail: NUCLEAR MEDICINE
Discharge: HOME | End: 2024-02-05
Attending: FAMILY MEDICINE
Payer: COMMERCIAL

## 2024-02-05 VITALS — DIASTOLIC BLOOD PRESSURE: 87 MMHG | HEART RATE: 87 BPM | SYSTOLIC BLOOD PRESSURE: 152 MMHG

## 2024-02-05 DIAGNOSIS — R07.9 EXERTIONAL CHEST PAIN: ICD-10-CM

## 2024-02-05 LAB
ATRIAL RATE - MUSE: 59 BPM
CV STRESS MAX HR HE: 94
DIASTOLIC BLOOD PRESSURE - MUSE: NORMAL MMHG
INTERPRETATION ECG - MUSE: NORMAL
P AXIS - MUSE: 32 DEGREES
PR INTERVAL - MUSE: 240 MS
QRS DURATION - MUSE: 90 MS
QT - MUSE: 412 MS
QTC - MUSE: 407 MS
R AXIS - MUSE: -14 DEGREES
RATE PRESSURE PRODUCT: NORMAL
STRESS ECHO BASELINE DIASTOLIC HE: 87
STRESS ECHO BASELINE HR: 62 BPM
STRESS ECHO BASELINE SYSTOLIC BP: 152
STRESS ECHO CALCULATED PERCENT HR: 66 %
STRESS ECHO LAST STRESS DIASTOLIC BP: 85
STRESS ECHO LAST STRESS SYSTOLIC BP: 145
STRESS ECHO TARGET HR: 143
SYSTOLIC BLOOD PRESSURE - MUSE: NORMAL MMHG
T AXIS - MUSE: 3 DEGREES
VENTRICULAR RATE- MUSE: 59 BPM

## 2024-02-05 PROCEDURE — A9500 TC99M SESTAMIBI: HCPCS | Performed by: FAMILY MEDICINE

## 2024-02-05 PROCEDURE — 343N000001 HC RX 343: Performed by: FAMILY MEDICINE

## 2024-02-05 PROCEDURE — 93016 CV STRESS TEST SUPVJ ONLY: CPT | Performed by: INTERNAL MEDICINE

## 2024-02-05 PROCEDURE — 78452 HT MUSCLE IMAGE SPECT MULT: CPT | Mod: 26 | Performed by: INTERNAL MEDICINE

## 2024-02-05 PROCEDURE — 78452 HT MUSCLE IMAGE SPECT MULT: CPT

## 2024-02-05 PROCEDURE — 93017 CV STRESS TEST TRACING ONLY: CPT

## 2024-02-05 PROCEDURE — 250N000011 HC RX IP 250 OP 636

## 2024-02-05 PROCEDURE — 93018 CV STRESS TEST I&R ONLY: CPT | Performed by: INTERNAL MEDICINE

## 2024-02-05 RX ORDER — REGADENOSON 0.08 MG/ML
INJECTION, SOLUTION INTRAVENOUS
Status: COMPLETED
Start: 2024-02-05 | End: 2024-02-05

## 2024-02-05 RX ORDER — AMINOPHYLLINE 25 MG/ML
50-100 INJECTION, SOLUTION INTRAVENOUS
Status: DISCONTINUED | OUTPATIENT
Start: 2024-02-05 | End: 2024-02-06 | Stop reason: HOSPADM

## 2024-02-05 RX ORDER — ACYCLOVIR 200 MG/1
0-1 CAPSULE ORAL
Status: DISCONTINUED | OUTPATIENT
Start: 2024-02-05 | End: 2024-02-06 | Stop reason: HOSPADM

## 2024-02-05 RX ORDER — REGADENOSON 0.08 MG/ML
0.4 INJECTION, SOLUTION INTRAVENOUS ONCE
Status: COMPLETED | OUTPATIENT
Start: 2024-02-05 | End: 2024-02-05

## 2024-02-05 RX ORDER — ALBUTEROL SULFATE 90 UG/1
2 AEROSOL, METERED RESPIRATORY (INHALATION) EVERY 5 MIN PRN
Status: DISCONTINUED | OUTPATIENT
Start: 2024-02-05 | End: 2024-02-06 | Stop reason: HOSPADM

## 2024-02-05 RX ADMIN — Medication 32.8 MILLICURIE: at 12:31

## 2024-02-05 RX ADMIN — Medication 11 MILLICURIE: at 10:52

## 2024-02-05 RX ADMIN — REGADENOSON 0.4 MG: 0.08 INJECTION, SOLUTION INTRAVENOUS at 12:09

## 2024-02-05 NOTE — TELEPHONE ENCOUNTER
Should proceed with stress test.      Follow up with us in clinic for blood pressure visit within a month or so.

## 2024-02-05 NOTE — TELEPHONE ENCOUNTER
Patient states blood pressure is currently 158/100 and heart rate 71.  Blood pressure is 120/80 and heart rate is normally in the low 60's.  Patient is taking Amlodipine 5 mg and then was reduced in half to 2.5 mg.   Patient denies difficulty breathing, chest pain and blurred vision.  Today patient has a stress test at 11:00 am today.  Patient has concerns about his blood pressure and stress test.  Patient is requesting to speak with clinic.  Please phone patient.    Nurse Triage SBAR    Is this a 2nd Level Triage? NO    Situation: Hypertension    Background: Today blood pressure is 158/100 and denies symptoms.  Patient has a stress test also today scheduled and has concerns about blood pressure.    Assessment: Blood pressure is currently 158/100 and denies chest pain, difficulty breathing and blurred vision.  Patient states that his dosage has just been changed.  Patient states that blood pressure usually runs in the 120's.  Patient is requesting to speak with PCP/Clinic about his blood pressure and heart rate and his upcoming stress test.    Protocol Recommended Disposition:   See in Office Within 2 Weeks    Recommendation: Please phone patient.     Routed to provider    Does the patient meet one of the following criteria for ADS visit consideration? 16+ years old, with an [facility] PCP     TIP  Providers, please consider if this condition is appropriate for management at one of our Acute and Diagnostic Services sites.     If patient is a good candidate, please use dotphrase <dot>triageresponse and select Refer to ADS to document.    Reason for Disposition   Systolic BP >= 130 OR Diastolic >= 80, and is taking BP medications    Additional Information   Negative: Sounds like a life-threatening emergency to the triager   Negative: Systolic BP >= 160 OR Diastolic >= 100, and any cardiac (e.g., breathing difficulty, chest pain) or neurologic symptoms (e.g., new-onset blurred or double vision)   Negative: Pregnant 20 or  more weeks (or postpartum < 6 weeks) with new hand or face swelling   Negative: Pregnant 20 or more weeks (or postpartum < 6 weeks) and Systolic BP >= 160 OR Diastolic >= 110   Negative: Patient sounds very sick or weak to the triager   Negative: Systolic BP >= 200 OR Diastolic >= 120 and having NO cardiac or neurologic symptoms   Negative: Pregnant 20 or more weeks (or postpartum < 6 weeks) with Systolic BP >= 140 OR Diastolic >= 90   Negative: Systolic BP >= 180 OR Diastolic >= 110, and missed most recent dose of blood pressure medication   Negative: Systolic BP >= 180 OR Diastolic >= 110   Negative: Patient wants to be seen   Negative: Ran out of BP medications   Negative: Taking BP medications and feels is having side effects (e.g., impotence, cough, dizziness)   Negative: Systolic BP >= 160 OR Diastolic >= 100   Negative: Systolic BP >= 130 OR Diastolic >= 80, and pregnant    Protocols used: Blood Pressure - High-A-OH

## 2024-02-05 NOTE — TELEPHONE ENCOUNTER
Call placed to pt with message from provider  Pt has been checking his BP at home  Advised pt to check his BP daily     Postponing a week to call pt to check on his BP    Thank you  Debi Pastor RN on 2/5/2024 at 9:31 AM

## 2024-02-08 ENCOUNTER — TRANSFERRED RECORDS (OUTPATIENT)
Dept: HEALTH INFORMATION MANAGEMENT | Facility: CLINIC | Age: 79
End: 2024-02-08

## 2024-02-10 ENCOUNTER — HOSPITAL ENCOUNTER (INPATIENT)
Facility: CLINIC | Age: 79
LOS: 2 days | Discharge: HOME | DRG: 392 | End: 2024-02-12
Attending: STUDENT IN AN ORGANIZED HEALTH CARE EDUCATION/TRAINING PROGRAM | Admitting: INTERNAL MEDICINE
Payer: COMMERCIAL

## 2024-02-10 ENCOUNTER — APPOINTMENT (OUTPATIENT)
Dept: CT IMAGING | Facility: CLINIC | Age: 79
DRG: 392 | End: 2024-02-10
Attending: STUDENT IN AN ORGANIZED HEALTH CARE EDUCATION/TRAINING PROGRAM
Payer: COMMERCIAL

## 2024-02-10 DIAGNOSIS — K22.5 ZENKER DIVERTICULUM: ICD-10-CM

## 2024-02-10 DIAGNOSIS — R22.1 THROAT SWELLING: Primary | ICD-10-CM

## 2024-02-10 DIAGNOSIS — G89.18 ACUTE POST-OPERATIVE PAIN: ICD-10-CM

## 2024-02-10 LAB
ACANTHOCYTES BLD QL SMEAR: NORMAL
ANION GAP SERPL CALCULATED.3IONS-SCNC: 11 MMOL/L (ref 7–15)
AUER BODIES BLD QL SMEAR: NORMAL
BASO STIPL BLD QL SMEAR: NORMAL
BASOPHILS # BLD AUTO: 0.1 10E3/UL (ref 0–0.2)
BASOPHILS NFR BLD AUTO: 1 %
BITE CELLS BLD QL SMEAR: NORMAL
BLISTER CELLS BLD QL SMEAR: NORMAL
BUN SERPL-MCNC: 25.2 MG/DL (ref 8–23)
BURR CELLS BLD QL SMEAR: NORMAL
CALCIUM SERPL-MCNC: 8.9 MG/DL (ref 8.8–10.2)
CHLORIDE SERPL-SCNC: 100 MMOL/L (ref 98–107)
CREAT BLD-MCNC: 1.3 MG/DL (ref 0.7–1.3)
CREAT SERPL-MCNC: 1.17 MG/DL (ref 0.67–1.17)
DACRYOCYTES BLD QL SMEAR: NORMAL
DEPRECATED HCO3 PLAS-SCNC: 26 MMOL/L (ref 22–29)
EGFRCR SERPLBLD CKD-EPI 2021: 57 ML/MIN/1.73M2
EGFRCR SERPLBLD CKD-EPI 2021: 64 ML/MIN/1.73M2
ELLIPTOCYTES BLD QL SMEAR: NORMAL
EOSINOPHIL # BLD AUTO: 0.1 10E3/UL (ref 0–0.7)
EOSINOPHIL NFR BLD AUTO: 1 %
ERYTHROCYTE [DISTWIDTH] IN BLOOD BY AUTOMATED COUNT: 13.2 % (ref 10–15)
FRAGMENTS BLD QL SMEAR: NORMAL
GLUCOSE BLDC GLUCOMTR-MCNC: 101 MG/DL (ref 70–99)
GLUCOSE BLDC GLUCOMTR-MCNC: 126 MG/DL (ref 70–99)
GLUCOSE BLDC GLUCOMTR-MCNC: 77 MG/DL (ref 70–99)
GLUCOSE SERPL-MCNC: 90 MG/DL (ref 70–99)
HCT VFR BLD AUTO: 40.4 % (ref 40–53)
HGB BLD-MCNC: 13.7 G/DL (ref 13.3–17.7)
HGB C CRYSTALS: NORMAL
HOWELL-JOLLY BOD BLD QL SMEAR: NORMAL
IMM GRANULOCYTES # BLD: 0 10E3/UL
IMM GRANULOCYTES NFR BLD: 1 %
LYMPHOCYTES # BLD AUTO: 1.6 10E3/UL (ref 0.8–5.3)
LYMPHOCYTES NFR BLD AUTO: 20 %
MCH RBC QN AUTO: 31.2 PG (ref 26.5–33)
MCHC RBC AUTO-ENTMCNC: 33.9 G/DL (ref 31.5–36.5)
MCV RBC AUTO: 92 FL (ref 78–100)
MONOCYTES # BLD AUTO: 0.9 10E3/UL (ref 0–1.3)
MONOCYTES NFR BLD AUTO: 11 %
NEUTROPHILS # BLD AUTO: 5.5 10E3/UL (ref 1.6–8.3)
NEUTROPHILS NFR BLD AUTO: 66 %
NEUTS HYPERSEG BLD QL SMEAR: NORMAL
NRBC # BLD AUTO: 0 10E3/UL
NRBC BLD AUTO-RTO: 0 /100
PLAT MORPH BLD: NORMAL
PLATELET # BLD AUTO: 168 10E3/UL (ref 150–450)
POLYCHROMASIA BLD QL SMEAR: NORMAL
POTASSIUM SERPL-SCNC: 4 MMOL/L (ref 3.4–5.3)
RBC # BLD AUTO: 4.39 10E6/UL (ref 4.4–5.9)
RBC AGGLUT BLD QL: NORMAL
RBC MORPH BLD: NORMAL
ROULEAUX BLD QL SMEAR: NORMAL
SICKLE CELLS BLD QL SMEAR: NORMAL
SMUDGE CELLS BLD QL SMEAR: NORMAL
SODIUM SERPL-SCNC: 137 MMOL/L (ref 135–145)
SPHEROCYTES BLD QL SMEAR: NORMAL
STOMATOCYTES BLD QL SMEAR: NORMAL
TARGETS BLD QL SMEAR: NORMAL
TOXIC GRANULES BLD QL SMEAR: NORMAL
VARIANT LYMPHS BLD QL SMEAR: NORMAL
WBC # BLD AUTO: 8.3 10E3/UL (ref 4–11)

## 2024-02-10 PROCEDURE — 70491 CT SOFT TISSUE NECK W/DYE: CPT

## 2024-02-10 PROCEDURE — 85025 COMPLETE CBC W/AUTO DIFF WBC: CPT | Performed by: STUDENT IN AN ORGANIZED HEALTH CARE EDUCATION/TRAINING PROGRAM

## 2024-02-10 PROCEDURE — 80048 BASIC METABOLIC PNL TOTAL CA: CPT | Performed by: STUDENT IN AN ORGANIZED HEALTH CARE EDUCATION/TRAINING PROGRAM

## 2024-02-10 PROCEDURE — 96366 THER/PROPH/DIAG IV INF ADDON: CPT

## 2024-02-10 PROCEDURE — 36415 COLL VENOUS BLD VENIPUNCTURE: CPT | Performed by: STUDENT IN AN ORGANIZED HEALTH CARE EDUCATION/TRAINING PROGRAM

## 2024-02-10 PROCEDURE — 250N000009 HC RX 250: Performed by: STUDENT IN AN ORGANIZED HEALTH CARE EDUCATION/TRAINING PROGRAM

## 2024-02-10 PROCEDURE — 96368 THER/DIAG CONCURRENT INF: CPT

## 2024-02-10 PROCEDURE — 87040 BLOOD CULTURE FOR BACTERIA: CPT | Performed by: STUDENT IN AN ORGANIZED HEALTH CARE EDUCATION/TRAINING PROGRAM

## 2024-02-10 PROCEDURE — 250N000013 HC RX MED GY IP 250 OP 250 PS 637: Performed by: INTERNAL MEDICINE

## 2024-02-10 PROCEDURE — 200N000001 HC R&B ICU

## 2024-02-10 PROCEDURE — 258N000003 HC RX IP 258 OP 636: Performed by: INTERNAL MEDICINE

## 2024-02-10 PROCEDURE — 250N000011 HC RX IP 250 OP 636: Performed by: STUDENT IN AN ORGANIZED HEALTH CARE EDUCATION/TRAINING PROGRAM

## 2024-02-10 PROCEDURE — 250N000011 HC RX IP 250 OP 636: Performed by: INTERNAL MEDICINE

## 2024-02-10 PROCEDURE — 82565 ASSAY OF CREATININE: CPT

## 2024-02-10 PROCEDURE — 99285 EMERGENCY DEPT VISIT HI MDM: CPT | Mod: 25

## 2024-02-10 PROCEDURE — 99223 1ST HOSP IP/OBS HIGH 75: CPT | Performed by: INTERNAL MEDICINE

## 2024-02-10 RX ORDER — ONDANSETRON 2 MG/ML
4 INJECTION INTRAMUSCULAR; INTRAVENOUS EVERY 6 HOURS PRN
Status: DISCONTINUED | OUTPATIENT
Start: 2024-02-10 | End: 2024-02-12 | Stop reason: HOSPADM

## 2024-02-10 RX ORDER — SODIUM CHLORIDE, SODIUM LACTATE, POTASSIUM CHLORIDE, CALCIUM CHLORIDE 600; 310; 30; 20 MG/100ML; MG/100ML; MG/100ML; MG/100ML
INJECTION, SOLUTION INTRAVENOUS CONTINUOUS
Status: DISCONTINUED | OUTPATIENT
Start: 2024-02-10 | End: 2024-02-10

## 2024-02-10 RX ORDER — ACETAMINOPHEN 325 MG/1
650 TABLET ORAL EVERY 4 HOURS PRN
Status: DISCONTINUED | OUTPATIENT
Start: 2024-02-10 | End: 2024-02-12 | Stop reason: HOSPADM

## 2024-02-10 RX ORDER — DEXTROSE MONOHYDRATE 25 G/50ML
25-50 INJECTION, SOLUTION INTRAVENOUS
Status: DISCONTINUED | OUTPATIENT
Start: 2024-02-10 | End: 2024-02-10

## 2024-02-10 RX ORDER — TRAZODONE HYDROCHLORIDE 100 MG/1
100 TABLET ORAL AT BEDTIME
Status: DISCONTINUED | OUTPATIENT
Start: 2024-02-10 | End: 2024-02-12 | Stop reason: HOSPADM

## 2024-02-10 RX ORDER — NALOXONE HYDROCHLORIDE 0.4 MG/ML
0.2 INJECTION, SOLUTION INTRAMUSCULAR; INTRAVENOUS; SUBCUTANEOUS
Status: DISCONTINUED | OUTPATIENT
Start: 2024-02-10 | End: 2024-02-12 | Stop reason: HOSPADM

## 2024-02-10 RX ORDER — PRAMIPEXOLE DIHYDROCHLORIDE 0.25 MG/1
1 TABLET ORAL 2 TIMES DAILY
Status: DISCONTINUED | OUTPATIENT
Start: 2024-02-10 | End: 2024-02-12 | Stop reason: HOSPADM

## 2024-02-10 RX ORDER — CEFTRIAXONE 1 G/1
1 INJECTION, POWDER, FOR SOLUTION INTRAMUSCULAR; INTRAVENOUS EVERY 24 HOURS
Status: DISCONTINUED | OUTPATIENT
Start: 2024-02-11 | End: 2024-02-12 | Stop reason: HOSPADM

## 2024-02-10 RX ORDER — NICOTINE POLACRILEX 4 MG
15-30 LOZENGE BUCCAL
Status: DISCONTINUED | OUTPATIENT
Start: 2024-02-10 | End: 2024-02-12 | Stop reason: HOSPADM

## 2024-02-10 RX ORDER — ATORVASTATIN CALCIUM 20 MG/1
20 TABLET, FILM COATED ORAL EVERY EVENING
Status: DISCONTINUED | OUTPATIENT
Start: 2024-02-10 | End: 2024-02-12 | Stop reason: HOSPADM

## 2024-02-10 RX ORDER — PANTOPRAZOLE SODIUM 40 MG/1
40 TABLET, DELAYED RELEASE ORAL DAILY
Status: DISCONTINUED | OUTPATIENT
Start: 2024-02-11 | End: 2024-02-12 | Stop reason: HOSPADM

## 2024-02-10 RX ORDER — IOPAMIDOL 755 MG/ML
500 INJECTION, SOLUTION INTRAVASCULAR ONCE
Status: COMPLETED | OUTPATIENT
Start: 2024-02-10 | End: 2024-02-10

## 2024-02-10 RX ORDER — HYDROMORPHONE HCL IN WATER/PF 6 MG/30 ML
0.2 PATIENT CONTROLLED ANALGESIA SYRINGE INTRAVENOUS
Status: DISCONTINUED | OUTPATIENT
Start: 2024-02-10 | End: 2024-02-12 | Stop reason: HOSPADM

## 2024-02-10 RX ORDER — DEXTROSE MONOHYDRATE 25 G/50ML
25-50 INJECTION, SOLUTION INTRAVENOUS
Status: DISCONTINUED | OUTPATIENT
Start: 2024-02-10 | End: 2024-02-12 | Stop reason: HOSPADM

## 2024-02-10 RX ORDER — METRONIDAZOLE 500 MG/100ML
500 INJECTION, SOLUTION INTRAVENOUS EVERY 8 HOURS
Status: DISCONTINUED | OUTPATIENT
Start: 2024-02-10 | End: 2024-02-11

## 2024-02-10 RX ORDER — METRONIDAZOLE 500 MG/100ML
500 INJECTION, SOLUTION INTRAVENOUS ONCE
Status: COMPLETED | OUTPATIENT
Start: 2024-02-10 | End: 2024-02-10

## 2024-02-10 RX ORDER — AMLODIPINE BESYLATE 2.5 MG/1
2.5 TABLET ORAL DAILY
Status: DISCONTINUED | OUTPATIENT
Start: 2024-02-10 | End: 2024-02-12 | Stop reason: HOSPADM

## 2024-02-10 RX ORDER — CEFTRIAXONE 2 G/1
2 INJECTION, POWDER, FOR SOLUTION INTRAMUSCULAR; INTRAVENOUS ONCE
Status: DISCONTINUED | OUTPATIENT
Start: 2024-02-10 | End: 2024-02-10

## 2024-02-10 RX ORDER — BRIMONIDINE TARTRATE 2 MG/ML
1 SOLUTION/ DROPS OPHTHALMIC 2 TIMES DAILY
Status: DISCONTINUED | OUTPATIENT
Start: 2024-02-10 | End: 2024-02-12 | Stop reason: HOSPADM

## 2024-02-10 RX ORDER — NALOXONE HYDROCHLORIDE 0.4 MG/ML
0.4 INJECTION, SOLUTION INTRAMUSCULAR; INTRAVENOUS; SUBCUTANEOUS
Status: DISCONTINUED | OUTPATIENT
Start: 2024-02-10 | End: 2024-02-12 | Stop reason: HOSPADM

## 2024-02-10 RX ORDER — CEFTRIAXONE 2 G/1
2 INJECTION, POWDER, FOR SOLUTION INTRAMUSCULAR; INTRAVENOUS ONCE
Status: COMPLETED | OUTPATIENT
Start: 2024-02-10 | End: 2024-02-10

## 2024-02-10 RX ORDER — NICOTINE POLACRILEX 4 MG
15-30 LOZENGE BUCCAL
Status: DISCONTINUED | OUTPATIENT
Start: 2024-02-10 | End: 2024-02-10

## 2024-02-10 RX ORDER — ACETAMINOPHEN 325 MG/10.15ML
650 LIQUID ORAL EVERY 4 HOURS PRN
Status: DISCONTINUED | OUTPATIENT
Start: 2024-02-10 | End: 2024-02-12 | Stop reason: HOSPADM

## 2024-02-10 RX ORDER — METRONIDAZOLE 500 MG/100ML
500 INJECTION, SOLUTION INTRAVENOUS ONCE
Status: DISCONTINUED | OUTPATIENT
Start: 2024-02-10 | End: 2024-02-10

## 2024-02-10 RX ORDER — ONDANSETRON 4 MG/1
4 TABLET, ORALLY DISINTEGRATING ORAL EVERY 6 HOURS PRN
Status: DISCONTINUED | OUTPATIENT
Start: 2024-02-10 | End: 2024-02-12 | Stop reason: HOSPADM

## 2024-02-10 RX ADMIN — ATORVASTATIN CALCIUM 20 MG: 20 TABLET, FILM COATED ORAL at 21:08

## 2024-02-10 RX ADMIN — TRAZODONE HYDROCHLORIDE 100 MG: 100 TABLET ORAL at 21:07

## 2024-02-10 RX ADMIN — METRONIDAZOLE 500 MG: 500 INJECTION, SOLUTION INTRAVENOUS at 18:00

## 2024-02-10 RX ADMIN — SODIUM CHLORIDE 65 ML: 9 INJECTION, SOLUTION INTRAVENOUS at 09:26

## 2024-02-10 RX ADMIN — DEXTROSE AND SODIUM CHLORIDE: 5; 900 INJECTION, SOLUTION INTRAVENOUS at 14:16

## 2024-02-10 RX ADMIN — IOPAMIDOL 90 ML: 755 INJECTION, SOLUTION INTRAVENOUS at 09:26

## 2024-02-10 RX ADMIN — METRONIDAZOLE 500 MG: 500 INJECTION, SOLUTION INTRAVENOUS at 10:55

## 2024-02-10 RX ADMIN — PRAMIPEXOLE DIHYDROCHLORIDE 1 MG: 0.25 TABLET ORAL at 21:07

## 2024-02-10 RX ADMIN — AMLODIPINE BESYLATE 2.5 MG: 2.5 TABLET ORAL at 15:31

## 2024-02-10 RX ADMIN — CEFTRIAXONE 2 G: 2 INJECTION, POWDER, FOR SOLUTION INTRAMUSCULAR; INTRAVENOUS at 10:55

## 2024-02-10 RX ADMIN — DEXTROSE AND SODIUM CHLORIDE: 5; 900 INJECTION, SOLUTION INTRAVENOUS at 23:37

## 2024-02-10 RX ADMIN — ACETAMINOPHEN 650 MG: 325 SUSPENSION ORAL at 21:07

## 2024-02-10 ASSESSMENT — ACTIVITIES OF DAILY LIVING (ADL)
ADLS_ACUITY_SCORE: 21
ADLS_ACUITY_SCORE: 20
ADLS_ACUITY_SCORE: 21
ADLS_ACUITY_SCORE: 35
ADLS_ACUITY_SCORE: 21
ADLS_ACUITY_SCORE: 35
ADLS_ACUITY_SCORE: 35
ADLS_ACUITY_SCORE: 21

## 2024-02-10 NOTE — ED TRIAGE NOTES
Pt had surgery on Thursday to repair a pouch in his throat. C/o severe pain. Was given oxycodone elixir, helps a little but makes him very drowsy.      Triage Assessment (Adult)       Row Name 02/10/24 0844          Triage Assessment    Airway WDL WDL        Respiratory WDL    Respiratory WDL WDL        Skin Circulation/Temperature WDL    Skin Circulation/Temperature WDL WDL        Cardiac WDL    Cardiac WDL WDL        Peripheral/Neurovascular WDL    Peripheral Neurovascular WDL WDL        Cognitive/Neuro/Behavioral WDL    Cognitive/Neuro/Behavioral WDL WDL

## 2024-02-10 NOTE — PLAN OF CARE
[id number]    Goal Outcome Evaluation:      Plan of Care Reviewed With: patient    Overall Patient Progress: improvingOverall Patient Progress: improving     ICU End of Shift Summary.  For vital signs and complete assessments, please see documentation flowsheets.     Pertinent assessments: Arrived to the unit this afternoon neurologically a/o to all spheres. Endorses difficulty swallowing and pain, ice chips helping. Bp/MAP wnl. Rhythm sinus with 1AVB. On RA and denies SOB. ENT following. Family updated at bedside.     Major Shift Events: As outlined above  Plan (Upcoming Events): Monitor airway. Gastrografin esophagram tomorrow.   Discharge/Transfer Needs: TBD    Bedside Shift Report Completed : Y  Bedside Safety Check Completed: Y

## 2024-02-10 NOTE — PHARMACY-ADMISSION MEDICATION HISTORY
Pharmacist Admission Medication History    Admission medication history is complete. The information provided in this note is only as accurate as the sources available at the time of the update.    Information Source(s): Patient and CareEverywhere/SureScripts via in-person    Pertinent Information: -    Changes made to PTA medication list:  Added: None  Deleted: None  Changed: None    Allergies reviewed with patient and updates made in EHR: yes    Medication History Completed By: Oliver Pavon [facility] 2/10/2024 12:55 PM    Prior to Admission medications    Medication Sig Last Dose Taking? Auth Provider Long Term End Date   amLODIPine (NORVASC) 5 MG tablet Take 0.5 tablets (2.5 mg) by mouth daily 2/9/2024 at hs Yes Payam Yeh MD Yes    atorvastatin (LIPITOR) 20 MG tablet Take 1 tablet (20 mg) by mouth daily 2/9/2024 at hs Yes Yordan Hernandez MD Yes    brimonidine (ALPHAGAN) 0.2 % ophthalmic solution Place 1 drop Into the left eye 2 times daily 2/10/2024 at x1 Yes Reported, Patient     meloxicam (MOBIC) 15 MG tablet Take 1 tablet (15 mg) by mouth daily as needed for pain Unknown at prn Yes Payam Toney MD Yes    Multiple Vitamins-Minerals (MULTIVITAMIN PO) Take 1 tablet by mouth daily 2/10/2024 at am Yes Reported, Patient     omeprazole (PRILOSEC) 20 MG DR capsule Take 2 capsules (40 mg) by mouth daily 2/10/2024 at am Yes Yordan Hernandez MD     pramipexole (MIRAPEX) 1 MG tablet Take 1 tablet (1 mg) by mouth 2 times daily For restless leg syndrome. 2/10/2024 at x1 Yes Bang Blevins MD Yes 6/4/24   sildenafil (REVATIO) 20 MG tablet Take 2 tablets (40 mg) by mouth daily as needed (ED) Unknown at prn Yes Yordan Hernandez MD Yes    traZODone (DESYREL) 100 MG tablet Take 1 tablet (100 mg) by mouth at bedtime 2/9/2024 at hs Yes Yordan Hernandez MD Yes

## 2024-02-10 NOTE — ED NOTES
"[facility]  ED Nurse Handoff Report    ED Chief complaint: Post-op Problem  . ED Diagnosis:   Final diagnoses:   Throat swelling   Zenker diverticulum   Acute post-operative pain       Allergies:   Allergies   Allergen Reactions    Amoxicillin Other (See Comments) and Unknown     Facial and neck flushing, felt hot    Levonorgestrel-Ethinyl Estrad Itching    Seasonal Allergies        Code Status: Full Code    Activity level - Baseline/Home:  independent.  Activity Level - Current:   independent.   Lift room needed: No.   Bariatric: No   Needed: No   Isolation: No.   Infection: Not Applicable.     Respiratory status: Room air    Vital Signs (within 30 minutes):   Vitals:    02/10/24 0843 02/10/24 1000 02/10/24 1015 02/10/24 1030   BP: (!) 157/84 138/88 134/81 133/83   Pulse: 76   69   Resp: 20      Temp: 98.3  F (36.8  C)      TempSrc: Temporal      SpO2: 93%  94% 93%   Weight: 107.5 kg (237 lb)      Height: 1.676 m (5' 6\")          Cardiac Rhythm:  ,      Pain level:    Patient confused: No.   Patient Falls Risk: patient and family education.   Elimination Status: Has voided     Patient Report - Initial Complaint: Post op problem.   Focused Assessment: Per provider notes:   78 year old male, 3 days s/p Endoscopic Zenker's Diverticulectomy, presenting with a post-op problem. He has had a sore throat since the procedure, but it became significantly worse this morning along with having difficulty swallowing. He describes the pain \"as if he has a ball in his throat\". He attempted to take 10 mg Oxycodone with only slight relief. Another symptom mentioned was a productive cough. He denies hemoptysis, fever, or chills. He currently follows with [facility], and his procedure was performed at [facility] in [place], [place].      Independent Historian:  Independent history was obtained from spouse. They provided information detailed above in HPI.     Abnormal Results:   Labs Ordered " and Resulted from Time of ED Arrival to Time of ED Departure   ISTAT CREATININE POCT - Abnormal       Result Value    Creatinine POCT 1.3      GFR, ESTIMATED POCT 57 (*)    BASIC METABOLIC PANEL   CBC WITH PLATELETS AND DIFFERENTIAL   ISTAT CREATININE POCT   BLOOD CULTURE   BLOOD CULTURE        Soft tissue neck CT w contrast   Final Result   IMPRESSION:    1.  Findings highly concerning for an approximately 2 cm retropharyngeal abscess centered posterior to the right piriform sinus containing small locules of gas with more extensive retropharyngeal edema extending cranially to the level the oropharynx.    This may relate to recent this may relate to recent pharyngeal mucosal injury or aggressive infection. Close clinical follow-up is advised.      Findings were called to Dr. Urena at 2/10/2024 10:03 AM CST by Dr. DESIRE Arredondo.          Treatments provided: See MAR  Family Comments: At bedside  OBS brochure/video discussed/provided to patient:  No  ED Medications:   Medications   cefTRIAXone (ROCEPHIN) 2 g vial to attach to  ml bag for ADULTS or NS 50 ml bag for PEDS (has no administration in time range)   metroNIDAZOLE (FLAGYL) infusion 500 mg (has no administration in time range)   CT Scan Flush (65 mLs Intravenous $Given 2/10/24 0926)   iopamidol (ISOVUE-370) solution 500 mL (90 mLs Intravenous $Given 2/10/24 0926)       Drips infusing:  No  For the majority of the shift this patient was Green.   Interventions performed were na.    Sepsis treatment initiated: No    Cares/treatment/interventions/medications to be completed following ED care: na    ED Nurse Name: Bridget Mccarthy RN  10:41 AM

## 2024-02-10 NOTE — ED PROVIDER NOTES
"History   Chief Complaint:  Post-op Problem       HPI:  Toni Collado is a very pleasant 78 year old male, 3 days s/p rigid cervical esophagoscopy presenting with postop problem. He has had a sore throat since the procedure, but it became significantly worse this morning along with having difficulty swallowing. He describes the pain \"as if he has a ball in his throat\". He attempted to take 10 mg Oxycodone with only slight relief. Another symptom mentioned was a productive cough. He denies hemoptysis, fever, or chills.  He has no difficulty breathing.  He has been able to manage his secretions.      Independent Historian:  Independent history was obtained from spouse. They provided information detailed above in HPI.     Review of External Notes:  I personally reviewed notes from the patient's operative note dated  2/8/24 . This provided me with information regarding patient's recent clinical course.     I personally reviewed the patient's chart, including available medication list and available past medical history, past surgical history, family history, and social history.    Physical Exam   Patient Vitals for the past 24 hrs:   BP Temp Temp src Pulse Resp SpO2 Height Weight   02/10/24 1159 -- -- -- -- -- 92 % -- --   02/10/24 1144 132/72 -- -- 57 -- 95 % -- --   02/10/24 1100 (!) 140/88 -- -- 76 -- 95 % -- --   02/10/24 1045 (!) 131/92 -- -- 66 -- 93 % -- --   02/10/24 1030 133/83 -- -- 69 -- 93 % -- --   02/10/24 1015 134/81 -- -- -- -- 94 % -- --   02/10/24 1000 138/88 -- -- -- -- -- -- --   02/10/24 0843 (!) 157/84 98.3  F (36.8  C) Temporal 76 20 93 % 1.676 m (5' 6\") 107.5 kg (237 lb)      Physical Exam  Vitals and nursing note reviewed.   Constitutional:       Appearance: He is obese. He is not ill-appearing.   HENT:      Mouth/Throat:      Mouth: Mucous membranes are moist.      Pharynx: Oropharynx is clear. No oropharyngeal exudate or posterior oropharyngeal erythema.   Cardiovascular:      Rate and " Rhythm: Normal rate and regular rhythm.   Pulmonary:      Effort: Pulmonary effort is normal.      Breath sounds: Normal breath sounds.   Musculoskeletal:      Cervical back: Normal range of motion and neck supple. No rigidity.   Skin:     General: Skin is warm and dry.   Neurological:      Mental Status: He is alert and oriented to person, place, and time.         Emergency Department Course     ECG:   No ECG performed.     Imaging: Laboratory:   Soft tissue neck CT w contrast   Final Result   IMPRESSION:    1.  Findings highly concerning for an approximately 2 cm retropharyngeal abscess centered posterior to the right piriform sinus containing small locules of gas with more extensive retropharyngeal edema extending cranially to the level the oropharynx.    This may relate to recent this may relate to recent pharyngeal mucosal injury or aggressive infection. Close clinical follow-up is advised.      Findings were called to Dr. Urena at 2/10/2024 10:03 AM CST by Dr. DESIRE Arredondo.         Report(s) per radiology. Labs Ordered and Resulted from Time of ED Arrival to Time of ED Departure   ISTAT CREATININE POCT - Abnormal       Result Value    Creatinine POCT 1.3      GFR, ESTIMATED POCT 57 (*)    BASIC METABOLIC PANEL - Abnormal    Sodium 137      Potassium 4.0      Chloride 100      Carbon Dioxide (CO2) 26      Anion Gap 11      Urea Nitrogen 25.2 (*)     Creatinine 1.17      GFR Estimate 64      Calcium 8.9      Glucose 90     CBC WITH PLATELETS AND DIFFERENTIAL - Abnormal    WBC Count 8.3      RBC Count 4.39 (*)     Hemoglobin 13.7      Hematocrit 40.4      MCV 92      MCH 31.2      MCHC 33.9      RDW 13.2      Platelet Count 168      % Neutrophils 66      % Lymphocytes 20      % Monocytes 11      % Eosinophils 1      % Basophils 1      % Immature Granulocytes 1      NRBCs per 100 WBC 0      Absolute Neutrophils 5.5      Absolute Lymphocytes 1.6      Absolute Monocytes 0.9      Absolute Eosinophils 0.1       Absolute Basophils 0.1      Absolute Immature Granulocytes 0.0      Absolute NRBCs 0.0     RBC AND PLATELET MORPHOLOGY    Platelet Assessment        Value: Automated Count Confirmed. Platelet morphology is normal.    Acanthocytes        Tate Rods        Basophilic Stippling        Bite Cells        Blister Cells        [place] Cells        Elliptocytes        Hgb C Crystals        [place] Bodies        Hypersegmented Neutrophils        Polychromasia        RBC agglutination        RBC Fragments        Reactive Lymphocytes        Rouleaux        Sickle Cells        Smudge Cells        Spherocytes        Stomatocytes        Target Cells        Teardrop Cells        Toxic Neutrophils        RBC Morphology Confirmed RBC Indices     ISTAT CREATININE POCT   BLOOD CULTURE   BLOOD CULTURE           Interventions & Assessments:    Interventions:  Medications   CT Scan Flush (65 mLs Intravenous $Given 2/10/24 0926)   iopamidol (ISOVUE-370) solution 500 mL (90 mLs Intravenous $Given 2/10/24 0926)   cefTRIAXone (ROCEPHIN) 2 g vial to attach to  ml bag for ADULTS or NS 50 ml bag for PEDS (0 g Intravenous Stopped 2/10/24 1204)   metroNIDAZOLE (FLAGYL) infusion 500 mg (0 mg Intravenous Stopped 2/10/24 1204)        Assessments  Independent Interpretations  Consultations/Discussion of Management or Tests  ED Course as of 02/10/24 1232   Sat Feb 10, 2024   0850 I obtained history and examined the patient as noted above.   1001 I discussed the patient's presentation, assessment and plan with Dr. DESIRE Arredondo.     1015 I reassessed the patient, discussed findings and plan of care.     1033 I discussed the patient's presentation, assessment and plan with Dr Erazo of ENT.     1215 I discussed the patient's presentation, workup, assessment, plan and disposition with hospitalist Dr Phillips.     Social Determinants of Health affecting care:   None.     Disposition:  The patient was admitted to the hospital under the care of Dr. Phillips.      Impression & Plan   Medical Decision Making:  Patient with recent instrumentation to esophagus presenting with throat swelling and pain.  Vital signs notable for elevated blood pressure on arrival but otherwise reassuring.  Physical exam is unremarkable.  Initial suspicion was for some oropharyngeal swelling post procedure so I did obtain a CT soft tissue of neck with contrast.  This did raise concern for retropharyngeal abscess per radiology.  I did discuss this with Dr. Campbell of ENT, who felt this was more likely reflective of the patient's existing Zenker diverticulum.  He recommended the patient be made n.p.o. and started on IV antibiotics.  He will see the patient in the hospital.  Patient is handling his secretions and has no difficulty breathing at this time.  I did obtain labs, along with blood cultures.  Patient has no leukocytosis.  Patient has a documented allergy to penicillin so I did start the patient on ceftriaxone and Flagyl.  He will be admitted to hospitalist team, ICU status, for close observation and monitoring, IV antibiotics, evaluation by ENT.     Diagnosis:    ICD-10-CM    1. Throat swelling  R22.1       2. Zenker diverticulum  K22.5       3. Acute post-operative pain  G89.18            Discharge Medications:  New Prescriptions    No medications on file     Scribe Disclosure:  I, Harsh Vasquez, am serving as a scribe at 9:10 AM on 2/10/2024 to document services personally performed by Alex Urena MD based on my observations and the provider's statements to me.     Alex Urena MD  02/10/24 [id number]

## 2024-02-10 NOTE — CONSULTS
ENT Consult    Assessment and Plan  POD #2 from rigid esophagoscopy, admitted for inability to take PO, throat pain, and possible infection/abscess of the hypopharynx. My opinion based on WBC of 8, absence of fevers, and only POD #2, is that it is highly unlikely he has an abscess of the hypopharynx. More likely has trauma related pain to the hypopharynx related to the esophagoscopy.  The area on the CT is likely the gas-filled small Zenkers, and edema. Certainly nothing on CT looks to be in need of surgical drainage.  Regardless, possible early infection related to mucosal trauma, and should continue current antibiotics. Unlikely there is a tear and leak, but cannot assume none, given the findings, so should have a cervical esophagram tomorrow, or if unable over the weekend, then on Monday.  Remain NPO except ice chips until he has confirmation of no leak on esophagram. He has no airway issues, and can go to floor.    CC:   throat pain    HPI:   Toni Collado is a 78 year old male patient with past medical history of hypertension, hyperlipidemia, obstructive sleep apnea, uses CPAP at night, GERD, depression, anxiety, restless leg syndrome, chronic oropharyngeal dysphagia with recent outpatient procedure who presented to emergency room for evaluation for difficulty swallowing. He stated that he has longstanding history of difficulty swallowing. He had modified barium swallow on 12/5/2023 and was diagnosed with esophageal diverticulum. He was seen by ENT and underwent rigid cervical esophagoscopy on 2/8/2023 but unable to complete the procedure because of the anatomy of the neck. He stated that he continued to have worsening symptoms and came to emergency room for evaluation.  Vitals are stable on arrival to emergency room. CBC and BMP is also normal. CT of the neck was done and showed findings highly concerning for an approximately 2 cm retropharyngeal abscess centered posterior to the right piriform sinus. ENT  was consulted and recommended admission for further evaluation. No urgent procedure was planned. He was started on Ceftriaxone and flagyl. He was admitted to ICU for management.      Past Medical History:   Diagnosis Date    Adenomatous colon polyp 07/2019    Asymptomatic varicose veins of both lower extremities     Bulbar polio 1952    residual dysphagia    Cervical osteoarthritis     Chronic anxiety     Chronic depression     Chronic depression 09/19/2022    Familial tremor     Gastroesophageal reflux disease without esophagitis 04/12/2021    Hallux limitus of left foot     Localized, primary osteoarthritis of hand     Oropharyngeal dysphagia     Pre-op evaluation 06/15/2022    Primary osteoarthritis of left hip 06/15/2022    Primary osteoarthritis of left hip 06/15/2022    RLS (restless legs syndrome)     Sleep apnea, obstructive     CPAP    Status post total replacement of left hip     Varicose veins of left lower extremity      Current Facility-Administered Medications   Medication    acetaminophen (TYLENOL) tablet 650 mg    Or    acetaminophen (TYLENOL) solution 650 mg    amLODIPine (NORVASC) tablet 2.5 mg    atorvastatin (LIPITOR) tablet 20 mg    brimonidine (ALPHAGAN) 0.2 % ophthalmic solution 1 drop    [START ON 2/11/2024] cefTRIAXone (ROCEPHIN) 1 g vial to attach to  mL bag for ADULTS or NS 50 mL bag for PEDS    dextrose 5% and 0.9% NaCl infusion    glucose gel 15-30 g    Or    dextrose 50 % injection 25-50 mL    Or    glucagon injection 1 mg    HYDROmorphone (DILAUDID) injection 0.2 mg    metroNIDAZOLE (FLAGYL) infusion 500 mg    naloxone (NARCAN) injection 0.2 mg    Or    naloxone (NARCAN) injection 0.4 mg    Or    naloxone (NARCAN) injection 0.2 mg    Or    naloxone (NARCAN) injection 0.4 mg    ondansetron (ZOFRAN ODT) ODT tab 4 mg    Or    ondansetron (ZOFRAN) injection 4 mg    [START ON 2/11/2024] pantoprazole (PROTONIX) EC tablet 40 mg    pramipexole (MIRAPEX) tablet 1 mg    traZODone (DESYREL)  tablet 100 mg        Allergies   Allergen Reactions    Amoxicillin Other (See Comments) and Unknown     Facial and neck flushing, felt hot    Seasonal Allergies      Social History     Socioeconomic History    Marital status:      Spouse name: Not on file    Number of children: Not on file    Years of education: Not on file    Highest education level: Not on file   Occupational History    Not on file   Tobacco Use    Smoking status: Former     Types: Cigarettes     Quit date: 1975     Years since quittin.2     Passive exposure: Past    Smokeless tobacco: Never   Vaping Use    Vaping Use: Never used   Substance and Sexual Activity    Alcohol use: Not Currently    Drug use: No    Sexual activity: Not Currently     Partners: Female     Birth control/protection: None   Other Topics Concern    Not on file   Social History Narrative    WIFE-JESSIE RETIRED SON SHERMAN 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       Social Determinants of Health     Financial Resource Strain: Low Risk  (2024)    Financial Resource Strain     Within the past 12 months, have you or your family members you live with been unable to get utilities (heat, electricity) when it was really needed?: No   Food Insecurity: Low Risk  (2024)    Food Insecurity     Within the past 12 months, did you worry that your food would run out before you got money to buy more?: No     Within the past 12 months, did the food you bought just not last and you didn t have money to get more?: No   Transportation Needs: Low Risk  (2024)    Transportation Needs     Within the past 12 months, has lack of transportation kept you from medical appointments, getting your medicines, non-medical meetings or appointments, work, or from getting things that you need?: No   Physical Activity: Sufficiently Active (10/31/2023)    Exercise Vital Sign     Days of  "Exercise per Week: 3 days     Minutes of Exercise per Session: 50 min   Stress: No Stress Concern Present (10/31/2023)    [employer] - Occupational Stress Questionnaire     Feeling of Stress : Not at all   Social Connections: Moderately Isolated (10/31/2023)    Social Connection and Isolation Panel [NHANES]     Frequency of Communication with Friends and Family: More than three times a week     Frequency of Social Gatherings with Friends and Family: Once a week     Attends Taoist Services: Never     Active Member of Clubs or Organizations: No     Attends Club or Organization Meetings: Not on file     Marital Status:    Interpersonal Safety: Low Risk  (11/7/2023)    Interpersonal Safety     Do you feel physically and emotionally safe where you currently live?: Yes     Within the past 12 months, have you been hit, slapped, kicked or otherwise physically hurt by someone?: No     Within the past 12 months, have you been humiliated or emotionally abused in other ways by your partner or ex-partner?: No   Housing Stability: Low Risk  (1/27/2024)    Housing Stability     Do you have housing? : Yes     Are you worried about losing your housing?: No     ROS as above    EXAM  /75   Pulse 77   Temp 98  F (36.7  C) (Oral)   Resp 11   Ht 1.676 m (5' 6\")   Wt 109.3 kg (241 lb)   SpO2 93%   BMI 38.90 kg/m    NOSE normal  Eyes, EOMI normal  Voice clear, not hoarse, no stridor or stertor  NEck soft, no crepitus, larynx feels normal  OC/OP: large tongue, no swelling or redness    WBC 8 today    CT NECK  Narrative & Impression   EXAM: CT SOFT TISSUE NECK W CONTRAST  LOCATION: [facility]  DATE: 2/10/2024     INDICATION: Throat swelling and pain  COMPARISON: None.  CONTRAST: 90mL Isovue 370  TECHNIQUE: Routine CT Soft Tissue Neck with IV contrast. Multiplanar reformats. Dose reduction techniques were used.     FINDINGS:      MUCOSAL SPACES/SOFT TISSUES: Mild " mucosal thickening involving the posterior pharyngeal wall with fairly diffuse retropharyngeal edema extending from the oropharynx through the cricoid cartilage. There is a focal collection of retropharyngeal fluid and   gas with faint peripheral enhancement centered posterior to the right piriform sinus highly concerning for retropharyngeal abscess formation. This collection measures at least 2.1 x 1.4 x 2.3 cm in transverse, AP, and craniocaudal dimensions   respectively. Asymmetric effacement of the right piriform sinus. Unremarkable infraglottic trachea. Minor asymmetric stranding of the right parapharyngeal fat. The oral cavity is partially obscured by streak artifact from dental amalgam/restorations.   Visualized floor of mouth structures are unremarkable.     LYMPH NODES: No pathologic lymph nodes by size or morphology criteria.      SALIVARY GLANDS: Normal parotid and submandibular glands.     THYROID: Normal.      VESSELS: Mild atherosclerotic plaque at the carotid bifurcations.     VISUALIZED INTRACRANIAL/ORBITS/SINUSES: Minor senescent change of the included intracranial compartment. Visualized paranasal sinuses and mastoid air cells are clear.     OTHER: Multilevel cervical spondylosis without destructive osseous lesion. The included lung apices are clear.                                                                      IMPRESSION:   1.  Findings highly concerning for an approximately 2 cm retropharyngeal abscess centered posterior to the right piriform sinus containing small locules of gas with more extensive retropharyngeal edema extending cranially to the level the oropharynx.   This may relate to recent this may relate to recent pharyngeal mucosal injury or aggressive infection. Close clinical follow-up is advised.        Leander Erazo M.D.

## 2024-02-10 NOTE — H&P
[facility]    History and Physical  Hospitalist       Date of Admission:  2/10/2024  Date of Service (when I saw the patient): 02/10/24    Assessment & Plan   Toni Collado is a 78 year old male patient with past medical history of hypertension, hyperlipidemia, obstructive sleep apnea, uses CPAP at night, GERD, depression, anxiety, restless leg syndrome, chronic oropharyngeal dysphagia with recent outpatient procedure who presented to emergency room for evaluation for difficulty swallowing. He stated that he has longstanding history of difficulty swallowing. He had modified barium swallow on 12/5/2023 and was diagnosed with esophageal diverticulum. He was seen by ENT and underwent rigid cervical esophagoscopy on 2/8/2023 but unable to complete the procedure because of the anatomy of the neck. He stated that he continued to have worsening symptoms and came to emergency room for evaluation.  Vitals are stable on arrival to emergency room. CBC and BMP is also normal. CT of the neck was done and showed findings highly concerning for an approximately 2 cm retropharyngeal abscess centered posterior to the right piriform sinus. ENT was consulted and recommended admission for further evaluation. No urgent procedure was planned. He was started on Ceftriaxone and flagyl. He was admitted to ICU for management.      Oropharyngeal dysphagia with Zenker's diverticulum  Concern for retropharyngeal abscess  Patient has difficulty swallowing. Patient had recent rigid cervical esophagoscopy on 2/8/2023 with failed attempt to remove the diverticulum. Denies fever. Doesn't look septic. CBC normal. CT neck concerning for retropharyngeal abscess.  No evidence of airway compromise at this time.    --Will continue Ceftriaxone and flagyl  --Will place formal consult for ENT  --Will keep him NPO for now. Will start him on IV fluids    Obstructive sleep apnea  Patient uses CPAP at night. His wife is bringing his CPAP  tonight  --Continue CPAP during sleep    Hypertension  --Will resume pta medication once reviewed by pharmacy    GERD: Will resume PPI    Depression/Anxiety: Will resume his pta meds.     Restless leg syndrome  --will resume mirapex    DVT Prophylaxis: Pneumatic Compression Devices  Code Status: Full Code    Disposition: Expected discharge: anticipate at least 2 nights of hospital course    Sridevi Nunes MD    Primary Care Physician   Yordan Hernandez    Chief Complaint   Difficulty swallowing    History is obtained from the patient    History of Present Illness   Toni Collado is a 78 year old male patient with past medical history of hypertension, hyperlipidemia, obstructive sleep apnea, uses CPAP at night, GERD, depression, anxiety, restless leg syndrome, chronic oropharyngeal dysphagia with recent outpatient procedure who presented to emergency room for evaluation for difficulty swallowing. He stated that he has longstanding history of difficulty swallowing. He had modified barium swallow on 12/5/2023 and was diagnosed with esophageal diverticulum. He was seen by ENT and underwent rigid cervical esophagoscopy on 2/8/2023. Per procedure note by Dr. Gonzales, the procedure was stopped after unsuccessful attempts to visualize the posterior ledge of the diverticulum. Patient stated that he continues to have difficulty swallowing after he went home. Today his symptoms got much worse and he was advised to come to emergency room. He denies fever, cough, shortness of breath. He has no nausea or vomiting.   In the ER, his vital signs showed temperature 98.3, pulse 76. Blood pressure 157/84, oxygen saturation 93% on room air.   Lab workup showed normal BMP and CBC.   CT of the neck was done and showed findings highly concerning for an approximately 2 cm retropharyngeal abscess centered posterior to the right piriform sinus containing small locules of gas with more extensive retropharyngeal edema extending cranially to  "the level the oropharynx. This may relate to recent this may relate to recent pharyngeal mucosal injury or aggressive infection.   ENT was consulted from emergency room. Dr. Erazo recommended admission for further evaluation. He was started on Ceftriaxone and flagyl per ENT. He was admitted to ICU for close monitoring.     Past Medical History    I have reviewed this patient's medical history and updated it with pertinent information if needed.   Past Medical History:   Diagnosis Date    Adenomatous colon polyp 07/2019    Asymptomatic varicose veins of both lower extremities     Bulbar polio 1952    residual dysphagia    Cervical osteoarthritis     Chronic anxiety     Chronic depression     Chronic depression 09/19/2022    Familial tremor     Gastroesophageal reflux disease without esophagitis 04/12/2021    Hallux limitus of left foot     Localized, primary osteoarthritis of hand     Oropharyngeal dysphagia     Pre-op evaluation 06/15/2022    Primary osteoarthritis of left hip 06/15/2022    Primary osteoarthritis of left hip 06/15/2022    RLS (restless legs syndrome)     Sleep apnea, obstructive     CPAP    Status post total replacement of left hip     Varicose veins of left lower extremity        Past Surgical History   I have reviewed this patient's surgical history and updated it with pertinent information if needed.  Past Surgical History:   Procedure Laterality Date    ABDOMEN SURGERY      ARTHROSCOPY KNEE      CATARACT IOL, RT/LT Bilateral      and     COLONOSCOPY N/A 09/13/2023    Procedure: Colonoscopy with polypectomies using cold snare;  Surgeon: Yue Cline MD;  Location:  GI    ESOPHAGOSCOPY, GASTROSCOPY, DUODENOSCOPY (EGD), COMBINED N/A 09/13/2023    Procedure: Esophagoscopy, gastroscopy, duodenoscopy (EGD), combined with biopsy using cold forcep;  Surgeon: Yue Cline MD;  Location:  GI    HERNIA REPAIR, UMBILICAL      KNEE SURGERY Left     \"bone on bone\" d/t " arthritis    RADIOFREQUENCY ABLATION NERVES      cervical spine    RHINOPLASTY      SEPTOPLASTY      SHOULDER SURGERY Bilateral     right (pitching injury), left (bone spur)    SHOULDER SURGERY Right     reconstructive surgery with hardware    TOE SURGERY Left     left big toe d/t arthritis    TOTAL KNEE ARTHROPLASTY Left     unicompartmental       Prior to Admission Medications   Prior to Admission Medications   Prescriptions Last Dose Informant Patient Reported? Taking?   Multiple Vitamins-Minerals (MULTIVITAMIN PO) 2/10/2024 at am  Yes Yes   Sig: Take 1 tablet by mouth daily   amLODIPine (NORVASC) 5 MG tablet 2/9/2024 at hs  No Yes   Sig: Take 0.5 tablets (2.5 mg) by mouth daily   atorvastatin (LIPITOR) 20 MG tablet 2/9/2024 at hs  No Yes   Sig: Take 1 tablet (20 mg) by mouth daily   brimonidine (ALPHAGAN) 0.2 % ophthalmic solution 2/10/2024 at x1  Yes Yes   Sig: Place 1 drop Into the left eye 2 times daily   meloxicam (MOBIC) 15 MG tablet Unknown at prn  No Yes   Sig: Take 1 tablet (15 mg) by mouth daily as needed for pain   omeprazole (PRILOSEC) 20 MG DR capsule 2/10/2024 at am  No Yes   Sig: Take 2 capsules (40 mg) by mouth daily   pramipexole (MIRAPEX) 1 MG tablet 2/10/2024 at x1  No Yes   Sig: Take 1 tablet (1 mg) by mouth 2 times daily For restless leg syndrome.   sildenafil (REVATIO) 20 MG tablet Unknown at prn  No Yes   Sig: Take 2 tablets (40 mg) by mouth daily as needed (ED)   traZODone (DESYREL) 100 MG tablet 2/9/2024 at hs  No Yes   Sig: Take 1 tablet (100 mg) by mouth at bedtime      Facility-Administered Medications: None     Allergies   Allergies   Allergen Reactions    Amoxicillin Other (See Comments) and Unknown     Facial and neck flushing, felt hot    Seasonal Allergies        Social History   I have reviewed this patient's social history and updated it with pertinent information if needed. Toni Collado  reports that he quit smoking about 48 years ago. His smoking use included cigarettes.  He has been exposed to tobacco smoke. He has never used smokeless tobacco. He reports that he does not currently use alcohol. He reports that he does not use drugs.    Family History   I have reviewed this patient's family history and updated it with pertinent information if needed.   Family History   Problem Relation Age of Onset    Heart Failure Mother         valvular heart disease    Heart Failure Father         ihd    Myelodysplastic syndrome Father     Other Cancer Father         Mylodisplasia    Cancer Brother         renal    Other Cancer Brother         Kidney cancer    Heart Failure Brother         ihd    Substance Abuse Brother         Alcoholic    Substance Abuse Maternal Grandfather         Alcoholic    Diabetes Sister     Colon Cancer No family hx of     Thyroid Disease No family hx of        Review of Systems   The 10 point Review of Systems is negative other than noted in the HPI or here. Difficulty swallowing    Physical Exam   Temp: 98.3  F (36.8  C) Temp src: Temporal BP: 115/64 Pulse: 69   Resp: 20 SpO2: 95 %      Vital Signs with Ranges  Temp:  [98.3  F (36.8  C)] 98.3  F (36.8  C)  Pulse:  [57-78] 69  Resp:  [20] 20  BP: (115-157)/(64-92) 115/64  SpO2:  [92 %-95 %] 95 %  237 lbs 0 oz    GEN:  Alert, oriented x 3, appears comfortable, NAD.  HEENT:  Normocephalic/atraumatic, no scleral icterus, no nasal discharge, mouth moist.  CV:  Regular rate and rhythm, no murmur or JVD.  S1 + S2 noted, no S3 or S4.  LUNGS:  Clear to auscultation bilaterally without rales/rhonchi/wheezing/retractions.  Symmetric chest rise on inhalation noted.  ABD:  Active bowel sounds, soft, non-tender/non-distended.  No rebound/guarding/rigidity.  EXT:  No edema or cyanosis.  Hands/feet warm to touch with good signs of peripheral perfusion.  No joint synovitis noted.  SKIN:  Dry to touch, no exanthems noted in the visualized areas.  NEURO:  Symmetric muscle strength, sensation to touch grossly intact.  No new focal  deficits appreciated.    Data   Data reviewed today:  I personally reviewed no images or EKG's today.  Recent Labs   Lab 02/10/24  1024 02/10/24  0908   WBC 8.3  --    HGB 13.7  --    MCV 92  --      --      --    POTASSIUM 4.0  --    CHLORIDE 100  --    CO2 26  --    BUN 25.2*  --    CR 1.17 1.3   ANIONGAP 11  --    NITHIN 8.9  --    GLC 90  --        Recent Results (from the past 24 hour(s))   Soft tissue neck CT w contrast    Narrative    EXAM: CT SOFT TISSUE NECK W CONTRAST  LOCATION: [facility]  DATE: 2/10/2024    INDICATION: Throat swelling and pain  COMPARISON: None.  CONTRAST: 90mL Isovue 370  TECHNIQUE: Routine CT Soft Tissue Neck with IV contrast. Multiplanar reformats. Dose reduction techniques were used.    FINDINGS:     MUCOSAL SPACES/SOFT TISSUES: Mild mucosal thickening involving the posterior pharyngeal wall with fairly diffuse retropharyngeal edema extending from the oropharynx through the cricoid cartilage. There is a focal collection of retropharyngeal fluid and   gas with faint peripheral enhancement centered posterior to the right piriform sinus highly concerning for retropharyngeal abscess formation. This collection measures at least 2.1 x 1.4 x 2.3 cm in transverse, AP, and craniocaudal dimensions   respectively. Asymmetric effacement of the right piriform sinus. Unremarkable infraglottic trachea. Minor asymmetric stranding of the right parapharyngeal fat. The oral cavity is partially obscured by streak artifact from dental amalgam/restorations.   Visualized floor of mouth structures are unremarkable.    LYMPH NODES: No pathologic lymph nodes by size or morphology criteria.     SALIVARY GLANDS: Normal parotid and submandibular glands.    THYROID: Normal.     VESSELS: Mild atherosclerotic plaque at the carotid bifurcations.    VISUALIZED INTRACRANIAL/ORBITS/SINUSES: Minor senescent change of the included intracranial compartment. Visualized paranasal sinuses and  mastoid air cells are clear.    OTHER: Multilevel cervical spondylosis without destructive osseous lesion. The included lung apices are clear.      Impression    IMPRESSION:   1.  Findings highly concerning for an approximately 2 cm retropharyngeal abscess centered posterior to the right piriform sinus containing small locules of gas with more extensive retropharyngeal edema extending cranially to the level the oropharynx.   This may relate to recent this may relate to recent pharyngeal mucosal injury or aggressive infection. Close clinical follow-up is advised.    Findings were called to Dr. Urena at 2/10/2024 10:03 AM CST by Dr. DESIRE Arredondo.

## 2024-02-11 ENCOUNTER — APPOINTMENT (OUTPATIENT)
Dept: GENERAL RADIOLOGY | Facility: CLINIC | Age: 79
DRG: 392 | End: 2024-02-11
Attending: OTOLARYNGOLOGY
Payer: COMMERCIAL

## 2024-02-11 LAB
GLUCOSE BLDC GLUCOMTR-MCNC: 108 MG/DL (ref 70–99)
GLUCOSE BLDC GLUCOMTR-MCNC: 112 MG/DL (ref 70–99)

## 2024-02-11 PROCEDURE — 250N000012 HC RX MED GY IP 250 OP 636 PS 637: Performed by: INTERNAL MEDICINE

## 2024-02-11 PROCEDURE — 250N000011 HC RX IP 250 OP 636: Performed by: INTERNAL MEDICINE

## 2024-02-11 PROCEDURE — 250N000009 HC RX 250: Performed by: INTERNAL MEDICINE

## 2024-02-11 PROCEDURE — 74220 X-RAY XM ESOPHAGUS 1CNTRST: CPT

## 2024-02-11 PROCEDURE — 99232 SBSQ HOSP IP/OBS MODERATE 35: CPT | Performed by: INTERNAL MEDICINE

## 2024-02-11 PROCEDURE — 250N000013 HC RX MED GY IP 250 OP 250 PS 637: Performed by: INTERNAL MEDICINE

## 2024-02-11 PROCEDURE — 120N000001 HC R&B MED SURG/OB

## 2024-02-11 RX ORDER — METRONIDAZOLE 500 MG/100ML
500 INJECTION, SOLUTION INTRAVENOUS EVERY 12 HOURS
Status: DISCONTINUED | OUTPATIENT
Start: 2024-02-11 | End: 2024-02-12 | Stop reason: HOSPADM

## 2024-02-11 RX ADMIN — METRONIDAZOLE 500 MG: 500 INJECTION, SOLUTION INTRAVENOUS at 10:27

## 2024-02-11 RX ADMIN — CEFTRIAXONE 1 G: 1 INJECTION, POWDER, FOR SOLUTION INTRAMUSCULAR; INTRAVENOUS at 09:38

## 2024-02-11 RX ADMIN — DIATRIZOATE MEGLUMINE AND DIATRIZOATE SODIUM 30 ML: 660; 100 SOLUTION ORAL; RECTAL at 09:11

## 2024-02-11 RX ADMIN — DEXAMETHASONE 6 MG: 2 TABLET ORAL at 11:30

## 2024-02-11 RX ADMIN — METRONIDAZOLE 500 MG: 500 INJECTION, SOLUTION INTRAVENOUS at 01:55

## 2024-02-11 RX ADMIN — BRIMONIDINE TARTRATE 1 DROP: 2 SOLUTION/ DROPS OPHTHALMIC at 20:08

## 2024-02-11 RX ADMIN — TRAZODONE HYDROCHLORIDE 100 MG: 100 TABLET ORAL at 22:04

## 2024-02-11 RX ADMIN — METRONIDAZOLE 500 MG: 500 INJECTION, SOLUTION INTRAVENOUS at 22:04

## 2024-02-11 RX ADMIN — PRAMIPEXOLE DIHYDROCHLORIDE 1 MG: 0.25 TABLET ORAL at 09:41

## 2024-02-11 RX ADMIN — PRAMIPEXOLE DIHYDROCHLORIDE 1 MG: 0.25 TABLET ORAL at 20:00

## 2024-02-11 RX ADMIN — PANTOPRAZOLE SODIUM 40 MG: 40 TABLET, DELAYED RELEASE ORAL at 09:42

## 2024-02-11 RX ADMIN — ATORVASTATIN CALCIUM 20 MG: 20 TABLET, FILM COATED ORAL at 20:00

## 2024-02-11 RX ADMIN — BRIMONIDINE TARTRATE 1 DROP: 2 SOLUTION/ DROPS OPHTHALMIC at 09:43

## 2024-02-11 RX ADMIN — AMLODIPINE BESYLATE 2.5 MG: 2.5 TABLET ORAL at 09:42

## 2024-02-11 RX ADMIN — DEXAMETHASONE 6 MG: 2 TABLET ORAL at 20:02

## 2024-02-11 ASSESSMENT — ACTIVITIES OF DAILY LIVING (ADL)
ADLS_ACUITY_SCORE: 21

## 2024-02-11 NOTE — PLAN OF CARE
ICU End of Shift Summary.  For vital signs and complete assessments, please see documentation flowsheets.      Pertinent assessments:   Patient alert and oriented x4, VSS on RA. Pt on home CPAP overnight while sleeping. Tele SR, w/ 1AVB. Pt SBA with ambulation. PIV to right arm with D5/NS @ 100mL/hr. Plan for gastrografin esophagram today.     Discharge/Transfer Needs: TBD     Bedside Shift Report Completed : yes  Bedside Safety Check Completed: yes

## 2024-02-11 NOTE — PLAN OF CARE
Goal Outcome Evaluation:      Plan of Care Reviewed With: patient, spouse        Pt arrived on the unit @1145. A&O x4, vitals stable, independent in the room. Voiding. Denies pain. Diet advanced to regular. Pt is tolerating soft foods. IV rocephin. Q12 dexamethasone, improvement noted in throat swelling. Home CPAP in the room. Plan is home tomorrow.

## 2024-02-11 NOTE — PLAN OF CARE
Goal Outcome Evaluation:      Plan of Care Reviewed With: patient    Overall Patient Progress: improvingOverall Patient Progress: improving  ICU End of Shift Summary.  For vital signs and complete assessments, please see documentation flowsheets.     Pertinent assessments: Neurologically a/o to all spheres and pleasant. Difficulty swallowing and sore throat improving, steroids initiated. Bp/MAP wnl. Rhythm sinus with 1AVB. On RA and protecting his airway. Tolerating clears. ENT following. Family updated at bedside.     Major Shift Events: As outlined above  Plan (Upcoming Events): POC ongoing  Discharge/Transfer Needs: To transfer to [facility], report called and family updated    Bedside Shift Report Completed : y  Bedside Safety Check Completed: y

## 2024-02-11 NOTE — PROGRESS NOTES
[facility]    Hospitalist Progress Note  Provider : Sridevi Nunes MD, MD  Date of Service (when I saw the patient): 02/11/2024    Assessment & Plan   Toni Collado is a 78 year old male patient with past medical history of hypertension, hyperlipidemia, obstructive sleep apnea, uses CPAP at night, GERD, depression, anxiety, restless leg syndrome, chronic oropharyngeal dysphagia with recent outpatient procedure who presented to emergency room for evaluation for difficulty swallowing. He stated that he has longstanding history of difficulty swallowing. He had modified barium swallow on 12/5/2023 and was diagnosed with esophageal diverticulum. He was seen by ENT and underwent rigid cervical esophagoscopy on 2/8/2023 but unable to complete the procedure because of the anatomy of the neck. He stated that he continued to have worsening symptoms and came to emergency room for evaluation.  Vitals are stable on arrival to emergency room. CBC and BMP is also normal. CT of the neck was done and showed findings highly concerning for an approximately 2 cm retropharyngeal abscess centered posterior to the right piriform sinus. ENT was consulted and recommended admission for further evaluation. No urgent procedure was planned. He was started on Ceftriaxone and flagyl. He was admitted to ICU for management.       Oropharyngeal dysphagia with Zenker's diverticulum  Concern for retropharyngeal abscess  Patient has difficulty swallowing. Patient had recent rigid cervical esophagoscopy on 2/8/2023 with failed attempt to remove the diverticulum. Denies fever. Doesn't look septic. CBC normal. CT neck concerning for retropharyngeal abscess.  No evidence of airway compromise at this time.    --Seen by Dr. Erazo. Per ENT, he is highly unlikely that he has an abscess  --Will continue Ceftriaxone and flagyl for now  --Will keep him NPO. Continue IV fluids     Obstructive sleep apnea  Patient uses CPAP at  night.  --Continue CPAP during sleep     Hypertension  --Will continue amlodipine     GERD: Continue omeprazole      Depression/Anxiety: continue trazodone     Restless leg syndrome  --will continue mirapex     DVT Prophylaxis: Pneumatic Compression Devices  Code Status: Full Code    Disposition: Expected discharge in 1-2 days     Sridevi Nunes MD    Interval History   Patient seen and examined. Chart reviewed. Patient stated that he still has difficulty swallowing but feeling a little better. Denies nausea or vomiting. Has no fever. Also denies cough or shortness of breath.     -Data reviewed today: I reviewed all new labs and imaging results over the last 24 hours. I personally reviewed no images or EKG's today.    Physical Exam   Temp: 98.3  F (36.8  C) Temp src: Oral BP: (!) 145/85 Pulse: 76   Resp: 18 SpO2: 95 % O2 Device: None (Room air)    Vitals:    02/10/24 0843 02/10/24 1401   Weight: 107.5 kg (237 lb) 109.3 kg (241 lb)     Vital Signs with Ranges  Temp:  [98  F (36.7  C)-98.3  F (36.8  C)] 98.3  F (36.8  C)  Pulse:  [55-82] 76  Resp:  [11-39] 18  BP: (115-156)/(64-92) 145/85  SpO2:  [85 %-98 %] 95 %  I/O last 3 completed shifts:  In: 1573.33 [I.V.:1573.33]  Out: 1450 [Urine:1450]    GEN:  Alert, oriented x 3, appears comfortable, NAD.  HEENT:  Normocephalic/atraumatic, no scleral icterus, no nasal discharge, mouth moist.  CV:  Regular rate and rhythm, no murmur or JVD.  S1 + S2 noted, no S3 or S4.  LUNGS:  Clear to auscultation bilaterally without rales/rhonchi/wheezing/retractions.  Symmetric chest rise on inhalation noted.  ABD:  Active bowel sounds, soft, non-tender/non-distended.  No rebound/guarding/rigidity.  EXT:  No edema or cyanosis.  Hands/feet warm to touch with good signs of peripheral perfusion.  No joint synovitis noted.  SKIN:  Dry to touch, no exanthems noted in the visualized areas.  NEURO:  Symmetric muscle strength, sensation to touch grossly intact.  No new focal deficits  appreciated.    Medications    dextrose 5% and 0.9% NaCl 100 mL/hr at 02/11/24 0600      amLODIPine  2.5 mg Oral Daily    atorvastatin  20 mg Oral QPM    brimonidine  1 drop Left Eye BID    cefTRIAXone  1 g Intravenous Q24H    metroNIDAZOLE  500 mg Intravenous Q8H    pantoprazole  40 mg Oral Daily    pramipexole  1 mg Oral BID    traZODone  100 mg Oral At Bedtime       Data   Recent Labs   Lab 02/11/24  0757 02/11/24  0347 02/10/24  2332 02/10/24  1401 02/10/24  1024 02/10/24  0908   WBC  --   --   --   --  8.3  --    HGB  --   --   --   --  13.7  --    MCV  --   --   --   --  92  --    PLT  --   --   --   --  168  --    NA  --   --   --   --  137  --    POTASSIUM  --   --   --   --  4.0  --    CHLORIDE  --   --   --   --  100  --    CO2  --   --   --   --  26  --    BUN  --   --   --   --  25.2*  --    CR  --   --   --   --  1.17 1.3   ANIONGAP  --   --   --   --  11  --    NITHIN  --   --   --   --  8.9  --    * 112* 126*   < > 90  --     < > = values in this interval not displayed.       Recent Results (from the past 24 hour(s))   Soft tissue neck CT w contrast    Narrative    EXAM: CT SOFT TISSUE NECK W CONTRAST  LOCATION: [facility]  DATE: 2/10/2024    INDICATION: Throat swelling and pain  COMPARISON: None.  CONTRAST: 90mL Isovue 370  TECHNIQUE: Routine CT Soft Tissue Neck with IV contrast. Multiplanar reformats. Dose reduction techniques were used.    FINDINGS:     MUCOSAL SPACES/SOFT TISSUES: Mild mucosal thickening involving the posterior pharyngeal wall with fairly diffuse retropharyngeal edema extending from the oropharynx through the cricoid cartilage. There is a focal collection of retropharyngeal fluid and   gas with faint peripheral enhancement centered posterior to the right piriform sinus highly concerning for retropharyngeal abscess formation. This collection measures at least 2.1 x 1.4 x 2.3 cm in transverse, AP, and craniocaudal dimensions   respectively. Asymmetric  effacement of the right piriform sinus. Unremarkable infraglottic trachea. Minor asymmetric stranding of the right parapharyngeal fat. The oral cavity is partially obscured by streak artifact from dental amalgam/restorations.   Visualized floor of mouth structures are unremarkable.    LYMPH NODES: No pathologic lymph nodes by size or morphology criteria.     SALIVARY GLANDS: Normal parotid and submandibular glands.    THYROID: Normal.     VESSELS: Mild atherosclerotic plaque at the carotid bifurcations.    VISUALIZED INTRACRANIAL/ORBITS/SINUSES: Minor senescent change of the included intracranial compartment. Visualized paranasal sinuses and mastoid air cells are clear.    OTHER: Multilevel cervical spondylosis without destructive osseous lesion. The included lung apices are clear.      Impression    IMPRESSION:   1.  Findings highly concerning for an approximately 2 cm retropharyngeal abscess centered posterior to the right piriform sinus containing small locules of gas with more extensive retropharyngeal edema extending cranially to the level the oropharynx.   This may relate to recent this may relate to recent pharyngeal mucosal injury or aggressive infection. Close clinical follow-up is advised.    Findings were called to Dr. Urena at 2/10/2024 10:03 AM CST by Dr. DESIRE Arredondo.

## 2024-02-11 NOTE — PROGRESS NOTES
"ENT    Pain in throat is improved about 50%.  He had the gastrograffin esophagram today which was negative for leak. Wants to eat.  Tolerating water now.    EXAM  BP (!) 145/85   Pulse 74   Temp 98.1  F (36.7  C) (Oral)   Resp 22   Ht 1.676 m (5' 6\")   Wt 109.3 kg (241 lb)   SpO2 95%   BMI 38.90 kg/m    OC/OP: normal  Neck normal    Narrative & Impression   EXAM: XR GASTROGRAFIN ESOPHAGRAM  LOCATION: [facility]  DATE: 02/11/2024     INDICATION: Severe throat pain after endoscopy.  COMPARISON: None.  TECHNIQUE: Routine.     FINDINGS:   FLUOROSCOPIC TIME: 1.7 minutes.  NUMBER OF IMAGES: 3 fluoroscopic images and 3 clips.     ESOPHAGUS: Unremarkable single contrast water soluble esophagram. No evidence for contrast leakage. Normal peristalsis. No strictures, masses, or inflammatory changes.                                                                      IMPRESSION:   No evidence for a contrast leak.       IMP/PLAN  Traumatic swelling of hypopharynx from rigid esophagoscopy.  Pain improving. No abscess or major infection, no leak. Suspect the inflamed small zenkers diverticulum looked like an abscess on the CT. Out of abundance of caution should continue antibioitics in case he has a low grade infection, Augmentin is fine. Continue 7 day course at home. Advance diet.  May be discharged to home tomorrow morning. ENT does not need to round tomorrow. Follow up with Dr. Gonzales in 1 week.  OK for a few dose of steroid in hospital. No need for outpatient steroids.    Leander Erazo M.D.    "

## 2024-02-12 VITALS
BODY MASS INDEX: 38.73 KG/M2 | WEIGHT: 241 LBS | OXYGEN SATURATION: 95 % | TEMPERATURE: 97.6 F | HEIGHT: 66 IN | RESPIRATION RATE: 18 BRPM | DIASTOLIC BLOOD PRESSURE: 75 MMHG | SYSTOLIC BLOOD PRESSURE: 149 MMHG | HEART RATE: 76 BPM

## 2024-02-12 PROCEDURE — 99238 HOSP IP/OBS DSCHRG MGMT 30/<: CPT | Performed by: INTERNAL MEDICINE

## 2024-02-12 PROCEDURE — 250N000013 HC RX MED GY IP 250 OP 250 PS 637: Performed by: INTERNAL MEDICINE

## 2024-02-12 PROCEDURE — 250N000012 HC RX MED GY IP 250 OP 636 PS 637: Performed by: INTERNAL MEDICINE

## 2024-02-12 RX ORDER — DEXAMETHASONE 6 MG/1
6 TABLET ORAL
Qty: 3 TABLET | Refills: 0 | Status: ON HOLD | OUTPATIENT
Start: 2024-02-12 | End: 2024-06-25

## 2024-02-12 RX ORDER — CLINDAMYCIN HCL 300 MG
300 CAPSULE ORAL 4 TIMES DAILY
Qty: 16 CAPSULE | Refills: 0 | Status: SHIPPED | OUTPATIENT
Start: 2024-02-12 | End: 2024-02-16

## 2024-02-12 RX ORDER — ACETAMINOPHEN 325 MG/1
650 TABLET ORAL EVERY 4 HOURS PRN
Status: ON HOLD | COMMUNITY
Start: 2024-02-12 | End: 2024-07-16

## 2024-02-12 RX ORDER — SACCHAROMYCES BOULARDII 250 MG
250 CAPSULE ORAL 2 TIMES DAILY
Qty: 14 CAPSULE | Refills: 0 | Status: SHIPPED | OUTPATIENT
Start: 2024-02-12 | End: 2024-02-19

## 2024-02-12 RX ADMIN — AMLODIPINE BESYLATE 2.5 MG: 2.5 TABLET ORAL at 07:49

## 2024-02-12 RX ADMIN — DEXAMETHASONE 6 MG: 2 TABLET ORAL at 07:49

## 2024-02-12 RX ADMIN — PRAMIPEXOLE DIHYDROCHLORIDE 1 MG: 0.25 TABLET ORAL at 07:48

## 2024-02-12 RX ADMIN — PANTOPRAZOLE SODIUM 40 MG: 40 TABLET, DELAYED RELEASE ORAL at 07:49

## 2024-02-12 ASSESSMENT — ACTIVITIES OF DAILY LIVING (ADL)
ADLS_ACUITY_SCORE: 20

## 2024-02-12 NOTE — PROGRESS NOTES
"Patient alert and oriented x4, and in no acute distress. Able to make needs known. On RA. Denies pain.Up independently. Pt on home CPAP overnight while sleeping.  No swallowing issues reported. Family at bedside visiting. POC ongoing.    BP (!) 146/81 (BP Location: Right arm)   Pulse 94   Temp 97.7  F (36.5  C) (Temporal)   Resp 18   Ht 1.676 m (5' 6\")   Wt 109.3 kg (241 lb)   SpO2 93%   BMI 38.90 kg/m       "

## 2024-02-12 NOTE — DISCHARGE SUMMARY
"[facility]  Hospitalist Discharge Summary      Date of Admission:  2/10/2024  Date of Discharge:  2/12/2024  Discharging Provider: Ovidio Ceballos MD  Discharge Service: Hospitalist Service    Discharge Diagnoses   Oropharyngeal dysphagia with Zenker's diverticulum with   Concern for retropharyngeal abscess  Obstructive sleep apnea  Hypertension  GERD  Depression  Anxiety  Restless leg syndrome  Full code      Clinically Significant Risk Factors     # Obesity: Estimated body mass index is 38.9 kg/m  as calculated from the following:    Height as of this encounter: 1.676 m (5' 6\").    Weight as of this encounter: 109.3 kg (241 lb).       Follow-ups Needed After Discharge   Follow-up Appointments     Follow-up and recommended labs and tests       Follow up with primary care provider, Yordan Hernandez, within 7 days for   hospital follow- up.  The following labs/tests are recommended: CBC    Follow up with [facility] Dr. Gonzales in 1 week.   .            Unresulted Labs Ordered in the Past 30 Days of this Admission       Date and Time Order Name Status Description    2/10/2024 10:04 AM Blood Culture Arm, Left Preliminary     2/10/2024 10:04 AM Blood Culture Hand, Left Preliminary         These results will be followed up by Yordan Hernandez      Discharge Disposition   Discharged to home  Condition at discharge: Stable    Hospital Course   Toni Collado is a 78-year-old gentleman with hypertension, hypercholesteremia, BRITTNI using CPAP at night, GERD, depression, anxiety, RLS, chronic oropharyngeal dysphagia who had outpatient rigid cervical esophagoscope be on 2/8/2023 came into [facility] on 2/10/2024 with symptoms of swallowing difficulty.  Patient's CBC was normal CT scan of the neck showed findings highly concerning for approximately 2 cm retropharyngeal abscess centered posterior to the right pyriform sinus sinus.  ENT was consulted and had earlier recommended patient be admitted " to the hospital and started on antibiotics.  Patient was started on ceftriaxone and Flagyl because of penicillin allergy.  Subsequently patient was evaluated by Dr. Castro GEE otolaryngologist who felt that the CT scan finding could represent Zenker's diverticulum nevertheless out of abundant caution prescribed antibiotics to be continued for a total of 7 days.  Patient was prescribed clindamycin in association with Culturelle probiotic regarding this.  ENT did not wanted steroids to be continued after discharge but patient states that steroids have made his pain go away completely and would like to taper it off from 6 mg twice daily to 6 mg once daily for the next 3 days..  Patient is discharged home in a hemodynamically stable condition though the blood pressure was somewhat elevated at 149/75.   He is asked to follow-up with otolaryngologist ENT physician Dr. Gonzales in 1 week.     Consultations This Hospital Stay   ENT IP CONSULT    Code Status   Full Code    Time Spent on this Encounter   I, Ovidio Ceballos MD, personally saw the patient today and spent less than or equal to 30 minutes discharging this patient.       Ovidio Ceballos MD  [facility]  [address] [place] [address]  Phone: [phone]  Fax: [phone]  ______________________________________________________________________    Physical Exam   Vital Signs: Temp: 97.6  F (36.4  C) Temp src: Temporal BP: (!) 149/75 Pulse: 76   Resp: 18 SpO2: 95 % O2 Device: None (Room air)    Weight: 241 lbs 0 oz    GENERAL: Patient does not look in any acute distress  HEENT: EOM+, Conjunctiva is clear   NECK: , no Jugular Venous distention  HEART: S1 S2 regular  Rate and Rhythm,  no murmur,    LUNGS: Respirations are  not laboured, Lungs are clear to auscultation without Crepitations or Wheezing   ABDOMEN: Soft , there is no tenderness ,  Bowel Sounds are  Positive   LOWER LIMBS:  Pedal Edema  Bilaterally   CNS:  Alert,  Oriented  x 3, Moving all the Four Limbs          Primary Care Physician   Yordan Hernandez    Discharge Orders      Reason for your hospital stay    presented to emergency room for evaluation for difficulty swallowing.     Follow-up and recommended labs and tests     Follow up with primary care provider, Yordan Hernandez, within 7 days for hospital follow- up.  The following labs/tests are recommended: CBC    Follow up with [facility] Dr. Gonzales in 1 week.   .     Activity    Your activity upon discharge: activity as tolerated     Diet    Follow this diet upon discharge: Orders Placed This Encounter      Combination Diet Regular Diet       Significant Results and Procedures   Most Recent 3 CBC's:  Recent Labs   Lab Test 02/10/24  1024 12/01/23  1244 06/15/22  1515   WBC 8.3 5.6 6.4   HGB 13.7 14.8 15.0   MCV 92 93 91    161 188     Most Recent 3 BMP's:  Recent Labs   Lab Test 02/11/24  0757 02/11/24  0347 02/10/24  2332 02/10/24  1401 02/10/24  1024 02/10/24  0908 12/01/23  1244 07/19/23  1026   NA  --   --   --   --  137  --  133* 140   POTASSIUM  --   --   --   --  4.0  --  4.2 4.3   CHLORIDE  --   --   --   --  100  --  98 105   CO2  --   --   --   --  26  --  25 23   BUN  --   --   --   --  25.2*  --  18.8 19.8   CR  --   --   --   --  1.17 1.3 1.03 1.09   ANIONGAP  --   --   --   --  11  --  10 12   NITHIN  --   --   --   --  8.9  --  8.8 9.3   * 112* 126*   < > 90  --  101* 96    < > = values in this interval not displayed.   ,   Results for orders placed or performed during the hospital encounter of 02/10/24   Soft tissue neck CT w contrast    Narrative    EXAM: CT SOFT TISSUE NECK W CONTRAST  LOCATION: [facility]  DATE: 2/10/2024    INDICATION: Throat swelling and pain  COMPARISON: None.  CONTRAST: 90mL Isovue 370  TECHNIQUE: Routine CT Soft Tissue Neck with IV contrast. Multiplanar reformats. Dose reduction techniques were used.    FINDINGS:     MUCOSAL SPACES/SOFT TISSUES: Mild  mucosal thickening involving the posterior pharyngeal wall with fairly diffuse retropharyngeal edema extending from the oropharynx through the cricoid cartilage. There is a focal collection of retropharyngeal fluid and   gas with faint peripheral enhancement centered posterior to the right piriform sinus highly concerning for retropharyngeal abscess formation. This collection measures at least 2.1 x 1.4 x 2.3 cm in transverse, AP, and craniocaudal dimensions   respectively. Asymmetric effacement of the right piriform sinus. Unremarkable infraglottic trachea. Minor asymmetric stranding of the right parapharyngeal fat. The oral cavity is partially obscured by streak artifact from dental amalgam/restorations.   Visualized floor of mouth structures are unremarkable.    LYMPH NODES: No pathologic lymph nodes by size or morphology criteria.     SALIVARY GLANDS: Normal parotid and submandibular glands.    THYROID: Normal.     VESSELS: Mild atherosclerotic plaque at the carotid bifurcations.    VISUALIZED INTRACRANIAL/ORBITS/SINUSES: Minor senescent change of the included intracranial compartment. Visualized paranasal sinuses and mastoid air cells are clear.    OTHER: Multilevel cervical spondylosis without destructive osseous lesion. The included lung apices are clear.      Impression    IMPRESSION:   1.  Findings highly concerning for an approximately 2 cm retropharyngeal abscess centered posterior to the right piriform sinus containing small locules of gas with more extensive retropharyngeal edema extending cranially to the level the oropharynx.   This may relate to recent this may relate to recent pharyngeal mucosal injury or aggressive infection. Close clinical follow-up is advised.    Findings were called to Dr. Urena at 2/10/2024 10:03 AM CST by Dr. DESIRE Arredondo.   XR Gastrografin Esophagram    Narrative    EXAM: XR GASTROGRAFIN ESOPHAGRAM  LOCATION: [facility]  DATE: 02/11/2024    INDICATION:  Severe throat pain after endoscopy.  COMPARISON: None.  TECHNIQUE: Routine.    FINDINGS:   FLUOROSCOPIC TIME: 1.7 minutes.  NUMBER OF IMAGES: 3 fluoroscopic images and 3 clips.    ESOPHAGUS: Unremarkable single contrast water soluble esophagram. No evidence for contrast leakage. Normal peristalsis. No strictures, masses, or inflammatory changes.      Impression    IMPRESSION:   No evidence for a contrast leak.           Discharge Medications   Current Discharge Medication List        START taking these medications    Details   acetaminophen (TYLENOL) 325 MG tablet Take 2 tablets (650 mg) by mouth every 4 hours as needed for mild pain    Associated Diagnoses: Acute post-operative pain      clindamycin (CLEOCIN) 300 MG capsule Take 1 capsule (300 mg) by mouth 4 times daily for 4 days  Qty: 16 capsule, Refills: 0    Associated Diagnoses: Zenker diverticulum; Acute post-operative pain      dexAMETHasone (DECADRON) 6 MG tablet Take 1 tablet (6 mg) by mouth daily (with breakfast) for 3 days  Qty: 3 tablet, Refills: 0    Associated Diagnoses: Zenker diverticulum; Acute post-operative pain      saccharomyces boulardii (FLORASTOR) 250 MG capsule Take 1 capsule (250 mg) by mouth 2 times daily for 7 days  Qty: 14 capsule, Refills: 0    Associated Diagnoses: Zenker diverticulum; Acute post-operative pain           CONTINUE these medications which have NOT CHANGED    Details   amLODIPine (NORVASC) 5 MG tablet Take 0.5 tablets (2.5 mg) by mouth daily  Qty: 90 tablet, Refills: 3    Associated Diagnoses: Essential hypertension      atorvastatin (LIPITOR) 20 MG tablet Take 1 tablet (20 mg) by mouth daily  Qty: 90 tablet, Refills: 3    Associated Diagnoses: Hypercholesteremia      brimonidine (ALPHAGAN) 0.2 % ophthalmic solution Place 1 drop Into the left eye 2 times daily      meloxicam (MOBIC) 15 MG tablet Take 1 tablet (15 mg) by mouth daily as needed for pain  Qty: 90 tablet, Refills: 2    Associated Diagnoses: Osteoarthritis of  cervical spine, unspecified spinal osteoarthritis complication status      Multiple Vitamins-Minerals (MULTIVITAMIN PO) Take 1 tablet by mouth daily      omeprazole (PRILOSEC) 20 MG DR capsule Take 2 capsules (40 mg) by mouth daily  Qty: 90 capsule, Refills: 3    Comments: Profile Rx: patient will contact pharmacy when needed  Associated Diagnoses: Gastroesophageal reflux disease without esophagitis      pramipexole (MIRAPEX) 1 MG tablet Take 1 tablet (1 mg) by mouth 2 times daily For restless leg syndrome.  Qty: 180 tablet, Refills: 3    Associated Diagnoses: RLS (restless legs syndrome)      sildenafil (REVATIO) 20 MG tablet Take 2 tablets (40 mg) by mouth daily as needed (ED)  Qty: 30 tablet, Refills: 5    Associated Diagnoses: Erectile dysfunction, unspecified erectile dysfunction type      traZODone (DESYREL) 100 MG tablet Take 1 tablet (100 mg) by mouth at bedtime  Qty: 90 tablet, Refills: 3    Comments: Profile Rx: patient will contact pharmacy when needed  Associated Diagnoses: RLS (restless legs syndrome)           Allergies   Allergies   Allergen Reactions    Amoxicillin Other (See Comments) and Unknown     Facial and neck flushing, felt hot    Seasonal Allergies

## 2024-02-12 NOTE — PROGRESS NOTES
Patient alert and oriented. VSS on room air. Patient denied any pain in throat. Stated swallowing has improved and that he ate his full dinner without problem. Stated steroids have made a huge difference. Patient independent in room. Used home CPAP overnight. Plan to discharge home.

## 2024-02-12 NOTE — PLAN OF CARE
Goal Outcome Evaluation:      Assumed care from 2606-2255     Overall Patient Progress: improvingOverall Patient Progress: improving     Pt A & O x 4, pt reports that he completely pain free. Really feels that the steroids have made a big difference. Denies any swallowing issue. Independent in room. Uses home CPAP at night. VSS. Continues with IV ABX. Plan for discharge tomorrow.

## 2024-02-12 NOTE — PROGRESS NOTES
Discharge instructions reviewed with pt and spouse. Verbalizes understanding.  Prescriptions for oxycodone, florastor,and clindamycin sent to patient's pharmacy in [place]. Personal belongings gathered and sent with pt. Pt ambulated off the unit with spouse, accompanied by support staff.  Leaves for home with spouse.

## 2024-02-15 LAB
BACTERIA BLD CULT: NO GROWTH
BACTERIA BLD CULT: NO GROWTH

## 2024-03-04 ENCOUNTER — MYC MEDICAL ADVICE (OUTPATIENT)
Dept: PEDIATRICS | Facility: CLINIC | Age: 79
End: 2024-03-04
Payer: COMMERCIAL

## 2024-03-04 DIAGNOSIS — E78.00 HYPERCHOLESTEREMIA: Primary | ICD-10-CM

## 2024-03-04 RX ORDER — PRAVASTATIN SODIUM 10 MG
10 TABLET ORAL DAILY
Qty: 90 TABLET | Refills: 3 | Status: SHIPPED | OUTPATIENT
Start: 2024-03-04 | End: 2024-05-29

## 2024-04-29 NOTE — TELEPHONE ENCOUNTER
REFERRAL INFORMATION:  Referring Provider:  Yordan Wheat MD  Referring Clinic:  [facility]  Reason for Visit/Diagnosis: Dysphagia, unspecified type      FUTURE VISIT INFORMATION:  Appointment Date: 5/13/24  Appointment Time: 2:20     NOTES STATUS DETAILS   OFFICE NOTE from Referring Provider Internal MYChart message-12/08/23   OFFICE NOTE from Other Specialist Internal Ov 2/11/24-Sridevi Berrios MD    O12/5/23 Vijaya Romero [facility] DISCHARGE SUMMARY/  ED VISITS Internal ED-admit 2/10/24-2/12/24-[facility]   OPERATIVE REPORT Care Everywhere 2/8/24-RIGID ESOPHAGOSCOPY-[facility]   MEDICATION LIST Internal         ENDOSCOPY  Internal/CE   9/13/23-Yordan Wheat MD   COLONOSCOPY Internal 9/13/23-Yordan wheat MD    8/9/19 -[facility]   PERTINENT LABS Internal    PATHOLOGY REPORTS (RELATED) Internal    IMAGING (CT, MRI, EGD, MRCP, Small Bowel Follow Through/SBT, MR/CT Enterography) Internal XR Gastrrografin Esophagram 2/11/24    CT soft tissue neck 2/10/24    XR video swallow 12/05/23    XR Esophagram 12/04/23    CT head 12/1/23

## 2024-05-13 ENCOUNTER — PRE VISIT (OUTPATIENT)
Dept: GASTROENTEROLOGY | Facility: CLINIC | Age: 79
End: 2024-05-13

## 2024-05-15 ENCOUNTER — APPOINTMENT (OUTPATIENT)
Dept: ULTRASOUND IMAGING | Facility: CLINIC | Age: 79
End: 2024-05-15
Attending: EMERGENCY MEDICINE
Payer: COMMERCIAL

## 2024-05-15 ENCOUNTER — APPOINTMENT (OUTPATIENT)
Dept: GENERAL RADIOLOGY | Facility: CLINIC | Age: 79
End: 2024-05-15
Attending: EMERGENCY MEDICINE
Payer: COMMERCIAL

## 2024-05-15 ENCOUNTER — MYC MEDICAL ADVICE (OUTPATIENT)
Dept: PEDIATRICS | Facility: CLINIC | Age: 79
End: 2024-05-15
Payer: COMMERCIAL

## 2024-05-15 ENCOUNTER — APPOINTMENT (OUTPATIENT)
Dept: CT IMAGING | Facility: CLINIC | Age: 79
End: 2024-05-15
Attending: EMERGENCY MEDICINE
Payer: COMMERCIAL

## 2024-05-15 ENCOUNTER — NURSE TRIAGE (OUTPATIENT)
Dept: PEDIATRICS | Facility: CLINIC | Age: 79
End: 2024-05-15
Payer: COMMERCIAL

## 2024-05-15 ENCOUNTER — HOSPITAL ENCOUNTER (EMERGENCY)
Facility: CLINIC | Age: 79
Discharge: HOME | End: 2024-05-15
Attending: EMERGENCY MEDICINE | Admitting: EMERGENCY MEDICINE
Payer: COMMERCIAL

## 2024-05-15 VITALS
HEART RATE: 65 BPM | WEIGHT: 249.56 LBS | SYSTOLIC BLOOD PRESSURE: 121 MMHG | TEMPERATURE: 98.7 F | BODY MASS INDEX: 39.17 KG/M2 | DIASTOLIC BLOOD PRESSURE: 100 MMHG | OXYGEN SATURATION: 96 % | RESPIRATION RATE: 18 BRPM | HEIGHT: 67 IN

## 2024-05-15 DIAGNOSIS — R06.02 SOB (SHORTNESS OF BREATH): ICD-10-CM

## 2024-05-15 DIAGNOSIS — L03.116 CELLULITIS OF LEFT LOWER EXTREMITY: ICD-10-CM

## 2024-05-15 DIAGNOSIS — N28.1 RENAL CYST: ICD-10-CM

## 2024-05-15 LAB
ANION GAP SERPL CALCULATED.3IONS-SCNC: 11 MMOL/L (ref 7–15)
BASOPHILS # BLD AUTO: 0.1 10E3/UL (ref 0–0.2)
BASOPHILS NFR BLD AUTO: 1 %
BUN SERPL-MCNC: 17.8 MG/DL (ref 8–23)
CALCIUM SERPL-MCNC: 9 MG/DL (ref 8.8–10.2)
CHLORIDE SERPL-SCNC: 105 MMOL/L (ref 98–107)
CREAT SERPL-MCNC: 0.97 MG/DL (ref 0.67–1.17)
D DIMER PPP FEU-MCNC: 0.85 UG/ML FEU (ref 0–0.5)
DEPRECATED HCO3 PLAS-SCNC: 25 MMOL/L (ref 22–29)
EGFRCR SERPLBLD CKD-EPI 2021: 80 ML/MIN/1.73M2
EOSINOPHIL # BLD AUTO: 0.2 10E3/UL (ref 0–0.7)
EOSINOPHIL NFR BLD AUTO: 4 %
ERYTHROCYTE [DISTWIDTH] IN BLOOD BY AUTOMATED COUNT: 13.4 % (ref 10–15)
GLUCOSE SERPL-MCNC: 113 MG/DL (ref 70–99)
HCT VFR BLD AUTO: 36.5 % (ref 40–53)
HGB BLD-MCNC: 12.6 G/DL (ref 13.3–17.7)
HOLD SPECIMEN: NORMAL
HOLD SPECIMEN: NORMAL
IMM GRANULOCYTES # BLD: 0 10E3/UL
IMM GRANULOCYTES NFR BLD: 0 %
LYMPHOCYTES # BLD AUTO: 1.1 10E3/UL (ref 0.8–5.3)
LYMPHOCYTES NFR BLD AUTO: 18 %
MCH RBC QN AUTO: 31.1 PG (ref 26.5–33)
MCHC RBC AUTO-ENTMCNC: 34.5 G/DL (ref 31.5–36.5)
MCV RBC AUTO: 90 FL (ref 78–100)
MONOCYTES # BLD AUTO: 0.7 10E3/UL (ref 0–1.3)
MONOCYTES NFR BLD AUTO: 12 %
NEUTROPHILS # BLD AUTO: 3.8 10E3/UL (ref 1.6–8.3)
NEUTROPHILS NFR BLD AUTO: 65 %
NRBC # BLD AUTO: 0 10E3/UL
NRBC BLD AUTO-RTO: 0 /100
NT-PROBNP SERPL-MCNC: 138 PG/ML (ref 0–1800)
PLATELET # BLD AUTO: 178 10E3/UL (ref 150–450)
POTASSIUM SERPL-SCNC: 4 MMOL/L (ref 3.4–5.3)
RBC # BLD AUTO: 4.05 10E6/UL (ref 4.4–5.9)
SODIUM SERPL-SCNC: 141 MMOL/L (ref 135–145)
TROPONIN T SERPL HS-MCNC: 24 NG/L
TROPONIN T SERPL HS-MCNC: 24 NG/L
WBC # BLD AUTO: 5.9 10E3/UL (ref 4–11)

## 2024-05-15 PROCEDURE — 71275 CT ANGIOGRAPHY CHEST: CPT

## 2024-05-15 PROCEDURE — 99285 EMERGENCY DEPT VISIT HI MDM: CPT | Mod: 25

## 2024-05-15 PROCEDURE — 80048 BASIC METABOLIC PNL TOTAL CA: CPT | Performed by: EMERGENCY MEDICINE

## 2024-05-15 PROCEDURE — 83880 ASSAY OF NATRIURETIC PEPTIDE: CPT | Performed by: EMERGENCY MEDICINE

## 2024-05-15 PROCEDURE — 85048 AUTOMATED LEUKOCYTE COUNT: CPT | Performed by: EMERGENCY MEDICINE

## 2024-05-15 PROCEDURE — 85379 FIBRIN DEGRADATION QUANT: CPT | Performed by: EMERGENCY MEDICINE

## 2024-05-15 PROCEDURE — 93970 EXTREMITY STUDY: CPT

## 2024-05-15 PROCEDURE — 36415 COLL VENOUS BLD VENIPUNCTURE: CPT | Performed by: EMERGENCY MEDICINE

## 2024-05-15 PROCEDURE — 250N000011 HC RX IP 250 OP 636: Performed by: EMERGENCY MEDICINE

## 2024-05-15 PROCEDURE — 96365 THER/PROPH/DIAG IV INF INIT: CPT | Mod: 59

## 2024-05-15 PROCEDURE — 84484 ASSAY OF TROPONIN QUANT: CPT | Performed by: EMERGENCY MEDICINE

## 2024-05-15 PROCEDURE — 93005 ELECTROCARDIOGRAM TRACING: CPT

## 2024-05-15 PROCEDURE — 250N000009 HC RX 250: Performed by: EMERGENCY MEDICINE

## 2024-05-15 PROCEDURE — 71046 X-RAY EXAM CHEST 2 VIEWS: CPT

## 2024-05-15 RX ORDER — IOPAMIDOL 755 MG/ML
500 INJECTION, SOLUTION INTRAVASCULAR ONCE
Status: COMPLETED | OUTPATIENT
Start: 2024-05-15 | End: 2024-05-15

## 2024-05-15 RX ORDER — CEFTRIAXONE 1 G/1
1 INJECTION, POWDER, FOR SOLUTION INTRAMUSCULAR; INTRAVENOUS ONCE
Status: COMPLETED | OUTPATIENT
Start: 2024-05-15 | End: 2024-05-15

## 2024-05-15 RX ORDER — CEPHALEXIN 500 MG/1
500 CAPSULE ORAL 4 TIMES DAILY
Qty: 28 CAPSULE | Refills: 0 | Status: SHIPPED | OUTPATIENT
Start: 2024-05-15 | End: 2024-05-22

## 2024-05-15 RX ADMIN — CEFTRIAXONE 1 G: 1 INJECTION, POWDER, FOR SOLUTION INTRAMUSCULAR; INTRAVENOUS at 19:59

## 2024-05-15 RX ADMIN — IOPAMIDOL 73 ML: 755 INJECTION, SOLUTION INTRAVENOUS at 20:27

## 2024-05-15 RX ADMIN — SODIUM CHLORIDE 90 ML: 9 INJECTION, SOLUTION INTRAVENOUS at 20:27

## 2024-05-15 ASSESSMENT — COLUMBIA-SUICIDE SEVERITY RATING SCALE - C-SSRS
1. IN THE PAST MONTH, HAVE YOU WISHED YOU WERE DEAD OR WISHED YOU COULD GO TO SLEEP AND NOT WAKE UP?: NO
6. HAVE YOU EVER DONE ANYTHING, STARTED TO DO ANYTHING, OR PREPARED TO DO ANYTHING TO END YOUR LIFE?: NO
2. HAVE YOU ACTUALLY HAD ANY THOUGHTS OF KILLING YOURSELF IN THE PAST MONTH?: NO

## 2024-05-15 ASSESSMENT — ACTIVITIES OF DAILY LIVING (ADL)
ADLS_ACUITY_SCORE: 35
ADLS_ACUITY_SCORE: 33
ADLS_ACUITY_SCORE: 35

## 2024-05-15 NOTE — TELEPHONE ENCOUNTER
RN attempted to reach patient to triage. LVMTCB and speak with a triage nurse.     Agueda CAPPS RN on 5/15/2024 at 1:44 PM

## 2024-05-15 NOTE — ED TRIAGE NOTES
SOB with exertion for last 2 weeks. Pain across the top of his chest which comes with exertion as well. + cough. +1 edema BLE.

## 2024-05-15 NOTE — TELEPHONE ENCOUNTER
Patient calls to report chest tightness and heaviness x 2 weeks. This has gotten progressively worse. He reports shortness of breath with exertion such as going up and down the stairs. Pt also reports increased bilateral lower extremity swelling, involving both ankles L>R. He reports his left leg is more swollen due to a recent fall while putting a boat in the water from the boat lift. Pt also reports a cough and GI issues. GI issues have been ongoing for past few months since being diagnosed with diverticulitis. Intermittent diarrhea. Dispo is to call 911 now, patient agreeable to ER but will have his wife drive him.    Phuong Qureshi RN on 5/15/2024 at 3:19 PM      Reason for Disposition   Chest pain   Chest pain lasting longer than 5 minutes and ANY of the following:         Pain is crushing, pressure-like, or heavy         Over 44 years old          Over 30 years old and one cardiac risk factor (e.g diabetes, high blood pressure, high cholesterol, smoker, or family history of heart disease)         History of heart disease (e.g. angina, heart attack, heart failure, bypass surgery, takes nitroglycerin)    Additional Information   Negative: SEVERE difficulty breathing (e.g., struggling for each breath, speaks in single words, pulse > 120)   Negative: Breathing stopped and hasn't returned   Negative: Choking on something   Negative: Bluish (or gray) lips or face   Negative: Difficult to awaken or acting confused (e.g., disoriented, slurred speech)   Negative: Passed out (i.e., fainted, collapsed and was not responding)   Negative: Wheezing started suddenly after medicine, an allergic food, or bee sting   Negative: Stridor (harsh sound while breathing in)   Negative: Slow, shallow and weak breathing   Negative: Sounds like a life-threatening emergency to the triager   Negative: SEVERE difficulty breathing (e.g., struggling for each breath, speaks in single words)   Negative: Difficult to awaken or acting confused  (e.g., disoriented, slurred speech)   Negative: Shock suspected (e.g., cold/pale/clammy skin, too weak to stand, low BP, rapid pulse)   Negative: Passed out (i.e., lost consciousness, collapsed and was not responding)   Negative: Heart beating < 50 beats per minute OR > 140 beats per minute   Negative: Visible sweat on face or sweat dripping down face   Negative: Sounds like a life-threatening emergency to the triager   Negative: Followed an injury to chest   Negative: SEVERE chest pain   Negative: Pain also in shoulder(s) or arm(s) or jaw    Answer Assessment - Initial Assessment Questions  1. RESPIRATORY STATUS: denies wheezing, hard to catch breath with any exertion  2. ONSET: 2 weeks  3. PATTERN Worse with exertion-breathing normally at rest   5. RECURRENT SYMPTOM: Denies  6. CARDIAC HISTORY: Denies  7. LUNG HISTORY: Denies  8. CAUSE: Unsure  9. OTHER SYMPTOMS: Reports: chest heaviness, cough,      Denies: dizziness, chest pain, fever  10. O2 SATURATION MONITOR:  Denies   11. PREGNANCY: n/a  12. TRAVEL: Denies    Answer Assessment - Initial Assessment Questions  1. LOCATION: Chest heaviness/tightness middle of sternum   2. RADIATION: Denies  3. ONSET: 2 weeks ago   4. PATTERN: Intermittent, worse with exertion   5. DURATION: resolves once he sits down and rests  6. SEVERITY: feels pressure more than pain  7. CARDIAC RISK FACTORS:        Reports: HTN, high cholesterol,       Denies: MI, DM, tobacco use, 2 brothers with stent placements   8. PULMONARY RISK FACTORS:  Denies  9. CAUSE:  Unsure   10. OTHER SYMPTOMS:   Reports: cough, dyspnea on exertion, intermittent diarrhea         Denies: dizziness, fever, vomiting    Protocols used: Breathing Difficulty-A-OH, Chest Pain-A-OH

## 2024-05-15 NOTE — ED PROVIDER NOTES
Emergency Department Note      History of Present Illness     Chief Complaint  Shortness of Breath and Chest Pain    HPI  Toni Collado is a 78 year old male with a history of BRITTNI, oropharyngeal dysphagia, and GERD who presents with his wife for an evaluation for shortness of breath and chest pain. He states he feels a heaviness in his chest and dyspnea on exertion for the past two weeks.No SOB at rest or laying flat. Adds that he has bilateral lower extremity edema and notes that the left leg became swollen before the right leg. Reports that he fell off of a dock and scraped his left leg before onset of his symptoms. Notes that he has orthopnea at baseline but has been sleeping fine. He has no history of heart failure, cardiac stents, or blood clots. He is not on blood thinners. He denies fever or chills.     Independent Historian  Wife as detailed above.    Review of External Notes  I reviewed nurse triage note from today when the patient called regarding shortness of breath.  Past Medical History   Medical History and Problem List  Adenomatous colon polyp  Bulbar polio  Cervical osteoarthritis  Chronic anxiety  Depression  GERD  BRITTNI   Restless leg syndrome  Oropharyngeal dysphasia  BPPV  Zenker diverticulum     Medications  Amlodipine  Dexamethasone  Meloxicam  Omeprazole  Pramipexole  Pravastatin  Sildenafil  Trazodone     Surgical History   Cataract removal, bilateral  Umbilical hernia repair  Cervical spine radiofrequency ablation of nerves  Rhinoplasty  Septoplasty  Shoulder surgery, bilateral  Knee surgery, left    Physical Exam   Patient Vitals for the past 24 hrs:   BP Temp Temp src Pulse Resp SpO2 Height Weight   05/15/24 2000 (!) 121/100 -- -- 68 -- 95 % -- --   05/15/24 1930 136/81 -- -- 67 -- 97 % -- --   05/15/24 1845 (!) 158/90 -- -- 68 -- 95 % -- --   05/15/24 1830 (!) 156/85 -- -- 68 -- 96 % -- --   05/15/24 1815 139/79 -- -- 63 -- 97 % -- --   05/15/24 1800 (!) 158/91 -- -- 70 -- 97 % -- --  "  05/15/24 1745 (!) 144/78 -- -- 65 -- 98 % -- --   05/15/24 1730 (!) 142/77 -- -- 67 -- 96 % -- --   05/15/24 1715 (!) 142/76 -- -- 65 -- 97 % -- --   05/15/24 1710 (!) 160/81 -- -- 65 -- 95 % -- --   05/15/24 1700 -- -- -- 69 -- 96 % -- --   05/15/24 1655 (!) 160/83 -- -- 67 -- -- -- --   05/15/24 1551 110/81 98.7  F (37.1  C) Oral 76 18 97 % 1.702 m (5' 7\") 113.2 kg (249 lb 9 oz)     Physical Exam  Vitals reviewed.   Constitutional:       General: He is not in acute distress.     Appearance: He is not ill-appearing.   HENT:      Head: Normocephalic and atraumatic.   Eyes:      Extraocular Movements: Extraocular movements intact.   Cardiovascular:      Rate and Rhythm: Normal rate and regular rhythm.   Pulmonary:      Effort: Pulmonary effort is normal. No respiratory distress.      Breath sounds: Normal breath sounds. No wheezing.   Abdominal:      Palpations: Abdomen is soft.      Tenderness: There is no abdominal tenderness. There is no guarding.   Musculoskeletal:      Cervical back: Normal range of motion.      Right lower leg: Edema present.      Left lower leg: Edema present.      Comments: Left greater than right lower extremity edema.  On the left anterior tibia there is a small abrasion with surrounding erythema and warmth.  No underlying induration or fluctuance.  No underlying bony deformity or tenderness.    Skin:     General: Skin is warm and dry.   Neurological:      Mental Status: He is alert and oriented to person, place, and time.      GCS: GCS eye subscore is 4. GCS verbal subscore is 5. GCS motor subscore is 6.   Psychiatric:         Behavior: Behavior normal.         Diagnostics   Lab Results   Labs Ordered and Resulted from Time of ED Arrival to Time of ED Departure   BASIC METABOLIC PANEL - Abnormal       Result Value    Sodium 141      Potassium 4.0      Chloride 105      Carbon Dioxide (CO2) 25      Anion Gap 11      Urea Nitrogen 17.8      Creatinine 0.97      GFR Estimate 80      Calcium " 9.0      Glucose 113 (*)    TROPONIN T, HIGH SENSITIVITY - Abnormal    Troponin T, High Sensitivity 24 (*)    CBC WITH PLATELETS AND DIFFERENTIAL - Abnormal    WBC Count 5.9      RBC Count 4.05 (*)     Hemoglobin 12.6 (*)     Hematocrit 36.5 (*)     MCV 90      MCH 31.1      MCHC 34.5      RDW 13.4      Platelet Count 178      % Neutrophils 65      % Lymphocytes 18      % Monocytes 12      % Eosinophils 4      % Basophils 1      % Immature Granulocytes 0      NRBCs per 100 WBC 0      Absolute Neutrophils 3.8      Absolute Lymphocytes 1.1      Absolute Monocytes 0.7      Absolute Eosinophils 0.2      Absolute Basophils 0.1      Absolute Immature Granulocytes 0.0      Absolute NRBCs 0.0     TROPONIN T, HIGH SENSITIVITY - Abnormal    Troponin T, High Sensitivity 24 (*)    D DIMER QUANTITATIVE - Abnormal    D-Dimer Quantitative 0.85 (*)    NT PROBNP INPATIENT - Normal    N terminal Pro BNP Inpatient 138         Imaging  CT Chest Pulmonary Embolism w Contrast   Final Result   IMPRESSION:   1.  No pulmonary embolus or other acute findings in the chest.   2.  Partially visualized low-attenuation lesion within the right kidney, probably represents a cyst although is indeterminant. Could consider evaluation with renal ultrasound.         US Lower Extremity Venous Duplex Bilateral   Final Result   IMPRESSION:   1.  No deep venous thrombosis in the bilateral lower extremities.      XR Chest 2 Views   Final Result   IMPRESSION: Heart size and pulmonary vascularity normal. The lungs are clear. Surgical anchor right humeral head.        EKG   ECG taken at 1611, ECG read at 1620  Sinus rhythm with 1st degree A-V block   Rate 70 bpm. MA interval 246 ms. QRS duration 94 ms. QT/QTc 386/416 ms. P-R-T axes 34 -12 12.    Independent Interpretation  CXR shows no pulm edema  ED Course    Medications Administered  Medications   cefTRIAXone (ROCEPHIN) 1 g vial to attach to  mL bag for ADULTS or NS 50 mL bag for PEDS (0 g Intravenous  Stopped 5/15/24 2058)   CT scan flush (90 mLs Intravenous $Given 5/15/24 2027)   iopamidol (ISOVUE-370) solution 500 mL (73 mLs Intravenous $Given 5/15/24 2027)     Procedures  Procedures     Discussion of Management  None    Social Determinants of Health adding to complexity of care  None    ED Course  ED Course as of 05/15/24 2105   Wed May 15, 2024   1721 I obtained history and examined the patient as noted above.    1805 N-Terminal Pro BNP Inpatient: 138   1811 I rechecked and updated the patient.    1953 I rechecked and updated the patient.    1958 I rechecked and updated the patient. The patient reports that he drove 15,000 miles to [place] three weeks ago.   2059 I rechecked and updated the patient.      Medical Decision Making / Diagnosis   CMS Diagnoses: None    MIPS  CT for PE was ordered because the patient had an abnormal d-dimer.    GT Collado is a 78 year old male who presents today with shortness of breath and chest pain.  States that he only has shortness of breath with exertion.  He also notes lower extremity edema left greater than right.  Reports having an abrasion to the left leg after scraping it on something.  Patient hemodynamically stable on arrival no desaturations noted on the monitor.  Cardiac workup ordered prior to my assessment including troponin.  Troponin slightly elevated at 24.  I discussed with patient and wife plan for repeat troponin in 2 hours, chest x-ray, right extremity Doppler to rule out DVT.  They agree with plan of care.  Imaging studies as noted above no DVT seen.  I discussed with patient further.  He does admit that he had a long drive roughly 1500 miles 3 weeks ago.  He has no prior history of DVTs PEs in the past.  I discussed the plan for adding on a D-dimer.  D-dimer was elevated and CT PE study was ordered.  CT PE study as noted above that showed no pulmonary embolism.  Mildly enlarged heart and also calcifications of the coronary arteries noted.  I  discussed these findings with the patient and wife.  Discussed plan for discharge and follow-up with cardiology.  Referral was placed.  Discussed plan for treatment for suspected cellulitis to the left lower extremity.  Keflex was given.  Discussed with him care instructions regarding cellulitis and return precautions including any increased redness, swelling.  Area was outlined with marker by nurse.  They are instructed to follow-up with cardiology as well as PCP.    Disposition  The patient was discharged.     ICD-10 Codes:    ICD-10-CM    1. Renal cyst  N28.1       2. Cellulitis of left lower extremity  L03.116       3. SOB (shortness of breath)  R06.02 Follow-Up with Cardiology           Discharge Medications  New Prescriptions    CEPHALEXIN (KEFLEX) 500 MG CAPSULE    Take 1 capsule (500 mg) by mouth 4 times daily for 7 days         Scribe Disclosure:  I, Andra Mejia, am serving as a scribe at 5:43 PM on 5/15/2024 to document services personally performed by Samantha Villalta DO based on my observations and the provider's statements to me.     Scribe Disclosure:  IJacqueline, am serving as a scribe  at 8:19 PM on 5/15/2024 to document services personally performed by Samantha Villalta DO based on my observations and the provider's statements to me.         Samantha Villalta DO  05/16/24 2036

## 2024-05-16 LAB
ATRIAL RATE - MUSE: 70 BPM
DIASTOLIC BLOOD PRESSURE - MUSE: NORMAL MMHG
INTERPRETATION ECG - MUSE: NORMAL
P AXIS - MUSE: 34 DEGREES
PR INTERVAL - MUSE: 246 MS
QRS DURATION - MUSE: 94 MS
QT - MUSE: 386 MS
QTC - MUSE: 416 MS
R AXIS - MUSE: -12 DEGREES
SYSTOLIC BLOOD PRESSURE - MUSE: NORMAL MMHG
T AXIS - MUSE: 12 DEGREES
VENTRICULAR RATE- MUSE: 70 BPM

## 2024-05-16 NOTE — ED NOTES
Area of injury and redness to L shin outlined with skin marker per MD Villalta. Pt educated on purpose of skin marker and monitoring infection.

## 2024-05-16 NOTE — DISCHARGE INSTRUCTIONS
Take the antibiotics as directed until complete    A cardiology referral was placed, please make an appointment for follow up     Follow-up with your primary care doctor in 1 to 2 days    Return to the ER for any worsening or concerning symptoms

## 2024-05-17 ENCOUNTER — OFFICE VISIT (OUTPATIENT)
Dept: URGENT CARE | Facility: URGENT CARE | Age: 79
End: 2024-05-17
Payer: COMMERCIAL

## 2024-05-17 ENCOUNTER — TELEPHONE (OUTPATIENT)
Dept: PEDIATRICS | Facility: CLINIC | Age: 79
End: 2024-05-17
Payer: COMMERCIAL

## 2024-05-17 VITALS
HEART RATE: 69 BPM | SYSTOLIC BLOOD PRESSURE: 152 MMHG | OXYGEN SATURATION: 97 % | TEMPERATURE: 98.4 F | DIASTOLIC BLOOD PRESSURE: 79 MMHG

## 2024-05-17 DIAGNOSIS — L03.116 CELLULITIS OF LEFT ANTERIOR LOWER LEG: Primary | ICD-10-CM

## 2024-05-17 LAB
BASOPHILS # BLD AUTO: 0 10E3/UL (ref 0–0.2)
BASOPHILS NFR BLD AUTO: 1 %
EOSINOPHIL # BLD AUTO: 0.2 10E3/UL (ref 0–0.7)
EOSINOPHIL NFR BLD AUTO: 4 %
IMM GRANULOCYTES # BLD: 0 10E3/UL
IMM GRANULOCYTES NFR BLD: 0 %
LYMPHOCYTES # BLD AUTO: 1.2 10E3/UL (ref 0.8–5.3)
LYMPHOCYTES NFR BLD AUTO: 18 %
MONOCYTES # BLD AUTO: 0.7 10E3/UL (ref 0–1.3)
MONOCYTES NFR BLD AUTO: 10 %
NEUTROPHILS # BLD AUTO: 4.5 10E3/UL (ref 1.6–8.3)
NEUTROPHILS NFR BLD AUTO: 68 %
WBC # BLD AUTO: 6.6 10E3/UL (ref 4–11)

## 2024-05-17 PROCEDURE — 36415 COLL VENOUS BLD VENIPUNCTURE: CPT | Performed by: PHYSICIAN ASSISTANT

## 2024-05-17 PROCEDURE — 99214 OFFICE O/P EST MOD 30 MIN: CPT | Performed by: PHYSICIAN ASSISTANT

## 2024-05-17 PROCEDURE — 85048 AUTOMATED LEUKOCYTE COUNT: CPT | Performed by: PHYSICIAN ASSISTANT

## 2024-05-17 PROCEDURE — 85004 AUTOMATED DIFF WBC COUNT: CPT | Performed by: PHYSICIAN ASSISTANT

## 2024-05-17 RX ORDER — DOXYCYCLINE 100 MG/1
100 CAPSULE ORAL 2 TIMES DAILY
Qty: 14 CAPSULE | Refills: 0 | Status: SHIPPED | OUTPATIENT
Start: 2024-05-17 | End: 2024-05-24

## 2024-05-17 NOTE — TELEPHONE ENCOUNTER
S-(situation): Pt noticed increased redness around a wound that happened 2 days ago when he fell off a dock and scraped his leg.    B-(background): Went to the ER. IV- ABX and started on Keflex QID for 7 days. Reddened area has spread 2-3 inches outside of the original injury that was marked with a black pen. Pt just noticed today that the red area was spreading    A-(assessment): no drainage, looks more red. No fever.     R-(recommendations): UC?     Routing to PCP    Debi Pastor RN on 5/17/2024 at 2:35 PM

## 2024-05-17 NOTE — TELEPHONE ENCOUNTER
Call placed to pt with message from provider  Pt verbalized understanding and agrees to the plan    Thank you  Debi Pastor RN on 5/17/2024 at 3:08 PM

## 2024-05-28 ENCOUNTER — HOSPITAL ENCOUNTER (OUTPATIENT)
Dept: ULTRASOUND IMAGING | Facility: CLINIC | Age: 79
Discharge: HOME | End: 2024-05-28
Attending: PREVENTIVE MEDICINE | Admitting: PREVENTIVE MEDICINE
Payer: COMMERCIAL

## 2024-05-28 ENCOUNTER — OFFICE VISIT (OUTPATIENT)
Dept: PEDIATRICS | Facility: CLINIC | Age: 79
End: 2024-05-28
Payer: COMMERCIAL

## 2024-05-28 ENCOUNTER — TELEPHONE (OUTPATIENT)
Dept: PEDIATRICS | Facility: CLINIC | Age: 79
End: 2024-05-28

## 2024-05-28 VITALS
OXYGEN SATURATION: 95 % | HEART RATE: 78 BPM | DIASTOLIC BLOOD PRESSURE: 81 MMHG | TEMPERATURE: 97.9 F | RESPIRATION RATE: 18 BRPM | SYSTOLIC BLOOD PRESSURE: 160 MMHG | WEIGHT: 244 LBS | BODY MASS INDEX: 38.22 KG/M2

## 2024-05-28 DIAGNOSIS — R73.01 IFG (IMPAIRED FASTING GLUCOSE): ICD-10-CM

## 2024-05-28 DIAGNOSIS — D64.9 ANEMIA, UNSPECIFIED TYPE: ICD-10-CM

## 2024-05-28 DIAGNOSIS — R60.0 BILATERAL LEG EDEMA: Primary | ICD-10-CM

## 2024-05-28 DIAGNOSIS — M79.89 LEFT LEG SWELLING: ICD-10-CM

## 2024-05-28 LAB
ALBUMIN SERPL BCG-MCNC: 4.2 G/DL (ref 3.5–5.2)
ALP SERPL-CCNC: 74 U/L (ref 40–150)
ALT SERPL W P-5'-P-CCNC: 34 U/L (ref 0–70)
ANION GAP SERPL CALCULATED.3IONS-SCNC: 16 MMOL/L (ref 7–15)
AST SERPL W P-5'-P-CCNC: 32 U/L (ref 0–45)
BASOPHILS # BLD AUTO: 0.1 10E3/UL (ref 0–0.2)
BASOPHILS NFR BLD AUTO: 1 %
BILIRUB SERPL-MCNC: 0.2 MG/DL
BUN SERPL-MCNC: 18.2 MG/DL (ref 8–23)
CALCIUM SERPL-MCNC: 9.1 MG/DL (ref 8.8–10.2)
CHLORIDE SERPL-SCNC: 105 MMOL/L (ref 98–107)
CK SERPL-CCNC: 128 U/L (ref 39–308)
CREAT SERPL-MCNC: 0.97 MG/DL (ref 0.67–1.17)
CRP SERPL-MCNC: 3.15 MG/L
DEPRECATED HCO3 PLAS-SCNC: 22 MMOL/L (ref 22–29)
EGFRCR SERPLBLD CKD-EPI 2021: 80 ML/MIN/1.73M2
EOSINOPHIL # BLD AUTO: 0.2 10E3/UL (ref 0–0.7)
EOSINOPHIL NFR BLD AUTO: 4 %
ERYTHROCYTE [DISTWIDTH] IN BLOOD BY AUTOMATED COUNT: 13.5 % (ref 10–15)
ERYTHROCYTE [SEDIMENTATION RATE] IN BLOOD BY WESTERGREN METHOD: 13 MM/HR (ref 0–20)
GLUCOSE SERPL-MCNC: 112 MG/DL (ref 70–99)
HBA1C MFR BLD: 5.7 %
HCT VFR BLD AUTO: 38.9 % (ref 40–53)
HGB BLD-MCNC: 13.2 G/DL (ref 13.3–17.7)
IMM GRANULOCYTES # BLD: 0 10E3/UL
IMM GRANULOCYTES NFR BLD: 0 %
IRON BINDING CAPACITY (ROCHE): 369 UG/DL (ref 240–430)
IRON SATN MFR SERPL: 12 % (ref 15–46)
IRON SERPL-MCNC: 46 UG/DL (ref 61–157)
LACTATE SERPL-SCNC: 1.5 MMOL/L (ref 0.7–2)
LYMPHOCYTES # BLD AUTO: 1.1 10E3/UL (ref 0.8–5.3)
LYMPHOCYTES NFR BLD AUTO: 19 %
MCH RBC QN AUTO: 30.9 PG (ref 26.5–33)
MCHC RBC AUTO-ENTMCNC: 33.9 G/DL (ref 31.5–36.5)
MCV RBC AUTO: 91 FL (ref 78–100)
MONOCYTES # BLD AUTO: 0.6 10E3/UL (ref 0–1.3)
MONOCYTES NFR BLD AUTO: 11 %
NEUTROPHILS # BLD AUTO: 3.7 10E3/UL (ref 1.6–8.3)
NEUTROPHILS NFR BLD AUTO: 65 %
NRBC # BLD AUTO: 0 10E3/UL
NRBC BLD AUTO-RTO: 0 /100
NT-PROBNP SERPL-MCNC: 158 PG/ML (ref 0–1800)
PLATELET # BLD AUTO: 226 10E3/UL (ref 150–450)
POTASSIUM SERPL-SCNC: 3.8 MMOL/L (ref 3.4–5.3)
PROCALCITONIN SERPL IA-MCNC: 0.05 NG/ML
PROT SERPL-MCNC: 6.8 G/DL (ref 6.4–8.3)
RBC # BLD AUTO: 4.27 10E6/UL (ref 4.4–5.9)
SODIUM SERPL-SCNC: 143 MMOL/L (ref 135–145)
WBC # BLD AUTO: 5.6 10E3/UL (ref 4–11)

## 2024-05-28 PROCEDURE — 83880 ASSAY OF NATRIURETIC PEPTIDE: CPT | Mod: GZ | Performed by: PREVENTIVE MEDICINE

## 2024-05-28 PROCEDURE — 84145 PROCALCITONIN (PCT): CPT | Performed by: PREVENTIVE MEDICINE

## 2024-05-28 PROCEDURE — 82607 VITAMIN B-12: CPT | Performed by: PREVENTIVE MEDICINE

## 2024-05-28 PROCEDURE — 99215 OFFICE O/P EST HI 40 MIN: CPT | Mod: 25 | Performed by: PREVENTIVE MEDICINE

## 2024-05-28 PROCEDURE — 96374 THER/PROPH/DIAG INJ IV PUSH: CPT | Performed by: PREVENTIVE MEDICINE

## 2024-05-28 PROCEDURE — 83550 IRON BINDING TEST: CPT | Performed by: PREVENTIVE MEDICINE

## 2024-05-28 PROCEDURE — 83605 ASSAY OF LACTIC ACID: CPT | Performed by: PREVENTIVE MEDICINE

## 2024-05-28 PROCEDURE — 85652 RBC SED RATE AUTOMATED: CPT | Performed by: PREVENTIVE MEDICINE

## 2024-05-28 PROCEDURE — 36415 COLL VENOUS BLD VENIPUNCTURE: CPT | Performed by: PREVENTIVE MEDICINE

## 2024-05-28 PROCEDURE — 82570 ASSAY OF URINE CREATININE: CPT | Performed by: PREVENTIVE MEDICINE

## 2024-05-28 PROCEDURE — 83540 ASSAY OF IRON: CPT | Performed by: PREVENTIVE MEDICINE

## 2024-05-28 PROCEDURE — 82550 ASSAY OF CK (CPK): CPT | Performed by: PREVENTIVE MEDICINE

## 2024-05-28 PROCEDURE — 85025 COMPLETE CBC W/AUTO DIFF WBC: CPT | Performed by: PREVENTIVE MEDICINE

## 2024-05-28 PROCEDURE — 80053 COMPREHEN METABOLIC PANEL: CPT | Performed by: PREVENTIVE MEDICINE

## 2024-05-28 PROCEDURE — 99417 PROLNG OP E/M EACH 15 MIN: CPT | Performed by: PREVENTIVE MEDICINE

## 2024-05-28 PROCEDURE — 93971 EXTREMITY STUDY: CPT | Mod: LT

## 2024-05-28 PROCEDURE — 82043 UR ALBUMIN QUANTITATIVE: CPT | Performed by: PREVENTIVE MEDICINE

## 2024-05-28 PROCEDURE — 82728 ASSAY OF FERRITIN: CPT | Performed by: PREVENTIVE MEDICINE

## 2024-05-28 PROCEDURE — 86140 C-REACTIVE PROTEIN: CPT | Performed by: PREVENTIVE MEDICINE

## 2024-05-28 PROCEDURE — 83036 HEMOGLOBIN GLYCOSYLATED A1C: CPT | Performed by: PREVENTIVE MEDICINE

## 2024-05-28 RX ORDER — ERTAPENEM 1 G/1
1 INJECTION, POWDER, LYOPHILIZED, FOR SOLUTION INTRAMUSCULAR; INTRAVENOUS ONCE
Status: COMPLETED | OUTPATIENT
Start: 2024-05-28 | End: 2024-05-28

## 2024-05-28 RX ORDER — CLINDAMYCIN HCL 300 MG
300 CAPSULE ORAL 4 TIMES DAILY
Qty: 28 CAPSULE | Refills: 0 | Status: SHIPPED | OUTPATIENT
Start: 2024-05-28 | End: 2024-06-04

## 2024-05-28 RX ORDER — HYDROCHLOROTHIAZIDE 12.5 MG/1
12.5 TABLET ORAL DAILY
Qty: 30 TABLET | Refills: 0 | Status: SHIPPED | OUTPATIENT
Start: 2024-05-28 | End: 2024-05-29

## 2024-05-28 RX ADMIN — ERTAPENEM 1 G: 1 INJECTION, POWDER, LYOPHILIZED, FOR SOLUTION INTRAMUSCULAR; INTRAVENOUS at 15:34

## 2024-05-28 NOTE — PROGRESS NOTES
"Acute and Diagnostic Services Clinic Visit    Assessment & Plan     (R60.0) Bilateral leg edema  (primary encounter diagnosis)  LLE US, Labs all reassuring today  Patient given 1 g IV ertapenem today  Prescribed clindamycin for one week  Suspect mostly related to stasis dermatitis/amlodipine induced edema rather than cellulitis.  Advise stopping amlodipine and starting hydrochlorothiazide 12.5 mg daily instead for bp control.    Also elevate legs and use compression stockings.  Follow up in clinic in one week bp recheck and leg recheck, sooner as needed.  Also repeat bmp on 1 week given start of hydrochlorothiazide.        (D64.9) Anemia, unspecified type  Plan: Iron and iron binding capacity, Ferritin,         Vitamin B12  Noted to be slightly anemia on labs, will check iron and b12 levels    (R73.01) IFG (impaired fasting glucose)  Plan: Hemoglobin A1c  Glucose up as well, A1c pending        79  minutes were spent doing chart review, history and exam, documentation and further activities per the note.      BMI  Estimated body mass index is 38.22 kg/m  as calculated from the following:    Height as of 5/15/24: 1.702 m (5' 7\").    Weight as of this encounter: 110.7 kg (244 lb).         See Patient Instructions    No follow-ups on file.    Subjective   Johnny is a 78 year old, presenting for the following health issues:  Cellulitis (Left lower extremity edema, Cellulitis)    HPI     Musculoskeletal problem/pain  Onset/Duration: X 17 days  Description:       Location: left lower extremity        Joint swelling: YES       Redness: YES       Pain: 1/10       Warmth: YES  Progression of symptoms same intermittent  Accompanying signs and symptoms:       Fevers: no        Numbness/tingling/weakness: no   History        Trauma to the area: YES- scrapped leg on dock, de-boarding a boat        Previous history of Gout: no         Alcohol usage: no         Diuretic use: no         Recent illness: no         Previous similar " problem: no         Previous evaluation: YES  Aggravating factors include: a lot walking and standing, stairs, increased edema  Therapies tried and outcome: Anti Biotics, completed course X 2, IV anti biotics  Have you had any surgeries on your arteries of veins: No    This is a 79 yo man who injured his left leg a couple weeks ago cutting a spot below his left knee.  Was seen in the ED on 5/15, had a negative LLE US and treated for cellulitis with ceftriaxone 1g IV once.  Then was seen in [facility] 5/17 and given a week doxycycline for LLE cellulitis.  He says redness and swelling have improved slightly but not much.  No fevers, minimal pain.        Review of Systems  Constitutional, HEENT, cardiovascular, pulmonary, GI, , musculoskeletal, neuro, skin, endocrine and psych systems are negative, except as otherwise noted.      Objective    Wt 110.7 kg (244 lb)   BMI 38.22 kg/m    Body mass index is 38.22 kg/m .  Physical Exam   GENERAL: alert and no distress  EYES: Eyes grossly normal to inspection, PERRL and conjunctivae and sclerae normal  HENT: ear canals and TM's normal, nose and mouth without ulcers or lesions  NECK: no adenopathy, no asymmetry, masses, or scars  RESP: lungs clear to auscultation - no rales, rhonchi or wheezes  CV: regular rate and rhythm, normal S1 S2, no S3 or S4, no murmur, click or rub, no peripheral edema  ABDOMEN: soft, nontender, no hepatosplenomegaly, no masses and bowel sounds normal  MS: no gross musculoskeletal defects noted, no edema  SKIN: no suspicious lesions or rashes  NEURO: Normal strength and tone, mentation intact and speech normal  PSYCH: mentation appears normal, affect normal/bright  EXTREMITIES:  Warm, well perfused, ble edema L>R, slight warmth and redness below knee on left    Results for orders placed or performed during the hospital encounter of 05/28/24   US Lower Extremity Venous Duplex Left     Status: None    Narrative    EXAM: US LOWER EXTREMITY VENOUS DUPLEX  LEFT  LOCATION: [facility]  DATE: 5/28/2024    INDICATION: Right leg pain and erythema.  COMPARISON: None.  TECHNIQUE: Venous Duplex ultrasound of the left lower extremity with and without compression, augmentation and duplex. Color flow and spectral Doppler with waveform analysis performed.    FINDINGS: Exam includes the common femoral, femoral, popliteal, and contralateral common femoral veins as well as segmentally visualized deep calf veins and greater saphenous vein.     LEFT: No deep vein thrombosis. No superficial thrombophlebitis. No popliteal cyst.    At the area of pain and erythema in the left anterior lateral proximal mid calf there is subcutaneous edema without discrete fluid collection.      Impression    IMPRESSION:  1.  No deep venous thrombosis in the left lower extremity.    2.  No discrete fluid collection to suggest abscess at the area of pain and redness.   Results for orders placed or performed in visit on 05/28/24   Albumin Random Urine Quantitative with Creat Ratio     Status: None   Result Value Ref Range    Creatinine Urine mg/dL 150.8 mg/dL    Albumin Urine mg/L <12.0 mg/L    Albumin Urine mg/g Cr     Lactic acid whole blood     Status: Normal   Result Value Ref Range    Lactic Acid 1.5 0.7 - 2.0 mmol/L   Comprehensive metabolic panel     Status: Abnormal   Result Value Ref Range    Sodium 143 135 - 145 mmol/L    Potassium 3.8 3.4 - 5.3 mmol/L    Carbon Dioxide (CO2) 22 22 - 29 mmol/L    Anion Gap 16 (H) 7 - 15 mmol/L    Urea Nitrogen 18.2 8.0 - 23.0 mg/dL    Creatinine 0.97 0.67 - 1.17 mg/dL    GFR Estimate 80 >60 mL/min/1.73m2    Calcium 9.1 8.8 - 10.2 mg/dL    Chloride 105 98 - 107 mmol/L    Glucose 112 (H) 70 - 99 mg/dL    Alkaline Phosphatase 74 40 - 150 U/L    AST 32 0 - 45 U/L    ALT 34 0 - 70 U/L    Protein Total 6.8 6.4 - 8.3 g/dL    Albumin 4.2 3.5 - 5.2 g/dL    Bilirubin Total 0.2 <=1.2 mg/dL   Procalcitonin     Status: Normal   Result Value Ref Range     Procalcitonin 0.05 <0.50 ng/mL   Erythrocyte sedimentation rate auto     Status: Normal   Result Value Ref Range    Erythrocyte Sedimentation Rate 13 0 - 20 mm/hr   CRP inflammation     Status: Normal   Result Value Ref Range    CRP Inflammation 3.15 <5.00 mg/L   CK total     Status: Normal   Result Value Ref Range     39 - 308 U/L   BNP-N terminal pro     Status: Normal   Result Value Ref Range    N Terminal Pro BNP Outpatient 158 0 - 1,800 pg/mL   CBC with platelets and differential     Status: Abnormal   Result Value Ref Range    WBC Count 5.6 4.0 - 11.0 10e3/uL    RBC Count 4.27 (L) 4.40 - 5.90 10e6/uL    Hemoglobin 13.2 (L) 13.3 - 17.7 g/dL    Hematocrit 38.9 (L) 40.0 - 53.0 %    MCV 91 78 - 100 fL    MCH 30.9 26.5 - 33.0 pg    MCHC 33.9 31.5 - 36.5 g/dL    RDW 13.5 10.0 - 15.0 %    Platelet Count 226 150 - 450 10e3/uL    % Neutrophils 65 %    % Lymphocytes 19 %    % Monocytes 11 %    % Eosinophils 4 %    % Basophils 1 %    % Immature Granulocytes 0 %    NRBCs per 100 WBC 0 <1 /100    Absolute Neutrophils 3.7 1.6 - 8.3 10e3/uL    Absolute Lymphocytes 1.1 0.8 - 5.3 10e3/uL    Absolute Monocytes 0.6 0.0 - 1.3 10e3/uL    Absolute Eosinophils 0.2 0.0 - 0.7 10e3/uL    Absolute Basophils 0.1 0.0 - 0.2 10e3/uL    Absolute Immature Granulocytes 0.0 <=0.4 10e3/uL    Absolute NRBCs 0.0 10e3/uL   Iron and iron binding capacity     Status: Abnormal   Result Value Ref Range    Iron 46 (L) 61 - 157 ug/dL    Iron Binding Capacity 369 240 - 430 ug/dL    Iron Sat Index 12 (L) 15 - 46 %   Ferritin     Status: Normal   Result Value Ref Range    Ferritin 93 31 - 409 ng/mL   Hemoglobin A1c     Status: Abnormal   Result Value Ref Range    Hemoglobin A1C 5.7 (H) <5.7 %   Vitamin B12     Status: Normal   Result Value Ref Range    Vitamin B12 456 232 - 1,245 pg/mL   CBC with platelets differential     Status: Abnormal    Narrative    The following orders were created for panel order CBC with platelets differential.  Procedure                                Abnormality         Status                     ---------                               -----------         ------                     CBC with platelets and d...[[id number]]  Abnormal            Final result                 Please view results for these tests on the individual orders.             Signed Electronically by: Abraham Oneal MD, MD

## 2024-05-28 NOTE — TELEPHONE ENCOUNTER
Pt was seen in ED 5/15 for LLE cellulitis, treated with IV antibiotics.  5/17 was worsening, seen in UC and treated with PO antibiotics.    Pt has finished both antibiotics.  Continues to have symptoms.  Still swollen knee on down, warm to touch, crawling sensation, still red.  Remains about same size as marked in ED (about softball size in diameter).  L Knee swelling (had partial knee replacement a couple of years ago).    Would you like pt to be seen in [facility]?  Please advise.    Routed to EA triage for follow up as well.    Mar Sanchez RN, BSN  [facility]

## 2024-05-28 NOTE — PATIENT INSTRUCTIONS
Thanks for choosing the [facility] for your medical care today.    You were seen for left leg swelling and inflammation    I advise the following...    Stop amlodipine - suspect contributing to leg swelling  Start hydrochlorothiazide for blood pressure instead.  Should help swelling  Elevate legs.  Knee high compression stocking bilaterally  Clindamycin 300 mg four times per day to treat any remaining infection    Follow up in clinic in one week for recheck.

## 2024-05-28 NOTE — TELEPHONE ENCOUNTER
Called [facility], spoke to Dr Oneal.  ADS will see pt, may need ongoing antibiotics.  ADS appt scheduled for 2:30 PM.      Called pt, pt agrees to 2:30 appt at [facility].    Referral to Acute and Diagnostic Services    [phone] ([facility]) [place] [address] [facility], [address], [place] [address]    Transition to Acute & Diagnostic Services Clinic has been discussed with patient, and he agrees with next level of care.   Patient understands that evaluation/treatment at [facility] typically takes significantly longer than in clinic/urgent care (>2 hours).  The [facility] Acute and Diagnostics Services Clinic has been contacted by provider/staff to confirm patient acceptance.         Mar Sanchez RN, BSN  [facility]

## 2024-05-29 ENCOUNTER — OFFICE VISIT (OUTPATIENT)
Dept: CARDIOLOGY | Facility: CLINIC | Age: 79
End: 2024-05-29
Payer: COMMERCIAL

## 2024-05-29 VITALS
HEIGHT: 67 IN | BODY MASS INDEX: 38.01 KG/M2 | SYSTOLIC BLOOD PRESSURE: 140 MMHG | OXYGEN SATURATION: 96 % | WEIGHT: 242.2 LBS | HEART RATE: 73 BPM | DIASTOLIC BLOOD PRESSURE: 86 MMHG

## 2024-05-29 DIAGNOSIS — I25.10 CORONARY ARTERY DISEASE DUE TO CALCIFIED CORONARY LESION: Primary | ICD-10-CM

## 2024-05-29 DIAGNOSIS — I25.84 CORONARY ARTERY DISEASE DUE TO CALCIFIED CORONARY LESION: Primary | ICD-10-CM

## 2024-05-29 DIAGNOSIS — R79.89 ELEVATED TROPONIN I MEASUREMENT: ICD-10-CM

## 2024-05-29 DIAGNOSIS — R06.02 SOB (SHORTNESS OF BREATH): ICD-10-CM

## 2024-05-29 DIAGNOSIS — I10 BENIGN ESSENTIAL HYPERTENSION: ICD-10-CM

## 2024-05-29 DIAGNOSIS — I20.0 ACCELERATING ANGINA (H): ICD-10-CM

## 2024-05-29 LAB
CREAT UR-MCNC: 150.8 MG/DL
FERRITIN SERPL-MCNC: 93 NG/ML (ref 31–409)
MICROALBUMIN UR-MCNC: <12 MG/L
MICROALBUMIN/CREAT UR: NORMAL MG/G{CREAT}
VIT B12 SERPL-MCNC: 456 PG/ML (ref 232–1245)

## 2024-05-29 PROCEDURE — 99205 OFFICE O/P NEW HI 60 MIN: CPT | Performed by: INTERNAL MEDICINE

## 2024-05-29 RX ORDER — ASPIRIN 81 MG/1
81 TABLET ORAL DAILY
Qty: 90 TABLET | Refills: 3 | Status: ON HOLD | OUTPATIENT
Start: 2024-05-29 | End: 2024-07-16

## 2024-05-29 RX ORDER — ROSUVASTATIN CALCIUM 40 MG/1
40 TABLET, COATED ORAL AT BEDTIME
Qty: 90 TABLET | Refills: 3 | Status: SHIPPED | OUTPATIENT
Start: 2024-05-29

## 2024-05-29 RX ORDER — NITROGLYCERIN 0.4 MG/1
TABLET SUBLINGUAL
Qty: 20 TABLET | Refills: 3 | Status: ON HOLD | OUTPATIENT
Start: 2024-05-29 | End: 2024-07-16

## 2024-05-29 RX ORDER — OLMESARTAN MEDOXOMIL AND HYDROCHLOROTHIAZIDE 20/12.5 20; 12.5 MG/1; MG/1
1 TABLET ORAL DAILY
Qty: 90 TABLET | Refills: 3 | Status: SHIPPED | OUTPATIENT
Start: 2024-05-29 | End: 2024-06-06 | Stop reason: SINTOL

## 2024-05-29 NOTE — LETTER
5/29/2024    Yordan Hernandez MD  [address] [place] [address]    RE: Toni Collado       Dear Colleague,     I had the pleasure of seeing Toni Collado in the [facility].      Cardiology Clinic Consultation:    May 29, 2024   Patient Name: Toni Collado  Patient MRN: [id number]    Consult indication: chest pain    HPI:    I had the opportunity to see patient Toni Collado in cardiology clinic for a consultation. Patient is followed by our colleague Yordan Hernandez MD with Primary Care.     As you know patient is a pleasant 78-year-old male with a past medical history significant for obesity, hypertension, dyslipidemia, sleep apnea (on CPAP), Zenker's diverticulum, GERD, recently diagnosed LLE cellulitis, who presents for further evaluation and management of chest pain.    Over the past several months patient has had intermittent chest tightness with physical exertion, specifically when walking for exercise with his wife.  He also notes some chest tightness when climbing stairs.  He suffered an injury to his left leg and so has not been physically active/exercising for the past couple of weeks.  Previously was assessed with a Lexiscan stress test 2/5/2024, the study was technically challenging due to diaphragmatic attenuation but was negative for inducible myocardial ischemia or infarction.  On personal review, I concur that the study was suboptimal due to artifact.  Patient was seen in the ED 5/15/2024 with chest discomfort and dyspnea, these symptoms were more severe than his prior episodes.  Blood pressure was elevated as high as 160/80 mmHg.  ECG personally reviewed, normal sinus rhythm with first-degree AV block, artifact likely from IV pump, no obvious ischemic changes.  High-sensitivity troponin was mildly elevated at 24, subsequent value 24.  Hemoglobin was mildly reduced at 12.6, though subsequent value 5/28 was 13.2.  CT PE study was negative for PE however did  demonstrate mild enlargement of the heart, as well as severe coronary artery calcifications.  On personal review, severe calcifications in the LAD and left circumflex territories.    Assessment and Plan/Recommendations:    # Persistent exertional chest pain concerning for angina, recent ED visit for chest pain with mildly elevated troponin, CT PE study demonstrating severe coronary artery calcifications.  Lexiscan stress test 2/2024 negative however was of lower diagnostic quality due to patient body habitus and associated artifact.  Borderline elevated troponin was flat in trajectory, could be reflective of demand ischemia from hypertension, however with ongoing exertional chest discomfort, I am concerned about clinically significant obstructive coronary artery disease.    - Discussed options for further evaluation and management  - Given overall clinical presentation, recommend cardiac catheterization/coronary angiography and possible intervention.  Discussed risks including but not limited to bleeding complications, stroke, heart attack, death, renal injury leading to dialysis.  Patient voiced understanding and was in agreement.  Due to the ongoing nature of his symptoms and overall clinical presentation, this procedure should be done on an urgent basis, TTE should also be done on an urgent basis.  - BP remains elevated, recently started on hydrochlorothiazide 12.5 mg daily, will change this to a combination olmesartan-hydrochlorothiazide  - Aspirin 81 mg daily, advised to monitor for signs of bleeding  - Change pravastatin to rosuvastatin  - Sublingual nitroglycerin as needed, cautioned on interaction with sildenafil  - BMP and CBC in 1 week  - Advised to refrain from strenuous physical activity and to seek medical care if he develops any severe chest pain, chest pressure, abnormal shortness of breath in the interim  - Follow-up with cardiology KRIS after procedure  Thank you for allowing our team to participate  "in the care of Toni Collado.  Please do not hesitate to call or page me with any questions or concerns.    Sincerely,     Nain Cruz MD, [facility]  Cardiology  May 29, 2024    cc  Samantha Villalta DO  [facility]  [address] [place] [address]    Past Medical History:     Patient Active Problem List   Diagnosis    RLS (restless legs syndrome)    BRITTNI on CPAP    Elevated prostate specific antigen (PSA)    Gastroesophageal reflux disease without esophagitis    History of colonic polyps    Tinnitus of both ears    Oropharyngeal dysphagia    Varicose veins of left lower extremity    Adenomatous colon polyp    Bulbar polio    Benign paroxysmal positional vertigo due to bilateral vestibular disorder    Primary osteoarthritis of both hands    BMI 38.0-38.9,adult    Class 2 severe obesity due to excess calories with serious comorbidity in adult (H)    Zenker diverticulum    Throat swelling    Acute post-operative pain       Past Surgical History:   Past Surgical History:   Procedure Laterality Date    ABDOMEN SURGERY      ARTHROSCOPY KNEE      CATARACT IOL, RT/LT Bilateral      and     COLONOSCOPY N/A 09/13/2023    Procedure: Colonoscopy with polypectomies using cold snare;  Surgeon: Yue Cline MD;  Location:  GI    ESOPHAGOSCOPY, GASTROSCOPY, DUODENOSCOPY (EGD), COMBINED N/A 09/13/2023    Procedure: Esophagoscopy, gastroscopy, duodenoscopy (EGD), combined with biopsy using cold forcep;  Surgeon: Yue Cline MD;  Location:  GI    HERNIA REPAIR, UMBILICAL      KNEE SURGERY Left     \"bone on bone\" d/t arthritis    RADIOFREQUENCY ABLATION NERVES      cervical spine    RHINOPLASTY      SEPTOPLASTY      SHOULDER SURGERY Bilateral     right (pitching injury), left (bone spur)    SHOULDER SURGERY Right     reconstructive surgery with hardware    TOE SURGERY Left     left big toe d/t arthritis    TOTAL KNEE ARTHROPLASTY Left     unicompartmental "       Medications (outpatient):  Current Outpatient Medications   Medication Sig Dispense Refill    acetaminophen (TYLENOL) 325 MG tablet Take 2 tablets (650 mg) by mouth every 4 hours as needed for mild pain      aspirin 81 MG EC tablet Take 1 tablet (81 mg) by mouth daily 90 tablet 3    brimonidine (ALPHAGAN) 0.2 % ophthalmic solution Place 1 drop Into the left eye 2 times daily      clindamycin (CLEOCIN) 300 MG capsule Take 1 capsule (300 mg) by mouth 4 times daily for 7 days 28 capsule 0    meloxicam (MOBIC) 15 MG tablet Take 1 tablet (15 mg) by mouth daily as needed for pain 90 tablet 2    Multiple Vitamins-Minerals (MULTIVITAMIN PO) Take 1 tablet by mouth daily      nitroGLYcerin (NITROSTAT) 0.4 MG sublingual tablet For chest pain place 1 tablet under the tongue every 5 minutes for 3 doses. If symptoms persist 5 minutes after 1st dose call 911. 20 tablet 3    olmesartan-hydrochlorothiazide (BENICAR HCT) 20-12.5 MG tablet Take 1 tablet by mouth daily 90 tablet 3    omeprazole (PRILOSEC) 20 MG DR capsule Take 2 capsules (40 mg) by mouth daily 90 capsule 3    pramipexole (MIRAPEX) 1 MG tablet Take 1 tablet (1 mg) by mouth 2 times daily For restless leg syndrome. 180 tablet 3    rosuvastatin (CRESTOR) 40 MG tablet Take 1 tablet (40 mg) by mouth at bedtime 90 tablet 3    sildenafil (REVATIO) 20 MG tablet Take 2 tablets (40 mg) by mouth daily as needed (ED) 30 tablet 5    traZODone (DESYREL) 100 MG tablet Take 1 tablet (100 mg) by mouth at bedtime 90 tablet 3    dexAMETHasone (DECADRON) 6 MG tablet Take 1 tablet (6 mg) by mouth daily (with breakfast) for 3 days 3 tablet 0       Allergies:  Allergies   Allergen Reactions    Amoxicillin Other (See Comments) and Unknown     Facial and neck flushing, felt hot    Seasonal Allergies        Social History:   History   Drug Use No      History   Smoking Status    Former    Types: Cigarettes   Smokeless Tobacco    Never     Social History    Substance and Sexual Activity      " Alcohol use: Not Currently       Family History:  Family History   Problem Relation Age of Onset    Heart Failure Mother         valvular heart disease    Heart Failure Father         ihd    Myelodysplastic syndrome Father     Other Cancer Father         Mylodisplasia    Cancer Brother         renal    Other Cancer Brother         Kidney cancer    Heart Failure Brother         ihd    Substance Abuse Brother         Alcoholic    Substance Abuse Maternal Grandfather         Alcoholic    Diabetes Sister     Colon Cancer No family hx of     Thyroid Disease No family hx of        Review of Systems:   A comprehensive 12 system review of systems was carried out.  Pertinent positives and negatives are noted above. Otherwise negative for contributory information.    Objective & Physical Exam:  BP (!) 140/86   Pulse 73   Ht 1.702 m (5' 7\")   Wt 109.9 kg (242 lb 3.2 oz)   SpO2 96%   BMI 37.93 kg/m    Wt Readings from Last 2 Encounters:   05/29/24 109.9 kg (242 lb 3.2 oz)   05/28/24 110.7 kg (244 lb)     Body mass index is 37.93 kg/m .   Body surface area is 2.28 meters squared.    Constitutional: appears stated age, in no apparent distress, appears to be well nourished  Pulmonary: clear to auscultation bilaterally, no wheezes, no rales, no increased work of breathing  Cardiovascular: JVP normal, regular rate, regular rhythm, normal S1 and S2, no S3, S4, no murmur appreciated  Gastrointestinal: no guarding, non-rigid   Neurologic: awake, alert, moves all extremities    Data reviewed:  Lab Results   Component Value Date    WBC 5.6 05/28/2024    RBC 4.27 (L) 05/28/2024    HGB 13.2 (L) 05/28/2024    HCT 38.9 (L) 05/28/2024    MCV 91 05/28/2024    MCH 30.9 05/28/2024    MCHC 33.9 05/28/2024    RDW 13.5 05/28/2024     05/28/2024     Sodium   Date Value Ref Range Status   05/28/2024 143 135 - 145 mmol/L Final     Comment:     Reference intervals for this test were updated on 09/26/2023 to more accurately reflect our " healthy population. There may be differences in the flagging of prior results with similar values performed with this method. Interpretation of those prior results can be made in the context of the updated reference intervals.      Potassium   Date Value Ref Range Status   05/28/2024 3.8 3.4 - 5.3 mmol/L Final   06/15/2022 3.8 3.5 - 5.0 mmol/L Final     Chloride   Date Value Ref Range Status   05/28/2024 105 98 - 107 mmol/L Final   06/15/2022 103 98 - 107 mmol/L Final     Carbon Dioxide (CO2)   Date Value Ref Range Status   05/28/2024 22 22 - 29 mmol/L Final   06/15/2022 23 22 - 31 mmol/L Final     Anion Gap   Date Value Ref Range Status   05/28/2024 16 (H) 7 - 15 mmol/L Final   06/15/2022 13 5 - 18 mmol/L Final     Glucose   Date Value Ref Range Status   05/28/2024 112 (H) 70 - 99 mg/dL Final   06/15/2022 86 70 - 125 mg/dL Final     GLUCOSE BY METER POCT   Date Value Ref Range Status   02/11/2024 108 (H) 70 - 99 mg/dL Final     Urea Nitrogen   Date Value Ref Range Status   05/28/2024 18.2 8.0 - 23.0 mg/dL Final   06/15/2022 16 8 - 28 mg/dL Final     Creatinine   Date Value Ref Range Status   05/28/2024 0.97 0.67 - 1.17 mg/dL Final     GFR Estimate   Date Value Ref Range Status   05/28/2024 80 >60 mL/min/1.73m2 Final   04/12/2021 >60 >60 mL/min/1.73m2 Final     GFR, ESTIMATED POCT   Date Value Ref Range Status   02/10/2024 57 (L) >60 mL/min/1.73m2 Final     Calcium   Date Value Ref Range Status   05/28/2024 9.1 8.8 - 10.2 mg/dL Final     Bilirubin Total   Date Value Ref Range Status   05/28/2024 0.2 <=1.2 mg/dL Final     Alkaline Phosphatase   Date Value Ref Range Status   05/28/2024 74 40 - 150 U/L Final     ALT   Date Value Ref Range Status   05/28/2024 34 0 - 70 U/L Final     Comment:     Reference intervals for this test were updated on 6/12/2023 to more accurately reflect our healthy population. There may be differences in the flagging of prior results with similar values performed with this method.  Interpretation of those prior results can be made in the context of the updated reference intervals.       AST   Date Value Ref Range Status   05/28/2024 32 0 - 45 U/L Final     Comment:     Reference intervals for this test were updated on 6/12/2023 to more accurately reflect our healthy population. There may be differences in the flagging of prior results with similar values performed with this method. Interpretation of those prior results can be made in the context of the updated reference intervals.     Recent Labs   Lab Test 07/19/23  1026 09/19/22  1052   CHOL 189 202*   HDL 39* 40   * 120*   TRIG 152* 209*      Lab Results   Component Value Date    A1C 5.7 05/28/2024    A1C 5.4 07/19/2023    A1C 5.2 09/19/2022        Thank you for allowing me to participate in the care of your patient.      Sincerely,     Nain Cruz MD     [facility]  cc:   Samantha Villalta DO  [facility]  [address] [place] [address]

## 2024-05-29 NOTE — PROGRESS NOTES
Cardiology Clinic Consultation:    May 29, 2024   Patient Name: Toni Collado  Patient MRN: [id number]    Consult indication: chest pain    HPI:    I had the opportunity to see patient Toni Collado in cardiology clinic for a consultation. Patient is followed by our colleague Yordan Hernandez MD with Primary Care.     As you know patient is a pleasant 78-year-old male with a past medical history significant for obesity, hypertension, dyslipidemia, sleep apnea (on CPAP), Zenker's diverticulum, GERD, recently diagnosed LLE cellulitis, who presents for further evaluation and management of chest pain.    Over the past several months patient has had intermittent chest tightness with physical exertion, specifically when walking for exercise with his wife.  He also notes some chest tightness when climbing stairs.  He suffered an injury to his left leg and so has not been physically active/exercising for the past couple of weeks.  Previously was assessed with a Lexiscan stress test 2/5/2024, the study was technically challenging due to diaphragmatic attenuation but was negative for inducible myocardial ischemia or infarction.  On personal review, I concur that the study was suboptimal due to artifact.  Patient was seen in the ED 5/15/2024 with chest discomfort and dyspnea, these symptoms were more severe than his prior episodes.  Blood pressure was elevated as high as 160/80 mmHg.  ECG personally reviewed, normal sinus rhythm with first-degree AV block, artifact likely from IV pump, no obvious ischemic changes.  High-sensitivity troponin was mildly elevated at 24, subsequent value 24.  Hemoglobin was mildly reduced at 12.6, though subsequent value 5/28 was 13.2.  CT PE study was negative for PE however did demonstrate mild enlargement of the heart, as well as severe coronary artery calcifications.  On personal review, severe calcifications in the LAD and left circumflex territories.    Assessment and  Plan/Recommendations:    # Persistent exertional chest pain concerning for angina, recent ED visit for chest pain with mildly elevated troponin, CT PE study demonstrating severe coronary artery calcifications.  Lexiscan stress test 2/2024 negative however was of lower diagnostic quality due to patient body habitus and associated artifact.  Borderline elevated troponin was flat in trajectory, could be reflective of demand ischemia from hypertension, however with ongoing exertional chest discomfort, I am concerned about clinically significant obstructive coronary artery disease.    - Discussed options for further evaluation and management  - Given overall clinical presentation, recommend cardiac catheterization/coronary angiography and possible intervention.  Discussed risks including but not limited to bleeding complications, stroke, heart attack, death, renal injury leading to dialysis.  Patient voiced understanding and was in agreement.  Due to the ongoing nature of his symptoms and overall clinical presentation, this procedure should be done on an urgent basis, TTE should also be done on an urgent basis.  - BP remains elevated, recently started on hydrochlorothiazide 12.5 mg daily, will change this to a combination olmesartan-hydrochlorothiazide  - Aspirin 81 mg daily, advised to monitor for signs of bleeding  - Change pravastatin to rosuvastatin  - Sublingual nitroglycerin as needed, cautioned on interaction with sildenafil  - BMP and CBC in 1 week  - Advised to refrain from strenuous physical activity and to seek medical care if he develops any severe chest pain, chest pressure, abnormal shortness of breath in the interim  - Follow-up with cardiology KRIS after procedure  Thank you for allowing our team to participate in the care of Toni Collado.  Please do not hesitate to call or page me with any questions or concerns.    Sincerely,     Nain Cruz MD, [facility]  Cardiology  May 29,  "2024      Samantha Villalta DO  [facility]  [address] [place] [address]    Past Medical History:     Patient Active Problem List   Diagnosis    RLS (restless legs syndrome)    BRITTNI on CPAP    Elevated prostate specific antigen (PSA)    Gastroesophageal reflux disease without esophagitis    History of colonic polyps    Tinnitus of both ears    Oropharyngeal dysphagia    Varicose veins of left lower extremity    Adenomatous colon polyp    Bulbar polio    Benign paroxysmal positional vertigo due to bilateral vestibular disorder    Primary osteoarthritis of both hands    BMI 38.0-38.9,adult    Class 2 severe obesity due to excess calories with serious comorbidity in adult (H)    Zenker diverticulum    Throat swelling    Acute post-operative pain       Past Surgical History:   Past Surgical History:   Procedure Laterality Date    ABDOMEN SURGERY      ARTHROSCOPY KNEE      CATARACT IOL, RT/LT Bilateral      and     COLONOSCOPY N/A 09/13/2023    Procedure: Colonoscopy with polypectomies using cold snare;  Surgeon: Yue Cline MD;  Location:  GI    ESOPHAGOSCOPY, GASTROSCOPY, DUODENOSCOPY (EGD), COMBINED N/A 09/13/2023    Procedure: Esophagoscopy, gastroscopy, duodenoscopy (EGD), combined with biopsy using cold forcep;  Surgeon: Yue Cline MD;  Location:  GI    HERNIA REPAIR, UMBILICAL      KNEE SURGERY Left     \"bone on bone\" d/t arthritis    RADIOFREQUENCY ABLATION NERVES      cervical spine    RHINOPLASTY      SEPTOPLASTY      SHOULDER SURGERY Bilateral     right (pitching injury), left (bone spur)    SHOULDER SURGERY Right     reconstructive surgery with hardware    TOE SURGERY Left     left big toe d/t arthritis    TOTAL KNEE ARTHROPLASTY Left     unicompartmental       Medications (outpatient):  Current Outpatient Medications   Medication Sig Dispense Refill    acetaminophen (TYLENOL) 325 MG tablet Take 2 tablets (650 mg) by mouth every 4 hours as " needed for mild pain      aspirin 81 MG EC tablet Take 1 tablet (81 mg) by mouth daily 90 tablet 3    brimonidine (ALPHAGAN) 0.2 % ophthalmic solution Place 1 drop Into the left eye 2 times daily      clindamycin (CLEOCIN) 300 MG capsule Take 1 capsule (300 mg) by mouth 4 times daily for 7 days 28 capsule 0    meloxicam (MOBIC) 15 MG tablet Take 1 tablet (15 mg) by mouth daily as needed for pain 90 tablet 2    Multiple Vitamins-Minerals (MULTIVITAMIN PO) Take 1 tablet by mouth daily      nitroGLYcerin (NITROSTAT) 0.4 MG sublingual tablet For chest pain place 1 tablet under the tongue every 5 minutes for 3 doses. If symptoms persist 5 minutes after 1st dose call 911. 20 tablet 3    olmesartan-hydrochlorothiazide (BENICAR HCT) 20-12.5 MG tablet Take 1 tablet by mouth daily 90 tablet 3    omeprazole (PRILOSEC) 20 MG DR capsule Take 2 capsules (40 mg) by mouth daily 90 capsule 3    pramipexole (MIRAPEX) 1 MG tablet Take 1 tablet (1 mg) by mouth 2 times daily For restless leg syndrome. 180 tablet 3    rosuvastatin (CRESTOR) 40 MG tablet Take 1 tablet (40 mg) by mouth at bedtime 90 tablet 3    sildenafil (REVATIO) 20 MG tablet Take 2 tablets (40 mg) by mouth daily as needed (ED) 30 tablet 5    traZODone (DESYREL) 100 MG tablet Take 1 tablet (100 mg) by mouth at bedtime 90 tablet 3    dexAMETHasone (DECADRON) 6 MG tablet Take 1 tablet (6 mg) by mouth daily (with breakfast) for 3 days 3 tablet 0       Allergies:  Allergies   Allergen Reactions    Amoxicillin Other (See Comments) and Unknown     Facial and neck flushing, felt hot    Seasonal Allergies        Social History:   History   Drug Use No      History   Smoking Status    Former    Types: Cigarettes   Smokeless Tobacco    Never     Social History    Substance and Sexual Activity      Alcohol use: Not Currently       Family History:  Family History   Problem Relation Age of Onset    Heart Failure Mother         valvular heart disease    Heart Failure Father          "ihd    Myelodysplastic syndrome Father     Other Cancer Father         Mylodisplasia    Cancer Brother         renal    Other Cancer Brother         Kidney cancer    Heart Failure Brother         ihd    Substance Abuse Brother         Alcoholic    Substance Abuse Maternal Grandfather         Alcoholic    Diabetes Sister     Colon Cancer No family hx of     Thyroid Disease No family hx of        Review of Systems:   A comprehensive 12 system review of systems was carried out.  Pertinent positives and negatives are noted above. Otherwise negative for contributory information.    Objective & Physical Exam:  BP (!) 140/86   Pulse 73   Ht 1.702 m (5' 7\")   Wt 109.9 kg (242 lb 3.2 oz)   SpO2 96%   BMI 37.93 kg/m    Wt Readings from Last 2 Encounters:   05/29/24 109.9 kg (242 lb 3.2 oz)   05/28/24 110.7 kg (244 lb)     Body mass index is 37.93 kg/m .   Body surface area is 2.28 meters squared.    Constitutional: appears stated age, in no apparent distress, appears to be well nourished  Pulmonary: clear to auscultation bilaterally, no wheezes, no rales, no increased work of breathing  Cardiovascular: JVP normal, regular rate, regular rhythm, normal S1 and S2, no S3, S4, no murmur appreciated  Gastrointestinal: no guarding, non-rigid   Neurologic: awake, alert, moves all extremities    Data reviewed:  Lab Results   Component Value Date    WBC 5.6 05/28/2024    RBC 4.27 (L) 05/28/2024    HGB 13.2 (L) 05/28/2024    HCT 38.9 (L) 05/28/2024    MCV 91 05/28/2024    MCH 30.9 05/28/2024    MCHC 33.9 05/28/2024    RDW 13.5 05/28/2024     05/28/2024     Sodium   Date Value Ref Range Status   05/28/2024 143 135 - 145 mmol/L Final     Comment:     Reference intervals for this test were updated on 09/26/2023 to more accurately reflect our healthy population. There may be differences in the flagging of prior results with similar values performed with this method. Interpretation of those prior results can be made in the " context of the updated reference intervals.      Potassium   Date Value Ref Range Status   05/28/2024 3.8 3.4 - 5.3 mmol/L Final   06/15/2022 3.8 3.5 - 5.0 mmol/L Final     Chloride   Date Value Ref Range Status   05/28/2024 105 98 - 107 mmol/L Final   06/15/2022 103 98 - 107 mmol/L Final     Carbon Dioxide (CO2)   Date Value Ref Range Status   05/28/2024 22 22 - 29 mmol/L Final   06/15/2022 23 22 - 31 mmol/L Final     Anion Gap   Date Value Ref Range Status   05/28/2024 16 (H) 7 - 15 mmol/L Final   06/15/2022 13 5 - 18 mmol/L Final     Glucose   Date Value Ref Range Status   05/28/2024 112 (H) 70 - 99 mg/dL Final   06/15/2022 86 70 - 125 mg/dL Final     GLUCOSE BY METER POCT   Date Value Ref Range Status   02/11/2024 108 (H) 70 - 99 mg/dL Final     Urea Nitrogen   Date Value Ref Range Status   05/28/2024 18.2 8.0 - 23.0 mg/dL Final   06/15/2022 16 8 - 28 mg/dL Final     Creatinine   Date Value Ref Range Status   05/28/2024 0.97 0.67 - 1.17 mg/dL Final     GFR Estimate   Date Value Ref Range Status   05/28/2024 80 >60 mL/min/1.73m2 Final   04/12/2021 >60 >60 mL/min/1.73m2 Final     GFR, ESTIMATED POCT   Date Value Ref Range Status   02/10/2024 57 (L) >60 mL/min/1.73m2 Final     Calcium   Date Value Ref Range Status   05/28/2024 9.1 8.8 - 10.2 mg/dL Final     Bilirubin Total   Date Value Ref Range Status   05/28/2024 0.2 <=1.2 mg/dL Final     Alkaline Phosphatase   Date Value Ref Range Status   05/28/2024 74 40 - 150 U/L Final     ALT   Date Value Ref Range Status   05/28/2024 34 0 - 70 U/L Final     Comment:     Reference intervals for this test were updated on 6/12/2023 to more accurately reflect our healthy population. There may be differences in the flagging of prior results with similar values performed with this method. Interpretation of those prior results can be made in the context of the updated reference intervals.       AST   Date Value Ref Range Status   05/28/2024 32 0 - 45 U/L Final     Comment:      Reference intervals for this test were updated on 6/12/2023 to more accurately reflect our healthy population. There may be differences in the flagging of prior results with similar values performed with this method. Interpretation of those prior results can be made in the context of the updated reference intervals.     Recent Labs   Lab Test 07/19/23  1026 09/19/22  1052   CHOL 189 202*   HDL 39* 40   * 120*   TRIG 152* 209*      Lab Results   Component Value Date    A1C 5.7 05/28/2024    A1C 5.4 07/19/2023    A1C 5.2 09/19/2022

## 2024-05-29 NOTE — PATIENT INSTRUCTIONS
May 29, 2024    Thank you for allowing our Cardiology team to participate in your care.     Please note the following changes to your heart treatment plan:     Medication changes:   - stop hydrochlorothiazide   - start combination pill of olmesartan-hydrochlorothiazide   - start aspirin 81mg daily with food  - nitroglycerin as needed   - stop pravastatin 10mg at bedtime   - start rosuvastatin 40mg at bedtime     Tests to be done:  - labs in 1 week  - TTE (heart ultrasound)  - cardiac catheterization/coronary angiogram and possible intervention    Follow up:  - Follow up in about 2 weeks with cardiology KRIS, or sooner as needed.      For scheduling, please call [phone].      Please contact our team through [employer] or our Nurse Team Voicemail service [phone], or the General Clinic [phone] for any questions or concerns.     If you are having a medical emergency, please call [phone].     Sincerely,    Nain Cruz MD, [facility]  Cardiology    [facility] [place]

## 2024-05-30 NOTE — PROGRESS NOTES
SUBJECTIVE:  Toni Collado is a 78 year old male who comes in for worsening infection on his left lower leg.  Patient was seen in the ER 2 days ago and diagnosed with cellulitis of the left anterior leg.  The area was marked.  He now has redness that is spread outside of it with increasing tenderness and warmth to the touch.  He denies any fevers but has felt more fatigued.  He was placed on Keflex at that time.  Has been taking medication as directed.  Monitor symptoms at this time.    Past Medical History:   Diagnosis Date    Adenomatous colon polyp 07/2019    Asymptomatic varicose veins of both lower extremities     Bulbar polio 1952    residual dysphagia    Cervical osteoarthritis     Chronic anxiety     Chronic depression     Chronic depression 09/19/2022    Familial tremor     Gastroesophageal reflux disease without esophagitis 04/12/2021    Hallux limitus of left foot     Localized, primary osteoarthritis of hand     Oropharyngeal dysphagia     Pre-op evaluation 06/15/2022    Primary osteoarthritis of left hip 06/15/2022    Primary osteoarthritis of left hip 06/15/2022    RLS (restless legs syndrome)     Sleep apnea, obstructive     CPAP    Status post total replacement of left hip     Varicose veins of left lower extremity      Current Outpatient Medications   Medication Sig Dispense Refill    acetaminophen (TYLENOL) 325 MG tablet Take 2 tablets (650 mg) by mouth every 4 hours as needed for mild pain      brimonidine (ALPHAGAN) 0.2 % ophthalmic solution Place 1 drop Into the left eye 2 times daily      meloxicam (MOBIC) 15 MG tablet Take 1 tablet (15 mg) by mouth daily as needed for pain 90 tablet 2    Multiple Vitamins-Minerals (MULTIVITAMIN PO) Take 1 tablet by mouth daily      omeprazole (PRILOSEC) 20 MG DR capsule Take 2 capsules (40 mg) by mouth daily 90 capsule 3    pramipexole (MIRAPEX) 1 MG tablet Take 1 tablet (1 mg) by mouth 2 times daily For restless leg syndrome. 180 tablet 3    sildenafil  (REVATIO) 20 MG tablet Take 2 tablets (40 mg) by mouth daily as needed (ED) 30 tablet 5    traZODone (DESYREL) 100 MG tablet Take 1 tablet (100 mg) by mouth at bedtime 90 tablet 3    aspirin 81 MG EC tablet Take 1 tablet (81 mg) by mouth daily 90 tablet 3    clindamycin (CLEOCIN) 300 MG capsule Take 1 capsule (300 mg) by mouth 4 times daily for 7 days 28 capsule 0    dexAMETHasone (DECADRON) 6 MG tablet Take 1 tablet (6 mg) by mouth daily (with breakfast) for 3 days 3 tablet 0    nitroGLYcerin (NITROSTAT) 0.4 MG sublingual tablet For chest pain place 1 tablet under the tongue every 5 minutes for 3 doses. If symptoms persist 5 minutes after 1st dose call 911. 20 tablet 3    olmesartan-hydrochlorothiazide (BENICAR HCT) 20-12.5 MG tablet Take 1 tablet by mouth daily 90 tablet 3    rosuvastatin (CRESTOR) 40 MG tablet Take 1 tablet (40 mg) by mouth at bedtime 90 tablet 3     No current facility-administered medications for this visit.     Social History     Socioeconomic History    Marital status:      Spouse name: Not on file    Number of children: Not on file    Years of education: Not on file    Highest education level: Not on file   Occupational History    Not on file   Tobacco Use    Smoking status: Former     Current packs/day: 0.00     Types: Cigarettes     Quit date: 1975     Years since quittin.5     Passive exposure: Past    Smokeless tobacco: Never   Vaping Use    Vaping status: Never Used   Substance and Sexual Activity    Alcohol use: Not Currently    Drug use: No    Sexual activity: Not Currently     Partners: Female     Birth control/protection: None   Other Topics Concern    Not on file   Social History Narrative    WIFE-JESSIE RETIRED SON SHERMAN 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       Social Determinants of Health     Financial Resource Strain: Low Risk  (2024)    Financial Resource  Strain     Within the past 12 months, have you or your family members you live with been unable to get utilities (heat, electricity) when it was really needed?: No   Food Insecurity: Low Risk  (1/27/2024)    Food Insecurity     Within the past 12 months, did you worry that your food would run out before you got money to buy more?: No     Within the past 12 months, did the food you bought just not last and you didn t have money to get more?: No   Transportation Needs: Low Risk  (1/27/2024)    Transportation Needs     Within the past 12 months, has lack of transportation kept you from medical appointments, getting your medicines, non-medical meetings or appointments, work, or from getting things that you need?: No   Physical Activity: Sufficiently Active (10/31/2023)    Exercise Vital Sign     Days of Exercise per Week: 3 days     Minutes of Exercise per Session: 50 min   Stress: No Stress Concern Present (10/31/2023)    [employer] [place] [employer] - Occupational Stress Questionnaire     Feeling of Stress : Not at all   Social Connections: Moderately Isolated (10/31/2023)    Social Connection and Isolation Panel [NHANES]     Frequency of Communication with Friends and Family: More than three times a week     Frequency of Social Gatherings with Friends and Family: Once a week     Attends Jainism Services: Never     Active Member of Clubs or Organizations: No     Attends Club or Organization Meetings: Not on file     Marital Status:    Interpersonal Safety: Low Risk  (11/7/2023)    Interpersonal Safety     Do you feel physically and emotionally safe where you currently live?: Yes     Within the past 12 months, have you been hit, slapped, kicked or otherwise physically hurt by someone?: No     Within the past 12 months, have you been humiliated or emotionally abused in other ways by your partner or ex-partner?: No   Housing Stability: Low Risk  (1/27/2024)    Housing Stability     Do you have  housing? : Yes     Are you worried about losing your housing?: No     ROS  negative other than stated above    Exam:  GENERAL APPEARANCE: healthy, alert and no distress  EYES: EOMI,  PERRL  RESP: lungs clear to auscultation - no rales, rhonchi or wheezes  CV: regular rates and rhythm, normal S1 S2, no S3 or S4 and no murmur, click or rub -  MS: extremities normal- no gross deformities noted, no evidence of inflammation in joints, FROM in all extremities.  SKIN: Left lower leg anterior aspect with erythema warm to the touch that is spread outside the markings from 2 days ago.  Does.  Infection is spreading.  No abscess or drainage noted  NEURO: Normal strength and tone, sensory exam grossly normal, mentation intact and speech normal    Results for orders placed or performed in visit on 05/17/24   WBC and Differential     Status: None   Result Value Ref Range    WBC Count 6.6 4.0 - 11.0 10e3/uL    % Neutrophils 68 %    % Lymphocytes 18 %    % Monocytes 10 %    % Eosinophils 4 %    % Basophils 1 %    % Immature Granulocytes 0 %    Absolute Neutrophils 4.5 1.6 - 8.3 10e3/uL    Absolute Lymphocytes 1.2 0.8 - 5.3 10e3/uL    Absolute Monocytes 0.7 0.0 - 1.3 10e3/uL    Absolute Eosinophils 0.2 0.0 - 0.7 10e3/uL    Absolute Basophils 0.0 0.0 - 0.2 10e3/uL    Absolute Immature Granulocytes 0.0 <=0.4 10e3/uL   WBC with Diff     Status: None    Narrative    The following orders were created for panel order WBC with Diff.  Procedure                               Abnormality         Status                     ---------                               -----------         ------                     WBC and Differential[[id number]]                             Final result                 Please view results for these tests on the individual orders.     assessment/plan:  (L03.116) Cellulitis of left anterior lower leg  (primary encounter diagnosis)  Comment:   Plan: WBC with Diff, doxycycline hyclate (VIBRAMYCIN)        100 MG capsule           Worsening cellulitis of the left anterior lower leg.  Was seen 2 days ago in the ER and started on Keflex.  Symptoms have not improved but have worsened.  White count is reassuring.  Will add doxycycline.  Advised to take with food.  Continue to monitor symptoms and will return to the clinic if any symptoms worsen or new symptoms develop.

## 2024-05-31 ENCOUNTER — TELEPHONE (OUTPATIENT)
Dept: ORTHOPEDICS | Facility: CLINIC | Age: 79
End: 2024-05-31
Payer: COMMERCIAL

## 2024-05-31 NOTE — TELEPHONE ENCOUNTER
[facility] Call Center    Phone Message    May a detailed message be left on voicemail: yes     Reason for Call: Other: Patient calling to cancel surgery on 6/28 for now will look back into reschedule later in the fall      Action Taken: Other: Nails     Travel Screening: Not Applicable     Date of Service:

## 2024-06-01 DIAGNOSIS — E61.1 IRON DEFICIENCY: Primary | ICD-10-CM

## 2024-06-01 RX ORDER — FERROUS SULFATE 325(65) MG
325 TABLET ORAL EVERY OTHER DAY
Qty: 45 TABLET | Refills: 0 | Status: SHIPPED | OUTPATIENT
Start: 2024-06-01 | End: 2024-08-30

## 2024-06-04 ENCOUNTER — LAB (OUTPATIENT)
Dept: LAB | Facility: CLINIC | Age: 79
End: 2024-06-04
Payer: COMMERCIAL

## 2024-06-04 DIAGNOSIS — I25.10 CORONARY ARTERY DISEASE DUE TO CALCIFIED CORONARY LESION: ICD-10-CM

## 2024-06-04 DIAGNOSIS — I10 BENIGN ESSENTIAL HYPERTENSION: ICD-10-CM

## 2024-06-04 DIAGNOSIS — I25.84 CORONARY ARTERY DISEASE DUE TO CALCIFIED CORONARY LESION: ICD-10-CM

## 2024-06-04 LAB
ERYTHROCYTE [DISTWIDTH] IN BLOOD BY AUTOMATED COUNT: 13.3 % (ref 10–15)
HCT VFR BLD AUTO: 41.2 % (ref 40–53)
HGB BLD-MCNC: 13.9 G/DL (ref 13.3–17.7)
MCH RBC QN AUTO: 30.5 PG (ref 26.5–33)
MCHC RBC AUTO-ENTMCNC: 33.7 G/DL (ref 31.5–36.5)
MCV RBC AUTO: 90 FL (ref 78–100)
PLATELET # BLD AUTO: 218 10E3/UL (ref 150–450)
RBC # BLD AUTO: 4.56 10E6/UL (ref 4.4–5.9)
WBC # BLD AUTO: 6.7 10E3/UL (ref 4–11)

## 2024-06-04 PROCEDURE — 36415 COLL VENOUS BLD VENIPUNCTURE: CPT

## 2024-06-04 PROCEDURE — 80048 BASIC METABOLIC PNL TOTAL CA: CPT

## 2024-06-04 PROCEDURE — 85027 COMPLETE CBC AUTOMATED: CPT

## 2024-06-05 LAB
ANION GAP SERPL CALCULATED.3IONS-SCNC: 12 MMOL/L (ref 7–15)
BUN SERPL-MCNC: 23 MG/DL (ref 8–23)
CALCIUM SERPL-MCNC: 9.6 MG/DL (ref 8.8–10.2)
CHLORIDE SERPL-SCNC: 102 MMOL/L (ref 98–107)
CREAT SERPL-MCNC: 1.35 MG/DL (ref 0.67–1.17)
DEPRECATED HCO3 PLAS-SCNC: 26 MMOL/L (ref 22–29)
EGFRCR SERPLBLD CKD-EPI 2021: 54 ML/MIN/1.73M2
GLUCOSE SERPL-MCNC: 102 MG/DL (ref 70–99)
POTASSIUM SERPL-SCNC: 4.7 MMOL/L (ref 3.4–5.3)
SODIUM SERPL-SCNC: 140 MMOL/L (ref 135–145)

## 2024-06-06 ENCOUNTER — TELEPHONE (OUTPATIENT)
Dept: CARDIOLOGY | Facility: CLINIC | Age: 79
End: 2024-06-06
Payer: COMMERCIAL

## 2024-06-06 DIAGNOSIS — I25.10 CORONARY ARTERY DISEASE DUE TO CALCIFIED CORONARY LESION: Primary | ICD-10-CM

## 2024-06-06 DIAGNOSIS — I10 BENIGN ESSENTIAL HYPERTENSION: ICD-10-CM

## 2024-06-06 DIAGNOSIS — G25.81 RLS (RESTLESS LEGS SYNDROME): ICD-10-CM

## 2024-06-06 DIAGNOSIS — I25.84 CORONARY ARTERY DISEASE DUE TO CALCIFIED CORONARY LESION: Primary | ICD-10-CM

## 2024-06-06 RX ORDER — OLMESARTAN MEDOXOMIL 20 MG/1
20 TABLET ORAL DAILY
Qty: 90 TABLET | Refills: 4 | Status: ON HOLD | OUTPATIENT
Start: 2024-06-06 | End: 2024-07-16

## 2024-06-06 NOTE — TELEPHONE ENCOUNTER
----- Message from Nain Cruz MD sent at 6/6/2024  8:27 AM CDT -----  Results reviewed, please let the patient know that overall findings demonstrate mild worsening of kidney function possibly from dehydration, and so I recommend we continue olmesartan 20mg daily and stop hydrochlorothiazide, he should ensure adequate hydration and avoid NSAIDs, repeat BMP in 1 week thanks!    Spoke to patient and reviewed message from Dr Cruz. He expressed understanding. Order placed for BMP and Rx sent to pharmacy for olmesartan.     Patient mentions some stomach upset, states he has been taking multiple antibiotics for several weeks and has also received IV antibiotics for a wound on his foot. It is healed now, but he thinks taking all the antibiotics has effected his gut, feels like he has an ulcer. He describes indigestion and pain in his espohagus with eating. Recommended follow up with primary care about this. He thinks the pain is different than his heart pain. Reviewed NTG SL teaching and ED precautions. He expressed understanding. Angiogram is 6/25.

## 2024-06-06 NOTE — RESULT ENCOUNTER NOTE
Results reviewed, please let the patient know that overall findings demonstrate mild worsening of kidney function possibly from dehydration, and so I recommend we continue olmesartan 20mg daily and stop hydrochlorothiazide, he should ensure adequate hydration and avoid NSAIDs, repeat BMP in 1 week thanks!

## 2024-06-09 RX ORDER — PRAMIPEXOLE DIHYDROCHLORIDE 1 MG/1
TABLET ORAL
Qty: 180 TABLET | Refills: 0 | Status: SHIPPED | OUTPATIENT
Start: 2024-06-09 | End: 2024-09-06

## 2024-06-18 ENCOUNTER — LAB (OUTPATIENT)
Dept: LAB | Facility: CLINIC | Age: 79
End: 2024-06-18
Payer: COMMERCIAL

## 2024-06-18 ENCOUNTER — TELEPHONE (OUTPATIENT)
Dept: CARDIOLOGY | Facility: CLINIC | Age: 79
End: 2024-06-18

## 2024-06-18 DIAGNOSIS — I25.84 CORONARY ARTERY DISEASE DUE TO CALCIFIED CORONARY LESION: ICD-10-CM

## 2024-06-18 DIAGNOSIS — I10 BENIGN ESSENTIAL HYPERTENSION: ICD-10-CM

## 2024-06-18 DIAGNOSIS — I25.10 CORONARY ARTERY DISEASE DUE TO CALCIFIED CORONARY LESION: Primary | ICD-10-CM

## 2024-06-18 DIAGNOSIS — I25.10 CORONARY ARTERY DISEASE DUE TO CALCIFIED CORONARY LESION: ICD-10-CM

## 2024-06-18 DIAGNOSIS — I20.0 ACCELERATING ANGINA (H): ICD-10-CM

## 2024-06-18 DIAGNOSIS — I25.84 CORONARY ARTERY DISEASE DUE TO CALCIFIED CORONARY LESION: Primary | ICD-10-CM

## 2024-06-18 LAB
ANION GAP SERPL CALCULATED.3IONS-SCNC: 8 MMOL/L (ref 7–15)
BUN SERPL-MCNC: 18 MG/DL (ref 8–23)
CALCIUM SERPL-MCNC: 9.8 MG/DL (ref 8.8–10.2)
CHLORIDE SERPL-SCNC: 107 MMOL/L (ref 98–107)
CREAT SERPL-MCNC: 1.33 MG/DL (ref 0.67–1.17)
DEPRECATED HCO3 PLAS-SCNC: 26 MMOL/L (ref 22–29)
EGFRCR SERPLBLD CKD-EPI 2021: 55 ML/MIN/1.73M2
GLUCOSE SERPL-MCNC: 128 MG/DL (ref 70–99)
POTASSIUM SERPL-SCNC: 4.8 MMOL/L (ref 3.4–5.3)
SODIUM SERPL-SCNC: 141 MMOL/L (ref 135–145)

## 2024-06-18 PROCEDURE — 36415 COLL VENOUS BLD VENIPUNCTURE: CPT

## 2024-06-18 PROCEDURE — 80048 BASIC METABOLIC PNL TOTAL CA: CPT

## 2024-06-18 RX ORDER — ASPIRIN 81 MG/1
243 TABLET, CHEWABLE ORAL ONCE
Status: CANCELLED | OUTPATIENT
Start: 2024-06-18

## 2024-06-18 RX ORDER — POTASSIUM CHLORIDE 1500 MG/1
20 TABLET, EXTENDED RELEASE ORAL
Status: CANCELLED | OUTPATIENT
Start: 2024-06-18

## 2024-06-18 RX ORDER — LIDOCAINE 40 MG/G
CREAM TOPICAL
Status: CANCELLED | OUTPATIENT
Start: 2024-06-18

## 2024-06-18 RX ORDER — ASPIRIN 325 MG
325 TABLET ORAL ONCE
Status: CANCELLED | OUTPATIENT
Start: 2024-06-18 | End: 2024-06-18

## 2024-06-18 RX ORDER — SODIUM CHLORIDE 9 MG/ML
INJECTION, SOLUTION INTRAVENOUS CONTINUOUS
Status: CANCELLED | OUTPATIENT
Start: 2024-06-18

## 2024-06-18 NOTE — TELEPHONE ENCOUNTER
Coronary angiogram/PCI/Right Heart Cath prep instructions.     Patient is scheduled for a Coronary Angiogram at [facility] - [address], [place] [address] - Main Entrance of the Hospital on 6/25/24.  Check in time is at 0830 and procedure to follow.    Patient instructed to remain NPO for solid foods 8 hours prior to arrival and may have clear liquids up to 2 hours prior to arrival.    Patient Patient does not require extra fluids prior to procedure.    Patient is not diabetic.    Patient is not on anticoagulation.    Patient is not on diuretics.     Patient is taking ASA 81mg daily and will take 4 tabs (324mg) the morning of the procedure.    Pt is not on a SGLT2 inhibitor.    Pt is not on a GLP-1 Agonist    Patient advised to take their other daily medications the morning of the procedure with small sips of water.     Verified patient does not have a contrast allergy.    Verified patient has someone available to drive them home from the hospital and can stay with them for 24 hours after the procedure.     Patient advised to notify care team with any new COVID like symptoms prior to procedure. Day of procedure phone number: [place] at [phone]    Patient is aware of visitor policy.    Patient expresses understanding of above instructions and denies further questions at this time.      Tania Cruz RN  [facility]

## 2024-06-21 ENCOUNTER — TELEPHONE (OUTPATIENT)
Dept: CARDIOLOGY | Facility: CLINIC | Age: 79
End: 2024-06-21
Payer: COMMERCIAL

## 2024-06-21 DIAGNOSIS — I10 BENIGN ESSENTIAL HYPERTENSION: Primary | ICD-10-CM

## 2024-06-21 NOTE — TELEPHONE ENCOUNTER
Message from Dr. Cruz:  From: Nain Cruz MD   Sent: 6/21/2024   6:07 AM CDT   To: Sinai Castillo RN     Hi please have him undergo standard pre-hydration before the angiogram, with a BMP 1 week post cath     Order placed for bmp on July 2.  Spoke with patient, he will schedule the follow up bmp at his [place] clinic.

## 2024-06-24 ENCOUNTER — HOSPITAL ENCOUNTER (OUTPATIENT)
Dept: CARDIOLOGY | Facility: CLINIC | Age: 79
Discharge: HOME | End: 2024-06-24
Attending: INTERNAL MEDICINE | Admitting: INTERNAL MEDICINE
Payer: COMMERCIAL

## 2024-06-24 ENCOUNTER — TELEPHONE (OUTPATIENT)
Dept: MEDSURG UNIT | Facility: CLINIC | Age: 79
End: 2024-06-24

## 2024-06-24 DIAGNOSIS — I25.10 CORONARY ARTERY DISEASE DUE TO CALCIFIED CORONARY LESION: ICD-10-CM

## 2024-06-24 DIAGNOSIS — R06.02 SOB (SHORTNESS OF BREATH): ICD-10-CM

## 2024-06-24 DIAGNOSIS — R79.89 ELEVATED TROPONIN I MEASUREMENT: ICD-10-CM

## 2024-06-24 DIAGNOSIS — I20.0 ACCELERATING ANGINA (H): ICD-10-CM

## 2024-06-24 DIAGNOSIS — I25.84 CORONARY ARTERY DISEASE DUE TO CALCIFIED CORONARY LESION: ICD-10-CM

## 2024-06-24 LAB — LVEF ECHO: NORMAL

## 2024-06-24 PROCEDURE — 93306 TTE W/DOPPLER COMPLETE: CPT | Mod: 26 | Performed by: INTERNAL MEDICINE

## 2024-06-24 PROCEDURE — 93306 TTE W/DOPPLER COMPLETE: CPT

## 2024-06-24 NOTE — TELEPHONE ENCOUNTER
Chart reviewed for upcoming appt.  CSE and nursing orders in [facility] for procedure.

## 2024-06-25 ENCOUNTER — HOSPITAL ENCOUNTER (OUTPATIENT)
Facility: CLINIC | Age: 79
Discharge: HOME | End: 2024-06-25
Attending: INTERNAL MEDICINE | Admitting: INTERNAL MEDICINE
Payer: COMMERCIAL

## 2024-06-25 ENCOUNTER — APPOINTMENT (OUTPATIENT)
Dept: ULTRASOUND IMAGING | Facility: CLINIC | Age: 79
End: 2024-06-25
Attending: STUDENT IN AN ORGANIZED HEALTH CARE EDUCATION/TRAINING PROGRAM
Payer: COMMERCIAL

## 2024-06-25 VITALS
HEART RATE: 76 BPM | RESPIRATION RATE: 16 BRPM | BODY MASS INDEX: 36.87 KG/M2 | TEMPERATURE: 98 F | OXYGEN SATURATION: 96 % | DIASTOLIC BLOOD PRESSURE: 74 MMHG | WEIGHT: 234.9 LBS | SYSTOLIC BLOOD PRESSURE: 124 MMHG | HEIGHT: 67 IN

## 2024-06-25 DIAGNOSIS — I25.84 CORONARY ARTERY DISEASE DUE TO CALCIFIED CORONARY LESION: ICD-10-CM

## 2024-06-25 DIAGNOSIS — I71.21 ANEURYSM OF ASCENDING AORTA WITHOUT RUPTURE (H): Primary | ICD-10-CM

## 2024-06-25 DIAGNOSIS — I25.10 CORONARY ARTERY DISEASE DUE TO CALCIFIED CORONARY LESION: ICD-10-CM

## 2024-06-25 DIAGNOSIS — I20.0 ACCELERATING ANGINA (H): ICD-10-CM

## 2024-06-25 DIAGNOSIS — R79.89 ELEVATED TROPONIN I MEASUREMENT: ICD-10-CM

## 2024-06-25 PROBLEM — Z98.890 STATUS POST CORONARY ANGIOGRAM: Status: ACTIVE | Noted: 2024-06-25

## 2024-06-25 LAB
ABO/RH(D): NORMAL
ALBUMIN SERPL BCG-MCNC: 4.1 G/DL (ref 3.5–5.2)
ALBUMIN UR-MCNC: NEGATIVE MG/DL
ALP SERPL-CCNC: 72 U/L (ref 40–150)
ALT SERPL W P-5'-P-CCNC: 50 U/L (ref 0–70)
ANION GAP SERPL CALCULATED.3IONS-SCNC: 12 MMOL/L (ref 7–15)
ANTIBODY SCREEN: NEGATIVE
APPEARANCE UR: CLEAR
APTT PPP: 27 SECONDS (ref 22–38)
AST SERPL W P-5'-P-CCNC: 39 U/L (ref 0–45)
ATRIAL RATE - MUSE: 65 BPM
BILIRUB DIRECT SERPL-MCNC: <0.2 MG/DL (ref 0–0.3)
BILIRUB SERPL-MCNC: 0.4 MG/DL
BILIRUB UR QL STRIP: NEGATIVE
BUN SERPL-MCNC: 18.5 MG/DL (ref 8–23)
CALCIUM SERPL-MCNC: 9.4 MG/DL (ref 8.8–10.2)
CHLORIDE SERPL-SCNC: 104 MMOL/L (ref 98–107)
COLOR UR AUTO: NORMAL
CREAT SERPL-MCNC: 1.18 MG/DL (ref 0.67–1.17)
DEPRECATED HCO3 PLAS-SCNC: 23 MMOL/L (ref 22–29)
DIASTOLIC BLOOD PRESSURE - MUSE: NORMAL MMHG
EGFRCR SERPLBLD CKD-EPI 2021: 64 ML/MIN/1.73M2
ERYTHROCYTE [DISTWIDTH] IN BLOOD BY AUTOMATED COUNT: 13.2 % (ref 10–15)
GLUCOSE SERPL-MCNC: 104 MG/DL (ref 70–99)
GLUCOSE UR STRIP-MCNC: NEGATIVE MG/DL
HCT VFR BLD AUTO: 39.6 % (ref 40–53)
HGB BLD-MCNC: 13.5 G/DL (ref 13.3–17.7)
HGB UR QL STRIP: NEGATIVE
INR PPP: 1.06 (ref 0.85–1.15)
INTERPRETATION ECG - MUSE: NORMAL
KETONES UR STRIP-MCNC: NEGATIVE MG/DL
LEUKOCYTE ESTERASE UR QL STRIP: NEGATIVE
MCH RBC QN AUTO: 31.4 PG (ref 26.5–33)
MCHC RBC AUTO-ENTMCNC: 34.1 G/DL (ref 31.5–36.5)
MCV RBC AUTO: 92 FL (ref 78–100)
NITRATE UR QL: NEGATIVE
P AXIS - MUSE: 46 DEGREES
PH UR STRIP: 6 [PH] (ref 5–7)
PLATELET # BLD AUTO: 183 10E3/UL (ref 150–450)
POTASSIUM SERPL-SCNC: 4 MMOL/L (ref 3.4–5.3)
PR INTERVAL - MUSE: 238 MS
PROT SERPL-MCNC: 6.8 G/DL (ref 6.4–8.3)
QRS DURATION - MUSE: 96 MS
QT - MUSE: 400 MS
QTC - MUSE: 416 MS
R AXIS - MUSE: -15 DEGREES
RBC # BLD AUTO: 4.3 10E6/UL (ref 4.4–5.9)
RBC URINE: 0 /HPF
SODIUM SERPL-SCNC: 139 MMOL/L (ref 135–145)
SP GR UR STRIP: 1.01 (ref 1–1.03)
SPECIMEN EXPIRATION DATE: NORMAL
SQUAMOUS EPITHELIAL: <1 /HPF
SYSTOLIC BLOOD PRESSURE - MUSE: NORMAL MMHG
T AXIS - MUSE: 19 DEGREES
UROBILINOGEN UR STRIP-MCNC: NORMAL MG/DL
VENTRICULAR RATE- MUSE: 65 BPM
WBC # BLD AUTO: 5.9 10E3/UL (ref 4–11)
WBC URINE: 2 /HPF

## 2024-06-25 PROCEDURE — 999N000071 HC STATISTIC HEART CATH LAB OR EP LAB

## 2024-06-25 PROCEDURE — 99152 MOD SED SAME PHYS/QHP 5/>YRS: CPT | Performed by: INTERNAL MEDICINE

## 2024-06-25 PROCEDURE — 250N000011 HC RX IP 250 OP 636: Performed by: INTERNAL MEDICINE

## 2024-06-25 PROCEDURE — 99204 OFFICE O/P NEW MOD 45 MIN: CPT | Mod: GC | Performed by: STUDENT IN AN ORGANIZED HEALTH CARE EDUCATION/TRAINING PROGRAM

## 2024-06-25 PROCEDURE — 80048 BASIC METABOLIC PNL TOTAL CA: CPT | Performed by: INTERNAL MEDICINE

## 2024-06-25 PROCEDURE — 272N000001 HC OR GENERAL SUPPLY STERILE: Performed by: INTERNAL MEDICINE

## 2024-06-25 PROCEDURE — 999N000184 HC STATISTIC TELEMETRY

## 2024-06-25 PROCEDURE — 93458 L HRT ARTERY/VENTRICLE ANGIO: CPT | Mod: 26 | Performed by: INTERNAL MEDICINE

## 2024-06-25 PROCEDURE — 250N000011 HC RX IP 250 OP 636: Mod: JZ | Performed by: INTERNAL MEDICINE

## 2024-06-25 PROCEDURE — 36415 COLL VENOUS BLD VENIPUNCTURE: CPT | Performed by: INTERNAL MEDICINE

## 2024-06-25 PROCEDURE — 93880 EXTRACRANIAL BILAT STUDY: CPT

## 2024-06-25 PROCEDURE — 85730 THROMBOPLASTIN TIME PARTIAL: CPT | Performed by: INTERNAL MEDICINE

## 2024-06-25 PROCEDURE — 258N000003 HC RX IP 258 OP 636: Mod: JZ | Performed by: INTERNAL MEDICINE

## 2024-06-25 PROCEDURE — 93005 ELECTROCARDIOGRAM TRACING: CPT

## 2024-06-25 PROCEDURE — 93458 L HRT ARTERY/VENTRICLE ANGIO: CPT | Performed by: INTERNAL MEDICINE

## 2024-06-25 PROCEDURE — 93970 EXTREMITY STUDY: CPT

## 2024-06-25 PROCEDURE — 81001 URINALYSIS AUTO W/SCOPE: CPT | Performed by: STUDENT IN AN ORGANIZED HEALTH CARE EDUCATION/TRAINING PROGRAM

## 2024-06-25 PROCEDURE — 999N000054 HC STATISTIC EKG NON-CHARGEABLE

## 2024-06-25 PROCEDURE — 82248 BILIRUBIN DIRECT: CPT | Performed by: STUDENT IN AN ORGANIZED HEALTH CARE EDUCATION/TRAINING PROGRAM

## 2024-06-25 PROCEDURE — 85610 PROTHROMBIN TIME: CPT | Performed by: INTERNAL MEDICINE

## 2024-06-25 PROCEDURE — 86900 BLOOD TYPING SEROLOGIC ABO: CPT | Performed by: STUDENT IN AN ORGANIZED HEALTH CARE EDUCATION/TRAINING PROGRAM

## 2024-06-25 PROCEDURE — 85027 COMPLETE CBC AUTOMATED: CPT | Performed by: INTERNAL MEDICINE

## 2024-06-25 RX ORDER — NALOXONE HYDROCHLORIDE 0.4 MG/ML
0.2 INJECTION, SOLUTION INTRAMUSCULAR; INTRAVENOUS; SUBCUTANEOUS
Status: DISCONTINUED | OUTPATIENT
Start: 2024-06-25 | End: 2024-06-25 | Stop reason: HOSPADM

## 2024-06-25 RX ORDER — ATROPINE SULFATE 0.1 MG/ML
0.5 INJECTION INTRAVENOUS
Status: DISCONTINUED | OUTPATIENT
Start: 2024-06-25 | End: 2024-06-25 | Stop reason: HOSPADM

## 2024-06-25 RX ORDER — SODIUM CHLORIDE 9 MG/ML
INJECTION, SOLUTION INTRAVENOUS CONTINUOUS
Status: DISCONTINUED | OUTPATIENT
Start: 2024-06-25 | End: 2024-06-25 | Stop reason: HOSPADM

## 2024-06-25 RX ORDER — ASPIRIN 81 MG/1
243 TABLET, CHEWABLE ORAL ONCE
Status: COMPLETED | OUTPATIENT
Start: 2024-06-25 | End: 2024-06-25

## 2024-06-25 RX ORDER — LIDOCAINE 40 MG/G
CREAM TOPICAL
Status: DISCONTINUED | OUTPATIENT
Start: 2024-06-25 | End: 2024-06-25 | Stop reason: HOSPADM

## 2024-06-25 RX ORDER — FLUMAZENIL 0.1 MG/ML
0.2 INJECTION, SOLUTION INTRAVENOUS
Status: DISCONTINUED | OUTPATIENT
Start: 2024-06-25 | End: 2024-06-25 | Stop reason: HOSPADM

## 2024-06-25 RX ORDER — ASPIRIN 325 MG
325 TABLET ORAL ONCE
Status: COMPLETED | OUTPATIENT
Start: 2024-06-25 | End: 2024-06-25

## 2024-06-25 RX ORDER — FENTANYL CITRATE 50 UG/ML
INJECTION, SOLUTION INTRAMUSCULAR; INTRAVENOUS
Status: DISCONTINUED | OUTPATIENT
Start: 2024-06-25 | End: 2024-06-25 | Stop reason: HOSPADM

## 2024-06-25 RX ORDER — ACETAMINOPHEN 325 MG/1
650 TABLET ORAL EVERY 4 HOURS PRN
Status: DISCONTINUED | OUTPATIENT
Start: 2024-06-25 | End: 2024-06-25 | Stop reason: HOSPADM

## 2024-06-25 RX ORDER — NALOXONE HYDROCHLORIDE 0.4 MG/ML
0.4 INJECTION, SOLUTION INTRAMUSCULAR; INTRAVENOUS; SUBCUTANEOUS
Status: DISCONTINUED | OUTPATIENT
Start: 2024-06-25 | End: 2024-06-25 | Stop reason: HOSPADM

## 2024-06-25 RX ORDER — FENTANYL CITRATE 50 UG/ML
25 INJECTION, SOLUTION INTRAMUSCULAR; INTRAVENOUS
Status: DISCONTINUED | OUTPATIENT
Start: 2024-06-25 | End: 2024-06-25 | Stop reason: HOSPADM

## 2024-06-25 RX ORDER — IOPAMIDOL 755 MG/ML
INJECTION, SOLUTION INTRAVASCULAR
Status: DISCONTINUED | OUTPATIENT
Start: 2024-06-25 | End: 2024-06-25 | Stop reason: HOSPADM

## 2024-06-25 RX ORDER — OXYCODONE HYDROCHLORIDE 5 MG/1
5 TABLET ORAL EVERY 4 HOURS PRN
Status: DISCONTINUED | OUTPATIENT
Start: 2024-06-25 | End: 2024-06-25 | Stop reason: HOSPADM

## 2024-06-25 RX ORDER — OXYCODONE HYDROCHLORIDE 5 MG/1
10 TABLET ORAL EVERY 4 HOURS PRN
Status: DISCONTINUED | OUTPATIENT
Start: 2024-06-25 | End: 2024-06-25 | Stop reason: HOSPADM

## 2024-06-25 RX ORDER — POTASSIUM CHLORIDE 1500 MG/1
20 TABLET, EXTENDED RELEASE ORAL
Status: DISCONTINUED | OUTPATIENT
Start: 2024-06-25 | End: 2024-06-25 | Stop reason: HOSPADM

## 2024-06-25 RX ADMIN — SODIUM CHLORIDE: 9 INJECTION, SOLUTION INTRAVENOUS at 09:10

## 2024-06-25 ASSESSMENT — ACTIVITIES OF DAILY LIVING (ADL)
ADLS_ACUITY_SCORE: 35

## 2024-06-25 NOTE — PROGRESS NOTES
Care Suites Discharge Nursing Note    Patient Information  Name: Toni Collado  Age: 78 year old    Discharge Education:  Discharge instructions reviewed: Yes  Additional education/resources provided: Surgery saw patient  Patient/patient representative verbalizes understanding: Yes  Patient discharging on new medications: No  Medication education completed: N/A    Discharge Plans:   Discharge location: home  Discharge ride contacted: Yes  Approximate discharge time: 1345    Discharge Criteria:  Discharge criteria met and vital signs stable: Yes. Right groin CDI/soft, denies pain, ambulated/voided/ate/PIV pulled, information from surgery given to patient.     Patient Belongs:  Patient belongings returned to patient: Yes    Jeannie Mulligan RN

## 2024-06-25 NOTE — CONSULTS
Cardiac surgery consult  Note     Toni W Heaven  Code Status: Prior  Primary Care Physician: Yordan Hernandez   : 1945   Age: 78 year old     Date of Service: 2024     Subjective     Chief Complaint: Multivessel CAD, angina    History of present illness:     78-year-old male with a past medical history of BRITTNI, hypertension, obesity, GERD.  Presents for elective coronary angiogram after having episodes of chest pain at home.  Episodes preceded by activity.  Improved with rest.  He did undergo an exercise stress test which was inconclusive.   Angiogram today demonstrates multivessel CAD.  Echo shows preserved ejection fraction and no valvular abnormalities.  He also has a dilated ascending aorta on CT PE and echo.  Currently he feels well after his angiogram.  Denies chest pain.  He does not have chest pain at rest and is overall able to do regular activities.  No prior history of cardiac or lung surgery.  He is not on blood thinners     Subjective   Review of Systems:   Negative except where noted above     Patient Active Problem List   Diagnosis    RLS (restless legs syndrome)    BRITTNI on CPAP    Elevated prostate specific antigen (PSA)    Gastroesophageal reflux disease without esophagitis    History of colonic polyps    Tinnitus of both ears    Oropharyngeal dysphagia    Varicose veins of left lower extremity    Adenomatous colon polyp    Bulbar polio    Benign paroxysmal positional vertigo due to bilateral vestibular disorder    Primary osteoarthritis of both hands    BMI 38.0-38.9,adult    Class 2 severe obesity due to excess calories with serious comorbidity in adult (H)    Zenker diverticulum    Throat swelling    Acute post-operative pain    Accelerating angina (H)    Status post coronary angiogram    Elevated troponin I measurement    Coronary artery disease due to calcified coronary lesion     Past Medical History:   Diagnosis Date    Adenomatous colon polyp 2019    Asymptomatic varicose  "veins of both lower extremities     Bulbar polio 1952    residual dysphagia    Cervical osteoarthritis     Chronic anxiety     Chronic depression     Chronic depression 09/19/2022    Familial tremor     Gastroesophageal reflux disease without esophagitis 04/12/2021    Hallux limitus of left foot     Localized, primary osteoarthritis of hand     Oropharyngeal dysphagia     Pre-op evaluation 06/15/2022    Primary osteoarthritis of left hip 06/15/2022    Primary osteoarthritis of left hip 06/15/2022    RLS (restless legs syndrome)     Sleep apnea, obstructive     CPAP    Status post coronary angiogram 6/25/2024    Status post total replacement of left hip     Varicose veins of left lower extremity      Past Surgical History:   Procedure Laterality Date    ABDOMEN SURGERY      ARTHROSCOPY KNEE      CATARACT IOL, RT/LT Bilateral      and     COLONOSCOPY N/A 09/13/2023    Procedure: Colonoscopy with polypectomies using cold snare;  Surgeon: Yue Cline MD;  Location:  GI    ESOPHAGOSCOPY, GASTROSCOPY, DUODENOSCOPY (EGD), COMBINED N/A 09/13/2023    Procedure: Esophagoscopy, gastroscopy, duodenoscopy (EGD), combined with biopsy using cold forcep;  Surgeon: Yue Cline MD;  Location: [facility]    HERNIA REPAIR, UMBILICAL      KNEE SURGERY Left     \"bone on bone\" d/t arthritis    RADIOFREQUENCY ABLATION NERVES      cervical spine    RHINOPLASTY      SEPTOPLASTY      SHOULDER SURGERY Bilateral     right (pitching injury), left (bone spur)    SHOULDER SURGERY Right     reconstructive surgery with hardware    TOE SURGERY Left     left big toe d/t arthritis    TOTAL KNEE ARTHROPLASTY Left     unicompartmental     Social History     Socioeconomic History    Marital status:      Spouse name: Not on file    Number of children: Not on file    Years of education: Not on file    Highest education level: Not on file   Occupational History    Not on file   Tobacco Use    Smoking status: Former     " Current packs/day: 0.00     Types: Cigarettes     Quit date: 1975     Years since quittin.6     Passive exposure: Past    Smokeless tobacco: Never   Vaping Use    Vaping status: Never Used   Substance and Sexual Activity    Alcohol use: Not Currently    Drug use: No    Sexual activity: Not Currently     Partners: Female     Birth control/protection: None   Other Topics Concern    Not on file   Social History Narrative    WIFE-JESSIE RETIRED SON SHERMAN 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       Social Determinants of Health     Financial Resource Strain: Low Risk  (2024)    Financial Resource Strain     Within the past 12 months, have you or your family members you live with been unable to get utilities (heat, electricity) when it was really needed?: No   Food Insecurity: Low Risk  (2024)    Food Insecurity     Within the past 12 months, did you worry that your food would run out before you got money to buy more?: No     Within the past 12 months, did the food you bought just not last and you didn t have money to get more?: No   Transportation Needs: Low Risk  (2024)    Transportation Needs     Within the past 12 months, has lack of transportation kept you from medical appointments, getting your medicines, non-medical meetings or appointments, work, or from getting things that you need?: No   Physical Activity: Sufficiently Active (10/31/2023)    Exercise Vital Sign     Days of Exercise per Week: 3 days     Minutes of Exercise per Session: 50 min   Stress: No Stress Concern Present (10/31/2023)    [employer] [place] [employer] - Occupational Stress Questionnaire     Feeling of Stress : Not at all   Social Connections: Moderately Isolated (10/31/2023)    Social Connection and Isolation Panel [NHANES]     Frequency of Communication with Friends and Family: More than three times a week      Frequency of Social Gatherings with Friends and Family: Once a week     Attends Restorationism Services: Never     Active Member of Clubs or Organizations: No     Attends Club or Organization Meetings: Not on file     Marital Status:    Interpersonal Safety: Low Risk  (11/7/2023)    Interpersonal Safety     Do you feel physically and emotionally safe where you currently live?: Yes     Within the past 12 months, have you been hit, slapped, kicked or otherwise physically hurt by someone?: No     Within the past 12 months, have you been humiliated or emotionally abused in other ways by your partner or ex-partner?: No   Housing Stability: Low Risk  (1/27/2024)    Housing Stability     Do you have housing? : Yes     Are you worried about losing your housing?: No     Family History   Problem Relation Age of Onset    Heart Failure Mother         valvular heart disease    Heart Failure Father         ihd    Myelodysplastic syndrome Father     Other Cancer Father         Mylodisplasia    Cancer Brother         renal    Other Cancer Brother         Kidney cancer    Heart Failure Brother         ihd    Substance Abuse Brother         Alcoholic    Substance Abuse Maternal Grandfather         Alcoholic    Diabetes Sister     Colon Cancer No family hx of     Thyroid Disease No family hx of      Medications Prior to Admission   Medication Sig Dispense Refill Last Dose    acetaminophen (TYLENOL) 325 MG tablet Take 2 tablets (650 mg) by mouth every 4 hours as needed for mild pain   Past Week    aspirin 81 MG EC tablet Take 1 tablet (81 mg) by mouth daily 90 tablet 3 6/25/2024    brimonidine (ALPHAGAN) 0.2 % ophthalmic solution Place 1 drop Into the left eye 2 times daily   Past Week    ferrous sulfate (FEROSUL) 325 (65 Fe) MG tablet Take 1 tablet (325 mg) by mouth every other day for 90 days 45 tablet 0 6/24/2024    meloxicam (MOBIC) 15 MG tablet Take 1 tablet (15 mg) by mouth daily as needed for pain 90 tablet 2 Past Month     "Multiple Vitamins-Minerals (MULTIVITAMIN PO) Take 1 tablet by mouth daily   6/24/2024    nitroGLYcerin (NITROSTAT) 0.4 MG sublingual tablet For chest pain place 1 tablet under the tongue every 5 minutes for 3 doses. If symptoms persist 5 minutes after 1st dose call 911. 20 tablet 3 Unknown    olmesartan (BENICAR) 20 MG tablet Take 1 tablet (20 mg) by mouth daily 90 tablet 4 6/25/2024    omeprazole (PRILOSEC) 20 MG DR capsule Take 2 capsules (40 mg) by mouth daily 90 capsule 3 6/25/2024    pramipexole (MIRAPEX) 1 MG tablet TAKE 1 TABLET BY MOUTH TWICE DAILY FOR RESTLESS LEG SYNDROME.. 180 tablet 0 6/25/2024    rosuvastatin (CRESTOR) 40 MG tablet Take 1 tablet (40 mg) by mouth at bedtime 90 tablet 3 6/24/2024    sildenafil (REVATIO) 20 MG tablet Take 2 tablets (40 mg) by mouth daily as needed (ED) 30 tablet 5 Unknown    traZODone (DESYREL) 100 MG tablet Take 1 tablet (100 mg) by mouth at bedtime 90 tablet 3 6/24/2024     Allergies   Allergen Reactions    Amoxicillin Other (See Comments) and Unknown     Facial and neck flushing, felt hot    Seasonal Allergies           Objective   Objective   BP 95/68   Pulse 74   Temp 98  F (36.7  C) (Oral)   Resp 16   Ht 1.689 m (5' 6.5\")   Wt 106.5 kg (234 lb 14.4 oz)   SpO2 96%   BMI 37.35 kg/m      Temp  Min: 98  F (36.7  C)  Max: 98  F (36.7  C)  BP  Min: 95/68  Max: 136/80   No intake or output data in the 24 hours ending 06/25/24 1138  No intake/output data recorded.     Physical Exam:   Gen- alert and oriented x3, NAD  HEENT- NC/AT, EOMI  Cardiac- RRR  Pulm- normal respiratory effort  Abd- soft, NT/ND,   - deferred  Extremities- no peripheral edema  Neuro- gross motor intact  Skin- no obvious rashes, bruises, or cuts     Pertinent Labs: Reviewed.     Arterial Blood Gases   No lab results found in last 7 days.    Complete Blood Count   Recent Labs   Lab 06/25/24  0909   WBC 5.9   HGB 13.5          Basic Metabolic Panel  Recent Labs   Lab 06/25/24  0909    "   POTASSIUM 4.0   CHLORIDE 104   CO2 23   BUN 18.5   CR 1.18*       Liver Function Tests  Recent Labs   Lab 06/25/24  0909   AST 39   ALT 50   ALKPHOS 72   BILITOTAL 0.4   ALBUMIN 4.1   INR 1.06       Coagulation Profile  Recent Labs   Lab 06/25/24  0909   INR 1.06   PTT 27          Pertinent Imaging (Last 24 hours):  Recent Results (from the past 24 hour(s))   Cardiac Catheterization    Narrative      Ost RCA to Prox RCA lesion is 100% stenosed.    Mid LM to Dist LM lesion is 50% stenosed.    Mid LAD lesion is 90% stenosed.    2nd Diag lesion is 80% stenosed.    Ramus lesion is 99% stenosed.    Ost Cx to Prox Cx lesion is 75% stenosed.    3rd Mrg lesion is 80% stenosed.    LPDA lesion is 100% stenosed.    The left ventricular ejection fraction is grossly normal.    Left ventricular filling pressures are mildly elevated.    1) Severe mutivessel CAD  2) Ascending aortic dilitation  3) Normal LV function  4) Recommend CABG and probable ascending aortic aneurysm repair.         Last Echo:  Echo result w/o MOPS: Interpretation Summary 1. The left ventricle is normal in size. There is moderate concentric leftventricular hypertrophy. Left ventricular systolic function is normal. Thevisual ejection fraction is 55-60%. Diastolic Doppler findings (E/E' ratioand/or other parameters) suggest left ventricular filling pressures arenormal. No regional wall motion abnormalities noted.2. The right ventricle is normal size. The right ventricular systolic functionis normal.3. Mild biatrial enlargement.4. Trace to mild mitral and tricuspid regurgitation.5. The aortic root is normal size. Ascending aorta aneurysm is present.6. No pericardial effusion.7. No previous study for comparison.               Current Medications     S  C  H  E  D Current Facility-Administered Medications   Medication Dose Route Frequency Provider Last Rate Last Admin    sodium chloride (PF) 0.9% PF flush 3 mL  3 mL Intracatheter Q8H Renny Rene MD           G  T  T Current Facility-Administered Medications   Medication Dose Route Frequency Provider Last Rate Last Admin    sodium chloride 0.9 % infusion   Intravenous Continuous Renny Rene MD 75 mL/hr at 06/25/24 1031 Rate Verify at 06/25/24 1031      P  R  N Current Facility-Administered Medications   Medication Dose Route Frequency Provider Last Rate Last Admin    acetaminophen (TYLENOL) tablet 650 mg  650 mg Oral Q4H PRN Renny Rene MD        atropine injection 0.5 mg  0.5 mg Intravenous Once PRN Renny Rene MD        fentaNYL (PF) (SUBLIMAZE) injection 25 mcg  25 mcg Intravenous Q15 Min PRN Renny Rene MD        flumazenil (ROMAZICON) injection 0.2 mg  0.2 mg Intravenous q1 min prn Renny Rene MD        Hold: metformin and metformin containing medications on day of the procedure and for 48 hours after IV contrast given- Patients with acute kidney injury or severe chronic kidney disease (stage IV or stage V; i.e., eGFR less than 30)   Does not apply HOLD Renny Rene MD        lidocaine (LMX4) cream   Topical Q1H PRN Renny Rene MD        lidocaine 1 % 0.1-1 mL  0.1-1 mL Other Q1H PRN Renny Rene MD        midazolam (VERSED) injection 0.5 mg  0.5 mg Intravenous Q5 Min PRN Renny Rene MD        naloxone (NARCAN) injection 0.2 mg  0.2 mg Intravenous Q2 Min PRN Renny Rene MD        Or    naloxone (NARCAN) injection 0.4 mg  0.4 mg Intravenous Q2 Min PRN Renny Rene MD        Or    naloxone (NARCAN) injection 0.2 mg  0.2 mg Intramuscular Q2 Min PRN Renny Rene MD        Or    naloxone (NARCAN) injection 0.4 mg  0.4 mg Intramuscular Q2 Min PRRenny Bonilla MD        oxyCODONE (ROXICODONE) tablet 5 mg  5 mg Oral Q4H PRN Renny Rene MD        Or    oxyCODONE (ROXICODONE) tablet 10 mg  10 mg Oral Q4H PRN Renny Rene MD        sodium chloride (PF) 0.9% PF  flush 3 mL  3 mL Intracatheter q1 min prn Renny Rene MD        sodium chloride 0.9% BOLUS 250 mL  250 mL Intravenous Once PRN Renny Rene MD                Assessment     78 year old male with severe multivessel CAD, ascending aortic aneurysm     Plan     Patient seen and chart reviewed.  Discussed with the patient and his wife and son who are both present after the angiogram today.  Discussed surgical revascularization and replacement of the ascending aortic aneurysm.  Patient is overall a good surgical candidate and after discussing the risk, benefits, alternatives he agreed to proceed with surgery, CABG.    Will obtain lower extremity vein mapping as well as carotid ultrasound.  Would also like to get a dedicated CTA of the chest given his ascending aneurysm.  This can be done outpatient since he has already had a contrast load today with the angiogram.    Will plan to schedule surgery at his earliest convenience.       Signed:    Carter Cardenas MD 6/25/2024 at 11:38 AM  Cardiothoracic Surgery Fellow

## 2024-06-25 NOTE — PROGRESS NOTES
Care Suites Post Procedure Note    Patient Information  Name: Toni Collado  Age: 78 year old    Post Procedure  Time patient returned to Care Suites: 1025  Concerns/abnormal assessment: NA  If abnormal assessment, provider notified: N/A  Plan/Other: bedrest x 2 hours, right groin site CDI/soft, denies pain.     Jeannie Mulligan RN     Pt. greeted resting in bed in ED + IV lock, asleep, daughter at bedside

## 2024-06-25 NOTE — PROGRESS NOTES
Care Suites Admission Nursing Note    Patient Information  Name: Toni Collado  Age: 78 year old  Reason for admission: [facility]  Care Suites arrival time: 0830    Visitor Information  Name: Darlin     Patient Admission/Assessment   Pre-procedure assessment complete: Yes  If abnormal assessment/labs, provider notified: N/A  NPO: Yes  Medications held per instructions/orders: N/A  Consent: obtained  If applicable, pregnancy test status: deferred  Patient oriented to room: Yes  Education/questions answered: Yes  Plan/other: proceed as scheduled    Discharge Planning  Discharge name/phone number: Darlin [phone]  Overnight post sedation caregiver: Darlin  Discharge location: home    Jeannie Mulligan RN

## 2024-06-25 NOTE — PROGRESS NOTES
1105 Report received from Jeannie Mulligan RN. Cardiac surgeon at bedside speaking with pt & family.  1115 Pt A/O. Tegaderm drsg CDI to right groin puncture site. Gauze drsg applied. No oozing or hematoma noted. Area soft & flat. Pt denies pain. Activity restrictions reinforced with pt with verbal understanding received. Pt's family at bedside. Detailed update given.   1120 Pt taking diet & flds well. No complaints.  1200 Kelly Oscar RN with Cardiac Vascular Surgery at bedside to speak with pt & family.  1220 Report given to Jeannie Mulligan RN.

## 2024-06-25 NOTE — DISCHARGE INSTRUCTIONS
Cardiac Angiogram Discharge Instructions - Femoral    After you go home:    Have an adult stay with you until tomorrow.  Drink extra fluids for 2 days.  You may resume your normal diet.  No smoking       For 24 hours - due to the sedation you received:  Relax and take it easy.  Do NOT make any important or legal decisions.  Do NOT drive or operate machines at home or at work.  Do NOT drink alcohol.    Care of Groin Puncture Site:    For the first 24 hrs - check the puncture site every 1-2 hours while awake.  For 2 days, when you cough, sneeze, laugh or move your bowels, hold your hand over the puncture site and press firmly.  Remove the bandaid after 24 hours. If there is minor oozing, apply another bandaid and remove it after 12 hours.  It is normal to have a small bruise or pea size lump at the site.  You may shower tomorrow. Do NOT take a bath, or use a hot tub or pool for at least 3 days. Do NOT scrub the site. Do not use lotion or powder near the puncture site.    Activity:            For 2 days:  No stooping or squatting  Do NOT do any heavy activity such as exercise, lifting, or straining.   No housework, yard work or any activity that make you sweat  Do NOT lift more than 10 pounds    Bleeding:    If you start bleeding from the site in your groin, lie down flat and press firmly on/above the site for 10 minutes.   Once bleeding stops, lay flat for 2 hours.   Call [facility] as soon as you can.       Call 911 right away if you have heavy bleeding or bleeding that does not stop.      Medicines:    If you are taking an antiplatelet medication such as Plavix, Brilinta or Effient, do not stop taking it until you talk to your cardiologist.    If you are on Metformin (Glucophage), do not restart it until you have blood tests (within 2 to 3 days after discharge).  After you have your blood drawn, you may restart the Metformin.   Take your medications, including blood thinners, unless your provider tells you not to.     If you take Coumadin (Warfarin), have your INR checked by your provider in  3-5 days. Call your clinic to schedule this.  If you have stopped any medicines, check with your provider about when to restart them.    Follow Up Appointments:    Follow up with [facility] Nurse Practitioner at [facility] of patient preference in 7-10 days.  Follow up with Cardiac Surgery as directed.    Call the clinic if:    You have increased pain or a large or growing hard lump around the site.  The site is red, swollen, hot or tender.  Blood or fluid is draining from the site.  You have chills or a fever greater than 101 F (38 C).  Your leg feels numb, cool or changes color.  You have hives, a rash or unusual itching.  New pain in the back or belly that you cannot control with Tylenol.  Any questions or concerns.          [facility] [place]:    [phone] [facility] (7 days a week)    Or you may contact your provider via My Chart

## 2024-06-26 ENCOUNTER — TELEPHONE (OUTPATIENT)
Dept: CARDIOLOGY | Facility: CLINIC | Age: 79
End: 2024-06-26
Payer: COMMERCIAL

## 2024-06-26 ENCOUNTER — PREP FOR PROCEDURE (OUTPATIENT)
Dept: CARDIOLOGY | Facility: CLINIC | Age: 79
End: 2024-06-26
Payer: COMMERCIAL

## 2024-06-26 DIAGNOSIS — I71.21 ASCENDING AORTIC ANEURYSM (H): ICD-10-CM

## 2024-06-26 DIAGNOSIS — I25.10 CAD (CORONARY ARTERY DISEASE): Primary | ICD-10-CM

## 2024-06-26 NOTE — TELEPHONE ENCOUNTER
Per task, pt needs to schedule surgery with Dr. Mulvihill. Talked with pt and scheduled surgery for 7/10. Will call if anything changes

## 2024-06-27 ENCOUNTER — TELEPHONE (OUTPATIENT)
Dept: OTHER | Facility: CLINIC | Age: 79
End: 2024-06-27
Payer: COMMERCIAL

## 2024-06-27 ENCOUNTER — TELEPHONE (OUTPATIENT)
Dept: CARDIOLOGY | Facility: CLINIC | Age: 79
End: 2024-06-27
Payer: COMMERCIAL

## 2024-06-27 RX ORDER — CHLORHEXIDINE GLUCONATE ORAL RINSE 1.2 MG/ML
10 SOLUTION DENTAL ONCE
Status: CANCELLED | OUTPATIENT
Start: 2024-06-27 | End: 2024-06-27

## 2024-06-27 RX ORDER — CLINDAMYCIN PHOSPHATE 900 MG/50ML
900 INJECTION, SOLUTION INTRAVENOUS SEE ADMIN INSTRUCTIONS
Status: CANCELLED | OUTPATIENT
Start: 2024-06-27

## 2024-06-27 RX ORDER — CLINDAMYCIN PHOSPHATE 900 MG/50ML
900 INJECTION, SOLUTION INTRAVENOUS
Status: CANCELLED | OUTPATIENT
Start: 2024-06-27

## 2024-06-27 RX ORDER — ASPIRIN 81 MG/1
162 TABLET, CHEWABLE ORAL
Status: CANCELLED | OUTPATIENT
Start: 2024-06-27

## 2024-06-27 RX ORDER — FAMOTIDINE 20 MG/1
20 TABLET, FILM COATED ORAL
Status: CANCELLED | OUTPATIENT
Start: 2024-06-27

## 2024-06-27 RX ORDER — LIDOCAINE 40 MG/G
CREAM TOPICAL
Status: CANCELLED | OUTPATIENT
Start: 2024-06-27

## 2024-06-27 RX ORDER — ASPIRIN 81 MG/1
81 TABLET, CHEWABLE ORAL
Status: CANCELLED | OUTPATIENT
Start: 2024-06-27

## 2024-06-27 NOTE — TELEPHONE ENCOUNTER
Patient notified pre op instructions sent via my chart. Requested review and to call with questions.

## 2024-06-27 NOTE — TELEPHONE ENCOUNTER
Patient was admitted to [facility] on 6/25/24 with increasing angina. Here for OP coronary angiogram.    6/25/24: Coronary angiogram via RFA showed:      Ost RCA to Prox RCA lesion is 100% stenosed.    Mid LM to Dist LM lesion is 50% stenosed.    Mid LAD lesion is 90% stenosed.    2nd Diag lesion is 80% stenosed.    Ramus lesion is 99% stenosed.    Ost Cx to Prox Cx lesion is 75% stenosed.    3rd Mrg lesion is 80% stenosed.    LPDA lesion is 100% stenosed.    The left ventricular ejection fraction is grossly normal.    Left ventricular filling pressures are mildly elevated.     1) Severe mutivessel CAD  2) Ascending aortic dilitation  3) Normal LV function  4) Recommend CABG and probable ascending aortic aneurysm repair.     No medication changes made.    Called patient to discuss any post hospital d/c questions he may have and confirm f/u appts.     Patient denied any SOB, chest pain or lightheadedness.     RFA cardiac cath site is without bleeding, swelling, redness or signs of infection.     RN confirmed with patient that he is scheduled for CTA chest on 7/3/24 at 1525 in [place]. Scheduled for CABG on 7/10/24 at 0500 arrival time at [facility]. States he has CV surgery RN, Kelly Oscar's phone number for any questions. Currently scheduled for an OV on 7/6/24 at 1425 with ANAYELI Rossana Walker at our [place] Office.     Patient advised to call clinic with any cardiac related questions or concerns prior to this anayeli't. Patient verbalized understanding and agreed with plan. MIGDALIA Da Silva RN.

## 2024-07-02 ENCOUNTER — TELEPHONE (OUTPATIENT)
Dept: CARDIOLOGY | Facility: CLINIC | Age: 79
End: 2024-07-02

## 2024-07-02 ENCOUNTER — LAB (OUTPATIENT)
Dept: LAB | Facility: CLINIC | Age: 79
End: 2024-07-02
Payer: COMMERCIAL

## 2024-07-02 DIAGNOSIS — I10 BENIGN ESSENTIAL HYPERTENSION: ICD-10-CM

## 2024-07-02 LAB
ANION GAP SERPL CALCULATED.3IONS-SCNC: 9 MMOL/L (ref 7–15)
BUN SERPL-MCNC: 16 MG/DL (ref 8–23)
CALCIUM SERPL-MCNC: 9.5 MG/DL (ref 8.8–10.2)
CHLORIDE SERPL-SCNC: 107 MMOL/L (ref 98–107)
CREAT SERPL-MCNC: 1.26 MG/DL (ref 0.67–1.17)
DEPRECATED HCO3 PLAS-SCNC: 24 MMOL/L (ref 22–29)
EGFRCR SERPLBLD CKD-EPI 2021: 59 ML/MIN/1.73M2
GLUCOSE SERPL-MCNC: 124 MG/DL (ref 70–99)
POTASSIUM SERPL-SCNC: 4.3 MMOL/L (ref 3.4–5.3)
SODIUM SERPL-SCNC: 140 MMOL/L (ref 135–145)

## 2024-07-02 PROCEDURE — 36415 COLL VENOUS BLD VENIPUNCTURE: CPT

## 2024-07-02 PROCEDURE — 80048 BASIC METABOLIC PNL TOTAL CA: CPT

## 2024-07-02 NOTE — TELEPHONE ENCOUNTER
Spoke with patient. He received a reminder on his my chart to set up a cardiology visit. This is already scheduled on 7/16/2024.  Patient has some questions to the surgery RN - he will call the direct # to review with her.

## 2024-07-02 NOTE — TELEPHONE ENCOUNTER
[facility] Call Center    Phone Message    May a detailed message be left on voicemail: yes     Reason for Call: Other: pt was calling due to having a cardiovascular referral but he is already scheduled for a heart procedure on 7/10/24 with [facility]. [facility] cannot see a reason for referral listed in order nor see any chart documentation about need either. Please call pt back to discuss need for cardiovascular referral.       Action Taken: Other: cardiology     Travel Screening: Not Applicable      Thank you!  [facility]        Date of Service:

## 2024-07-03 ENCOUNTER — HOSPITAL ENCOUNTER (OUTPATIENT)
Dept: CARDIOLOGY | Facility: CLINIC | Age: 79
Discharge: HOME | End: 2024-07-03
Attending: STUDENT IN AN ORGANIZED HEALTH CARE EDUCATION/TRAINING PROGRAM | Admitting: STUDENT IN AN ORGANIZED HEALTH CARE EDUCATION/TRAINING PROGRAM
Payer: COMMERCIAL

## 2024-07-03 DIAGNOSIS — I71.21 ANEURYSM OF ASCENDING AORTA WITHOUT RUPTURE (H): ICD-10-CM

## 2024-07-03 PROCEDURE — 71275 CT ANGIOGRAPHY CHEST: CPT | Mod: 26 | Performed by: INTERNAL MEDICINE

## 2024-07-03 PROCEDURE — 250N000011 HC RX IP 250 OP 636: Performed by: STUDENT IN AN ORGANIZED HEALTH CARE EDUCATION/TRAINING PROGRAM

## 2024-07-03 PROCEDURE — 71275 CT ANGIOGRAPHY CHEST: CPT

## 2024-07-03 RX ORDER — NITROGLYCERIN 0.4 MG/1
0.4 TABLET SUBLINGUAL EVERY 5 MIN PRN
Status: ACTIVE | OUTPATIENT
Start: 2024-07-03 | End: 2024-07-03

## 2024-07-03 RX ORDER — LORAZEPAM 0.5 MG/1
0.5 TABLET ORAL EVERY 10 MIN PRN
Status: DISCONTINUED | OUTPATIENT
Start: 2024-07-03 | End: 2024-07-04 | Stop reason: HOSPADM

## 2024-07-03 RX ORDER — SODIUM CHLORIDE 9 MG/ML
INJECTION, SOLUTION INTRAVENOUS CONTINUOUS
Status: ACTIVE | OUTPATIENT
Start: 2024-07-03 | End: 2024-07-03

## 2024-07-03 RX ORDER — IOPAMIDOL 755 MG/ML
500 INJECTION, SOLUTION INTRAVASCULAR ONCE
Status: COMPLETED | OUTPATIENT
Start: 2024-07-03 | End: 2024-07-03

## 2024-07-03 RX ORDER — LIDOCAINE 40 MG/G
CREAM TOPICAL
Status: DISCONTINUED | OUTPATIENT
Start: 2024-07-03 | End: 2024-07-04 | Stop reason: HOSPADM

## 2024-07-03 RX ORDER — DIAZEPAM 5 MG
5 TABLET ORAL EVERY 30 MIN PRN
Status: DISCONTINUED | OUTPATIENT
Start: 2024-07-03 | End: 2024-07-04 | Stop reason: HOSPADM

## 2024-07-03 RX ADMIN — IOPAMIDOL 100 ML: 755 INJECTION, SOLUTION INTRAVENOUS at 14:07

## 2024-07-05 ENCOUNTER — TELEPHONE (OUTPATIENT)
Dept: CARDIOLOGY | Facility: CLINIC | Age: 79
End: 2024-07-05
Payer: COMMERCIAL

## 2024-07-05 DIAGNOSIS — I25.10 CAD (CORONARY ARTERY DISEASE): Primary | ICD-10-CM

## 2024-07-05 NOTE — TELEPHONE ENCOUNTER
Message from Dr. Cruz:  Nain Cruz MD  P [facility] Heart Team 2  Labs are stable, recommend continuing current regimen until surgery on 7/10, recommend KRIS follow up in about 1 month with CBC, BMP, lipids and ALT      [id number] called patient to review lab results and plan. OV with KRIS Rossana Walker on 7/16/2024 can be canceled. New visit in mid-August with labs needs to be scheduled.  Message sent to scheduling team.

## 2024-07-05 NOTE — TELEPHONE ENCOUNTER
I called and spoke with pt, asked if they'd be willing to receive a call about a research study that they are eligible for? Pt agreed to phone call.

## 2024-07-05 NOTE — RESULT ENCOUNTER NOTE
Labs are stable, recommend continuing current regimen until surgery on 7/10, recommend KRIS follow up in about 1 month with CBC, BMP, lipids and ALT thanks

## 2024-07-07 ENCOUNTER — NURSE TRIAGE (OUTPATIENT)
Dept: NURSING | Facility: CLINIC | Age: 79
End: 2024-07-07
Payer: COMMERCIAL

## 2024-07-07 NOTE — TELEPHONE ENCOUNTER
"Scheduled for open heart surgery CABG on 7/10/24.  BP has been running low for a couple days  100/65. Said he's usually 120s-130s/70s-80s  Rechecked BP, is at his normal now.  HR is faster than normal. Said it's usually in the 60's. Now in 80's/90'. He had though been up looking for his bp cough. I'd asked him to do a repeat.  Just started twp new bp meds  Not dizzy, light headed or weak. Said his head feels less clear.    With surgery coming up this week, I advised that Johnny talk to the on call for cardiology.  I transferred him to them and instructed him to ask to speak to the on call for Dr Cruz.    Caller stated understanding and agreement.        Reason for Disposition   [1] Fall in systolic BP > 20 mm Hg from normal AND [2] dizzy, lightheaded, or weak    Additional Information   Negative: Started suddenly after an allergic medicine, an allergic food, or bee sting   Negative: Shock suspected (e.g., cold/pale/clammy skin, too weak to stand, low BP, rapid pulse)   Negative: Difficult to awaken or acting confused (e.g., disoriented, slurred speech)   Negative: Fainted   Negative: [1] Systolic BP < 90 AND [2] dizzy, lightheaded, or weak   Negative: Chest pain   Negative: Bleeding (e.g., vomiting blood, rectal bleeding or tarry stools, severe vaginal bleeding)(Exception: Fainted from sight of small amount of blood; small cut or abrasion.)   Negative: Extra heartbeats, irregular heart beating, or heart is beating very fast  (i.e., \"palpitations\")   Negative: Sounds like a life-threatening emergency to the triager   Negative: [1] Systolic BP < 80 AND [2] NOT dizzy, lightheaded or weak   Negative: Abdominal pain   Negative: Fever > 100.4 F (38.0 C)   Negative: Major surgery in the past month   Negative: [1] Drinking very little AND [2] dehydration suspected (e.g., no urine > 12 hours, very dry mouth, very lightheaded)    Protocols used: Blood Pressure - Low-[facility]  Zamzam LARA RN [facility]       "

## 2024-07-08 ENCOUNTER — TELEPHONE (OUTPATIENT)
Dept: OTHER | Facility: CLINIC | Age: 79
End: 2024-07-08
Payer: COMMERCIAL

## 2024-07-08 NOTE — TELEPHONE ENCOUNTER
Patient called stating his HR has been in the 90's this week. Usually in the 60's. /60. States he feels fine. Scheduled for CABG/  Ascending  aortic Aneurysm replacement. Instructed pt to proceed to ED  if HR > 100 and symptomatic. Discuss with anesthesia am of surgery.

## 2024-07-09 ENCOUNTER — ANESTHESIA EVENT (OUTPATIENT)
Dept: SURGERY | Facility: CLINIC | Age: 79
DRG: 235 | End: 2024-07-09
Payer: COMMERCIAL

## 2024-07-09 ASSESSMENT — LIFESTYLE VARIABLES: TOBACCO_USE: 1

## 2024-07-09 NOTE — ANESTHESIA PREPROCEDURE EVALUATION
Anesthesia Pre-Procedure Evaluation    Patient: Toni Collado   MRN: [id number] : 1945        Procedure : Procedure(s):  CORONARY ARTERY BYPASS GRAFT  POSSIBLE ASCENDING AORTA  ANEURYSM REPAIR          Past Medical History:   Diagnosis Date    Adenomatous colon polyp 2019    Asymptomatic varicose veins of both lower extremities     Bulbar polio     residual dysphagia    Cervical osteoarthritis     Chronic anxiety     Chronic depression     Chronic depression 2022    Familial tremor     Gastroesophageal reflux disease without esophagitis 2021    Hallux limitus of left foot     Localized, primary osteoarthritis of hand     Oropharyngeal dysphagia     Pre-op evaluation 06/15/2022    Primary osteoarthritis of left hip 06/15/2022    Primary osteoarthritis of left hip 06/15/2022    RLS (restless legs syndrome)     Sleep apnea, obstructive     CPAP    Status post coronary angiogram 2024    Status post total replacement of left hip     Varicose veins of left lower extremity       Past Surgical History:   Procedure Laterality Date    ABDOMEN SURGERY      ARTHROSCOPY KNEE      CATARACT IOL, RT/LT Bilateral      and     COLONOSCOPY N/A 2023    Procedure: Colonoscopy with polypectomies using cold snare;  Surgeon: Yue Cline MD;  Location:  GI    CV CORONARY ANGIOGRAM N/A 2024    Procedure: Coronary Angiogram;  Surgeon: Renny Rene MD;  Location:  HEART CARDIAC CATH LAB    CV LEFT HEART CATH N/A 2024    Procedure: Left Heart Catheterization;  Surgeon: Renny Rene MD;  Location: [facility] CARDIAC CATH LAB    CV LEFT VENTRICULOGRAM N/A 2024    Procedure: Left Ventriculogram;  Surgeon: Renny Rene MD;  Location: [facility] CARDIAC CATH LAB    ESOPHAGOSCOPY, GASTROSCOPY, DUODENOSCOPY (EGD), COMBINED N/A 2023    Procedure: Esophagoscopy, gastroscopy, duodenoscopy (EGD), combined with biopsy using cold forcep;   "Surgeon: Yue Cline MD;  Location: [facility]    HERNIA REPAIR, UMBILICAL      KNEE SURGERY Left     \"bone on bone\" d/t arthritis    RADIOFREQUENCY ABLATION NERVES      cervical spine    RHINOPLASTY      SEPTOPLASTY      SHOULDER SURGERY Bilateral     right (pitching injury), left (bone spur)    SHOULDER SURGERY Right     reconstructive surgery with hardware    TOE SURGERY Left     left big toe d/t arthritis    TOTAL KNEE ARTHROPLASTY Left     unicompartmental      Allergies   Allergen Reactions    Amoxicillin Other (See Comments) and Unknown     Facial and neck flushing, felt hot    Seasonal Allergies       Social History     Tobacco Use    Smoking status: Former     Current packs/day: 0.00     Types: Cigarettes     Quit date: 1975     Years since quittin.6     Passive exposure: Past    Smokeless tobacco: Never   Substance Use Topics    Alcohol use: Not Currently      Wt Readings from Last 1 Encounters:   24 106.5 kg (234 lb 14.4 oz)        Anesthesia Evaluation            ROS/MED HX  ENT/Pulmonary:     (+) sleep apnea,               tobacco use, Past use,                       Neurologic: Comment: RLS, Bulbar Polio, BPPV      Cardiovascular:     (+) Dyslipidemia hypertension- -  CAD -  - -                                 Previous cardiac testing   Echo: Date: 24 Results:  Interpretation Summary     1. The left ventricle is normal in size. There is moderate concentric left  ventricular hypertrophy. Left ventricular systolic function is normal. The  visual ejection fraction is 55-60%. Diastolic Doppler findings (E/E' ratio  and/or other parameters) suggest left ventricular filling pressures are  normal. No regional wall motion abnormalities noted.  2. The right ventricle is normal size. The right ventricular systolic function  is normal.  3. Mild biatrial enlargement.  4. Trace to mild mitral and tricuspid regurgitation.  5. The aortic root is normal size. Ascending aorta aneurysm is " present.  6. No pericardial effusion.  7. No previous study for comparison.  ______________________________________________________________________________  Left Ventricle  The left ventricle is normal in size. There is moderate concentric left  ventricular hypertrophy. Left ventricular systolic function is normal. The  visual ejection fraction is 55-60%. Diastolic Doppler findings (E/E' ratio  and/or other parameters) suggest left ventricular filling pressures are  normal. No regional wall motion abnormalities noted.     Right Ventricle  The right ventricle is normal size. The right ventricular systolic function is  normal.     Atria  There is mild biatrial enlargement. There is no color Doppler evidence of an  atrial shunt.     Mitral Valve  There is mild (1+) mitral regurgitation.     Tricuspid Valve  There is trace tricuspid regurgitation. Right ventricular systolic pressure  could not be approximated due to inadequate tricuspid regurgitation.     Aortic Valve  The aortic valve is trileaflet. No aortic regurgitation is present. No aortic  stenosis is present.     Pulmonic Valve  There is trace pulmonic valvular regurgitation. There is no pulmonic valvular  stenosis.     Vessels  The aortic root is normal size. Ascending aorta aneurysm is present. The  inferior vena cava is normal.     Pericardium  There is no pericardial effusion.     Rhythm  Sinus rhythm was noted.    Stress Test:  Date: Results:    ECG Reviewed:  Date: 6/25/24 Results:    Sinus rhythm with sinus arrhythmia with 1st degree A-V block  Otherwise normal ECG      Cath:  Date: 6/25/25 Results:     Ost RCA to Prox RCA lesion is 100% stenosed.     Mid LM to Dist LM lesion is 50% stenosed.     Mid LAD lesion is 90% stenosed.     2nd Diag lesion is 80% stenosed.     Ramus lesion is 99% stenosed.     Ost Cx to Prox Cx lesion is 75% stenosed.     3rd Mrg lesion is 80% stenosed.     LPDA lesion is 100% stenosed.     The left ventricular ejection fraction  is grossly normal.     Left ventricular filling pressures are mildly elevated.     1) Severe mutivessel CAD  2) Ascending aortic dilitation  3) Normal LV function  4) Recommend CABG and probable ascending aortic aneurysm repair.        Coronary Findings    Diagnostic  Dominance: Left  Left Main  Mid LM to Dist LM lesion is 50% stenosed.    Left Anterior Descending  Mid LAD lesion is 90% stenosed.    Second Diagonal Branch  2nd Diag lesion is 80% stenosed.    Ramus Intermedius  Ramus lesion is 99% stenosed.    Left Circumflex  Ost Cx to Prox Cx lesion is 75% stenosed.    First Obtuse Marginal Branch  The vessel is small. There is moderate diffuse disease throughout the vessel.    Second Obtuse Marginal Branch  The vessel is small. There is moderate diffuse disease throughout the vessel.    Third Obtuse Marginal Branch  3rd Mrg lesion is 80% stenosed.    Left Posterior Descending Artery  Collaterals  LPDA filled by collaterals from 2nd Sept.    LPDA lesion is 100% stenosed.    Left Posterior Atrioventricular Artery    Right Coronary Artery  Ost RCA to Prox RCA lesion is 100% stenosed.    Right Ventricular Branch  Collaterals  RV Branch filled by collaterals from Ost RCA.          METS/Exercise Tolerance:     Hematologic:       Musculoskeletal:   (+)  arthritis,             GI/Hepatic: Comment: Zenker's Diverticulum    (+) GERD,                   Renal/Genitourinary:       Endo:       Psychiatric/Substance Use:     (+) psychiatric history anxiety and depression       Infectious Disease:       Malignancy:       Other:            Physical Exam    Airway        Mallampati: II   TM distance: > 3 FB   Neck ROM: full   Mouth opening: > 3 cm    Respiratory Devices and Support         Dental       (+) Minor Abnormalities - some fillings, tiny chips      Cardiovascular   cardiovascular exam normal          Pulmonary   pulmonary exam normal                OUTSIDE LABS:  CBC:   Lab Results   Component Value Date    WBC 5.9  "06/25/2024    WBC 6.7 06/04/2024    HGB 13.5 06/25/2024    HGB 13.9 06/04/2024    HCT 39.6 (L) 06/25/2024    HCT 41.2 06/04/2024     06/25/2024     06/04/2024     BMP:   Lab Results   Component Value Date     07/02/2024     06/25/2024    POTASSIUM 4.3 07/02/2024    POTASSIUM 4.0 06/25/2024    CHLORIDE 107 07/02/2024    CHLORIDE 104 06/25/2024    CO2 24 07/02/2024    CO2 23 06/25/2024    BUN 16.0 07/02/2024    BUN 18.5 06/25/2024    CR 1.26 (H) 07/02/2024    CR 1.18 (H) 06/25/2024     (H) 07/02/2024     (H) 06/25/2024     COAGS:   Lab Results   Component Value Date    PTT 27 06/25/2024    INR 1.06 06/25/2024     POC: No results found for: \"BGM\", \"HCG\", \"HCGS\"  HEPATIC:   Lab Results   Component Value Date    ALBUMIN 4.1 06/25/2024    PROTTOTAL 6.8 06/25/2024    ALT 50 06/25/2024    AST 39 06/25/2024    ALKPHOS 72 06/25/2024    BILITOTAL 0.4 06/25/2024     OTHER:   Lab Results   Component Value Date    LACT 1.5 05/28/2024    A1C 5.7 (H) 05/28/2024    NITHIN 9.5 07/02/2024    TSH 1.90 07/18/2019    SED 13 05/28/2024       Anesthesia Plan    ASA Status:  3    NPO Status:  NPO Appropriate    Anesthesia Type: General.     - Airway: ETT   Induction: Intravenous.   Maintenance: Inhalation.   Techniques and Equipment:     - Lines/Monitors: Arterial Line, Central Line, CVP, RIN            RIN Absolute Contra-indication: NONE            RIN Relative CI: Zenker's Diverticulum.     - Blood: Blood in Room, PRBC     - Drips/Meds: Dexmed. infusion     Consents    Anesthesia Plan(s) and associated risks, benefits, and realistic alternatives discussed. Questions answered and patient/representative(s) expressed understanding.     - Discussed:     - Discussed with:  Patient      - Extended Intubation/Ventilatory Support Discussed: Yes.      - Patient is DNR/DNI Status: No     Use of blood products discussed: Yes.     - Discussed with: Patient.     - Consented: consented to blood products " "    Postoperative Care    Pain management: IV analgesics, Oral pain medications.        Comments:    Other Comments: Induction with Lidocaine, Fentanyl, Versed, Propofol and Richard  Precedex gtt after Induction  50mL bag of NS and micro-dripper for Protamine admin  Propofol gtt for transport           Payam Martinez MD    I have reviewed the pertinent notes and labs in the chart from the past 30 days and (re)examined the patient.  Any updates or changes from those notes are reflected in this note.              # Obesity: Estimated body mass index is 37.35 kg/m  as calculated from the following:    Height as of 6/25/24: 1.689 m (5' 6.5\").    Weight as of 6/25/24: 106.5 kg (234 lb 14.4 oz).      "

## 2024-07-09 NOTE — H&P
Cardiac Surgery Consultation      Toni Collado MRN# [id number]   YOB: 1945 Age: 78 year old   Date of Admission: (Not on file)     Reason for consult: Ascending aneurysm + mvCAD           Assessment and Plan:   Mr. Collado is a 78-year-old man with severe multivessel coronary artery disease and a small ascending aneurysm. I recommend coronary artery bypass grafting with replacement of the ascending aorta. I described the technical details, as well as the expected postoperative course and recovery to the patient. I also discussed the risks and benefits of the procedure. The risks include but are not limited to bleeding, infection, stroke, heart or graft failure with myocardial infarction, dysrhythmia, respiratory failure, kidney or liver injury, bowel or limb ischemia, and death.     Please refer to initial consultation note from 6/25 when I met the patient         History of Present Illness:   78-year-old male with a past medical history of BRITTNI, hypertension, obesity, GERD.  Presents for elective coronary angiogram after having episodes of chest pain at home.  Episodes preceded by activity.  Improved with rest.  He did undergo an exercise stress test which was inconclusive.   Angiogram today demonstrates multivessel CAD.  Echo shows preserved ejection fraction and no valvular abnormalities.  He also has a dilated ascending aorta on CT PE and echo.  Currently he feels well after his angiogram.  Denies chest pain.  He does not have chest pain at rest and is overall able to do regular activities.  No prior history of cardiac or lung surgery.  He is not on blood thinners                Past Medical History:   I have reviewed this patient's past medical history  Past Medical History:   Diagnosis Date    Adenomatous colon polyp 07/2019    Asymptomatic varicose veins of both lower extremities     Bulbar polio 1952    residual dysphagia    Cervical osteoarthritis     Chronic anxiety     Chronic depression   "   Chronic depression 09/19/2022    Familial tremor     Gastroesophageal reflux disease without esophagitis 04/12/2021    Hallux limitus of left foot     Localized, primary osteoarthritis of hand     Oropharyngeal dysphagia     Pre-op evaluation 06/15/2022    Primary osteoarthritis of left hip 06/15/2022    Primary osteoarthritis of left hip 06/15/2022    RLS (restless legs syndrome)     Sleep apnea, obstructive     CPAP    Status post coronary angiogram 6/25/2024    Status post total replacement of left hip     Varicose veins of left lower extremity              Past Surgical History:   I have reviewed this patient's past surgical history  Past Surgical History:   Procedure Laterality Date    ABDOMEN SURGERY      ARTHROSCOPY KNEE      CATARACT IOL, RT/LT Bilateral      and     COLONOSCOPY N/A 09/13/2023    Procedure: Colonoscopy with polypectomies using cold snare;  Surgeon: Yue Cline MD;  Location:  GI    CV CORONARY ANGIOGRAM N/A 6/25/2024    Procedure: Coronary Angiogram;  Surgeon: Renny Rene MD;  Location:  HEART CARDIAC CATH LAB    CV LEFT HEART CATH N/A 6/25/2024    Procedure: Left Heart Catheterization;  Surgeon: Renny Rene MD;  Location: [facility] CARDIAC CATH LAB    CV LEFT VENTRICULOGRAM N/A 6/25/2024    Procedure: Left Ventriculogram;  Surgeon: Renny Rene MD;  Location:  HEART CARDIAC CATH LAB    ESOPHAGOSCOPY, GASTROSCOPY, DUODENOSCOPY (EGD), COMBINED N/A 09/13/2023    Procedure: Esophagoscopy, gastroscopy, duodenoscopy (EGD), combined with biopsy using cold forcep;  Surgeon: Yue Cline MD;  Location:  GI    HERNIA REPAIR, UMBILICAL      KNEE SURGERY Left     \"bone on bone\" d/t arthritis    RADIOFREQUENCY ABLATION NERVES      cervical spine    RHINOPLASTY      SEPTOPLASTY      SHOULDER SURGERY Bilateral     right (pitching injury), left (bone spur)    SHOULDER SURGERY Right     reconstructive surgery with hardware    TOE " SURGERY Left     left big toe d/t arthritis    TOTAL KNEE ARTHROPLASTY Left     unicompartmental               Social History:   I have reviewed this patient's social history  Social History     Tobacco Use    Smoking status: Former     Current packs/day: 0.00     Types: Cigarettes     Quit date: 1975     Years since quittin.6     Passive exposure: Past    Smokeless tobacco: Never   Substance Use Topics    Alcohol use: Not Currently             Family History:   I have reviewed this patient's family history  Family History   Problem Relation Age of Onset    Heart Failure Mother         valvular heart disease    Heart Failure Father         ihd    Myelodysplastic syndrome Father     Other Cancer Father         Mylodisplasia    Cancer Brother         renal    Other Cancer Brother         Kidney cancer    Heart Failure Brother         ihd    Substance Abuse Brother         Alcoholic    Substance Abuse Maternal Grandfather         Alcoholic    Diabetes Sister     Colon Cancer No family hx of     Thyroid Disease No family hx of              Allergies:     Allergies   Allergen Reactions    Amoxicillin Other (See Comments) and Unknown     Facial and neck flushing, felt hot    Seasonal Allergies              Medications:     No current facility-administered medications for this encounter.     Current Outpatient Medications   Medication Sig Dispense Refill    acetaminophen (TYLENOL) 325 MG tablet Take 2 tablets (650 mg) by mouth every 4 hours as needed for mild pain      aspirin 81 MG EC tablet Take 1 tablet (81 mg) by mouth daily 90 tablet 3    brimonidine (ALPHAGAN) 0.2 % ophthalmic solution Place 1 drop Into the left eye 2 times daily      ferrous sulfate (FEROSUL) 325 (65 Fe) MG tablet Take 1 tablet (325 mg) by mouth every other day for 90 days 45 tablet 0    meloxicam (MOBIC) 15 MG tablet Take 1 tablet (15 mg) by mouth daily as needed for pain 90 tablet 2    Multiple Vitamins-Minerals (MULTIVITAMIN PO) Take 1  tablet by mouth daily      nitroGLYcerin (NITROSTAT) 0.4 MG sublingual tablet For chest pain place 1 tablet under the tongue every 5 minutes for 3 doses. If symptoms persist 5 minutes after 1st dose call 911. 20 tablet 3    olmesartan (BENICAR) 20 MG tablet Take 1 tablet (20 mg) by mouth daily 90 tablet 4    omeprazole (PRILOSEC) 20 MG DR capsule Take 2 capsules (40 mg) by mouth daily 90 capsule 3    pramipexole (MIRAPEX) 1 MG tablet TAKE 1 TABLET BY MOUTH TWICE DAILY FOR RESTLESS LEG SYNDROME.. 180 tablet 0    rosuvastatin (CRESTOR) 40 MG tablet Take 1 tablet (40 mg) by mouth at bedtime 90 tablet 3    sildenafil (REVATIO) 20 MG tablet Take 2 tablets (40 mg) by mouth daily as needed (ED) 30 tablet 5    traZODone (DESYREL) 100 MG tablet Take 1 tablet (100 mg) by mouth at bedtime 90 tablet 3             Review of Systems:     The 5 point Review of Systems is negative other than noted in the HPI            Physical Exam:   Vitals were reviewed                       Vitals reviewed  Alert, pleasant, in NAD  Sclera anicteric  Neck supple JVD flat  Trachea midline  No prior chest incisions  Breathing unlabored on room air  RRR  Abdomen soft  Ext warm no edema           Data:     Lab Results   Component Value Date    WBC 5.9 06/25/2024    HGB 13.5 06/25/2024    HCT 39.6 (L) 06/25/2024     06/25/2024     07/02/2024    POTASSIUM 4.3 07/02/2024    CHLORIDE 107 07/02/2024    CO2 24 07/02/2024    BUN 16.0 07/02/2024    CR 1.26 (H) 07/02/2024     (H) 07/02/2024    SED 13 05/28/2024    DD 0.85 (H) 05/15/2024    NTBNPI 138 05/15/2024    NTBNP 158 05/28/2024    AST 39 06/25/2024    ALT 50 06/25/2024    ALKPHOS 72 06/25/2024    BILITOTAL 0.4 06/25/2024    INR 1.06 06/25/2024     All cardiac studies reviewed by me.    CARDIAC CATHETERIZATION:    Ost RCA to Prox RCA lesion is 100% stenosed.    Mid LM to Dist LM lesion is 50% stenosed.    Mid LAD lesion is 90% stenosed.    2nd Diag lesion is 80% stenosed.    Ramus  lesion is 99% stenosed.    Ost Cx to Prox Cx lesion is 75% stenosed.    3rd Mrg lesion is 80% stenosed.    LPDA lesion is 100% stenosed.    The left ventricular ejection fraction is grossly normal.    Left ventricular filling pressures are mildly elevated.     1) Severe mutivessel CAD  2) Ascending aortic dilitation  3) Normal LV function  4) Recommend CABG and probable ascending aortic aneurysm repair.    Left Main   Mid LM to Dist LM lesion is 50% stenosed.      Left Anterior Descending   Mid LAD lesion is 90% stenosed.      Second Diagonal Branch   2nd Diag lesion is 80% stenosed.      Ramus Intermedius   Ramus lesion is 99% stenosed.      Left Circumflex   Ost Cx to Prox Cx lesion is 75% stenosed.      First Obtuse Marginal Branch   The vessel is small. There is moderate diffuse disease throughout the vessel.      Second Obtuse Marginal Branch   The vessel is small. There is moderate diffuse disease throughout the vessel.      Third Obtuse Marginal Branch   3rd Mrg lesion is 80% stenosed.      Left Posterior Descending Artery   Collaterals   LPDA filled by collaterals from 2nd Sept.      LPDA lesion is 100% stenosed.      Left Posterior Atrioventricular Artery      Right Coronary Artery   Ost RCA to Prox RCA lesion is 100% stenosed.      Right Ventricular Branch   Collaterals   RV Branch filled by collaterals from Ost RCA.          TRANSTHORACIC ECHOCARDIOGRAPHY:  Interpretation Summary     1. The left ventricle is normal in size. There is moderate concentric left  ventricular hypertrophy. Left ventricular systolic function is normal. The  visual ejection fraction is 55-60%. Diastolic Doppler findings (E/E' ratio  and/or other parameters) suggest left ventricular filling pressures are  normal. No regional wall motion abnormalities noted.  2. The right ventricle is normal size. The right ventricular systolic function  is normal.  3. Mild biatrial enlargement.  4. Trace to mild mitral and tricuspid  regurgitation.  5. The aortic root is normal size. Ascending aorta aneurysm is present.  6. No pericardial effusion.  7. No previous study for comparison.    CT  FINDINGS:      The ascending thoracic aorta, aortic arch, and the descending  thoracoabdominal aorta demonstrate no evidence of  intramural hematoma  or dissection. There is a three-vessel arch. Great vessels origin are  widely patent. Mild calcification is noted in the aortic arch.  Widespread mild calcification and noncalcified plaque noted in the  descending thoracic aorta. Trivial nonstenotic calcification noted at  the origin of the left common carotid and left subclavian arteries.  The visualized portions of the great vessels to the head and to the  arms are patent without stenosis.     The aortic root is normal in dimension measuring 3.19 x 3.01 cm. The  sinotubular junction is normal in dimension measuring 3.17 x 3.08 cm.  The mid ascending aorta is dilated measuring 4.39 x 4.24  cm. The  distal ascending aorta prior to the takeoff of the innominate artery  is normal in dimension measuring 3.56 x 3.28 cm. The aortic arch is  normal dimension measuring 2.80 x 2.42 cm. The proximal descending  thoracic aorta is normal in dimension measuring 2.67 x 2.47 cm. The  mid descending thoracic aorta at its narrowest point at the location  of noncalcified plaque, measures 1.88 x 2.42 cm. The distal descending  thoracic aorta at the diaphragmatic hiatus is normal in dimension  measuring 2.68 x 2.57 cm.     Widespread dense coronary calcification.     IMPRESSIONS:     1. Dilatation of the mid ascending aorta. Extent  2. Aortic root, sinotubular junction, aortic arch and descending  thoracic aorta are normal in dimension.  3. Marked coronary calcification.    EKG:    Sinus rhythm with sinus arrhythmia with 1st degree A-V block  Otherwise normal ECG       CAROTID US:  IMPRESSION: Per sonographic criteria, there are less than 50% stenoses  in the internal carotid  arteries. Incidental note is made of an  irregular heart rate.

## 2024-07-10 ENCOUNTER — HOSPITAL ENCOUNTER (INPATIENT)
Facility: CLINIC | Age: 79
LOS: 6 days | Discharge: HOME | DRG: 235 | End: 2024-07-16
Attending: STUDENT IN AN ORGANIZED HEALTH CARE EDUCATION/TRAINING PROGRAM | Admitting: STUDENT IN AN ORGANIZED HEALTH CARE EDUCATION/TRAINING PROGRAM
Payer: COMMERCIAL

## 2024-07-10 ENCOUNTER — ANESTHESIA (OUTPATIENT)
Dept: SURGERY | Facility: CLINIC | Age: 79
DRG: 235 | End: 2024-07-10
Payer: COMMERCIAL

## 2024-07-10 ENCOUNTER — APPOINTMENT (OUTPATIENT)
Dept: GENERAL RADIOLOGY | Facility: CLINIC | Age: 79
DRG: 235 | End: 2024-07-10
Attending: PHYSICIAN ASSISTANT
Payer: COMMERCIAL

## 2024-07-10 ENCOUNTER — APPOINTMENT (OUTPATIENT)
Dept: GENERAL RADIOLOGY | Facility: CLINIC | Age: 79
DRG: 235 | End: 2024-07-10
Attending: INTERNAL MEDICINE
Payer: COMMERCIAL

## 2024-07-10 DIAGNOSIS — Z95.1 S/P CABG (CORONARY ARTERY BYPASS GRAFT): Primary | ICD-10-CM

## 2024-07-10 LAB
ABO/RH(D): NORMAL
ALBUMIN SERPL BCG-MCNC: 3.3 G/DL (ref 3.5–5.2)
ALBUMIN SERPL BCG-MCNC: 3.4 G/DL (ref 3.5–5.2)
ALLEN'S TEST: ABNORMAL
ALP SERPL-CCNC: 54 U/L (ref 40–150)
ALP SERPL-CCNC: 56 U/L (ref 40–150)
ALT SERPL W P-5'-P-CCNC: 44 U/L (ref 0–70)
ALT SERPL W P-5'-P-CCNC: 45 U/L (ref 0–70)
ANION GAP SERPL CALCULATED.3IONS-SCNC: 11 MMOL/L (ref 7–15)
ANION GAP SERPL CALCULATED.3IONS-SCNC: 15 MMOL/L (ref 7–15)
ANION GAP SERPL CALCULATED.3IONS-SCNC: 18 MMOL/L (ref 7–15)
ANTIBODY SCREEN: NEGATIVE
APTT PPP: 30 SECONDS (ref 22–38)
APTT PPP: 30 SECONDS (ref 22–38)
AST SERPL W P-5'-P-CCNC: 70 U/L (ref 0–45)
AST SERPL W P-5'-P-CCNC: ABNORMAL U/L
BASE EXCESS BLDA CALC-SCNC: -1.7 MMOL/L (ref -3–3)
BASE EXCESS BLDA CALC-SCNC: -12.5 MMOL/L (ref -3–3)
BASE EXCESS BLDA CALC-SCNC: -6.5 MMOL/L (ref -3–3)
BILIRUB SERPL-MCNC: 0.6 MG/DL
BILIRUB SERPL-MCNC: 0.7 MG/DL
BLD PROD TYP BPU: NORMAL
BLD PROD TYP BPU: NORMAL
BLOOD COMPONENT TYPE: NORMAL
BLOOD COMPONENT TYPE: NORMAL
BUN SERPL-MCNC: 13.7 MG/DL (ref 8–23)
BUN SERPL-MCNC: 14.2 MG/DL (ref 8–23)
BUN SERPL-MCNC: 14.7 MG/DL (ref 8–23)
CA-I BLD-MCNC: 4.1 MG/DL (ref 4.4–5.2)
CALCIUM SERPL-MCNC: 7.8 MG/DL (ref 8.8–10.2)
CALCIUM SERPL-MCNC: 7.9 MG/DL (ref 8.8–10.2)
CALCIUM SERPL-MCNC: 8 MG/DL (ref 8.8–10.2)
CHLORIDE SERPL-SCNC: 106 MMOL/L (ref 98–107)
CHLORIDE SERPL-SCNC: 107 MMOL/L (ref 98–107)
CHLORIDE SERPL-SCNC: 108 MMOL/L (ref 98–107)
CLOT INIT KAOL IND TO POST HEP NEUT TRTO: 1 {RATIO}
CLOT INIT KAOL IND TO POST HEP NEUT TRTO: 1 {RATIO}
CLOT INIT KAOLIN IND BLD US: 146 SEC (ref 113–166)
CLOT INIT KAOLIN IND BLD US: 152 SEC (ref 113–166)
CLOT INIT KAOLIN IND P HEP NEUT BLD US: 147 SEC (ref 103–153)
CLOT INIT KAOLIN IND P HEP NEUT BLD US: 149 SEC (ref 103–153)
CLOT STIFF PLT CONT BLD CALC: 17.6 HPA (ref 11.9–29.8)
CLOT STIFF PLT CONT BLD CALC: 21.4 HPA (ref 11.9–29.8)
CLOT STIFF TF IND P HEP NEUT BLD US: 20.4 HPA (ref 13–33.2)
CLOT STIFF TF IND P HEP NEUT BLD US: 24.9 HPA (ref 13–33.2)
CLOT STIFF TF IND+IIB-IIIA INH P HEP NEU: 2.8 HPA (ref 1–3.7)
CLOT STIFF TF IND+IIB-IIIA INH P HEP NEU: 3.5 HPA (ref 1–3.7)
CODING SYSTEM: NORMAL
CODING SYSTEM: NORMAL
COHGB MFR BLD: 95.3 % (ref 95–96)
COHGB MFR BLD: 98 % (ref 95–96)
COHGB MFR BLD: 98.6 % (ref 95–96)
CREAT SERPL-MCNC: 1.02 MG/DL (ref 0.67–1.17)
CREAT SERPL-MCNC: 1.04 MG/DL (ref 0.67–1.17)
CREAT SERPL-MCNC: 1.06 MG/DL (ref 0.67–1.17)
CREAT SERPL-MCNC: 1.24 MG/DL (ref 0.67–1.17)
CROSSMATCH: NORMAL
CROSSMATCH: NORMAL
DEPRECATED HCO3 PLAS-SCNC: 17 MMOL/L (ref 22–29)
DEPRECATED HCO3 PLAS-SCNC: 21 MMOL/L (ref 22–29)
DEPRECATED HCO3 PLAS-SCNC: 23 MMOL/L (ref 22–29)
EGFRCR SERPLBLD CKD-EPI 2021: 60 ML/MIN/1.73M2
EGFRCR SERPLBLD CKD-EPI 2021: 72 ML/MIN/1.73M2
EGFRCR SERPLBLD CKD-EPI 2021: 74 ML/MIN/1.73M2
EGFRCR SERPLBLD CKD-EPI 2021: 76 ML/MIN/1.73M2
ERYTHROCYTE [DISTWIDTH] IN BLOOD BY AUTOMATED COUNT: 13.2 % (ref 10–15)
ERYTHROCYTE [DISTWIDTH] IN BLOOD BY AUTOMATED COUNT: 13.3 % (ref 10–15)
FIBRINOGEN PPP-MCNC: 267 MG/DL (ref 170–490)
GLUCOSE BLDC GLUCOMTR-MCNC: 152 MG/DL (ref 70–99)
GLUCOSE BLDC GLUCOMTR-MCNC: 154 MG/DL (ref 70–99)
GLUCOSE BLDC GLUCOMTR-MCNC: 157 MG/DL (ref 70–99)
GLUCOSE BLDC GLUCOMTR-MCNC: 161 MG/DL (ref 70–99)
GLUCOSE BLDC GLUCOMTR-MCNC: 173 MG/DL (ref 70–99)
GLUCOSE SERPL-MCNC: 108 MG/DL (ref 70–99)
GLUCOSE SERPL-MCNC: 180 MG/DL (ref 70–99)
GLUCOSE SERPL-MCNC: 185 MG/DL (ref 70–99)
GLUCOSE SERPL-MCNC: 198 MG/DL (ref 70–99)
HCO3 BLD-SCNC: 14 MMOL/L (ref 21–28)
HCO3 BLD-SCNC: 20 MMOL/L (ref 21–28)
HCO3 BLD-SCNC: 23 MMOL/L (ref 21–28)
HCT VFR BLD AUTO: 28.3 % (ref 40–53)
HCT VFR BLD AUTO: 35.1 % (ref 40–53)
HGB BLD-MCNC: 11.9 G/DL (ref 13.3–17.7)
HGB BLD-MCNC: 12.1 G/DL (ref 13.3–17.7)
HGB BLD-MCNC: 9.8 G/DL (ref 13.3–17.7)
INR PPP: 1.47 (ref 0.85–1.15)
INR PPP: 1.77 (ref 0.85–1.15)
ISSUE DATE AND TIME: NORMAL
ISSUE DATE AND TIME: NORMAL
LACTATE SERPL-SCNC: 11.1 MMOL/L (ref 0.7–2)
LACTATE SERPL-SCNC: 6.3 MMOL/L (ref 0.7–2)
LACTATE SERPL-SCNC: 8.2 MMOL/L (ref 0.7–2)
LACTATE SERPL-SCNC: 8.6 MMOL/L (ref 0.7–2)
MAGNESIUM SERPL-MCNC: 2.3 MG/DL (ref 1.7–2.3)
MCH RBC QN AUTO: 31.9 PG (ref 26.5–33)
MCH RBC QN AUTO: 32.1 PG (ref 26.5–33)
MCHC RBC AUTO-ENTMCNC: 34.5 G/DL (ref 31.5–36.5)
MCHC RBC AUTO-ENTMCNC: 34.6 G/DL (ref 31.5–36.5)
MCV RBC AUTO: 93 FL (ref 78–100)
MCV RBC AUTO: 93 FL (ref 78–100)
O2/TOTAL GAS SETTING VFR VENT: 50 %
PCO2 BLD: 33 MM HG (ref 35–45)
PCO2 BLD: 39 MM HG (ref 35–45)
PCO2 BLD: 41 MM HG (ref 35–45)
PEEP: 5 CM H2O
PEEP: 5 CM H2O
PH BLD: 7.24 [PH] (ref 7.35–7.45)
PH BLD: 7.29 [PH] (ref 7.35–7.45)
PH BLD: 7.39 [PH] (ref 7.35–7.45)
PHOSPHATE SERPL-MCNC: 3.6 MG/DL (ref 2.5–4.5)
PLATELET # BLD AUTO: 133 10E3/UL (ref 150–450)
PLATELET # BLD AUTO: 144 10E3/UL (ref 150–450)
PO2 BLD: 118 MM HG (ref 80–105)
PO2 BLD: 77 MM HG (ref 80–105)
PO2 BLD: 89 MM HG (ref 80–105)
POTASSIUM SERPL-SCNC: 3.8 MMOL/L (ref 3.4–5.3)
POTASSIUM SERPL-SCNC: 3.9 MMOL/L (ref 3.4–5.3)
POTASSIUM SERPL-SCNC: 4 MMOL/L (ref 3.4–5.3)
POTASSIUM SERPL-SCNC: 4.5 MMOL/L (ref 3.4–5.3)
PROT SERPL-MCNC: 5 G/DL (ref 6.4–8.3)
PROT SERPL-MCNC: 5.2 G/DL (ref 6.4–8.3)
RBC # BLD AUTO: 3.05 10E6/UL (ref 4.4–5.9)
RBC # BLD AUTO: 3.79 10E6/UL (ref 4.4–5.9)
SAO2 % BLDA: 94 % (ref 92–100)
SAO2 % BLDA: 96 % (ref 92–100)
SAO2 % BLDA: 97 % (ref 92–100)
SODIUM SERPL-SCNC: 141 MMOL/L (ref 135–145)
SODIUM SERPL-SCNC: 142 MMOL/L (ref 135–145)
SODIUM SERPL-SCNC: 143 MMOL/L (ref 135–145)
SPECIMEN EXPIRATION DATE: NORMAL
UNIT ABO/RH: NORMAL
UNIT ABO/RH: NORMAL
UNIT NUMBER: NORMAL
UNIT NUMBER: NORMAL
UNIT STATUS: NORMAL
UNIT STATUS: NORMAL
UNIT TYPE ISBT: 6200
UNIT TYPE ISBT: 6200
WBC # BLD AUTO: 16.4 10E3/UL (ref 4–11)
WBC # BLD AUTO: 18.2 10E3/UL (ref 4–11)

## 2024-07-10 PROCEDURE — 99291 CRITICAL CARE FIRST HOUR: CPT | Performed by: INTERNAL MEDICINE

## 2024-07-10 PROCEDURE — 999N000141 HC STATISTIC PRE-PROCEDURE NURSING ASSESSMENT: Performed by: STUDENT IN AN ORGANIZED HEALTH CARE EDUCATION/TRAINING PROGRAM

## 2024-07-10 PROCEDURE — 999N000157 HC STATISTIC RCP TIME EA 10 MIN

## 2024-07-10 PROCEDURE — 999N000065 XR ABDOMEN PORT 1 VIEW

## 2024-07-10 PROCEDURE — 33859 AS-AORT GRF F/DS OTH/THN DSJ: CPT

## 2024-07-10 PROCEDURE — 82805 BLOOD GASES W/O2 SATURATION: CPT | Performed by: NURSE PRACTITIONER

## 2024-07-10 PROCEDURE — 83605 ASSAY OF LACTIC ACID: CPT | Performed by: PHYSICIAN ASSISTANT

## 2024-07-10 PROCEDURE — 85396 CLOTTING ASSAY WHOLE BLOOD: CPT

## 2024-07-10 PROCEDURE — 3E043XZ INTRODUCTION OF VASOPRESSOR INTO CENTRAL VEIN, PERCUTANEOUS APPROACH: ICD-10-PCS | Performed by: NURSE PRACTITIONER

## 2024-07-10 PROCEDURE — 021109W BYPASS CORONARY ARTERY, TWO ARTERIES FROM AORTA WITH AUTOLOGOUS VENOUS TISSUE, OPEN APPROACH: ICD-10-PCS | Performed by: STUDENT IN AN ORGANIZED HEALTH CARE EDUCATION/TRAINING PROGRAM

## 2024-07-10 PROCEDURE — 250N000011 HC RX IP 250 OP 636: Performed by: STUDENT IN AN ORGANIZED HEALTH CARE EDUCATION/TRAINING PROGRAM

## 2024-07-10 PROCEDURE — 410N000005 HC PER-PERFUSION, SH ONLY, 1ST 30 MIN: Performed by: STUDENT IN AN ORGANIZED HEALTH CARE EDUCATION/TRAINING PROGRAM

## 2024-07-10 PROCEDURE — 272N000001 HC OR GENERAL SUPPLY STERILE: Performed by: STUDENT IN AN ORGANIZED HEALTH CARE EDUCATION/TRAINING PROGRAM

## 2024-07-10 PROCEDURE — 82040 ASSAY OF SERUM ALBUMIN: CPT | Performed by: PHYSICIAN ASSISTANT

## 2024-07-10 PROCEDURE — 360N000079 HC SURGERY LEVEL 6, PER MIN: Performed by: STUDENT IN AN ORGANIZED HEALTH CARE EDUCATION/TRAINING PROGRAM

## 2024-07-10 PROCEDURE — 82330 ASSAY OF CALCIUM: CPT | Performed by: PHYSICIAN ASSISTANT

## 2024-07-10 PROCEDURE — 99207 PR NO BILLABLE SERVICE THIS VISIT: CPT | Performed by: NURSE PRACTITIONER

## 2024-07-10 PROCEDURE — 33859 AS-AORT GRF F/DS OTH/THN DSJ: CPT | Mod: GC | Performed by: STUDENT IN AN ORGANIZED HEALTH CARE EDUCATION/TRAINING PROGRAM

## 2024-07-10 PROCEDURE — 33859 AS-AORT GRF F/DS OTH/THN DSJ: CPT | Performed by: ANESTHESIOLOGY

## 2024-07-10 PROCEDURE — 85730 THROMBOPLASTIN TIME PARTIAL: CPT | Performed by: STUDENT IN AN ORGANIZED HEALTH CARE EDUCATION/TRAINING PROGRAM

## 2024-07-10 PROCEDURE — 250N000013 HC RX MED GY IP 250 OP 250 PS 637: Performed by: PHYSICIAN ASSISTANT

## 2024-07-10 PROCEDURE — 82330 ASSAY OF CALCIUM: CPT | Performed by: STUDENT IN AN ORGANIZED HEALTH CARE EDUCATION/TRAINING PROGRAM

## 2024-07-10 PROCEDURE — 250N000012 HC RX MED GY IP 250 OP 636 PS 637: Performed by: STUDENT IN AN ORGANIZED HEALTH CARE EDUCATION/TRAINING PROGRAM

## 2024-07-10 PROCEDURE — 258N000003 HC RX IP 258 OP 636: Performed by: NURSE ANESTHETIST, CERTIFIED REGISTERED

## 2024-07-10 PROCEDURE — 36415 COLL VENOUS BLD VENIPUNCTURE: CPT | Performed by: ANESTHESIOLOGY

## 2024-07-10 PROCEDURE — 250N000009 HC RX 250: Performed by: NURSE ANESTHETIST, CERTIFIED REGISTERED

## 2024-07-10 PROCEDURE — 82805 BLOOD GASES W/O2 SATURATION: CPT | Performed by: STUDENT IN AN ORGANIZED HEALTH CARE EDUCATION/TRAINING PROGRAM

## 2024-07-10 PROCEDURE — 82947 ASSAY GLUCOSE BLOOD QUANT: CPT | Performed by: ANESTHESIOLOGY

## 2024-07-10 PROCEDURE — 33533 CABG ARTERIAL SINGLE: CPT | Mod: GC | Performed by: STUDENT IN AN ORGANIZED HEALTH CARE EDUCATION/TRAINING PROGRAM

## 2024-07-10 PROCEDURE — 02100Z9 BYPASS CORONARY ARTERY, ONE ARTERY FROM LEFT INTERNAL MAMMARY, OPEN APPROACH: ICD-10-PCS | Performed by: STUDENT IN AN ORGANIZED HEALTH CARE EDUCATION/TRAINING PROGRAM

## 2024-07-10 PROCEDURE — 85610 PROTHROMBIN TIME: CPT | Performed by: PHYSICIAN ASSISTANT

## 2024-07-10 PROCEDURE — 250N000009 HC RX 250: Performed by: PHYSICIAN ASSISTANT

## 2024-07-10 PROCEDURE — 83605 ASSAY OF LACTIC ACID: CPT | Performed by: STUDENT IN AN ORGANIZED HEALTH CARE EDUCATION/TRAINING PROGRAM

## 2024-07-10 PROCEDURE — C1768 GRAFT, VASCULAR: HCPCS | Performed by: STUDENT IN AN ORGANIZED HEALTH CARE EDUCATION/TRAINING PROGRAM

## 2024-07-10 PROCEDURE — 85384 FIBRINOGEN ACTIVITY: CPT | Performed by: STUDENT IN AN ORGANIZED HEALTH CARE EDUCATION/TRAINING PROGRAM

## 2024-07-10 PROCEDURE — 250N000013 HC RX MED GY IP 250 OP 250 PS 637: Performed by: STUDENT IN AN ORGANIZED HEALTH CARE EDUCATION/TRAINING PROGRAM

## 2024-07-10 PROCEDURE — 250N000009 HC RX 250: Performed by: SURGERY

## 2024-07-10 PROCEDURE — 86923 COMPATIBILITY TEST ELECTRIC: CPT

## 2024-07-10 PROCEDURE — 84155 ASSAY OF PROTEIN SERUM: CPT | Performed by: STUDENT IN AN ORGANIZED HEALTH CARE EDUCATION/TRAINING PROGRAM

## 2024-07-10 PROCEDURE — 83735 ASSAY OF MAGNESIUM: CPT | Performed by: PHYSICIAN ASSISTANT

## 2024-07-10 PROCEDURE — 250N000011 HC RX IP 250 OP 636

## 2024-07-10 PROCEDURE — 370N000017 HC ANESTHESIA TECHNICAL FEE, PER MIN: Performed by: STUDENT IN AN ORGANIZED HEALTH CARE EDUCATION/TRAINING PROGRAM

## 2024-07-10 PROCEDURE — 250N000009 HC RX 250: Performed by: NURSE PRACTITIONER

## 2024-07-10 PROCEDURE — 84155 ASSAY OF PROTEIN SERUM: CPT | Performed by: PHYSICIAN ASSISTANT

## 2024-07-10 PROCEDURE — 88305 TISSUE EXAM BY PATHOLOGIST: CPT | Mod: TC | Performed by: STUDENT IN AN ORGANIZED HEALTH CARE EDUCATION/TRAINING PROGRAM

## 2024-07-10 PROCEDURE — C1898 LEAD, PMKR, OTHER THAN TRANS: HCPCS | Performed by: STUDENT IN AN ORGANIZED HEALTH CARE EDUCATION/TRAINING PROGRAM

## 2024-07-10 PROCEDURE — 86900 BLOOD TYPING SEROLOGIC ABO: CPT | Performed by: ANESTHESIOLOGY

## 2024-07-10 PROCEDURE — 80069 RENAL FUNCTION PANEL: CPT | Performed by: ANESTHESIOLOGY

## 2024-07-10 PROCEDURE — 83605 ASSAY OF LACTIC ACID: CPT | Performed by: NURSE PRACTITIONER

## 2024-07-10 PROCEDURE — 200N000001 HC R&B ICU

## 2024-07-10 PROCEDURE — 410N000006: Performed by: STUDENT IN AN ORGANIZED HEALTH CARE EDUCATION/TRAINING PROGRAM

## 2024-07-10 PROCEDURE — P9016 RBC LEUKOCYTES REDUCED: HCPCS

## 2024-07-10 PROCEDURE — 84295 ASSAY OF SERUM SODIUM: CPT | Performed by: STUDENT IN AN ORGANIZED HEALTH CARE EDUCATION/TRAINING PROGRAM

## 2024-07-10 PROCEDURE — 84075 ASSAY ALKALINE PHOSPHATASE: CPT | Performed by: STUDENT IN AN ORGANIZED HEALTH CARE EDUCATION/TRAINING PROGRAM

## 2024-07-10 PROCEDURE — 250N000011 HC RX IP 250 OP 636: Performed by: PHYSICIAN ASSISTANT

## 2024-07-10 PROCEDURE — 85610 PROTHROMBIN TIME: CPT | Performed by: STUDENT IN AN ORGANIZED HEALTH CARE EDUCATION/TRAINING PROGRAM

## 2024-07-10 PROCEDURE — 06BP4ZZ EXCISION OF RIGHT SAPHENOUS VEIN, PERCUTANEOUS ENDOSCOPIC APPROACH: ICD-10-PCS | Performed by: STUDENT IN AN ORGANIZED HEALTH CARE EDUCATION/TRAINING PROGRAM

## 2024-07-10 PROCEDURE — 93005 ELECTROCARDIOGRAM TRACING: CPT

## 2024-07-10 PROCEDURE — 85730 THROMBOPLASTIN TIME PARTIAL: CPT | Performed by: PHYSICIAN ASSISTANT

## 2024-07-10 PROCEDURE — 250N000011 HC RX IP 250 OP 636: Performed by: NURSE ANESTHETIST, CERTIFIED REGISTERED

## 2024-07-10 PROCEDURE — 80069 RENAL FUNCTION PANEL: CPT | Performed by: PHYSICIAN ASSISTANT

## 2024-07-10 PROCEDURE — 258N000003 HC RX IP 258 OP 636: Performed by: NURSE PRACTITIONER

## 2024-07-10 PROCEDURE — 258N000003 HC RX IP 258 OP 636

## 2024-07-10 PROCEDURE — 99100 ANES PT EXTEME AGE<1 YR&>70: CPT

## 2024-07-10 PROCEDURE — 250N000009 HC RX 250

## 2024-07-10 PROCEDURE — 250N000011 HC RX IP 250 OP 636: Performed by: NURSE PRACTITIONER

## 2024-07-10 PROCEDURE — 82374 ASSAY BLOOD CARBON DIOXIDE: CPT | Performed by: STUDENT IN AN ORGANIZED HEALTH CARE EDUCATION/TRAINING PROGRAM

## 2024-07-10 PROCEDURE — 94003 VENT MGMT INPAT SUBQ DAY: CPT

## 2024-07-10 PROCEDURE — 85027 COMPLETE CBC AUTOMATED: CPT | Performed by: PHYSICIAN ASSISTANT

## 2024-07-10 PROCEDURE — 250N000009 HC RX 250: Performed by: ANESTHESIOLOGY

## 2024-07-10 PROCEDURE — 33518 CABG ARTERY-VEIN TWO: CPT | Mod: GC | Performed by: STUDENT IN AN ORGANIZED HEALTH CARE EDUCATION/TRAINING PROGRAM

## 2024-07-10 PROCEDURE — 258N000003 HC RX IP 258 OP 636: Performed by: STUDENT IN AN ORGANIZED HEALTH CARE EDUCATION/TRAINING PROGRAM

## 2024-07-10 PROCEDURE — 76984 DX INTRAOP THORACIC AORTA US: CPT | Mod: 26 | Performed by: STUDENT IN AN ORGANIZED HEALTH CARE EDUCATION/TRAINING PROGRAM

## 2024-07-10 PROCEDURE — 250N000024 HC ISOFLURANE, PER MIN: Performed by: STUDENT IN AN ORGANIZED HEALTH CARE EDUCATION/TRAINING PROGRAM

## 2024-07-10 PROCEDURE — 250N000009 HC RX 250: Performed by: STUDENT IN AN ORGANIZED HEALTH CARE EDUCATION/TRAINING PROGRAM

## 2024-07-10 PROCEDURE — 250N000025 HC SEVOFLURANE, PER MIN: Performed by: STUDENT IN AN ORGANIZED HEALTH CARE EDUCATION/TRAINING PROGRAM

## 2024-07-10 PROCEDURE — 85041 AUTOMATED RBC COUNT: CPT | Performed by: STUDENT IN AN ORGANIZED HEALTH CARE EDUCATION/TRAINING PROGRAM

## 2024-07-10 PROCEDURE — 84100 ASSAY OF PHOSPHORUS: CPT | Performed by: PHYSICIAN ASSISTANT

## 2024-07-10 PROCEDURE — 999N000065 XR CHEST PORT 1 VIEW

## 2024-07-10 PROCEDURE — 5A1221Z PERFORMANCE OF CARDIAC OUTPUT, CONTINUOUS: ICD-10-PCS | Performed by: STUDENT IN AN ORGANIZED HEALTH CARE EDUCATION/TRAINING PROGRAM

## 2024-07-10 PROCEDURE — P9045 ALBUMIN (HUMAN), 5%, 250 ML: HCPCS | Performed by: NURSE PRACTITIONER

## 2024-07-10 PROCEDURE — 85018 HEMOGLOBIN: CPT | Performed by: NURSE PRACTITIONER

## 2024-07-10 PROCEDURE — 82805 BLOOD GASES W/O2 SATURATION: CPT | Performed by: PHYSICIAN ASSISTANT

## 2024-07-10 PROCEDURE — 04R00JZ REPLACEMENT OF ABDOMINAL AORTA WITH SYNTHETIC SUBSTITUTE, OPEN APPROACH: ICD-10-PCS | Performed by: STUDENT IN AN ORGANIZED HEALTH CARE EDUCATION/TRAINING PROGRAM

## 2024-07-10 DEVICE — HEMASHIELD PLATINUM WOVEN STRAIGHT DOUBLE VELOUR VASCULAR GRAFT WITH GRAFT SIZER ACCESSORY
Type: IMPLANTABLE DEVICE | Site: AORTA | Status: FUNCTIONAL
Brand: HEMASHIELD

## 2024-07-10 RX ORDER — CLINDAMYCIN PHOSPHATE 900 MG/50ML
900 INJECTION, SOLUTION INTRAVENOUS EVERY 8 HOURS
Status: COMPLETED | OUTPATIENT
Start: 2024-07-11 | End: 2024-07-11

## 2024-07-10 RX ORDER — PROPOFOL 10 MG/ML
INJECTION, EMULSION INTRAVENOUS PRN
Status: DISCONTINUED | OUTPATIENT
Start: 2024-07-10 | End: 2024-07-10

## 2024-07-10 RX ORDER — LIDOCAINE 40 MG/G
CREAM TOPICAL
Status: DISCONTINUED | OUTPATIENT
Start: 2024-07-10 | End: 2024-07-10 | Stop reason: HOSPADM

## 2024-07-10 RX ORDER — LIDOCAINE HYDROCHLORIDE 10 MG/ML
INJECTION, SOLUTION INFILTRATION; PERINEURAL
Status: COMPLETED | OUTPATIENT
Start: 2024-07-10 | End: 2024-07-10

## 2024-07-10 RX ORDER — SODIUM CHLORIDE, SODIUM LACTATE, POTASSIUM CHLORIDE, CALCIUM CHLORIDE 600; 310; 30; 20 MG/100ML; MG/100ML; MG/100ML; MG/100ML
INJECTION, SOLUTION INTRAVENOUS CONTINUOUS
Status: DISCONTINUED | OUTPATIENT
Start: 2024-07-10 | End: 2024-07-10 | Stop reason: HOSPADM

## 2024-07-10 RX ORDER — HYDRALAZINE HYDROCHLORIDE 20 MG/ML
10 INJECTION INTRAMUSCULAR; INTRAVENOUS EVERY 30 MIN PRN
Status: DISCONTINUED | OUTPATIENT
Start: 2024-07-10 | End: 2024-07-16 | Stop reason: HOSPADM

## 2024-07-10 RX ORDER — NITROGLYCERIN 20 MG/100ML
10-200 INJECTION INTRAVENOUS CONTINUOUS PRN
Status: DISCONTINUED | OUTPATIENT
Start: 2024-07-10 | End: 2024-07-11

## 2024-07-10 RX ORDER — NALOXONE HYDROCHLORIDE 0.4 MG/ML
0.2 INJECTION, SOLUTION INTRAMUSCULAR; INTRAVENOUS; SUBCUTANEOUS
Status: DISCONTINUED | OUTPATIENT
Start: 2024-07-10 | End: 2024-07-16 | Stop reason: HOSPADM

## 2024-07-10 RX ORDER — HYDROMORPHONE HCL IN WATER/PF 6 MG/30 ML
0.2 PATIENT CONTROLLED ANALGESIA SYRINGE INTRAVENOUS
Status: DISCONTINUED | OUTPATIENT
Start: 2024-07-10 | End: 2024-07-16 | Stop reason: HOSPADM

## 2024-07-10 RX ORDER — PROPOFOL 10 MG/ML
INJECTION, EMULSION INTRAVENOUS CONTINUOUS PRN
Status: DISCONTINUED | OUTPATIENT
Start: 2024-07-10 | End: 2024-07-10

## 2024-07-10 RX ORDER — PROCHLORPERAZINE MALEATE 5 MG
5 TABLET ORAL EVERY 6 HOURS PRN
Status: DISCONTINUED | OUTPATIENT
Start: 2024-07-10 | End: 2024-07-16 | Stop reason: HOSPADM

## 2024-07-10 RX ORDER — CLINDAMYCIN PHOSPHATE 900 MG/50ML
900 INJECTION, SOLUTION INTRAVENOUS SEE ADMIN INSTRUCTIONS
Status: DISCONTINUED | OUTPATIENT
Start: 2024-07-10 | End: 2024-07-10 | Stop reason: HOSPADM

## 2024-07-10 RX ORDER — HEPARIN SODIUM 1000 [USP'U]/ML
INJECTION, SOLUTION INTRAVENOUS; SUBCUTANEOUS PRN
Status: DISCONTINUED | OUTPATIENT
Start: 2024-07-10 | End: 2024-07-10

## 2024-07-10 RX ORDER — BISACODYL 10 MG
10 SUPPOSITORY, RECTAL RECTAL DAILY PRN
Status: DISCONTINUED | OUTPATIENT
Start: 2024-07-13 | End: 2024-07-16 | Stop reason: HOSPADM

## 2024-07-10 RX ORDER — LIDOCAINE HYDROCHLORIDE 20 MG/ML
INJECTION, SOLUTION INFILTRATION; PERINEURAL PRN
Status: DISCONTINUED | OUTPATIENT
Start: 2024-07-10 | End: 2024-07-10

## 2024-07-10 RX ORDER — OXYCODONE HYDROCHLORIDE 5 MG/1
10 TABLET ORAL EVERY 4 HOURS PRN
Status: DISCONTINUED | OUTPATIENT
Start: 2024-07-10 | End: 2024-07-16 | Stop reason: HOSPADM

## 2024-07-10 RX ORDER — CLINDAMYCIN PHOSPHATE 900 MG/50ML
900 INJECTION, SOLUTION INTRAVENOUS
Status: COMPLETED | OUTPATIENT
Start: 2024-07-10 | End: 2024-07-10

## 2024-07-10 RX ORDER — HEPARIN SODIUM 5000 [USP'U]/.5ML
5000 INJECTION, SOLUTION INTRAVENOUS; SUBCUTANEOUS EVERY 8 HOURS
Status: DISCONTINUED | OUTPATIENT
Start: 2024-07-11 | End: 2024-07-16 | Stop reason: HOSPADM

## 2024-07-10 RX ORDER — EPINEPHRINE IN 0.9 % SOD CHLOR 5 MG/250ML
.01-.1 PLASTIC BAG, INJECTION (ML) INTRAVENOUS CONTINUOUS PRN
Status: DISCONTINUED | OUTPATIENT
Start: 2024-07-10 | End: 2024-07-11

## 2024-07-10 RX ORDER — ASPIRIN 81 MG/1
81 TABLET, CHEWABLE ORAL
Status: DISCONTINUED | OUTPATIENT
Start: 2024-07-10 | End: 2024-07-10 | Stop reason: HOSPADM

## 2024-07-10 RX ORDER — HYDROMORPHONE HCL IN WATER/PF 6 MG/30 ML
0.4 PATIENT CONTROLLED ANALGESIA SYRINGE INTRAVENOUS
Status: DISCONTINUED | OUTPATIENT
Start: 2024-07-10 | End: 2024-07-16 | Stop reason: HOSPADM

## 2024-07-10 RX ORDER — LIDOCAINE 40 MG/G
CREAM TOPICAL
Status: DISCONTINUED | OUTPATIENT
Start: 2024-07-10 | End: 2024-07-16 | Stop reason: HOSPADM

## 2024-07-10 RX ORDER — ASPIRIN 81 MG/1
162 TABLET, CHEWABLE ORAL DAILY
Status: DISCONTINUED | OUTPATIENT
Start: 2024-07-10 | End: 2024-07-16 | Stop reason: HOSPADM

## 2024-07-10 RX ORDER — ACETAMINOPHEN 325 MG/1
650 TABLET ORAL EVERY 4 HOURS PRN
Status: DISCONTINUED | OUTPATIENT
Start: 2024-07-13 | End: 2024-07-16 | Stop reason: HOSPADM

## 2024-07-10 RX ORDER — NALOXONE HYDROCHLORIDE 0.4 MG/ML
0.4 INJECTION, SOLUTION INTRAMUSCULAR; INTRAVENOUS; SUBCUTANEOUS
Status: DISCONTINUED | OUTPATIENT
Start: 2024-07-10 | End: 2024-07-16 | Stop reason: HOSPADM

## 2024-07-10 RX ORDER — VANCOMYCIN HYDROCHLORIDE 1 G/20ML
INJECTION, POWDER, LYOPHILIZED, FOR SOLUTION INTRAVENOUS PRN
Status: DISCONTINUED | OUTPATIENT
Start: 2024-07-10 | End: 2024-07-10 | Stop reason: HOSPADM

## 2024-07-10 RX ORDER — NOREPINEPHRINE BITARTRATE 0.02 MG/ML
.01-.4 INJECTION, SOLUTION INTRAVENOUS CONTINUOUS PRN
Status: DISCONTINUED | OUTPATIENT
Start: 2024-07-10 | End: 2024-07-11

## 2024-07-10 RX ORDER — VECURONIUM BROMIDE 1 MG/ML
INJECTION, POWDER, LYOPHILIZED, FOR SOLUTION INTRAVENOUS PRN
Status: DISCONTINUED | OUTPATIENT
Start: 2024-07-10 | End: 2024-07-10

## 2024-07-10 RX ORDER — DEXTROSE MONOHYDRATE 25 G/50ML
25-50 INJECTION, SOLUTION INTRAVENOUS
Status: DISCONTINUED | OUTPATIENT
Start: 2024-07-10 | End: 2024-07-16 | Stop reason: HOSPADM

## 2024-07-10 RX ORDER — SODIUM CHLORIDE, SODIUM LACTATE, POTASSIUM CHLORIDE, CALCIUM CHLORIDE 600; 310; 30; 20 MG/100ML; MG/100ML; MG/100ML; MG/100ML
INJECTION, SOLUTION INTRAVENOUS CONTINUOUS PRN
Status: DISCONTINUED | OUTPATIENT
Start: 2024-07-10 | End: 2024-07-10

## 2024-07-10 RX ORDER — OXYCODONE HYDROCHLORIDE 5 MG/1
5 TABLET ORAL EVERY 4 HOURS PRN
Status: DISCONTINUED | OUTPATIENT
Start: 2024-07-10 | End: 2024-07-16 | Stop reason: HOSPADM

## 2024-07-10 RX ORDER — SODIUM CHLORIDE 9 MG/ML
INJECTION, SOLUTION INTRAVENOUS CONTINUOUS PRN
Status: DISCONTINUED | OUTPATIENT
Start: 2024-07-10 | End: 2024-07-10

## 2024-07-10 RX ORDER — PROPOFOL 10 MG/ML
5-75 INJECTION, EMULSION INTRAVENOUS CONTINUOUS
Status: DISCONTINUED | OUTPATIENT
Start: 2024-07-10 | End: 2024-07-11

## 2024-07-10 RX ORDER — DEXMEDETOMIDINE HYDROCHLORIDE 4 UG/ML
INJECTION, SOLUTION INTRAVENOUS CONTINUOUS PRN
Status: DISCONTINUED | OUTPATIENT
Start: 2024-07-10 | End: 2024-07-10

## 2024-07-10 RX ORDER — CALCIUM GLUCONATE 20 MG/ML
1 INJECTION, SOLUTION INTRAVENOUS EVERY 6 HOURS PRN
Status: DISCONTINUED | OUTPATIENT
Start: 2024-07-10 | End: 2024-07-16 | Stop reason: HOSPADM

## 2024-07-10 RX ORDER — SODIUM CHLORIDE, SODIUM LACTATE, POTASSIUM CHLORIDE, CALCIUM CHLORIDE 600; 310; 30; 20 MG/100ML; MG/100ML; MG/100ML; MG/100ML
INJECTION, SOLUTION INTRAVENOUS CONTINUOUS
Status: DISCONTINUED | OUTPATIENT
Start: 2024-07-10 | End: 2024-07-11

## 2024-07-10 RX ORDER — ASPIRIN 81 MG/1
162 TABLET, CHEWABLE ORAL
Status: DISCONTINUED | OUTPATIENT
Start: 2024-07-10 | End: 2024-07-10 | Stop reason: HOSPADM

## 2024-07-10 RX ORDER — NICOTINE POLACRILEX 4 MG
15-30 LOZENGE BUCCAL
Status: DISCONTINUED | OUTPATIENT
Start: 2024-07-10 | End: 2024-07-16 | Stop reason: HOSPADM

## 2024-07-10 RX ORDER — CALCIUM GLUCONATE 20 MG/ML
2 INJECTION, SOLUTION INTRAVENOUS EVERY 6 HOURS PRN
Status: DISCONTINUED | OUTPATIENT
Start: 2024-07-10 | End: 2024-07-16 | Stop reason: HOSPADM

## 2024-07-10 RX ORDER — GABAPENTIN 100 MG/1
100 CAPSULE ORAL AT BEDTIME
Status: DISCONTINUED | OUTPATIENT
Start: 2024-07-10 | End: 2024-07-16 | Stop reason: HOSPADM

## 2024-07-10 RX ORDER — ONDANSETRON 2 MG/ML
4 INJECTION INTRAMUSCULAR; INTRAVENOUS EVERY 6 HOURS PRN
Status: DISCONTINUED | OUTPATIENT
Start: 2024-07-10 | End: 2024-07-16 | Stop reason: HOSPADM

## 2024-07-10 RX ORDER — CHLORHEXIDINE GLUCONATE ORAL RINSE 1.2 MG/ML
10 SOLUTION DENTAL ONCE
Status: COMPLETED | OUTPATIENT
Start: 2024-07-10 | End: 2024-07-10

## 2024-07-10 RX ORDER — PROTAMINE SULFATE 10 MG/ML
INJECTION, SOLUTION INTRAVENOUS PRN
Status: DISCONTINUED | OUTPATIENT
Start: 2024-07-10 | End: 2024-07-10

## 2024-07-10 RX ORDER — BRIMONIDINE TARTRATE 2 MG/ML
1 SOLUTION/ DROPS OPHTHALMIC 2 TIMES DAILY
Status: DISCONTINUED | OUTPATIENT
Start: 2024-07-10 | End: 2024-07-11

## 2024-07-10 RX ORDER — PHENYLEPHRINE HCL IN 0.9% NACL 50MG/250ML
PLASTIC BAG, INJECTION (ML) INTRAVENOUS CONTINUOUS PRN
Status: DISCONTINUED | OUTPATIENT
Start: 2024-07-10 | End: 2024-07-10

## 2024-07-10 RX ORDER — FAMOTIDINE 20 MG/1
20 TABLET, FILM COATED ORAL
Status: COMPLETED | OUTPATIENT
Start: 2024-07-10 | End: 2024-07-10

## 2024-07-10 RX ORDER — DEXMEDETOMIDINE HYDROCHLORIDE 4 UG/ML
.2-.7 INJECTION, SOLUTION INTRAVENOUS CONTINUOUS
Status: DISCONTINUED | OUTPATIENT
Start: 2024-07-10 | End: 2024-07-11

## 2024-07-10 RX ORDER — METHOCARBAMOL 500 MG/1
500 TABLET, FILM COATED ORAL EVERY 6 HOURS PRN
Status: DISCONTINUED | OUTPATIENT
Start: 2024-07-10 | End: 2024-07-16 | Stop reason: HOSPADM

## 2024-07-10 RX ORDER — FENTANYL CITRATE 0.05 MG/ML
50 INJECTION, SOLUTION INTRAMUSCULAR; INTRAVENOUS
Status: DISCONTINUED | OUTPATIENT
Start: 2024-07-10 | End: 2024-07-10 | Stop reason: HOSPADM

## 2024-07-10 RX ORDER — POLYETHYLENE GLYCOL 3350 17 G/17G
17 POWDER, FOR SOLUTION ORAL DAILY
Status: DISCONTINUED | OUTPATIENT
Start: 2024-07-11 | End: 2024-07-13

## 2024-07-10 RX ORDER — AMOXICILLIN 250 MG
1 CAPSULE ORAL 2 TIMES DAILY
Status: DISCONTINUED | OUTPATIENT
Start: 2024-07-10 | End: 2024-07-16 | Stop reason: HOSPADM

## 2024-07-10 RX ORDER — FENTANYL CITRATE 50 UG/ML
INJECTION, SOLUTION INTRAMUSCULAR; INTRAVENOUS PRN
Status: DISCONTINUED | OUTPATIENT
Start: 2024-07-10 | End: 2024-07-10

## 2024-07-10 RX ORDER — ONDANSETRON 4 MG/1
4 TABLET, ORALLY DISINTEGRATING ORAL EVERY 6 HOURS PRN
Status: DISCONTINUED | OUTPATIENT
Start: 2024-07-10 | End: 2024-07-16 | Stop reason: HOSPADM

## 2024-07-10 RX ORDER — NITROGLYCERIN 10 MG/100ML
INJECTION INTRAVENOUS PRN
Status: DISCONTINUED | OUTPATIENT
Start: 2024-07-10 | End: 2024-07-10

## 2024-07-10 RX ORDER — ACETAMINOPHEN 325 MG/1
975 TABLET ORAL EVERY 8 HOURS
Status: DISCONTINUED | OUTPATIENT
Start: 2024-07-10 | End: 2024-07-12

## 2024-07-10 RX ORDER — PANTOPRAZOLE SODIUM 40 MG/1
40 TABLET, DELAYED RELEASE ORAL DAILY
Status: DISCONTINUED | OUTPATIENT
Start: 2024-07-10 | End: 2024-07-16 | Stop reason: HOSPADM

## 2024-07-10 RX ORDER — FUROSEMIDE 10 MG/ML
20 INJECTION INTRAMUSCULAR; INTRAVENOUS
Status: DISCONTINUED | OUTPATIENT
Start: 2024-07-10 | End: 2024-07-16 | Stop reason: HOSPADM

## 2024-07-10 RX ADMIN — CALCIUM GLUCONATE 1 G: 20 INJECTION, SOLUTION INTRAVENOUS at 20:23

## 2024-07-10 RX ADMIN — SODIUM CHLORIDE, POTASSIUM CHLORIDE, SODIUM LACTATE AND CALCIUM CHLORIDE 1000 ML: 600; 310; 30; 20 INJECTION, SOLUTION INTRAVENOUS at 19:50

## 2024-07-10 RX ADMIN — PHENYLEPHRINE HYDROCHLORIDE 100 MCG: 10 INJECTION INTRAVENOUS at 10:00

## 2024-07-10 RX ADMIN — SODIUM CHLORIDE, POTASSIUM CHLORIDE, SODIUM LACTATE AND CALCIUM CHLORIDE 500 ML: 600; 310; 30; 20 INJECTION, SOLUTION INTRAVENOUS at 18:06

## 2024-07-10 RX ADMIN — SODIUM BICARBONATE 100 MEQ: 84 INJECTION, SOLUTION INTRAVENOUS at 21:46

## 2024-07-10 RX ADMIN — ASPIRIN 81 MG CHEWABLE TABLET 162 MG: 81 TABLET CHEWABLE at 19:53

## 2024-07-10 RX ADMIN — VECURONIUM BROMIDE 2 MG: 1 INJECTION, POWDER, LYOPHILIZED, FOR SOLUTION INTRAVENOUS at 13:18

## 2024-07-10 RX ADMIN — FAMOTIDINE 20 MG: 20 TABLET, FILM COATED ORAL at 05:47

## 2024-07-10 RX ADMIN — SODIUM CHLORIDE: 9 INJECTION, SOLUTION INTRAVENOUS at 10:03

## 2024-07-10 RX ADMIN — PHENYLEPHRINE HYDROCHLORIDE 200 MCG: 10 INJECTION INTRAVENOUS at 10:30

## 2024-07-10 RX ADMIN — FENTANYL CITRATE 100 MCG: 50 INJECTION INTRAMUSCULAR; INTRAVENOUS at 10:34

## 2024-07-10 RX ADMIN — ALBUMIN HUMAN 25 G: 0.05 INJECTION, SOLUTION INTRAVENOUS at 21:53

## 2024-07-10 RX ADMIN — FENTANYL CITRATE 50 MCG: 50 INJECTION INTRAMUSCULAR; INTRAVENOUS at 10:36

## 2024-07-10 RX ADMIN — HEPARIN SODIUM 42000 UNITS: 1000 INJECTION INTRAVENOUS; SUBCUTANEOUS at 11:54

## 2024-07-10 RX ADMIN — CHLORHEXIDINE GLUCONATE 0.12% ORAL RINSE 15 ML: 1.2 LIQUID ORAL at 06:12

## 2024-07-10 RX ADMIN — NOREPINEPHRINE BITARTRATE 6.4 MCG: 1 INJECTION, SOLUTION, CONCENTRATE INTRAVENOUS at 12:22

## 2024-07-10 RX ADMIN — PHENYLEPHRINE HYDROCHLORIDE 100 MCG: 10 INJECTION INTRAVENOUS at 16:44

## 2024-07-10 RX ADMIN — FENTANYL CITRATE 150 MCG: 50 INJECTION INTRAMUSCULAR; INTRAVENOUS at 15:46

## 2024-07-10 RX ADMIN — MIDAZOLAM HYDROCHLORIDE 1 MG: 1 INJECTION, SOLUTION INTRAMUSCULAR; INTRAVENOUS at 14:30

## 2024-07-10 RX ADMIN — NITROGLYCERIN 10 MCG: 10 INJECTION INTRAVENOUS at 15:42

## 2024-07-10 RX ADMIN — DEXMEDETOMIDINE HYDROCHLORIDE 0.5 MCG/KG/HR: 400 INJECTION INTRAVENOUS at 22:47

## 2024-07-10 RX ADMIN — OXYCODONE HYDROCHLORIDE 5 MG: 5 TABLET ORAL at 19:54

## 2024-07-10 RX ADMIN — SODIUM CHLORIDE, POTASSIUM CHLORIDE, SODIUM LACTATE AND CALCIUM CHLORIDE 500 ML: 600; 310; 30; 20 INJECTION, SOLUTION INTRAVENOUS at 23:04

## 2024-07-10 RX ADMIN — CLINDAMYCIN PHOSPHATE 900 MG: 900 INJECTION, SOLUTION INTRAVENOUS at 09:38

## 2024-07-10 RX ADMIN — FENTANYL CITRATE 100 MCG: 50 INJECTION INTRAMUSCULAR; INTRAVENOUS at 15:43

## 2024-07-10 RX ADMIN — NITROGLYCERIN 10 MCG: 10 INJECTION INTRAVENOUS at 15:44

## 2024-07-10 RX ADMIN — SODIUM CHLORIDE: 9 INJECTION, SOLUTION INTRAVENOUS at 15:49

## 2024-07-10 RX ADMIN — FENTANYL CITRATE 50 MCG: 50 INJECTION INTRAMUSCULAR; INTRAVENOUS at 11:06

## 2024-07-10 RX ADMIN — CLINDAMYCIN PHOSPHATE 900 MG: 900 INJECTION, SOLUTION INTRAVENOUS at 15:38

## 2024-07-10 RX ADMIN — METHOCARBAMOL 500 MG: 500 TABLET ORAL at 19:54

## 2024-07-10 RX ADMIN — SUGAMMADEX 200 MG: 100 INJECTION, SOLUTION INTRAVENOUS at 17:20

## 2024-07-10 RX ADMIN — HEPARIN SODIUM 3000 UNITS: 1000 INJECTION INTRAVENOUS; SUBCUTANEOUS at 10:45

## 2024-07-10 RX ADMIN — SODIUM CHLORIDE, POTASSIUM CHLORIDE, SODIUM LACTATE AND CALCIUM CHLORIDE: 600; 310; 30; 20 INJECTION, SOLUTION INTRAVENOUS at 18:36

## 2024-07-10 RX ADMIN — PROTAMINE SULFATE 80 MG: 10 INJECTION, SOLUTION INTRAVENOUS at 15:44

## 2024-07-10 RX ADMIN — VECURONIUM BROMIDE 2 MG: 1 INJECTION, POWDER, LYOPHILIZED, FOR SOLUTION INTRAVENOUS at 15:03

## 2024-07-10 RX ADMIN — PROPOFOL 40 MG: 10 INJECTION, EMULSION INTRAVENOUS at 09:47

## 2024-07-10 RX ADMIN — NOREPINEPHRINE BITARTRATE 6.4 MCG: 1 INJECTION, SOLUTION, CONCENTRATE INTRAVENOUS at 12:07

## 2024-07-10 RX ADMIN — PHENYLEPHRINE HYDROCHLORIDE 200 MCG: 10 INJECTION INTRAVENOUS at 10:08

## 2024-07-10 RX ADMIN — DEXMEDETOMIDINE HYDROCHLORIDE 0.2 MCG/KG/HR: 200 INJECTION INTRAVENOUS at 10:33

## 2024-07-10 RX ADMIN — LIDOCAINE HYDROCHLORIDE 1 ML: 10 INJECTION, SOLUTION INFILTRATION; PERINEURAL at 09:40

## 2024-07-10 RX ADMIN — SODIUM CHLORIDE, SODIUM LACTATE, POTASSIUM CHLORIDE, CALCIUM CHLORIDE: 600; 310; 30; 20 INJECTION, SOLUTION INTRAVENOUS at 10:03

## 2024-07-10 RX ADMIN — VECURONIUM BROMIDE 3 MG: 1 INJECTION, POWDER, LYOPHILIZED, FOR SOLUTION INTRAVENOUS at 11:46

## 2024-07-10 RX ADMIN — INSULIN HUMAN 1.5 UNITS/HR: 1 INJECTION, SOLUTION INTRAVENOUS at 18:29

## 2024-07-10 RX ADMIN — FENTANYL CITRATE 200 MCG: 50 INJECTION INTRAMUSCULAR; INTRAVENOUS at 09:47

## 2024-07-10 RX ADMIN — SODIUM CHLORIDE, POTASSIUM CHLORIDE, SODIUM LACTATE AND CALCIUM CHLORIDE: 600; 310; 30; 20 INJECTION, SOLUTION INTRAVENOUS at 09:34

## 2024-07-10 RX ADMIN — PHENYLEPHRINE HYDROCHLORIDE 100 MCG: 10 INJECTION INTRAVENOUS at 10:02

## 2024-07-10 RX ADMIN — AMINOCAPROIC ACID 5 G/HR: 250 INJECTION, SOLUTION INTRAVENOUS at 10:17

## 2024-07-10 RX ADMIN — PHENYLEPHRINE HYDROCHLORIDE 100 MCG: 10 INJECTION INTRAVENOUS at 09:56

## 2024-07-10 RX ADMIN — VECURONIUM BROMIDE 2 MG: 1 INJECTION, POWDER, LYOPHILIZED, FOR SOLUTION INTRAVENOUS at 14:32

## 2024-07-10 RX ADMIN — MIDAZOLAM HYDROCHLORIDE 4 MG: 1 INJECTION, SOLUTION INTRAMUSCULAR; INTRAVENOUS at 09:47

## 2024-07-10 RX ADMIN — FENTANYL CITRATE 50 MCG: 50 INJECTION INTRAMUSCULAR; INTRAVENOUS at 11:25

## 2024-07-10 RX ADMIN — Medication 0.3 MCG/KG/MIN: at 10:25

## 2024-07-10 RX ADMIN — METOPROLOL TARTRATE 12.5 MG: 25 TABLET, FILM COATED ORAL at 06:00

## 2024-07-10 RX ADMIN — SENNOSIDES AND DOCUSATE SODIUM 1 TABLET: 50; 8.6 TABLET ORAL at 19:55

## 2024-07-10 RX ADMIN — NOREPINEPHRINE BITARTRATE 6.4 MCG: 1 INJECTION, SOLUTION, CONCENTRATE INTRAVENOUS at 12:20

## 2024-07-10 RX ADMIN — SODIUM BICARBONATE 100 MEQ: 84 INJECTION, SOLUTION INTRAVENOUS at 22:56

## 2024-07-10 RX ADMIN — Medication 40 MG: at 18:46

## 2024-07-10 RX ADMIN — ACETAMINOPHEN 975 MG: 325 TABLET, FILM COATED ORAL at 18:37

## 2024-07-10 RX ADMIN — FENTANYL CITRATE 50 MCG: 50 INJECTION INTRAMUSCULAR; INTRAVENOUS at 10:32

## 2024-07-10 RX ADMIN — EPINEPHRINE 0.03 MCG/KG/MIN: 1 INJECTION INTRAMUSCULAR; INTRAVENOUS; SUBCUTANEOUS at 15:13

## 2024-07-10 RX ADMIN — PHENYLEPHRINE HYDROCHLORIDE 200 MCG: 10 INJECTION INTRAVENOUS at 10:47

## 2024-07-10 RX ADMIN — LIDOCAINE HYDROCHLORIDE 100 MG: 20 INJECTION, SOLUTION INFILTRATION; PERINEURAL at 09:47

## 2024-07-10 RX ADMIN — PHENYLEPHRINE HYDROCHLORIDE 100 MCG: 10 INJECTION INTRAVENOUS at 09:54

## 2024-07-10 RX ADMIN — NITROGLYCERIN 5 MCG: 10 INJECTION INTRAVENOUS at 12:14

## 2024-07-10 RX ADMIN — NOREPINEPHRINE BITARTRATE 6.4 MCG: 1 INJECTION, SOLUTION, CONCENTRATE INTRAVENOUS at 12:08

## 2024-07-10 RX ADMIN — SODIUM CHLORIDE, POTASSIUM CHLORIDE, SODIUM LACTATE AND CALCIUM CHLORIDE 500 ML: 600; 310; 30; 20 INJECTION, SOLUTION INTRAVENOUS at 21:21

## 2024-07-10 RX ADMIN — ROCURONIUM BROMIDE 50 MG: 50 INJECTION, SOLUTION INTRAVENOUS at 10:36

## 2024-07-10 RX ADMIN — CLINDAMYCIN PHOSPHATE 900 MG: 900 INJECTION, SOLUTION INTRAVENOUS at 23:45

## 2024-07-10 RX ADMIN — ROCURONIUM BROMIDE 50 MG: 50 INJECTION, SOLUTION INTRAVENOUS at 09:47

## 2024-07-10 RX ADMIN — PROPOFOL 30 MCG/KG/MIN: 10 INJECTION, EMULSION INTRAVENOUS at 16:15

## 2024-07-10 RX ADMIN — BRIMONIDINE TARTRATE 1 DROP: 2 SOLUTION/ DROPS OPHTHALMIC at 22:35

## 2024-07-10 RX ADMIN — NOREPINEPHRINE BITARTRATE 0.03 MCG/KG/MIN: 0.02 INJECTION, SOLUTION INTRAVENOUS at 20:14

## 2024-07-10 RX ADMIN — PHENYLEPHRINE HYDROCHLORIDE 100 MCG: 10 INJECTION INTRAVENOUS at 16:35

## 2024-07-10 RX ADMIN — NOREPINEPHRINE BITARTRATE 6.4 MCG: 1 INJECTION, SOLUTION, CONCENTRATE INTRAVENOUS at 12:18

## 2024-07-10 ASSESSMENT — ACTIVITIES OF DAILY LIVING (ADL)
ADLS_ACUITY_SCORE: 22
ADLS_ACUITY_SCORE: 35
ADLS_ACUITY_SCORE: 22
FALL_HISTORY_WITHIN_LAST_SIX_MONTHS: NO
ADLS_ACUITY_SCORE: 22
CHANGE_IN_FUNCTIONAL_STATUS_SINCE_ONSET_OF_CURRENT_ILLNESS/INJURY: NO
ADLS_ACUITY_SCORE: 22

## 2024-07-10 NOTE — ANESTHESIA PROCEDURE NOTES
Central Line/PA Catheter Placement    Pre-Procedure   Staff -        Anesthesiologist:  Payam Martinez MD       Performed By: Anesthesiologist       Location: OR       Pre-Anesthestic Checklist: patient identified, IV checked, site marked, risks and benefits discussed, informed consent, monitors and equipment checked, pre-op evaluation and at physician/surgeon's request  Timeout:       Correct Patient: Yes        Correct Procedure: Yes        Correct Site: Yes        Correct Position: Yes        Correct Laterality: Yes   Line Placement:   This line was placed Post Induction    Procedure   Procedure: central line, new line and elective       Laterality: right       Insertion Site: internal jugular.       Patient Position: Trendelenburg  Sterile Prep        All elements of maximal sterile barrier technique followed       Patient Prep/Sterile Barriers: draped, hand hygiene, gloves , hat , mask , draped, gown, sterile gel and probe cover       Skin prep: Chloraprep  Insertion/Injection        Technique: ultrasound guided and Seldinger Technique        1. Ultrasound was used to evaluate the access site.       2. Vein evaluated via ultrasound for patency/adequacy.       3. Using real-time ultrasound the needle/catheter was observed entering the artery/vein.       4. Permanent image was captured and entered into the patient's record.       5. The visualized structures were anatomically normal.       6. There were no apparent abnormal pathologic findings.       Introducer Type: 9 Fr, 7 cm        Type: Introducer  Narrative         Secured by: suture       Tegaderm and Biopatch dressing used.       Complications: None apparent,        blood aspirated from all lumens,        All lumens flushed: Yes       Verification method: Ultrasound, Placement to be verified post-op and X-ray (CXR in ICU)   Comments:  Ultrasound Interpretation, central venous.  CXR will be done in ICU and tip determination made at that time.    1.  Ultrasound guidance was used to evaluate potential access sites.  2. Ultrasound was also used to verify the patency of the vessel specified above.   3. Ultrasound was used to visualize the needle entering the vessel.   4. The visualized structures were anatomically normal.  5. There were no apparent abnormal pathological findings.  6. A permanent ultrasound image was saved in the patient's record.

## 2024-07-10 NOTE — BRIEF OP NOTE
[facility]    Brief Operative Note    Pre-operative diagnosis: CAD (coronary artery disease) [I25.10]  Ascending aortic aneurysm (H24) [I71.21]  Post-operative diagnosis Same as pre-operative diagnosis    Procedure: CORONARY ARTERY BYPASS GRAFTING X 3 WITH RIGHT GREATER SAPHENOUS ENDOSCOPIC VEIN HARVEST LEFT INTERNAL MAMMARY ARTERY-LEFT ANTERIOR DESCENDING ARTERY RIGHT GREATER SAPHENOUS-OBTUSE MARGINAL ARTERY/ POSERTIOR DESCENDING ARTERY, N/A - Heart  ASCENDING AORTA  ANEURYSM REPAIR WITH HEMASHIED PLATINUM WOVEN DOUBLE VELOUR VASCULAR GRAFT SIZE:30MM  ON PUMP/ TRANSESPHOGEAL ECHOCARIDOGRAM PER ANESTHESIA, N/A - Heart    Surgeon: Surgeons and Role:     * Mulvihill, Michael, MD - Primary     * Dorota Franco PA-C - Assisting     * Shalini Walker PA-C - Assisting     * Ayleen Mas PA-C - Assisting     * Carter Cardenas MD - Fellow - Assisting  Anesthesia: General   Estimated Blood Loss: 700ml    Drains:1Left pleural and 2 mediastinal chest tube  Specimens:   ID Type Source Tests Collected by Time Destination   1 : ASCENDING AORTA Tissue Aorta SURGICAL PATHOLOGY EXAM Mulvihill, Michael, MD 7/10/2024  1:57 PM    A : POST CPB LABS. Blood Line, arterial CBC WITH PLATELETS, BASIC METABOLIC PANEL, FIBRINOGEN ACTIVITY, PARTIAL THROMBOPLASTIN TIME, INR Mulvihill, Michael, MD 7/10/2024  3:58 PM      Findings:   See full operative report  Complications: None.  Implants:   Implant Name Type Inv. Item Serial No.  Lot No. LRB No. Used Action   GRAFT HEMASHIELD PLATINUM [id number] - [id number] Graft GRAFT HEMASHIELD PLATINUM [id number] [employer] [id number] N/A 1 Implanted

## 2024-07-10 NOTE — ANESTHESIA PROCEDURE NOTES
Airway       Patient location during procedure: OR       Procedure Start/Stop Times: 7/10/2024 9:53 AM  Staff -        Anesthesiologist:  Payam Martinez MD       CRNA: Hoa Pringle APRN CRNA       Performed By: CRNA  Consent for Airway        Urgency: elective  Indications and Patient Condition       Indications for airway management: kellie-procedural       Induction type:intravenous       Mask difficulty assessment: 2 - vent by mask + OA or adjuvant +/- NMBA    Final Airway Details       Final airway type: endotracheal airway       Successful airway: ETT - single  Endotracheal Airway Details        ETT size (mm): 8.0       Cuffed: yes       Successful intubation technique: video laryngoscopy       VL Blade Size: Boggs 4       Grade View of Cords: 2       Adjucts: stylet       Position: Right       Measured from: lips       Secured at (cm): 24       Bite block used: Soft    Post intubation assessment        Placement verified by: capnometry, equal breath sounds and chest rise        Number of attempts at approach: 1       Number of other approaches attempted: 0       Secured with: tape       Ease of procedure: easy       Dentition: Intact and Unchanged    Medication(s) Administered   Medication Administration Time: 7/10/2024 9:53 AM

## 2024-07-10 NOTE — ANESTHESIA PROCEDURE NOTES
Arterial Line Procedure Note    Pre-Procedure   Staff -        Anesthesiologist:  Payam Martinez MD       Performed By: Anesthesiologist       Location: pre-op       Pre-Anesthestic Checklist: patient identified, IV checked, site marked, risks and benefits discussed, informed consent, monitors and equipment checked, pre-op evaluation and at physician/surgeon's request  Timeout:       Correct Patient: Yes        Correct Procedure: Yes        Correct Site: Yes        Correct Position: Yes   Line Placement:   This line was placed Pre Induction starting at 7/10/2024 9:40 AM and ending at 7/10/2024 9:44 AM  Procedure   Procedure: arterial line and new line       Diagnosis: CAD       Laterality: left       Insertion Site: radial (Radial).  Sterile Prep        All elements of maximal sterile barrier technique followed       Patient Prep/Sterile Barriers: hand hygiene, sterile gloves, hat, mask, draped, sterile gown, draped       Skin prep: Chloraprep  Insertion/Injection        Technique: Seldinger Technique        Catheter Type/Size: 20 gauge, 1.75 in/4.5 cm quick cath (integral wire)  Narrative         Secured by: suture       Tegaderm dressing used.       Arterial waveform: Yes        IBP within 10% of NIBP: Yes

## 2024-07-10 NOTE — PROGRESS NOTES
[facility]  Critical Care Service  Progress Note  Date of Service (when I saw the patient): 07/10/2024  Main Plans for Today  Extubate on pathway as able.   Assessment & Plan   Toni Collado is a 78 year old male who was admitted on 7/10/2024. Today he underwent a 3 vessel bypass and ascending aortic aneurysm repair  Neuro  Acute pain  Sedation  Plan:  -- Scheduled acetaminophen,   -- Propofol for sedation as needed until extubation  -- Delirium prevention as able    CV  S/p 3 vessel bypass and ascending aortic aneurysm repair  HTN  Plan:  -- Per pathway  -- Cardiac meds per CV surgery    Resp:  Post operative ventilator management  Acute respiratory failure  Plan:  -- Current settings are:  Vent Mode: CMV/AC  (Continuous Mandatory Ventilation/ Assist Control)  FiO2 (%): 50 %  Resp Rate (Set): 18 breaths/min  Tidal Volume (Set, mL): 450 mL  PEEP (cm H2O): 5 cmH2O  Resp: 17      -- PST when hemodynamically stable    GI/Nutrition  No prior hx  Plan:  -- NPO  -- PPI    Renal  No prior hx.  Baseline creatinine appears to be 1.0  Plan:  --monitor function and electrolytes as needed with replacement per ICU protocols. - generally avoid nephrotoxic agents such as NSAID, IV contrast unless specifically required  -- adjust medications as needed for renal clearance  -- follow I/O's as appropriate.  -- maintain euvolemia      Endocrine  Stress Hyperglycemia  Plan:  --  Insulin gtt per protocol if indicated  -- Keep BG  <180 for optimal healing    Heme:  Acute blood loss anemia  Coagulopathy   Plan:  -- Monitor hemoglobin.  -- Transfuse to keep > 7.0        General cares:  DVT Prophylaxis: Pneumatic Compression Devices  GI Prophylaxis: PPI  Restraints: Restraints for medical healing needed: YES  Family update by me today: No  Current lines are required for patient management  Access:  Bang Sorenson MD  Time Spent on this Encounter   Billing:  I spent 35 minutes bedside and on the  inpatient unit today managing the critical care of Toni Collado in relation to the issues listed in this note.  Interval History    S/p surgery today. No complications intraoperatively  Physical Exam   Temp: 96.9  F (36.1  C) Temp src: Temporal Temp  Min: 96.9  F (36.1  C)  Max: 96.9  F (36.1  C) BP: 116/69 Pulse: 68   Resp: 16 SpO2: 100 % O2 Device: Mechanical Ventilator    Vitals:    07/10/24 0545   Weight: 106.5 kg (234 lb 11.2 oz)     I/O last 3 completed shifts:  In: 1000 [I.V.:1000]  Out: 800 [Urine:800]    GEN: sedated on ventilator  EYES: PERRL, Anicteric sclera.   HEENT:  Normocephalic, atraumatic, trachea midline, ETT secure  CV: RRR, no gallops, rubs, or murmurs  PULM/CHEST: Clear breath sounds bilaterally without rhonchi, crackles or wheeze, symmetric chest rise  GI: soft, non-distended  : scott catheter in place, urine yellow and clear  EXTREMITIES: no peripheral edema, moving all extremities, peripheral pulses intact  NEURO: sedated  SKIN: No rashes, sores or ulcerations    Data   Recent Labs   Lab 07/10/24  1727 07/10/24  1558 07/10/24  0552   WBC 18.2* 16.4*  --    HGB 12.1* 9.8*  --    MCV 93 93  --    * 133*  --    INR  --  1.77*  --     141  --    POTASSIUM 3.8 3.9 4.5   CHLORIDE 106 107  --    CO2 21* 23  --    BUN 14.2 14.7  --    CR 1.06 1.02 1.24*   ANIONGAP 15 11  --    NITHIN 7.8* 7.9*  --    * 198* 108*   ALBUMIN 3.4*  --   --    PROTTOTAL 5.0*  --   --    BILITOTAL 0.6  --   --    ALKPHOS 54  --   --    ALT 45  --   --        Bang Sorenson MD  [facility] Intensivist Service

## 2024-07-10 NOTE — ANESTHESIA POSTPROCEDURE EVALUATION
Patient: Toni Collado    Procedure: Procedure(s):  CORONARY ARTERY BYPASS GRAFTING X 3 WITH RIGHT GREATER SAPHENOUS ENDOSCOPIC VEIN HARVEST LEFT INTERNAL MAMMARY ARTERY-LEFT ANTERIOR DESCENDING ARTERY RIGHT GREATER SAPHENOUS-OBTUSE MARGINAL ARTERY/ POSERTIOR DESCENDING ARTERY  ASCENDING AORTA  ANEURYSM REPAIR WITH HEMASHIED PLATINUM WOVEN DOUBLE VELOUR VASCULAR GRAFT SIZE:30MM  ON PUMP/ TRANSESPHOGEAL ECHOCARIDOGRAM PER ANESTHESIA       Anesthesia Type:  General    Note:  Disposition: ICU            ICU Sign Out: Anesthesiologist/ICU physician sign out WAS performed   Postop Pain Control:    PONV:    Neuro/Psych:    Airway/Respiratory:             Sign Out: AIRWAY IN SITU/Resp. Support               Airway in situ/Resp. Support: ETT                 Reason: Planned Pre-op   CV/Hemodynamics: Uneventful            Sign Out: Acceptable CV status; No obvious hypovolemia; No obvious fluid overload   Other NRE: NONE   DID A NON-ROUTINE EVENT OCCUR? No    Event details/Postop Comments:  Patient transported from the OR to the ICU, intubated and sedated, while monitored.  Sign-out was given to the MD, RN and RT.  Al questions were answered.  Transport was without incident.    Payam Martinez MD             Last vitals:  Vitals:    07/10/24 0545 07/10/24 1724   BP: 116/69    Pulse: 68    Resp: 16    Temp: 36.1  C (96.9  F)    SpO2: 98% 100%       Electronically Signed By: Payam Martinez MD  July 10, 2024  5:56 PM

## 2024-07-10 NOTE — ANESTHESIA CARE TRANSFER NOTE
Patient: Toni Collado    Procedure: Procedure(s):  CORONARY ARTERY BYPASS GRAFTING X 3 WITH RIGHT GREATER SAPHENOUS ENDOSCOPIC VEIN HARVEST LEFT INTERNAL MAMMARY ARTERY-LEFT ANTERIOR DESCENDING ARTERY RIGHT GREATER SAPHENOUS-OBTUSE MARGINAL ARTERY/ POSERTIOR DESCENDING ARTERY  ASCENDING AORTA  ANEURYSM REPAIR WITH HEMASHIED PLATINUM WOVEN DOUBLE VELOUR VASCULAR GRAFT SIZE:30MM  ON PUMP/ TRANSESPHOGEAL ECHOCARIDOGRAM PER ANESTHESIA       Diagnosis: CAD (coronary artery disease) [I25.10]  Ascending aortic aneurysm (H24) [I71.21]  Diagnosis Additional Information: No value filed.    Anesthesia Type:   General     Note:    Oropharynx: endotracheal tube in place, oral gastic tube in place and ventilatory support  Level of Consciousness: iatrogenic sedation      Independent Airway: airway patency not satisfactory and stable  Dentition: dentition unchanged  Vital Signs Stable: post-procedure vital signs reviewed and stable  Report to RN Given: handoff report given  Patient transferred to: ICU (icu 363)  Comments:     On arrival to ICU, endotracheal tube position unchanged, equal, bilateral breath sounds auscultated in ICU room, patient placed on ICU ventilator by respiratory therapist, teeth and oral mucosa intact and unchanged at handoff of care. At anesthesia handoff of care, clinical monitors and alarms on and functioning, report on patient's clinical status given to ICU RN, report on patient's clinical status given to intensivist, ICU staff questions answered  ICU Handoff: Call for PAUSE to initiate/utilize ICU HANDOFF, Identified Patient, Identified Responsible Provider, Reviewed the Pertinent Medical History, Discussed Surgical Course, Reviewed Intra-OP Anesthesia Management and Issues during Anesthesia, Set Expectations for Post Procedure Period and Allowed Opportunity for Questions and Acknowledgement of Understanding  Vitals:  Vitals Value Taken Time   BP     Temp 36  C (96.8  F) 07/10/24 1757   Pulse 89  07/10/24 1757   Resp 20 07/10/24 1757   SpO2 97 % 07/10/24 1757   Vitals shown include unfiled device data.    Electronically Signed By: DEANNA Hutchison CRNA  July 10, 2024  5:59 PM

## 2024-07-11 ENCOUNTER — APPOINTMENT (OUTPATIENT)
Dept: PHYSICAL THERAPY | Facility: CLINIC | Age: 79
DRG: 235 | End: 2024-07-11
Attending: PHYSICIAN ASSISTANT
Payer: COMMERCIAL

## 2024-07-11 ENCOUNTER — APPOINTMENT (OUTPATIENT)
Dept: GENERAL RADIOLOGY | Facility: CLINIC | Age: 79
DRG: 235 | End: 2024-07-11
Attending: PHYSICIAN ASSISTANT
Payer: COMMERCIAL

## 2024-07-11 DIAGNOSIS — Z95.1 S/P CABG (CORONARY ARTERY BYPASS GRAFT): Primary | ICD-10-CM

## 2024-07-11 LAB
ALBUMIN SERPL BCG-MCNC: 3.7 G/DL (ref 3.5–5.2)
ALP SERPL-CCNC: 44 U/L (ref 40–150)
ALT SERPL W P-5'-P-CCNC: 38 U/L (ref 0–70)
ANION GAP SERPL CALCULATED.3IONS-SCNC: 7 MMOL/L (ref 7–15)
AST SERPL W P-5'-P-CCNC: 65 U/L (ref 0–45)
ATRIAL RATE - MUSE: 70 BPM
ATRIAL RATE - MUSE: 91 BPM
BASE EXCESS BLDA CALC-SCNC: -0.4 MMOL/L (ref -3–3)
BASE EXCESS BLDA CALC-SCNC: -0.9 MMOL/L (ref -3–3)
BASE EXCESS BLDA CALC-SCNC: -1.4 MMOL/L (ref -3–3)
BASE EXCESS BLDA CALC-SCNC: -1.7 MMOL/L (ref -3–3)
BASE EXCESS BLDA CALC-SCNC: -2.2 MMOL/L (ref -3–3)
BASE EXCESS BLDA CALC-SCNC: 0.4 MMOL/L (ref -3–3)
BASE EXCESS BLDA CALC-SCNC: 0.5 MMOL/L (ref -3–3)
BASE EXCESS BLDA CALC-SCNC: 1.2 MMOL/L (ref -3–3)
BASE EXCESS BLDA CALC-SCNC: 1.6 MMOL/L (ref -3–3)
BASE EXCESS BLDV CALC-SCNC: 1.8 MMOL/L (ref -3–3)
BILIRUB SERPL-MCNC: 0.5 MG/DL
BUN SERPL-MCNC: 16.6 MG/DL (ref 8–23)
CA-I BLD-MCNC: 3.9 MG/DL (ref 4.4–5.2)
CA-I BLD-MCNC: 4 MG/DL (ref 4.4–5.2)
CA-I BLD-MCNC: 4 MG/DL (ref 4.4–5.2)
CA-I BLD-MCNC: 4.1 MG/DL (ref 4.4–5.2)
CA-I BLD-MCNC: 4.2 MG/DL (ref 4.4–5.2)
CA-I BLD-MCNC: 4.4 MG/DL (ref 4.4–5.2)
CA-I BLD-MCNC: 4.5 MG/DL (ref 4.4–5.2)
CA-I BLD-MCNC: 4.8 MG/DL (ref 4.4–5.2)
CA-I BLD-MCNC: 4.9 MG/DL (ref 4.4–5.2)
CALCIUM SERPL-MCNC: 8.1 MG/DL (ref 8.8–10.2)
CHLORIDE SERPL-SCNC: 105 MMOL/L (ref 98–107)
CREAT SERPL-MCNC: 1.13 MG/DL (ref 0.67–1.17)
DEPRECATED HCO3 PLAS-SCNC: 28 MMOL/L (ref 22–29)
DIASTOLIC BLOOD PRESSURE - MUSE: NORMAL MMHG
DIASTOLIC BLOOD PRESSURE - MUSE: NORMAL MMHG
EGFRCR SERPLBLD CKD-EPI 2021: 67 ML/MIN/1.73M2
ERYTHROCYTE [DISTWIDTH] IN BLOOD BY AUTOMATED COUNT: 13.2 % (ref 10–15)
GLUCOSE BLD-MCNC: 108 MG/DL (ref 70–99)
GLUCOSE BLD-MCNC: 121 MG/DL (ref 70–99)
GLUCOSE BLD-MCNC: 123 MG/DL (ref 70–99)
GLUCOSE BLD-MCNC: 129 MG/DL (ref 70–99)
GLUCOSE BLD-MCNC: 133 MG/DL (ref 70–99)
GLUCOSE BLD-MCNC: 156 MG/DL (ref 70–99)
GLUCOSE BLD-MCNC: 160 MG/DL (ref 70–99)
GLUCOSE BLD-MCNC: 168 MG/DL (ref 70–99)
GLUCOSE BLD-MCNC: 178 MG/DL (ref 70–99)
GLUCOSE BLD-MCNC: 189 MG/DL (ref 70–99)
GLUCOSE BLDC GLUCOMTR-MCNC: 107 MG/DL (ref 70–99)
GLUCOSE BLDC GLUCOMTR-MCNC: 108 MG/DL (ref 70–99)
GLUCOSE BLDC GLUCOMTR-MCNC: 116 MG/DL (ref 70–99)
GLUCOSE BLDC GLUCOMTR-MCNC: 141 MG/DL (ref 70–99)
GLUCOSE BLDC GLUCOMTR-MCNC: 152 MG/DL (ref 70–99)
GLUCOSE BLDC GLUCOMTR-MCNC: 158 MG/DL (ref 70–99)
GLUCOSE BLDC GLUCOMTR-MCNC: 165 MG/DL (ref 70–99)
GLUCOSE BLDC GLUCOMTR-MCNC: 89 MG/DL (ref 70–99)
GLUCOSE BLDC GLUCOMTR-MCNC: 91 MG/DL (ref 70–99)
GLUCOSE BLDC GLUCOMTR-MCNC: 99 MG/DL (ref 70–99)
GLUCOSE SERPL-MCNC: 118 MG/DL (ref 70–99)
HCO3 BLDA-SCNC: 24 MMOL/L (ref 21–28)
HCO3 BLDA-SCNC: 24 MMOL/L (ref 21–28)
HCO3 BLDA-SCNC: 25 MMOL/L (ref 21–28)
HCO3 BLDA-SCNC: 26 MMOL/L (ref 21–28)
HCO3 BLDA-SCNC: 27 MMOL/L (ref 21–28)
HCO3 BLDV-SCNC: 28 MMOL/L (ref 21–28)
HCT VFR BLD AUTO: 31 % (ref 40–53)
HGB BLD-MCNC: 10 G/DL (ref 13.3–17.7)
HGB BLD-MCNC: 10.3 G/DL (ref 13.3–17.7)
HGB BLD-MCNC: 10.3 G/DL (ref 13.3–17.7)
HGB BLD-MCNC: 10.9 G/DL (ref 13.3–17.7)
HGB BLD-MCNC: 10.9 G/DL (ref 13.3–17.7)
HGB BLD-MCNC: 12.6 G/DL (ref 13.3–17.7)
HGB BLD-MCNC: 13.1 G/DL (ref 13.3–17.7)
HGB BLD-MCNC: 9.2 G/DL (ref 13.3–17.7)
HGB BLD-MCNC: 9.7 G/DL (ref 13.3–17.7)
HGB BLD-MCNC: 9.8 G/DL (ref 13.3–17.7)
HGB BLD-MCNC: 9.8 G/DL (ref 13.3–17.7)
INTERPRETATION ECG - MUSE: NORMAL
INTERPRETATION ECG - MUSE: NORMAL
LACTATE BLD-SCNC: 1 MMOL/L (ref 0.7–2)
LACTATE BLD-SCNC: 1.1 MMOL/L (ref 0.7–2)
LACTATE BLD-SCNC: 1.3 MMOL/L (ref 0.7–2)
LACTATE BLD-SCNC: 1.3 MMOL/L (ref 0.7–2)
LACTATE BLD-SCNC: 1.5 MMOL/L (ref 0.7–2)
LACTATE BLD-SCNC: 1.7 MMOL/L (ref 0.7–2)
LACTATE BLD-SCNC: 1.7 MMOL/L (ref 0.7–2)
LACTATE BLD-SCNC: 3.6 MMOL/L (ref 0.7–2)
LACTATE SERPL-SCNC: 1.5 MMOL/L (ref 0.7–2)
LACTATE SERPL-SCNC: 4.8 MMOL/L (ref 0.7–2)
MAGNESIUM SERPL-MCNC: 2 MG/DL (ref 1.7–2.3)
MCH RBC QN AUTO: 31.9 PG (ref 26.5–33)
MCHC RBC AUTO-ENTMCNC: 35.2 G/DL (ref 31.5–36.5)
MCV RBC AUTO: 91 FL (ref 78–100)
O2/TOTAL GAS SETTING VFR VENT: 60 %
O2/TOTAL GAS SETTING VFR VENT: 70 %
O2/TOTAL GAS SETTING VFR VENT: 80 %
OXYHGB MFR BLDA: 100 % (ref 92–100)
OXYHGB MFR BLDA: 98 % (ref 92–100)
OXYHGB MFR BLDA: 98 % (ref 92–100)
OXYHGB MFR BLDA: 99 % (ref 92–100)
OXYHGB MFR BLDV: 83 % (ref 70–75)
P AXIS - MUSE: 22 DEGREES
P AXIS - MUSE: 72 DEGREES
PCO2 BLDA: 41 MM HG (ref 35–45)
PCO2 BLDA: 44 MM HG (ref 35–45)
PCO2 BLDA: 45 MM HG (ref 35–45)
PCO2 BLDA: 45 MM HG (ref 35–45)
PCO2 BLDA: 46 MM HG (ref 35–45)
PCO2 BLDV: 53 MM HG (ref 40–50)
PH BLDA: 7.33 [PH] (ref 7.35–7.45)
PH BLDA: 7.34 [PH] (ref 7.35–7.45)
PH BLDA: 7.35 [PH] (ref 7.35–7.45)
PH BLDA: 7.36 [PH] (ref 7.35–7.45)
PH BLDA: 7.37 [PH] (ref 7.35–7.45)
PH BLDA: 7.38 [PH] (ref 7.35–7.45)
PH BLDA: 7.39 [PH] (ref 7.35–7.45)
PH BLDA: 7.39 [PH] (ref 7.35–7.45)
PH BLDA: 7.41 [PH] (ref 7.35–7.45)
PH BLDV: 7.34 [PH] (ref 7.32–7.43)
PHOSPHATE SERPL-MCNC: 2.6 MG/DL (ref 2.5–4.5)
PLATELET # BLD AUTO: 146 10E3/UL (ref 150–450)
PO2 BLDA: 114 MM HG (ref 80–105)
PO2 BLDA: 121 MM HG (ref 80–105)
PO2 BLDA: 153 MM HG (ref 80–105)
PO2 BLDA: 335 MM HG (ref 80–105)
PO2 BLDA: 337 MM HG (ref 80–105)
PO2 BLDA: 338 MM HG (ref 80–105)
PO2 BLDA: 351 MM HG (ref 80–105)
PO2 BLDA: 354 MM HG (ref 80–105)
PO2 BLDA: 414 MM HG (ref 80–105)
PO2 BLDV: 50 MM HG (ref 25–47)
POTASSIUM BLD-SCNC: 3.8 MMOL/L (ref 3.4–5.3)
POTASSIUM BLD-SCNC: 4 MMOL/L (ref 3.4–5.3)
POTASSIUM BLD-SCNC: 4.4 MMOL/L (ref 3.4–5.3)
POTASSIUM BLD-SCNC: 4.4 MMOL/L (ref 3.4–5.3)
POTASSIUM BLD-SCNC: 4.6 MMOL/L (ref 3.4–5.3)
POTASSIUM BLD-SCNC: 5.3 MMOL/L (ref 3.4–5.3)
POTASSIUM BLD-SCNC: 5.5 MMOL/L (ref 3.4–5.3)
POTASSIUM BLD-SCNC: 5.6 MMOL/L (ref 3.4–5.3)
POTASSIUM BLD-SCNC: 5.8 MMOL/L (ref 3.4–5.3)
POTASSIUM BLD-SCNC: 5.8 MMOL/L (ref 3.4–5.3)
POTASSIUM SERPL-SCNC: 4.4 MMOL/L (ref 3.4–5.3)
PR INTERVAL - MUSE: NORMAL MS
PR INTERVAL - MUSE: NORMAL MS
PROT SERPL-MCNC: 5.2 G/DL (ref 6.4–8.3)
QRS DURATION - MUSE: 88 MS
QRS DURATION - MUSE: 96 MS
QT - MUSE: 358 MS
QT - MUSE: 396 MS
QTC - MUSE: 389 MS
QTC - MUSE: 398 MS
R AXIS - MUSE: 11 DEGREES
R AXIS - MUSE: 36 DEGREES
RBC # BLD AUTO: 3.42 10E6/UL (ref 4.4–5.9)
SAO2 % BLDA: 100 % (ref 95–96)
SAO2 % BLDA: 99 % (ref 95–96)
SAO2 % BLDA: >100 % (ref 95–96)
SAO2 % BLDV: 85 % (ref 70–75)
SODIUM BLD-SCNC: 139 MMOL/L (ref 135–145)
SODIUM BLD-SCNC: 140 MMOL/L (ref 135–145)
SODIUM BLD-SCNC: 141 MMOL/L (ref 135–145)
SODIUM BLD-SCNC: 142 MMOL/L (ref 135–145)
SODIUM BLD-SCNC: 142 MMOL/L (ref 135–145)
SODIUM SERPL-SCNC: 140 MMOL/L (ref 135–145)
SYSTOLIC BLOOD PRESSURE - MUSE: NORMAL MMHG
SYSTOLIC BLOOD PRESSURE - MUSE: NORMAL MMHG
T AXIS - MUSE: 53 DEGREES
T AXIS - MUSE: 69 DEGREES
VENTRICULAR RATE- MUSE: 61 BPM
VENTRICULAR RATE- MUSE: 71 BPM
WBC # BLD AUTO: 10.6 10E3/UL (ref 4–11)

## 2024-07-11 PROCEDURE — 71045 X-RAY EXAM CHEST 1 VIEW: CPT

## 2024-07-11 PROCEDURE — 250N000011 HC RX IP 250 OP 636: Performed by: PHYSICIAN ASSISTANT

## 2024-07-11 PROCEDURE — 84100 ASSAY OF PHOSPHORUS: CPT | Performed by: PHYSICIAN ASSISTANT

## 2024-07-11 PROCEDURE — 250N000013 HC RX MED GY IP 250 OP 250 PS 637: Performed by: STUDENT IN AN ORGANIZED HEALTH CARE EDUCATION/TRAINING PROGRAM

## 2024-07-11 PROCEDURE — 83735 ASSAY OF MAGNESIUM: CPT | Performed by: PHYSICIAN ASSISTANT

## 2024-07-11 PROCEDURE — 80053 COMPREHEN METABOLIC PANEL: CPT | Performed by: PHYSICIAN ASSISTANT

## 2024-07-11 PROCEDURE — 250N000013 HC RX MED GY IP 250 OP 250 PS 637: Performed by: PHYSICIAN ASSISTANT

## 2024-07-11 PROCEDURE — 93005 ELECTROCARDIOGRAM TRACING: CPT

## 2024-07-11 PROCEDURE — 97161 PT EVAL LOW COMPLEX 20 MIN: CPT | Mod: GP

## 2024-07-11 PROCEDURE — 36415 COLL VENOUS BLD VENIPUNCTURE: CPT | Performed by: PHYSICIAN ASSISTANT

## 2024-07-11 PROCEDURE — 85027 COMPLETE CBC AUTOMATED: CPT | Performed by: PHYSICIAN ASSISTANT

## 2024-07-11 PROCEDURE — 97530 THERAPEUTIC ACTIVITIES: CPT | Mod: GP

## 2024-07-11 PROCEDURE — 83605 ASSAY OF LACTIC ACID: CPT | Performed by: PHYSICIAN ASSISTANT

## 2024-07-11 PROCEDURE — 82330 ASSAY OF CALCIUM: CPT | Performed by: PHYSICIAN ASSISTANT

## 2024-07-11 PROCEDURE — 83605 ASSAY OF LACTIC ACID: CPT | Performed by: STUDENT IN AN ORGANIZED HEALTH CARE EDUCATION/TRAINING PROGRAM

## 2024-07-11 PROCEDURE — 120N000013 HC R&B IMCU

## 2024-07-11 PROCEDURE — 999N000009 HC STATISTIC AIRWAY CARE

## 2024-07-11 RX ORDER — ROSUVASTATIN CALCIUM 20 MG/1
40 TABLET, COATED ORAL AT BEDTIME
Status: DISCONTINUED | OUTPATIENT
Start: 2024-07-11 | End: 2024-07-16 | Stop reason: HOSPADM

## 2024-07-11 RX ORDER — BRIMONIDINE TARTRATE 2 MG/ML
1 SOLUTION/ DROPS OPHTHALMIC 2 TIMES DAILY PRN
Status: DISCONTINUED | OUTPATIENT
Start: 2024-07-11 | End: 2024-07-12

## 2024-07-11 RX ORDER — MAGNESIUM OXIDE 400 MG/1
400 TABLET ORAL EVERY 4 HOURS
Status: COMPLETED | OUTPATIENT
Start: 2024-07-11 | End: 2024-07-11

## 2024-07-11 RX ADMIN — CALCIUM GLUCONATE 1 G: 20 INJECTION, SOLUTION INTRAVENOUS at 07:18

## 2024-07-11 RX ADMIN — POTASSIUM & SODIUM PHOSPHATES POWDER PACK 280-160-250 MG 1 PACKET: 280-160-250 PACK at 10:31

## 2024-07-11 RX ADMIN — PANTOPRAZOLE SODIUM 40 MG: 40 TABLET, DELAYED RELEASE ORAL at 08:22

## 2024-07-11 RX ADMIN — OXYCODONE HYDROCHLORIDE 5 MG: 5 TABLET ORAL at 03:35

## 2024-07-11 RX ADMIN — POLYETHYLENE GLYCOL 3350 17 G: 17 POWDER, FOR SOLUTION ORAL at 08:21

## 2024-07-11 RX ADMIN — ACETAMINOPHEN 975 MG: 325 TABLET, FILM COATED ORAL at 08:22

## 2024-07-11 RX ADMIN — HYDROMORPHONE HYDROCHLORIDE 0.4 MG: 0.2 INJECTION, SOLUTION INTRAMUSCULAR; INTRAVENOUS; SUBCUTANEOUS at 13:23

## 2024-07-11 RX ADMIN — OXYCODONE HYDROCHLORIDE 5 MG: 5 TABLET ORAL at 11:16

## 2024-07-11 RX ADMIN — ACETAMINOPHEN 975 MG: 325 TABLET, FILM COATED ORAL at 17:29

## 2024-07-11 RX ADMIN — POTASSIUM & SODIUM PHOSPHATES POWDER PACK 280-160-250 MG 1 PACKET: 280-160-250 PACK at 07:33

## 2024-07-11 RX ADMIN — SENNOSIDES AND DOCUSATE SODIUM 1 TABLET: 50; 8.6 TABLET ORAL at 22:35

## 2024-07-11 RX ADMIN — CLINDAMYCIN PHOSPHATE 900 MG: 900 INJECTION, SOLUTION INTRAVENOUS at 15:31

## 2024-07-11 RX ADMIN — HEPARIN SODIUM 5000 UNITS: 5000 INJECTION, SOLUTION INTRAVENOUS; SUBCUTANEOUS at 13:23

## 2024-07-11 RX ADMIN — METHOCARBAMOL 500 MG: 500 TABLET ORAL at 03:34

## 2024-07-11 RX ADMIN — SENNOSIDES AND DOCUSATE SODIUM 1 TABLET: 50; 8.6 TABLET ORAL at 08:22

## 2024-07-11 RX ADMIN — OXYCODONE HYDROCHLORIDE 10 MG: 5 TABLET ORAL at 15:14

## 2024-07-11 RX ADMIN — Medication 400 MG: at 10:31

## 2024-07-11 RX ADMIN — ACETAMINOPHEN 975 MG: 325 TABLET, FILM COATED ORAL at 03:34

## 2024-07-11 RX ADMIN — OXYCODONE HYDROCHLORIDE 5 MG: 5 TABLET ORAL at 23:03

## 2024-07-11 RX ADMIN — GABAPENTIN 100 MG: 100 CAPSULE ORAL at 22:35

## 2024-07-11 RX ADMIN — ROSUVASTATIN CALCIUM 40 MG: 20 TABLET, FILM COATED ORAL at 22:35

## 2024-07-11 RX ADMIN — ASPIRIN 81 MG CHEWABLE TABLET 162 MG: 81 TABLET CHEWABLE at 08:22

## 2024-07-11 RX ADMIN — Medication 400 MG: at 07:33

## 2024-07-11 RX ADMIN — CLINDAMYCIN PHOSPHATE 900 MG: 900 INJECTION, SOLUTION INTRAVENOUS at 08:04

## 2024-07-11 RX ADMIN — OXYCODONE HYDROCHLORIDE 5 MG: 5 TABLET ORAL at 06:39

## 2024-07-11 RX ADMIN — HEPARIN SODIUM 5000 UNITS: 5000 INJECTION, SOLUTION INTRAVENOUS; SUBCUTANEOUS at 22:35

## 2024-07-11 ASSESSMENT — ACTIVITIES OF DAILY LIVING (ADL)
ADLS_ACUITY_SCORE: 28
ADLS_ACUITY_SCORE: 22
ADLS_ACUITY_SCORE: 36
ADLS_ACUITY_SCORE: 28
ADLS_ACUITY_SCORE: 26
ADLS_ACUITY_SCORE: 36
ADLS_ACUITY_SCORE: 22
ADLS_ACUITY_SCORE: 22
ADLS_ACUITY_SCORE: 28
ADLS_ACUITY_SCORE: 28
ADLS_ACUITY_SCORE: 36
ADLS_ACUITY_SCORE: 22
ADLS_ACUITY_SCORE: 28
ADLS_ACUITY_SCORE: 26
ADLS_ACUITY_SCORE: 22
ADLS_ACUITY_SCORE: 28
ADLS_ACUITY_SCORE: 28
ADLS_ACUITY_SCORE: 22
ADLS_ACUITY_SCORE: 28
ADLS_ACUITY_SCORE: 26
ADLS_ACUITY_SCORE: 28
ADLS_ACUITY_SCORE: 36
ADLS_ACUITY_SCORE: 36

## 2024-07-11 NOTE — CONSULTS
CARDIAC SURGERY NUTRITION CONSULT    Received standing order to assess and educate patient.    Will follow and complete assessment once patient is extubated and/or is transferred to medical unit.    Patient will receive nutrition education during the Outpatient Cardiac Rehab Program (nutrition classes/dietitian counseling).    Lorena Cameron RD, LD, [facility]   Clinical Dietitian - [facility]

## 2024-07-11 NOTE — PROGRESS NOTES
Came to unit around 1815. POD1 CABGx3. VSS ex temp 101.5. A&Ox4. SBA, did get dizzy after getting standing weight. RLE incisions PAUL. Midline chest incision PAUL. Pacer cords in place, box at bedside. BG ACHS. Grullon in place, plan to remove tomorrow. CT to -20. Tele: SR with 1* AVB. Full liquid diet. No BM, not passing gas. PIV SL. Family at bedside.

## 2024-07-11 NOTE — PROVIDER NOTIFICATION
Updated lab work called to Dr. Leary.  L.A. down to 8.2.  plan per Dr. Leary is to keep patient intubated overnight, recheck Lactic acid at 0200.  Latest ABG also reviewed by Dr. Leary.  Will call and update Spouse with plan.  Update called to Darlin who is appreciative of call.

## 2024-07-11 NOTE — PROGRESS NOTES
[facility]  Cardiovascular and Thoracic Surgery Daily Note      Assessment and Plan  POD # 1 s/p Coronary artery bypass grafting x 3 (LIMA to LAD, vein to OM, vein to PDA), Ascending Aorta Aneurysm Repair with 30mm Hemashield graft on 7/10/24 with Dr. Mulvihill    - CVS: Pre-op TTE with EF 55-60%, mild biatrial enlargement. Postop vasoplegic shock, weaned off ionotropes and vasopressors early this am. Lactic acidosis resolving, volume resuscitated overnight, recheck 1.5 this am. Weight up 8kg from preoperative weight, will consider initiating ASA, will initiate low dose BB today if BP allows, resume PTA statin. Chest tubes: 585ml/24 hours, serosanguinous output, no air leak. TPW: capped this am.     PTA cardiac meds on hold: Olmesartan 20mg daily    - Resp: Postoperative mechanical ventilation, resolved. Extubated at 0249 on POD#1. Saturating well on 4L. Continue to encourage IS, pulmonary toilet.    - Neuro: Neuro intact, pain controlled on current regimen. Consider resuming PTA mirapex and trazodone     - Renal: No history of significant renal disease. Cr stable. Trend BMP. Diuretic as above.  Recent Labs   Lab 07/11/24  0502 07/10/24  1802 07/10/24  1727   CR 1.13 1.04 1.06       - GI: no BM, no flatus, continue bowel regimen    - : Continue scott today for accurate I/Os and limited mobility    - Endo: Postop stress hyperglycemia, No hx DM. Insulin infusion with minimal needs. Will transition to sliding scale insulin today.   Hemoglobin A1C   Date Value Ref Range Status   05/28/2024 5.7 (H) <5.7 % Final     Comment:     Normal <5.7%   Prediabetes 5.7-6.4%    Diabetes 6.5% or higher     Note: Adopted from ADA consensus guidelines.        - FEN: Replace electrolytes as needed. Advance to full liquids, will advance further once     - ID: Postop leukocytosis, resolved. Afebrile. WBC trending down. Periop abx prophylaxis completing. Trend CBC and fever curve.   Recent Labs   Lab 07/11/24  0502  "07/10/24  1727 07/10/24  1558   WBC 10.6 18.2* 16.4*       - Heme: Acute blood loss anemia and thrombocytopenia due to surgery. Hgb and PLT stable. Trend CBC, transfuse PRN.   Recent Labs   Lab 07/11/24  0502 07/10/24  2133 07/10/24  1727 07/10/24  1558   HGB 10.9* 11.9* 12.1* 9.8*  9.8*   *  --  144* 133*       - Proph: SCD, subcutaneous heparin, PPI    - Other:  Clinically Significant Risk Factors        # Hyperkalemia: Highest K = 5.8 mmol/L in last 2 days, will monitor as appropriate   # Hypocalcemia: Lowest Ca = 7.8 mg/dL in last 2 days, will monitor and replace as appropriate     # Hypoalbuminemia: Lowest albumin = 3.3 g/dL at 7/10/2024  6:02 PM, will monitor as appropriate  # Coagulation Defect: INR = 1.47 (Ref range: 0.85 - 1.15) and/or PTT = 30 Seconds (Ref range: 22 - 38 Seconds), will monitor for bleeding              #Precipitous drop in Hgb/Hct: Lowest Hgb this hospitalization: 9.2 g/dL. Will continue to monitor and treat/transfuse as appropriate.     # Severe Obesity: Estimated body mass index is 40.2 kg/m  as calculated from the following:    Height as of this encounter: 1.689 m (5' 6.5\").    Weight as of this encounter: 114.7 kg (252 lb 13.9 oz)., PRESENT ON ADMISSION            - Dispo: Transfer to [facility] today. Initiate therapies today. Ok to remove lines. Transition to sliding scale insulin. Medically Ready for Discharge: Anticipated in 2-4 Days, dispo pending return of bowel and bladder function, removal of chest tubes, wean off oxygen and initiation of guideline medications.        Interval History  Extubated, weaned off ionotropes and vasopressors. Saturating well on 4L. Pain controlled. Tolerating clears-- hungry and asking to eat. No flatus or BM.       Medications  Current Facility-Administered Medications   Medication Dose Route Frequency Provider Last Rate Last Admin    acetaminophen (TYLENOL) tablet 975 mg  975 mg Oral Q8H Dorota Franco PA-C   975 mg at 07/11/24 0334    " aspirin (ASA) chewable tablet 162 mg  162 mg Oral or NG Tube Daily Dorota Franco PA-C   162 mg at 07/10/24 1953    brimonidine (ALPHAGAN) 0.2 % ophthalmic solution 1 drop  1 drop Left Eye BID Dorota Franco PA-C   1 drop at 07/10/24 2235    clindamycin (CLEOCIN) 900 mg in 50 mL D5W intermittent infusion  900 mg Intravenous Q8H Dorota Franco PA-C 100 mL/hr at 07/11/24 0804 900 mg at 07/11/24 0804    gabapentin (NEURONTIN) capsule 100 mg  100 mg Oral At Bedtime Dorota Franco PA-C        heparin ANTICOAGULANT injection 5,000 Units  5,000 Units Subcutaneous Q8H Dorota Franco PA-C        magnesium oxide (MAG-OX) tablet 400 mg  400 mg Oral Q4H Naveed Leary MD   400 mg at 07/11/24 0733    pantoprazole (PROTONIX) 2 mg/mL suspension 40 mg  40 mg Oral or NG Tube Daily Dorota Franco PA-C   40 mg at 07/10/24 1846    Or    pantoprazole (PROTONIX) EC tablet 40 mg  40 mg Oral Daily Dorota Franco PA-C        polyethylene glycol (MIRALAX) Packet 17 g  17 g Oral Daily Dorota Franco PA-C        potassium & sodium phosphates (NEUTRA-PHOS) Packet 1 packet  1 packet Oral or Feeding Tube Q4H Naveed Leary MD   1 packet at 07/11/24 0733    senna-docusate (SENOKOT-S/PERICOLACE) 8.6-50 MG per tablet 1 tablet  1 tablet Oral BID Dorota Franco PA-C   1 tablet at 07/10/24 1955    sodium chloride (PF) 0.9% PF flush 3 mL  3 mL Intracatheter Q8H Dorota Franco PA-C   3 mL at 07/11/24 0735     Current Facility-Administered Medications   Medication Dose Route Frequency Provider Last Rate Last Admin    [START ON 7/13/2024] acetaminophen (TYLENOL) tablet 650 mg  650 mg Oral Q4H PRN Dorota Franco PA-C        [START ON 7/13/2024] bisacodyl (DULCOLAX) suppository 10 mg  10 mg Rectal Daily PRN Dorota Franco PA-C        calcium gluconate 1 g in 50 mL in sodium chloride intermittent infusion  1 g Intravenous Q6H PRN Dorota Franco PA-C   1 g at 07/11/24 0718     calcium gluconate 2 g in  mL intermittent infusion  2 g Intravenous Q6H PRN Dorota Franco PA-C        calcium gluconate 3 g in sodium chloride 0.9 % 100 mL intermittent infusion  3 g Intravenous Q6H PRN Dorota Franco PA-C        glucose gel 15-30 g  15-30 g Oral Q15 Min PRN Dorota Franco PA-C        Or    dextrose 50 % injection 25-50 mL  25-50 mL Intravenous Q15 Min PRN Dorota Franco PA-C        Or    glucagon injection 1 mg  1 mg Subcutaneous Q15 Min PRN Dorota Franco PA-C        EPINEPHrine (ADRENALIN) 5 mg in  mL infusion  0.01-0.1 mcg/kg/min Intravenous Continuous PRN Dorota Franco PA-C   Stopped at 07/11/24 0258    furosemide (LASIX) injection 20 mg  20 mg Intravenous Once PRN Dorota Franco PA-C        hydrALAZINE (APRESOLINE) injection 10 mg  10 mg Intravenous Q30 Min PRN Dorota Franco PA-C        HYDROmorphone (DILAUDID) injection 0.2 mg  0.2 mg Intravenous Q2H PRN Dorota Franco PA-C        Or    HYDROmorphone (DILAUDID) injection 0.4 mg  0.4 mg Intravenous Q2H PRN Dorota Franco PA-C        lidocaine (LMX4) cream   Topical Q1H PRN Dorota Franco PA-C        lidocaine 1 % 0.1-1 mL  0.1-1 mL Other Q1H PRN Dorota Franco PA-C        [START ON 7/12/2024] magnesium hydroxide (MILK OF MAGNESIA) suspension 30 mL  30 mL Oral Daily PRN Dorota Franco PA-C        propofol (DIPRIVAN) bolus from bag or syringe pump  10 mg Intravenous Q15 Min PRN Bang Sorenson MD        And    Medication Instruction   Does not apply Continuous PRN Bang Sorenson MD        methocarbamol (ROBAXIN) tablet 500 mg  500 mg Oral Q6H PRN Dorota Franco PA-C   500 mg at 07/11/24 0334    naloxone (NARCAN) injection 0.2 mg  0.2 mg Intravenous Q2 Min PRN Yordan Fam RPH        Or    naloxone (NARCAN) injection 0.4 mg  0.4 mg Intravenous Q2 Min PRN Yordan Fam RPH        Or    naloxone (NARCAN) injection 0.2 mg  0.2 mg  "Intramuscular Q2 Min PRN Yordan Fam RPH        Or    naloxone (NARCAN) injection 0.4 mg  0.4 mg Intramuscular Q2 Min PRN Yordan Fam RPH        nitroGLYcerin 50 mg in D5W 250 mL (adult std) infusion CENTRAL   mcg/min Intravenous Continuous PRN Dorota Franco PA-C        norepinephrine (LEVOPHED) 4 mg in  mL infusion PREMIX  0.01-0.4 mcg/kg/min Intravenous Continuous PRN Dorota Franco PA-C   Stopped at 07/11/24 0711    ondansetron (ZOFRAN ODT) ODT tab 4 mg  4 mg Oral Q6H PRN Dorota Franco PA-C        Or    ondansetron (ZOFRAN) injection 4 mg  4 mg Intravenous Q6H PRN Dorota Franco PA-C        oxyCODONE (ROXICODONE) tablet 5 mg  5 mg Oral Q4H PRN Dorota Franco PA-C   5 mg at 07/11/24 0639    Or    oxyCODONE (ROXICODONE) tablet 10 mg  10 mg Oral Q4H PRN Dorota Franco PA-C        prochlorperazine (COMPAZINE) injection 5 mg  5 mg Intravenous Q6H PRN Dorota Franco PA-C        Or    prochlorperazine (COMPAZINE) tablet 5 mg  5 mg Oral Q6H PRN Dorota Franco PA-C        Reason beta blocker order not selected   Does not apply DOES NOT GO TO Dorota Winters PA-C        sodium chloride (PF) 0.9% PF flush 3 mL  3 mL Intracatheter q1 min prn Dorota Franco PA-C        vasopressin 0.2 units/mL in NS (PITRESSIN) standard conc infusion  0.5-4 Units/hr Intravenous Continuous PRN Dorota Franco PA-C             Physical Exam  Vitals were reviewed  Blood pressure 92/68, pulse 84, temperature 99  F (37.2  C), temperature source Oral, resp. rate 23, height 1.689 m (5' 6.5\"), weight 114.7 kg (252 lb 13.9 oz), SpO2 93%.  Rhythm: NSR    Lungs: diminished bases    Cardiovascular: rrr, no m/r/g    Abdomen: soft, NT, ND, +BS    Extremeties: warm, +LE edema    Incision: CDI    CT: serosang output 585 mL, no air leak    Weight:   Vitals:    07/10/24 0545 07/10/24 1800 07/11/24 0215   Weight: 106.5 kg (234 lb 11.2 oz) 112.1 kg (247 lb 2.2 oz) 114.7 kg (252 lb " 13.9 oz)         Data  Recent Labs   Lab 07/11/24  0747 07/11/24  0619 07/11/24  0505 07/11/24  0502 07/10/24  2221 07/10/24  2133 07/10/24  1851 07/10/24  1802 07/10/24  1727 07/10/24  1558 07/10/24  1558   WBC  --   --   --  10.6  --   --   --   --  18.2*  --  16.4*   HGB  --   --   --  10.9*  --  11.9*  --   --  12.1*  --  9.8*  9.8*   MCV  --   --   --  91  --   --   --   --  93  --  93   PLT  --   --   --  146*  --   --   --   --  144*  --  133*   INR  --   --   --   --   --   --   --   --  1.47*  --  1.77*   NA  --   --   --  140  --   --   --  143 142  --  141  142   POTASSIUM  --   --   --  4.4  --   --   --  4.0 3.8  --  3.8  3.9   CHLORIDE  --   --   --  105  --   --   --  108* 106  --  107   CO2  --   --   --  28  --   --   --  17* 21*  --  23   BUN  --   --   --  16.6  --   --   --  13.7 14.2  --  14.7   CR  --   --   --  1.13  --   --   --  1.04 1.06  --  1.02   ANIONGAP  --   --   --  7  --   --   --  18* 15  --  11   NITHIN  --   --   --  8.1*  --   --   --  8.0* 7.8*  --  7.9*   GLC 91 89 107* 118*   < >  --    < > 185* 180*  --  189*  198*   ALBUMIN  --   --   --  3.7  --   --   --  3.3* 3.4*   < >  --    PROTTOTAL  --   --   --  5.2*  --   --   --  5.2* 5.0*   < >  --    BILITOTAL  --   --   --  0.5  --   --   --  0.7 0.6   < >  --    ALKPHOS  --   --   --  44  --   --   --  56 54   < >  --    ALT  --   --   --  38  --   --   --  44 45   < >  --    AST  --   --   --  65*  --   --   --  70*  --   --   --     < > = values in this interval not displayed.       Imaging:  Recent Results (from the past 24 hour(s))   XR Chest Port 1 View    Narrative    EXAM: XR CHEST PORT 1 VIEW  LOCATION: [facility]  DATE: 7/10/2024    INDICATION: Post Op CVTS Surgery  COMPARISON: Chest radiograph the chest 7/3/2024.      Impression    IMPRESSION: Interval median sternotomy. Endotracheal tube tip is 2.5 cm above the level of the royal. Gastric tube courses below the level of the diaphragm  with tip not within the field of view. Mediastinal drain and left thoracostomy tubes are in   place. Right IJ central venous catheter with tip overlying the mid SVC. Cardiomegaly. Postoperative widening of the superior mediastinum. No focal consolidation, pleural effusion, or pneumothorax.   XR Abdomen Port 1 View    Narrative    EXAM: XR ABDOMEN PORT 1 VIEW  LOCATION: [facility]  DATE: 7/10/2024    INDICATION: Gastric drainage tube placement.   COMPARISON: Chest radiograph 7/10/2024.       Impression    IMPRESSION:     Gastric drainage tube tip is in the proximal stomach. The side-port is above the diaphragm and likely within the distal esophagus. Recommend advancement of the gastric drainage tube by approximately 6 cm to ensure adequate drainage.    Additional lines and support devices are unchanged and better evaluated on the recent chest radiograph.    No dilated bowel loops within the field-of-view.   XR Chest Port 1 View    Narrative    EXAM: XR CHEST PORT 1 VIEW  LOCATION: [facility]  DATE: 7/11/2024    INDICATION: Post Op CVTS Surgery  COMPARISON: 7/10/2024      Impression    IMPRESSION: Sternotomy. Interval extubation and removal of mediastinal drains. Cardiomegaly. Left chest tube in satisfactory position. No pneumothorax. Increased blunting of the left costophrenic angle. Findings secondary to effusion. Mild blunting of   the right costophrenic angle which may be due to effusion. Pulmonary vascularity upper limits of normal. Mild elevation left hemidiaphragm. Left basilar atelectasis.         Patient seen and discussed with Dr. Yudith Walker PA-C  Cardiothoracic Surgery  Available for paging [phone] (personal pager or CV Surgery Rounding Pager)  Personal Pager: [phone]  CV Surgery Rounding Pager: [phone]  After hours please page surgeon on-call

## 2024-07-11 NOTE — OP NOTE
Date of Surgery: July 11, 2024    Preop Diagnosis: mvCAD  Postop Diagnosis: mvCAD    Surgeon: Michael S. Mulvihill, M.D.  Assistant(s) : Dr. Carter Cardenas, and Venessa Mas PA-C     Procedures:  1. CABG x3 (LIMA-LAD, V-OM, V-PL)  2. Endoscopic Vein [place]  3. Epiaortic Echo  4. Replacement of the ascending aorta (30mm dacron hemashield platinum)    Implant Name Type Inv. Item Serial No.  Lot No. LRB No. Used Action   GRAFT HEMASHIELD PLATINUM [id number] - [id number] Graft GRAFT HEMASHIELD PLATINUM [id number] [employer] [id number] N/A 1 Implanted     Anesthesia: GETA  Approach: Median sternotomy  Drains: 1 left pleural 28 fr, 2x mediastinal 32 fr  Pacing Wires: atrial and ventricular    Findings of Procedure:  severe CAD  Ascending aneurysm    Disposition: ICU  Specimens: none  Indication: mvCAD    GRAFT MATRIX:  Distal: Graft Source: Target Quality: Conduit Quality: Distal Suture: Proximal Suture:  LAD   TOURE  Good  Good  7-0  N/A  PL  RSVG  Good  Good  7-0  6-0  OM   RSVG  Good  Good  7-0  6-0    Operative Indications / Brief History of Present Illness: 78-year-old male with a past medical history of BRITTNI, hypertension, obesity, GERD.  Presented for elective coronary angiogram after having episodes of chest pain at home.  Episodes preceded by activity.  Improved with rest.  He did undergo an exercise stress test which was inconclusive. Angiogram demonstrates multivessel CAD.  Echo shows preserved ejection fraction and no valvular abnormalities.  He also has a dilated ascending aorta on CT PE and echo. He was evaluated and judged to be a very good candidate for surgical revascularization with replacement of his ascending aneurysm on account of the small ascending aneurysm.    Procedure in Detail:  The risks, benefits, complications, treatment options, and expected outcomes were discussed with the patient. The possibilities of reaction to medication, pulmonary aspiration,  perforation of viscus, bleeding, recurrent infection, the need for additional procedures, failure to diagnose a condition, and creating a complication requiring transfusion or operation were discussed with the patient. The patient concurred with the proposed plan, giving informed consent. The patient was taken to the operating room, identified as Toni Collado and the procedure verified as Coronary Artery Bypass Grafting with ascending aortic replacement. A Time Out was held and the above information confirmed.    Standard monitoring lines and Grullon catheter were placed. General anesthesia was induced. The patient was prepped and draped in a sterile fashion.     An endoscopic vein harvesting was carried out by Venessa Mas PA-C.     A standard median sternotomy was performed. The left internal mammary artery was taken down using cautery and clips. Intravenous heparin was given at the appropriate dose to obtain anticoagulation sufficient for CPB. The LIMA was then transected distally. The flow was excellent.    The pericardium was then opened and the pericardial well was created.     Epiaortic echo was then completed. Images of the site for cannulation, cross clamp application and proximal anastomoses were recorded and personally interpreted by me. We cannulated high in the ascending such that ascending replacement could be performed under a single clamp beyond the point to which his ascending had tapered back to a normal diameter. There was no significant plaque in any of these sites.    Concentric purse strings were placed and the aorta was cannulated with a 20fr EOPA cannula. After adequate de-airing this cannula was connected to the CPB circuit. A purse string was placed around the right atrial appendage and it was cannulated with a dual stage venous cannula.     Prior to initiating CPB the LIMA was fashioned to the appropriate length.    Cardiopulmonary bypass was then initiated.     The distal targets (LAD,  PL, OM) were then evaluated and marked.    An antegrade vent/cardioplegia cannula was then placed. The aortic cross clamp was then applied and the heart was arrested with 1200 cc antegrade [place] cardioplegia. Cardioplegia was then administered intermittently to ensure adequate myocardial protection.    The distal anastomosis to the PL was completed first. After positioning the heart, the artery was opened and RSV was ananstomosed to the artery in an end-to-side fashion using 7-0 prolene in a running fashion. Prior to completing the anastomosis it was probed proximally and distally to ensure patency.    Next, the distal anastomosis to to the OM was completed. After positioning the heart, the artery was opened and RSV was ananstomosed to the artery in an end-to-side fashion using 7-0 prolene in a running fashion. Prior to completing the anastomosis it was probed proximally and distally to ensure patency.    Next the LIMA to LAD anastomosis was completed in a similar an end-to-side fashion with 7-0 prolene. Prior to completing the anastomosis it was probed to ensure patency.    The ascending aorta was then excised from just proximal to the cross clamp high and just proximal to the innominate artery, and 5mm above the sinotubular junction. A 30mm hemashield platinum graft was selected and sewn in a running fashion with 3-0 prolene in both the proximal and distal suture lines. A root vent was placed in the graft.    The proximal anastomoses were then completed in the standard fashion using 6-0 prolene in a running fashion.     After adequate deairing maneuvers, the cross clamp was removed and the heart was reperfused.    Examination of all surgical sites revealed good hemostasis. Atrial and ventricular epicardial pacing wires were then placed. The patient was fully ventillated and successfully weaned off of CPB. After a test dose of protamine, the patient was fully decannulated. The heparin was fully reversed with  protamine. Again, examination of all surgical sites were evaluated for hemostasis which was good.    Post-bypass RIN revealed preserved RV/LV function and no new wall motion abnormalities.    Left pleural as well as mediastinal drains were placed.   The sternum was closed using stainless steel wires.  The dermal and subcutaneous tissues were closed in layers and the skin was sewn closed with a subcuticular stitch.    At the end of the operation, all sponge, instruments, and needle counts were correct.    The patient tolerated the procedure without complication and was transported to the CTICU in stable condition.    Michael Mulvihill, MD  7/11/2024 @ 10:47 AM

## 2024-07-11 NOTE — PROGRESS NOTES
Extubation Note    Successful completion of SBT (Yes or No):yes  Extubation time:0249 7/11/24     Patient assessment:  Lung sounds:clear diminished  Stridor Present (Yes or No): no  Patient tolerance: yes - suctioned above and below cuff.  PT was able to follow commands and provided a strong cough during extubation and post.     Oxygen device:  Liter flow:6 lpm mask  FiO2:  SpO2:96%    Plan:follow care plan

## 2024-07-11 NOTE — PROVIDER NOTIFICATION
Update given to Carter PEREZ regarding events since 1900 shift change.  Total IVF's since arrival to ICU 2L LR, multiple L.A.'s which have risen to 11.  100mEq Sodium Bicarb along with 500ml 5% Albumin as of this conversation.  Plan per Carter is to give another 500LR and 100mEq Sodium Bicarb with recheck of L.A. and ABG afterwards.  With those lab results, if the L.A. does not result closer to 7, plan may be to consider remaining intubated.  The above was discussed with Khushboo Borja of intensivist group who agrees.  Spouse Darlin also given update over phone as she was remotely viewing pt's lab work, concerned regarding labwork, and wanted to know if she should return to hospital.  This RN assured her that her returning to the hospital is not warrented at this time and that we are diligently working on the acidosis and repeating labwork witth goal of liberating spouse from the ventilator.  Spouse also made aware that pt may potentially remain intubated overnight.

## 2024-07-11 NOTE — PROGRESS NOTES
07/11/24 0951   Appointment Info   Signing Clinician's Name / Credentials (PT) Lee Ann Dougherty DPT   Living Environment   People in Home spouse   Current Living Arrangements house   Home Accessibility stairs within home;stairs to enter home   Number of Stairs, Main Entrance 4   Stair Railings, Main Entrance railings safe and in good condition   Number of Stairs, Within Home, Primary greater than 10 stairs   Stair Railings, Within Home, Primary railings safe and in good condition   Transportation Anticipated family or friend will provide   Living Environment Comments Pt lives in multi level home w/ wife   Self-Care   Usual Activity Tolerance good   Current Activity Tolerance fair   Regular Exercise Yes   Activity/Exercise Type walking  (Golf, fishing)   Equipment Currently Used at Home none   Fall history within last six months no   Activity/Exercise/Self-Care Comment Pt IND at baseline, reports active w/ golf and fishing.   General Information   Onset of Illness/Injury or Date of Surgery 07/10/24   Patient/Family Therapy Goals Statement (PT) Return home   Cognition   Affect/Mental Status (Cognition) WFL   Orientation Status (Cognition) oriented x 4   Follows Commands (Cognition) WFL   Pain Assessment   Patient Currently in Pain   (Pt reports mild tightness in chest)   Integumentary/Edema   Integumentary/Edema Comments Sternal incision, CT   Posture    Posture Forward head position;Protracted shoulders   Range of Motion (ROM)   Range of Motion ROM deficits secondary to surgical procedure   Strength (Manual Muscle Testing)   Strength (Manual Muscle Testing) Able to perform R SLR;Able to perform L SLR;Deficits observed during functional mobility   Bed Mobility   Comment, (Bed Mobility) Supine to sit Mod A x2   Transfers   Comment, (Transfers) Sit to stand Min A x2   Gait/Stairs (Locomotion)   Comment, (Gait/Stairs) Not tested d/t weakness and fatigue   Balance   Balance Comments Fair seated and standing   Sensory  Examination   Sensory Perception patient reports no sensory changes   Clinical Impression   Criteria for Skilled Therapeutic Intervention Yes, treatment indicated   PT Diagnosis (PT) Impaired functional mobility and gait   Influenced by the following impairments Pain, weakness, decreased activity tolerance, impaired balance   Functional limitations due to impairments Limited functional mobility requiring AD and assist   Clinical Presentation (PT Evaluation Complexity) stable   Clinical Presentation Rationale Based on PMH, current status, and social support   Clinical Decision Making (Complexity) low complexity   Planned Therapy Interventions (PT) balance training;bed mobility training;gait training;stair training;strengthening;transfer training;progressive activity/exercise;risk factor education;patient/family education   Risk & Benefits of therapy have been explained evaluation/treatment results reviewed;care plan/treatment goals reviewed;risks/benefits reviewed;current/potential barriers reviewed;participants voiced agreement with care plan;participants included;patient;spouse/significant other   PT Total Evaluation Time   PT Eval, Low Complexity Minutes (02267) 10   Physical Therapy Goals   PT Frequency 2x/day   PT Predicted Duration/Target Date for Goal Attainment 07/18/24   PT Goals Bed Mobility;Transfers;Cardiac Phase 1   PT: Bed Mobility Independent;Supine to/from sit   PT: Transfers Modified independent;Sit to/from stand;Assistive device   PT: Understanding of cardiac education to maximize quality of life, condition management, and health outcomes Patient;Verbalize;Demonstrate   PT: Perform aerobic activity with stable cardiovascular response continuous;10 minutes;ambulation   PT: Functional/aerobic ambulation tolerance with stable cardiovascular response in order to return to home and community environment Modified independent;Assistive device;Greater than 300 feet   PT: Navigation of stairs simulating home  set up with stable cardiovascular response in order to return to home and community environment Modified independent;Greater than 10 stairs   Interventions   Interventions Quick Adds Therapeutic Activity;Therapeutic Procedure;Cardiac Rehab   Therapeutic Activity   Therapeutic Activities: dynamic activities to improve functional performance Minutes (33936) 20   Symptoms Noted During/After Treatment Fatigue   Treatment Detail/Skilled Intervention Pt in bed upon therapist arrival, agreeable to PT. Pt's wife present for session, supportive. Time setting up room, managing lines and tubes. Pt sitting EOB, able to shift hips forward w/ Mod A. Pt denied dizziness. Vitals monitored throughout session, see VSFS for details. Pt performed sit <> stand w/ Min Ax2, cues for sternal precautions. Pt performed stand pivot to high back chair w/ Min A x2. Pt demos controlled descent to chair. Denied symptoms except mild fatigue. RN in room to assist setting up lines. Pt in chair at end of session, all needs w/in reach, wife at side.   Cardiac Rehab Phase II Plan   Phase II Order Received Yes   Phase II Appointment Status Scheduled   Date/Time 7/23 10:00am   Location [facility]   PT Discharge Planning   PT Plan Progress bed mob, transfers, short distance amb, cardiac ed   PT Discharge Recommendation (DC Rec) home with assist;home with outpatient cardiac rehab   PT Rationale for DC Rec Pt below baseline, currently Ax1-2. Anticipate w/ further IP PT pt will progress. Pt's wife able to provide assist at home. Pt would benefit from OP CR to progress activity tolerance and cardiac education.   PT Brief overview of current status Ax1-2 SPT   Total Session Time   Timed Code Treatment Minutes 20   Total Session Time (sum of timed and untimed services) 30

## 2024-07-11 NOTE — PROVIDER NOTIFICATION
MD Notification    Notified Person: PA    Notified Person Name: Shalini Walker    Notification Date/Time: 1543    Notification Interaction: Phone    Purpose of Notification: Temp 38.5    Orders Received: None    Comments: Told it was due to atelectasis and to continue to monitor and notify if it gets higher

## 2024-07-11 NOTE — PLAN OF CARE
CV  Pressors: Levophed currently running at . 03mcg/kg/min.     HR range: 90's.  Pacer running at 90 A/V wires Sensing /capturing.  Chest tube output:  520 ml Total since arrival to ICU.    Neuro  Orientation: A&O X4  Delirium present?: No   Sleep:  No. Due to Extubation at 0249  Pain: Minimal 3- 5 out of ten    GI/  BM? : No   Urine output: adequate but trending down/  Lines: Right internal jugular, Left Radial Art line. One PIV.          Problem: Skin Injury Risk Increased  Goal: Skin Health and Integrity  Intervention: Optimize Skin Protection  Recent Flowsheet Documentation  Taken 7/11/2024 0530 by Rigo Cardenas, RN  Activity Management: back to bed  Taken 7/11/2024 0427 by Rigo Cardenas, RN  Activity Management: up in chair  Taken 7/11/2024 0425 by Rigo Cardenas, RN  Activity Management:   sitting, edge of bed   standing at bedside  Taken 7/11/2024 0400 by Rigo Cardenas RN  Head of Bed (HOB) Positioning: HOB at 30-45 degrees  Taken 7/11/2024 0000 by Rigo Cardenas RN  Head of Bed (HOB) Positioning: HOB at 30-45 degrees  Taken 7/10/2024 2200 by Rigo Cardenas RN  Head of Bed (HOB) Positioning: HOB at 30-45 degrees  Taken 7/10/2024 2000 by Rigo Cardenas RN  Head of Bed (HOB) Positioning: HOB at 30-45 degrees         Problem: Skin Injury Risk Increased  Goal: Skin Health and Integrity  Intervention: Plan: Nurse Driven Intervention: Moisture Management  Recent Flowsheet Documentation  Taken 7/11/2024 0500 by Rigo Cardenas, RN  Bathing/Skin Care:   back care   bath, chlorhexidine wipes   bath, complete   dressed/undressed   electrode patches/site rotation   linen changed

## 2024-07-11 NOTE — PROGRESS NOTES
Brief ICU Crosscover Note    Evaluated Mr. Collado for lactic acidosis    General: intubated, sedatd, NAD  Resp: clear, synchronous with ventilator  CV: SR, A paced.   Extremities: warm    - Initial post op lactate 6.3, given 500 ml of LR per CVTS. Repeat check at 8.6.Most recent pH 7.29.   - Bedside POCUS with extremely poor acoustic windows but could ascertain no pericardial effusion.   - SVV 15%, CVP 5, CI 2.1-2.3. Remains on low dose epinephrine and phenylephrine. I transitioned him to norepinephrine to titrate to keep MAP > 65, and titrate epinephrine to CI > 2.2, would keep at minimum at 0.02 until extubation   - Given 1.5 liters of LR total  - Mediastinal tube output minimal, dark serosanguinous. Stripped.   - CBC and coags satisfactory, no need for additional blood products.   I suspect he is intravascularly depleted and still underesuscitated. I updated the CVTS fellow Dr. Cardenas.   - Will repeat lactate and ABG following interventions  - Hold on extubation until we can resuscitate him better. Ok to start weaning sedation in preparation at this time.       Addendum;  - Follow up lactate up to 11.1. Given additional 500 ml LR and 500 ml of 5% albumin, 2 amps of sodium bicarb. Recheck lactate and ABG.   - Hemoglobin stable  - Keep intubated    Khushboo De La Mater    Additional CC time 30 minutes

## 2024-07-12 ENCOUNTER — APPOINTMENT (OUTPATIENT)
Dept: PHYSICAL THERAPY | Facility: CLINIC | Age: 79
DRG: 235 | End: 2024-07-12
Attending: STUDENT IN AN ORGANIZED HEALTH CARE EDUCATION/TRAINING PROGRAM
Payer: COMMERCIAL

## 2024-07-12 LAB
ANION GAP SERPL CALCULATED.3IONS-SCNC: 11 MMOL/L (ref 7–15)
BUN SERPL-MCNC: 17.1 MG/DL (ref 8–23)
CA-I BLD-MCNC: 4.5 MG/DL (ref 4.4–5.2)
CALCIUM SERPL-MCNC: 8.5 MG/DL (ref 8.8–10.2)
CHLORIDE SERPL-SCNC: 102 MMOL/L (ref 98–107)
CREAT SERPL-MCNC: 1.24 MG/DL (ref 0.67–1.17)
DEPRECATED HCO3 PLAS-SCNC: 24 MMOL/L (ref 22–29)
EGFRCR SERPLBLD CKD-EPI 2021: 60 ML/MIN/1.73M2
ERYTHROCYTE [DISTWIDTH] IN BLOOD BY AUTOMATED COUNT: 13.8 % (ref 10–15)
GLUCOSE BLDC GLUCOMTR-MCNC: 109 MG/DL (ref 70–99)
GLUCOSE BLDC GLUCOMTR-MCNC: 115 MG/DL (ref 70–99)
GLUCOSE BLDC GLUCOMTR-MCNC: 122 MG/DL (ref 70–99)
GLUCOSE BLDC GLUCOMTR-MCNC: 127 MG/DL (ref 70–99)
GLUCOSE SERPL-MCNC: 132 MG/DL (ref 70–99)
HCT VFR BLD AUTO: 31.3 % (ref 40–53)
HGB BLD-MCNC: 10.3 G/DL (ref 13.3–17.7)
LACTATE SERPL-SCNC: 1.4 MMOL/L (ref 0.7–2)
LACTATE SERPL-SCNC: 2 MMOL/L (ref 0.7–2)
MAGNESIUM SERPL-MCNC: 1.9 MG/DL (ref 1.7–2.3)
MCH RBC QN AUTO: 30.6 PG (ref 26.5–33)
MCHC RBC AUTO-ENTMCNC: 32.9 G/DL (ref 31.5–36.5)
MCV RBC AUTO: 93 FL (ref 78–100)
PHOSPHATE SERPL-MCNC: 2.8 MG/DL (ref 2.5–4.5)
PLATELET # BLD AUTO: 112 10E3/UL (ref 150–450)
POTASSIUM SERPL-SCNC: 4.1 MMOL/L (ref 3.4–5.3)
RBC # BLD AUTO: 3.37 10E6/UL (ref 4.4–5.9)
SODIUM SERPL-SCNC: 137 MMOL/L (ref 135–145)
WBC # BLD AUTO: 10.6 10E3/UL (ref 4–11)

## 2024-07-12 PROCEDURE — 250N000011 HC RX IP 250 OP 636: Performed by: PHYSICIAN ASSISTANT

## 2024-07-12 PROCEDURE — 80048 BASIC METABOLIC PNL TOTAL CA: CPT | Performed by: PHYSICIAN ASSISTANT

## 2024-07-12 PROCEDURE — 83605 ASSAY OF LACTIC ACID: CPT | Performed by: PHYSICIAN ASSISTANT

## 2024-07-12 PROCEDURE — 82330 ASSAY OF CALCIUM: CPT | Performed by: PHYSICIAN ASSISTANT

## 2024-07-12 PROCEDURE — 250N000013 HC RX MED GY IP 250 OP 250 PS 637: Performed by: STUDENT IN AN ORGANIZED HEALTH CARE EDUCATION/TRAINING PROGRAM

## 2024-07-12 PROCEDURE — 97530 THERAPEUTIC ACTIVITIES: CPT | Mod: GP

## 2024-07-12 PROCEDURE — 250N000011 HC RX IP 250 OP 636: Performed by: NURSE PRACTITIONER

## 2024-07-12 PROCEDURE — 250N000013 HC RX MED GY IP 250 OP 250 PS 637: Performed by: PHYSICIAN ASSISTANT

## 2024-07-12 PROCEDURE — 250N000013 HC RX MED GY IP 250 OP 250 PS 637: Performed by: NURSE PRACTITIONER

## 2024-07-12 PROCEDURE — 97110 THERAPEUTIC EXERCISES: CPT | Mod: GP

## 2024-07-12 PROCEDURE — 83735 ASSAY OF MAGNESIUM: CPT | Performed by: PHYSICIAN ASSISTANT

## 2024-07-12 PROCEDURE — 36415 COLL VENOUS BLD VENIPUNCTURE: CPT | Performed by: PHYSICIAN ASSISTANT

## 2024-07-12 PROCEDURE — 120N000013 HC R&B IMCU

## 2024-07-12 PROCEDURE — 85027 COMPLETE CBC AUTOMATED: CPT | Performed by: PHYSICIAN ASSISTANT

## 2024-07-12 PROCEDURE — 84100 ASSAY OF PHOSPHORUS: CPT | Performed by: PHYSICIAN ASSISTANT

## 2024-07-12 RX ORDER — TRAZODONE HYDROCHLORIDE 100 MG/1
100 TABLET ORAL AT BEDTIME
Status: DISCONTINUED | OUTPATIENT
Start: 2024-07-12 | End: 2024-07-16 | Stop reason: HOSPADM

## 2024-07-12 RX ORDER — ACETAMINOPHEN 325 MG/1
975 TABLET ORAL EVERY 8 HOURS
Status: COMPLETED | OUTPATIENT
Start: 2024-07-12 | End: 2024-07-13

## 2024-07-12 RX ORDER — FUROSEMIDE 10 MG/ML
40 INJECTION INTRAMUSCULAR; INTRAVENOUS
Status: DISCONTINUED | OUTPATIENT
Start: 2024-07-12 | End: 2024-07-13

## 2024-07-12 RX ORDER — BRIMONIDINE TARTRATE 2 MG/ML
1 SOLUTION/ DROPS OPHTHALMIC 2 TIMES DAILY
Status: DISCONTINUED | OUTPATIENT
Start: 2024-07-12 | End: 2024-07-14

## 2024-07-12 RX ORDER — MAGNESIUM OXIDE 400 MG/1
400 TABLET ORAL EVERY 4 HOURS
Status: COMPLETED | OUTPATIENT
Start: 2024-07-12 | End: 2024-07-12

## 2024-07-12 RX ORDER — PRAMIPEXOLE DIHYDROCHLORIDE 0.25 MG/1
0.25 TABLET ORAL AT BEDTIME
Status: DISCONTINUED | OUTPATIENT
Start: 2024-07-12 | End: 2024-07-16 | Stop reason: HOSPADM

## 2024-07-12 RX ADMIN — METHOCARBAMOL 500 MG: 500 TABLET ORAL at 21:19

## 2024-07-12 RX ADMIN — ROSUVASTATIN CALCIUM 40 MG: 20 TABLET, FILM COATED ORAL at 21:19

## 2024-07-12 RX ADMIN — GABAPENTIN 100 MG: 100 CAPSULE ORAL at 21:19

## 2024-07-12 RX ADMIN — POLYETHYLENE GLYCOL 3350 17 G: 17 POWDER, FOR SOLUTION ORAL at 09:28

## 2024-07-12 RX ADMIN — FUROSEMIDE 40 MG: 10 INJECTION, SOLUTION INTRAMUSCULAR; INTRAVENOUS at 17:50

## 2024-07-12 RX ADMIN — ACETAMINOPHEN 975 MG: 325 TABLET, FILM COATED ORAL at 04:00

## 2024-07-12 RX ADMIN — FUROSEMIDE 40 MG: 10 INJECTION, SOLUTION INTRAMUSCULAR; INTRAVENOUS at 11:52

## 2024-07-12 RX ADMIN — ACETAMINOPHEN 975 MG: 325 TABLET, FILM COATED ORAL at 21:19

## 2024-07-12 RX ADMIN — Medication 400 MG: at 11:52

## 2024-07-12 RX ADMIN — PRAMIPEXOLE DIHYDROCHLORIDE 0.25 MG: 0.25 TABLET ORAL at 21:20

## 2024-07-12 RX ADMIN — PANTOPRAZOLE SODIUM 40 MG: 40 TABLET, DELAYED RELEASE ORAL at 09:28

## 2024-07-12 RX ADMIN — ASPIRIN 81 MG CHEWABLE TABLET 162 MG: 81 TABLET CHEWABLE at 09:28

## 2024-07-12 RX ADMIN — Medication 400 MG: at 09:28

## 2024-07-12 RX ADMIN — HEPARIN SODIUM 5000 UNITS: 5000 INJECTION, SOLUTION INTRAVENOUS; SUBCUTANEOUS at 14:26

## 2024-07-12 RX ADMIN — METOPROLOL TARTRATE 12.5 MG: 25 TABLET, FILM COATED ORAL at 11:52

## 2024-07-12 RX ADMIN — HEPARIN SODIUM 5000 UNITS: 5000 INJECTION, SOLUTION INTRAVENOUS; SUBCUTANEOUS at 21:21

## 2024-07-12 RX ADMIN — METHOCARBAMOL 500 MG: 500 TABLET ORAL at 14:26

## 2024-07-12 RX ADMIN — ACETAMINOPHEN 975 MG: 325 TABLET, FILM COATED ORAL at 11:52

## 2024-07-12 RX ADMIN — SENNOSIDES AND DOCUSATE SODIUM 1 TABLET: 50; 8.6 TABLET ORAL at 09:28

## 2024-07-12 RX ADMIN — HEPARIN SODIUM 5000 UNITS: 5000 INJECTION, SOLUTION INTRAVENOUS; SUBCUTANEOUS at 05:54

## 2024-07-12 RX ADMIN — SENNOSIDES AND DOCUSATE SODIUM 1 TABLET: 50; 8.6 TABLET ORAL at 21:19

## 2024-07-12 RX ADMIN — TRAZODONE HYDROCHLORIDE 100 MG: 100 TABLET ORAL at 21:20

## 2024-07-12 RX ADMIN — METOPROLOL TARTRATE 12.5 MG: 25 TABLET, FILM COATED ORAL at 21:20

## 2024-07-12 ASSESSMENT — ACTIVITIES OF DAILY LIVING (ADL)
ADLS_ACUITY_SCORE: 34
ADLS_ACUITY_SCORE: 36
ADLS_ACUITY_SCORE: 36
ADLS_ACUITY_SCORE: 33
ADLS_ACUITY_SCORE: 36
ADLS_ACUITY_SCORE: 32
ADLS_ACUITY_SCORE: 33
ADLS_ACUITY_SCORE: 34
ADLS_ACUITY_SCORE: 36
ADLS_ACUITY_SCORE: 33
ADLS_ACUITY_SCORE: 33
ADLS_ACUITY_SCORE: 34
ADLS_ACUITY_SCORE: 32
ADLS_ACUITY_SCORE: 32
ADLS_ACUITY_SCORE: 34
ADLS_ACUITY_SCORE: 33
ADLS_ACUITY_SCORE: 32
ADLS_ACUITY_SCORE: 36
ADLS_ACUITY_SCORE: 34
ADLS_ACUITY_SCORE: 34
ADLS_ACUITY_SCORE: 33
ADLS_ACUITY_SCORE: 34
ADLS_ACUITY_SCORE: 36

## 2024-07-12 NOTE — PROGRESS NOTES
[facility]  Cardiovascular and Thoracic Surgery Daily Note      Assessment and Plan  POD # 2 s/p Coronary artery bypass grafting x 3 (LIMA to LAD, vein to OM, vein to PDA), Ascending Aorta Aneurysm Repair with 30mm Hemashield graft on 7/10/24 with Dr. Mulvihill    - CVS: Pre-op TTE with EF 55-60%, mild biatrial enlargement. Postop vasoplegic shock, resolved. Lactic acidosis resolved. Hypervolemic, lasix 40 mg IV BID.  mg BID, metoprolol tartrate 12.5 mg BID with hold parameters, resumed PTA rosuvastatin. Chest tubes: in place with serosanguinous output, no air leak, leave in. TPW: capped.    PTA cardiac meds on hold: Olmesartan 20mg daily    - Resp: Postoperative mechanical ventilation, resolved. Extubated at 0249 on POD#1. Saturating well on 2-7L, bipap at HS for BRITTNI. Continue to encourage IS, pulmonary toilet.    - Neuro: Neuros intact, pain controlled on current regimen. Resumed PTA mirapex and trazodone     - Renal: No history of significant renal disease. Cr slightly up, will monitor and trend BMP. Diuretic as above.  Recent Labs   Lab 07/12/24  0611 07/11/24  0502 07/10/24  1802   CR 1.24* 1.13 1.04       - GI: no BM, + flatus, continue bowel regimen    - : Remove scott today    - Endo: Postop stress hyperglycemia, No hx DM. Insulin infusion with minimal needs. Transitioned to sliding scale insulin  Hemoglobin A1C   Date Value Ref Range Status   05/28/2024 5.7 (H) <5.7 % Final     Comment:     Normal <5.7%   Prediabetes 5.7-6.4%    Diabetes 6.5% or higher     Note: Adopted from ADA consensus guidelines.        - FEN: Replace electrolytes as needed. Advance to full liquids, will advance further once     - ID: Postop leukocytosis, resolved. Afebrile. WBC trending down. Periop abx prophylaxis completed. Trend CBC and fever curve.   Recent Labs   Lab 07/12/24  0611 07/11/24  0502 07/10/24  1727   WBC 10.6 10.6 18.2*       - Heme: Acute blood loss anemia and thrombocytopenia due to  "surgery. Hgb and PLT stable. Trend CBC, transfuse PRN.   Recent Labs   Lab 07/12/24  0611 07/11/24  0502 07/10/24  2133 07/10/24  1727   HGB 10.3* 10.9* 11.9* 12.1*   * 146*  --  144*       - Proph: SCD, subcutaneous heparin, PPI    - Other:      # Hyperkalemia: Highest K = 5.8 mmol/L in last 2 days, will monitor as appropriate   # Hypocalcemia: Lowest Ca = 7.8 mg/dL in last 2 days, will monitor and replace as appropriate     # Hypoalbuminemia: Lowest albumin = 3.3 g/dL at 7/10/2024  6:02 PM, will monitor as appropriate    # Coagulation Defect: INR = 1.47 (Ref range: 0.85 - 1.15) and/or PTT = 30 Seconds (Ref range: 22 - 38 Seconds), will monitor for bleeding  # Thrombocytopenia: Lowest platelets = 112 in last 2 days, will monitor for bleeding             #Precipitous drop in Hgb/Hct: Lowest Hgb this hospitalization: 9.2 g/dL. Will continue to monitor and treat/transfuse as appropriate.     # Obesity: Estimated body mass index is 39.41 kg/m  as calculated from the following:    Height as of this encounter: 1.689 m (5' 6.5\").    Weight as of this encounter: 112.4 kg (247 lb 14.4 oz)., PRESENT ON ADMISSION      # History of CABG: noted on surgical history       - Dispo: Continue on [address]. Continue mobility, therapies and pulm hygiene. Medically Ready for Discharge: Anticipated in 2-4 Days, dispo pending return of bowel and bladder function, removal of chest tubes, wean off oxygen and initiation of guideline medications.      Interval History  No acute events overnight. States pain is well managed on current regimen, slept well. Breathing is stable on NC, working with IS. Tolerating diet, is passing flatus, no BM, no n/v. Denies chest pain, palpitations, dizziness, syncopal symptoms, chills, myalgias, sternal popping/clicking.      Medications  Current Facility-Administered Medications   Medication Dose Route Frequency Provider Last Rate Last Admin    acetaminophen (TYLENOL) tablet 975 mg  975 mg Oral Q8H " Mulvihill, Michael, MD        aspirin (ASA) chewable tablet 162 mg  162 mg Oral or NG Tube Daily Shalini Walker PA-C   162 mg at 07/12/24 0928    furosemide (LASIX) injection 40 mg  40 mg Intravenous BID Stephanie Hanson NP        gabapentin (NEURONTIN) capsule 100 mg  100 mg Oral At Bedtime Shalini Walker PA-C   100 mg at 07/11/24 2235    heparin ANTICOAGULANT injection 5,000 Units  5,000 Units Subcutaneous Q8H Shalini Walker PA-C   5,000 Units at 07/12/24 0554    insulin aspart (NovoLOG) injection (RAPID ACTING)  1-7 Units Subcutaneous TID AC Shalini Walker PA-C        insulin aspart (NovoLOG) injection (RAPID ACTING)  1-5 Units Subcutaneous At Bedtime Shalini Walker PA-C        magnesium oxide (MAG-OX) tablet 400 mg  400 mg Oral Q4H Mulvihill, Michael, MD   400 mg at 07/12/24 0928    metoprolol tartrate (LOPRESSOR) half-tab 12.5 mg  12.5 mg Oral BID Stephanie Hanson NP        pantoprazole (PROTONIX) 2 mg/mL suspension 40 mg  40 mg Oral or NG Tube Daily Shalini Walker PA-C   40 mg at 07/10/24 1846    Or    pantoprazole (PROTONIX) EC tablet 40 mg  40 mg Oral Daily Shalini Walker PA-C   40 mg at 07/12/24 0928    polyethylene glycol (MIRALAX) Packet 17 g  17 g Oral Daily Shalini Walker PA-C   17 g at 07/12/24 0928    rosuvastatin (CRESTOR) tablet 40 mg  40 mg Oral At Bedtime Shalini Walker PA-C   40 mg at 07/11/24 2235    senna-docusate (SENOKOT-S/PERICOLACE) 8.6-50 MG per tablet 1 tablet  1 tablet Oral BID Shalini Walker PA-C   1 tablet at 07/12/24 0928    sodium chloride (PF) 0.9% PF flush 3 mL  3 mL Intracatheter Q8H Shalini Walker PA-C   3 mL at 07/12/24 0928     Current Facility-Administered Medications   Medication Dose Route Frequency Provider Last Rate Last Admin    [START ON 7/13/2024] acetaminophen (TYLENOL) tablet 650 mg  650 mg Oral Q4H PRN Shalini Walker PA-C        [START ON 7/13/2024] bisacodyl (DULCOLAX) suppository 10 mg  10 mg Rectal Daily PRN Aaron  Shalini LATHAM PA-C        brimonidine (ALPHAGAN) 0.2 % ophthalmic solution 1 drop  1 drop Left Eye BID PRN Shalini Walker PA-C        calcium gluconate 1 g in 50 mL in sodium chloride intermittent infusion  1 g Intravenous Q6H PRShalini Rico PA-C   1 g at 07/11/24 0718    calcium gluconate 2 g in  mL intermittent infusion  2 g Intravenous Q6H PRN Shalini Walker PA-C        calcium gluconate 3 g in sodium chloride 0.9 % 100 mL intermittent infusion  3 g Intravenous Q6H PRN Shalini Walker PA-C        glucose gel 15-30 g  15-30 g Oral Q15 Min PRN Shalini Walker PA-C        Or    dextrose 50 % injection 25-50 mL  25-50 mL Intravenous Q15 Min PRN Shalini Walker PA-C        Or    glucagon injection 1 mg  1 mg Subcutaneous Q15 Min PRN Shalini Walker PA-C        furosemide (LASIX) injection 20 mg  20 mg Intravenous Once PRN Shalini Walker PA-C        hydrALAZINE (APRESOLINE) injection 10 mg  10 mg Intravenous Q30 Min PRN Shalini Walker PA-C        HYDROmorphone (DILAUDID) injection 0.2 mg  0.2 mg Intravenous Q2H PRN Shalini Walker PA-C        Or    HYDROmorphone (DILAUDID) injection 0.4 mg  0.4 mg Intravenous Q2H PRN Shalini Walker PA-C   0.4 mg at 07/11/24 1323    lidocaine (LMX4) cream   Topical Q1H PRN Shalini Walker PA-C        lidocaine 1 % 0.1-1 mL  0.1-1 mL Other Q1H PRN Shalini Walker PA-C        magnesium hydroxide (MILK OF MAGNESIA) suspension 30 mL  30 mL Oral Daily PRN Shalini Walker PA-C        methocarbamol (ROBAXIN) tablet 500 mg  500 mg Oral Q6H PRN Shalini Walker PA-C   500 mg at 07/11/24 0334    naloxone (NARCAN) injection 0.2 mg  0.2 mg Intravenous Q2 Min PRN Shalini Walker PA-C        Or    naloxone (NARCAN) injection 0.4 mg  0.4 mg Intravenous Q2 Min PRN Shalini Walker PA-C        Or    naloxone (NARCAN) injection 0.2 mg  0.2 mg Intramuscular Q2 Min PRShalini Rico PA-C        Or    naloxone (NARCAN) injection 0.4 mg  0.4  "mg Intramuscular Q2 Min PRN Shalini Walker PA-C        ondansetron (ZOFRAN ODT) ODT tab 4 mg  4 mg Oral Q6H PRShalini Rico PA-C        Or    ondansetron (ZOFRAN) injection 4 mg  4 mg Intravenous Q6H PRN Shalini Walker PA-C        oxyCODONE (ROXICODONE) tablet 5 mg  5 mg Oral Q4H PRShalini Rico PA-C   5 mg at 07/11/24 2303    Or    oxyCODONE (ROXICODONE) tablet 10 mg  10 mg Oral Q4H PRN Shalini Walker PA-C   10 mg at 07/11/24 1514    prochlorperazine (COMPAZINE) injection 5 mg  5 mg Intravenous Q6H PRShalini Rico PA-C        Or    prochlorperazine (COMPAZINE) tablet 5 mg  5 mg Oral Q6H PRShalini Rico PA-C        Reason beta blocker order not selected   Does not apply DOES NOT GO TO Shalini Peralta PA-C        sodium chloride (PF) 0.9% PF flush 3 mL  3 mL Intracatheter q1 min prShalini Rico PA-C             Physical Exam  Vitals were reviewed  Blood pressure 139/77, pulse 72, temperature 97.8  F (36.6  C), temperature source Oral, resp. rate 18, height 1.689 m (5' 6.5\"), weight 112.4 kg (247 lb 14.4 oz), SpO2 96%.  Rhythm: NSR    Lungs: diminished throughout, +O2    Cardiovascular: S1, S2, RRR, no m/r/g    Abdomen: soft, NT, ND, +BS    Extremeties: warm, 2+ BLE and BUE edema    Incision: CDI    CT: serosang output, no air leak    Weight:   Vitals:    07/10/24 0545 07/10/24 1800 07/11/24 0215 07/11/24 1821   Weight: 106.5 kg (234 lb 11.2 oz) 112.1 kg (247 lb 2.2 oz) 114.7 kg (252 lb 13.9 oz) 113.4 kg (250 lb 1.6 oz)    07/12/24 0500   Weight: 112.4 kg (247 lb 14.4 oz)         Data  Recent Labs   Lab 07/12/24  0743 07/12/24  0611 07/12/24  0158 07/11/24  0505 07/11/24  0502 07/10/24  2221 07/10/24  2133 07/10/24  1851 07/10/24  1802 07/10/24  1727 07/10/24  1558 07/10/24  1558   WBC  --  10.6  --   --  10.6  --   --   --   --  18.2*  --  16.4*   HGB  --  10.3*  --   --  10.9*  --  11.9*  --   --  12.1*  --  9.8*  9.8*   MCV  --  93  --   --  91  --   --   --   " --  93  --  93   PLT  --  112*  --   --  146*  --   --   --   --  144*  --  133*   INR  --   --   --   --   --   --   --   --   --  1.47*  --  1.77*   NA  --  137  --   --  140  --   --   --  143 142  --  141  142   POTASSIUM  --  4.1  --   --  4.4  --   --   --  4.0 3.8  --  3.8  3.9   CHLORIDE  --  102  --   --  105  --   --   --  108* 106  --  107   CO2  --  24  --   --  28  --   --   --  17* 21*  --  23   BUN  --  17.1  --   --  16.6  --   --   --  13.7 14.2  --  14.7   CR  --  1.24*  --   --  1.13  --   --   --  1.04 1.06  --  1.02   ANIONGAP  --  11  --   --  7  --   --   --  18* 15  --  11   NITHIN  --  8.5*  --   --  8.1*  --   --   --  8.0* 7.8*  --  7.9*   * 132* 109*   < > 118*   < >  --    < > 185* 180*  --  189*  198*   ALBUMIN  --   --   --   --  3.7  --   --   --  3.3* 3.4*   < >  --    PROTTOTAL  --   --   --   --  5.2*  --   --   --  5.2* 5.0*   < >  --    BILITOTAL  --   --   --   --  0.5  --   --   --  0.7 0.6   < >  --    ALKPHOS  --   --   --   --  44  --   --   --  56 54   < >  --    ALT  --   --   --   --  38  --   --   --  44 45   < >  --    AST  --   --   --   --  65*  --   --   --  70*  --   --   --     < > = values in this interval not displayed.       Imaging:  No results found for this or any previous visit (from the past 24 hour(s)).        Patient seen and discussed with Dr. Yudith Hanson, APRN, ACN-AG, CCRN  Nurse Practitioner  Cardiothoracic Surgery  Pager: [phone]    CV Surgery Rounding Pager: [phone]  After hours please page surgeon on-call

## 2024-07-12 NOTE — CONSULTS
"NUTRITION ASSESSMENT    REASON FOR ASSESSMENT:  Cardiac Surgery Nutrition Consult    CURRENT DIET / INTAKE:  Regular - just advanced.   Appetite is minimal since surgery on 7/10    ANTHROPOMETRICS:   Ht: 5'6.5\"   Wt: 106.5 kg (235#)(7/10)   IBW: 65.9 kg (162%)   BMI: 37.4 kg/m^2     MALNUTRITION:  Patient does not meet two of the following criteria necessary for diagnosing malnutrition:  significant weight loss, reduced intake, subcutaneous fat loss, muscle loss or fluid retention. Nutrition Focused Physical Assessment (NFPA) not appropriate at this time.    NUTRITION DIAGNOSIS:   Inadequate oral intake r/t poor appetite and increased needs post op AEB limited PO x2 days since surgery.     INTERVENTIONS:    Nutrition Prescription:  Regular diet  Ensure @ 2pm daily     Implementation:  Nutrition Education (Content):  Discussed the importance of adequate nutrition post-op for healing and energy, emphasizing protein foods, and encouraged patient to order a protein food at each meal.    Goals:  Patient to consume ~75% at meals in the next 3 - 5 days    Follow Up/Monitoring (InPatient):  Food and Fluid intake -  Monitor for adequacy    Follow Up/Monitoring (OutPatient):  Patient will participate in out-patient cardiac rehab and attend nutrition classes during the program    Bijal Almaraz RD, LD  Pager: [phone]    "

## 2024-07-12 NOTE — PLAN OF CARE
[id number]:    ORIENTATION/NEURO: A/o x 4, neuro intact  VITALS/TELE: VSS, tele NSR, Tmax 99.7 oral  PAIN: Sternal incision pain with activity. Improved after scheduled tylenol and robaxin  RESP: 4L NC, LS diminished  DIET: Regular- tolerating well  GI/: [place] removed- voiding spontaneously, no BM, passing flatus  LINES/DRAINS: L PIV SL, CT 3:1 to suction, serosanguineous drainage  LABS: Hgb 10.3, Creat 1.24, Mg replaced per protocol  SKIN: Sternal midline incision approximated, R leg harvest sites, Edema in hands/wrists 2+ & legs 1-2+  ASSIST/AMBULATION: Ax1 GB/W  FALL RISK: Yes  PLAN/DISPOSITION: Encourage activity and ambulation. Wean O2 as able. Pain management. Bowel program        Goal Outcome Evaluation: progressing

## 2024-07-12 NOTE — PLAN OF CARE
[id number]  Orientations: A&O x4  Vitals/Pain: VSS on 6L NC, C/o of incisional sternal pain, controlled with scheduled tylenol and PRN oxy x1   Tele: SR  Lines/Drains: CT 3:1 to -20 suction, 170 ml output over 12 hours. Pacer wires capped, pacer box at bedside. Scott in place. 1 PIV, SL.   Skin/Wounds: Midline sternal incision. RLE harvest sites. Blanchable redness on sacrum, mepilex in place.   GI/: No BM overnight, BS audible, + flatus. Adequate UOP with scott   Labs: K+/Phos/Mg replacement protocol.   Ambulation/Assist: Ax2 GBW  Diet: Full liquid   Sleep Quality: Fair  Plan: Scott to be removed today

## 2024-07-13 ENCOUNTER — APPOINTMENT (OUTPATIENT)
Dept: PHYSICAL THERAPY | Facility: CLINIC | Age: 79
DRG: 235 | End: 2024-07-13
Attending: STUDENT IN AN ORGANIZED HEALTH CARE EDUCATION/TRAINING PROGRAM
Payer: COMMERCIAL

## 2024-07-13 LAB
ANION GAP SERPL CALCULATED.3IONS-SCNC: 7 MMOL/L (ref 7–15)
BUN SERPL-MCNC: 22.4 MG/DL (ref 8–23)
CA-I BLD-MCNC: 4.3 MG/DL (ref 4.4–5.2)
CA-I BLD-MCNC: 4.4 MG/DL (ref 4.4–5.2)
CALCIUM SERPL-MCNC: 8.3 MG/DL (ref 8.8–10.2)
CHLORIDE SERPL-SCNC: 100 MMOL/L (ref 98–107)
CREAT SERPL-MCNC: 1.3 MG/DL (ref 0.67–1.17)
DEPRECATED HCO3 PLAS-SCNC: 30 MMOL/L (ref 22–29)
EGFRCR SERPLBLD CKD-EPI 2021: 57 ML/MIN/1.73M2
ERYTHROCYTE [DISTWIDTH] IN BLOOD BY AUTOMATED COUNT: 13.6 % (ref 10–15)
GLUCOSE SERPL-MCNC: 126 MG/DL (ref 70–99)
HCT VFR BLD AUTO: 31.5 % (ref 40–53)
HGB BLD-MCNC: 10.5 G/DL (ref 13.3–17.7)
MAGNESIUM SERPL-MCNC: 2.2 MG/DL (ref 1.7–2.3)
MCH RBC QN AUTO: 31.2 PG (ref 26.5–33)
MCHC RBC AUTO-ENTMCNC: 33.3 G/DL (ref 31.5–36.5)
MCV RBC AUTO: 94 FL (ref 78–100)
PHOSPHATE SERPL-MCNC: 2.7 MG/DL (ref 2.5–4.5)
PLATELET # BLD AUTO: 123 10E3/UL (ref 150–450)
POTASSIUM SERPL-SCNC: 4.2 MMOL/L (ref 3.4–5.3)
RBC # BLD AUTO: 3.37 10E6/UL (ref 4.4–5.9)
SODIUM SERPL-SCNC: 137 MMOL/L (ref 135–145)
WBC # BLD AUTO: 9.5 10E3/UL (ref 4–11)

## 2024-07-13 PROCEDURE — 36415 COLL VENOUS BLD VENIPUNCTURE: CPT | Performed by: NURSE PRACTITIONER

## 2024-07-13 PROCEDURE — 85027 COMPLETE CBC AUTOMATED: CPT | Performed by: NURSE PRACTITIONER

## 2024-07-13 PROCEDURE — 250N000013 HC RX MED GY IP 250 OP 250 PS 637: Performed by: PHYSICIAN ASSISTANT

## 2024-07-13 PROCEDURE — 250N000013 HC RX MED GY IP 250 OP 250 PS 637: Performed by: STUDENT IN AN ORGANIZED HEALTH CARE EDUCATION/TRAINING PROGRAM

## 2024-07-13 PROCEDURE — 36415 COLL VENOUS BLD VENIPUNCTURE: CPT | Performed by: PHYSICIAN ASSISTANT

## 2024-07-13 PROCEDURE — 250N000011 HC RX IP 250 OP 636: Performed by: PHYSICIAN ASSISTANT

## 2024-07-13 PROCEDURE — 97110 THERAPEUTIC EXERCISES: CPT | Mod: GP

## 2024-07-13 PROCEDURE — 250N000011 HC RX IP 250 OP 636: Performed by: NURSE PRACTITIONER

## 2024-07-13 PROCEDURE — 120N000013 HC R&B IMCU

## 2024-07-13 PROCEDURE — 250N000013 HC RX MED GY IP 250 OP 250 PS 637: Performed by: NURSE PRACTITIONER

## 2024-07-13 PROCEDURE — 83735 ASSAY OF MAGNESIUM: CPT | Performed by: NURSE PRACTITIONER

## 2024-07-13 PROCEDURE — 84100 ASSAY OF PHOSPHORUS: CPT | Performed by: STUDENT IN AN ORGANIZED HEALTH CARE EDUCATION/TRAINING PROGRAM

## 2024-07-13 PROCEDURE — 250N000011 HC RX IP 250 OP 636: Mod: JZ | Performed by: PHYSICIAN ASSISTANT

## 2024-07-13 PROCEDURE — 80048 BASIC METABOLIC PNL TOTAL CA: CPT | Performed by: NURSE PRACTITIONER

## 2024-07-13 PROCEDURE — 82330 ASSAY OF CALCIUM: CPT | Performed by: PHYSICIAN ASSISTANT

## 2024-07-13 PROCEDURE — 97530 THERAPEUTIC ACTIVITIES: CPT | Mod: GP

## 2024-07-13 RX ORDER — FUROSEMIDE 10 MG/ML
40 INJECTION INTRAMUSCULAR; INTRAVENOUS DAILY
Status: DISCONTINUED | OUTPATIENT
Start: 2024-07-14 | End: 2024-07-16 | Stop reason: HOSPADM

## 2024-07-13 RX ORDER — BISACODYL 10 MG
10 SUPPOSITORY, RECTAL RECTAL ONCE
Status: COMPLETED | OUTPATIENT
Start: 2024-07-13 | End: 2024-07-13

## 2024-07-13 RX ORDER — POLYETHYLENE GLYCOL 3350 17 G/17G
17 POWDER, FOR SOLUTION ORAL 2 TIMES DAILY
Status: DISCONTINUED | OUTPATIENT
Start: 2024-07-13 | End: 2024-07-16 | Stop reason: HOSPADM

## 2024-07-13 RX ADMIN — CALCIUM GLUCONATE 1 G: 20 INJECTION, SOLUTION INTRAVENOUS at 10:02

## 2024-07-13 RX ADMIN — POTASSIUM & SODIUM PHOSPHATES POWDER PACK 280-160-250 MG 1 PACKET: 280-160-250 PACK at 08:10

## 2024-07-13 RX ADMIN — HEPARIN SODIUM 5000 UNITS: 5000 INJECTION, SOLUTION INTRAVENOUS; SUBCUTANEOUS at 22:38

## 2024-07-13 RX ADMIN — HEPARIN SODIUM 5000 UNITS: 5000 INJECTION, SOLUTION INTRAVENOUS; SUBCUTANEOUS at 15:36

## 2024-07-13 RX ADMIN — POLYETHYLENE GLYCOL 3350 17 G: 17 POWDER, FOR SOLUTION ORAL at 08:09

## 2024-07-13 RX ADMIN — ASPIRIN 81 MG CHEWABLE TABLET 162 MG: 81 TABLET CHEWABLE at 08:10

## 2024-07-13 RX ADMIN — ROSUVASTATIN CALCIUM 40 MG: 20 TABLET, FILM COATED ORAL at 22:37

## 2024-07-13 RX ADMIN — FUROSEMIDE 40 MG: 10 INJECTION, SOLUTION INTRAMUSCULAR; INTRAVENOUS at 08:13

## 2024-07-13 RX ADMIN — METOPROLOL TARTRATE 12.5 MG: 25 TABLET, FILM COATED ORAL at 08:13

## 2024-07-13 RX ADMIN — HEPARIN SODIUM 5000 UNITS: 5000 INJECTION, SOLUTION INTRAVENOUS; SUBCUTANEOUS at 06:16

## 2024-07-13 RX ADMIN — PANTOPRAZOLE SODIUM 40 MG: 40 TABLET, DELAYED RELEASE ORAL at 08:13

## 2024-07-13 RX ADMIN — METHOCARBAMOL 500 MG: 500 TABLET ORAL at 08:13

## 2024-07-13 RX ADMIN — PRAMIPEXOLE DIHYDROCHLORIDE 0.25 MG: 0.25 TABLET ORAL at 22:37

## 2024-07-13 RX ADMIN — POTASSIUM & SODIUM PHOSPHATES POWDER PACK 280-160-250 MG 1 PACKET: 280-160-250 PACK at 12:49

## 2024-07-13 RX ADMIN — SENNOSIDES AND DOCUSATE SODIUM 1 TABLET: 50; 8.6 TABLET ORAL at 08:13

## 2024-07-13 RX ADMIN — ACETAMINOPHEN 975 MG: 325 TABLET, FILM COATED ORAL at 03:28

## 2024-07-13 RX ADMIN — TRAZODONE HYDROCHLORIDE 100 MG: 100 TABLET ORAL at 22:37

## 2024-07-13 RX ADMIN — GABAPENTIN 100 MG: 100 CAPSULE ORAL at 22:37

## 2024-07-13 RX ADMIN — METOPROLOL TARTRATE 12.5 MG: 25 TABLET, FILM COATED ORAL at 22:37

## 2024-07-13 RX ADMIN — ACETAMINOPHEN 975 MG: 325 TABLET, FILM COATED ORAL at 12:49

## 2024-07-13 ASSESSMENT — ACTIVITIES OF DAILY LIVING (ADL)
ADLS_ACUITY_SCORE: 29
ADLS_ACUITY_SCORE: 31
ADLS_ACUITY_SCORE: 29
ADLS_ACUITY_SCORE: 31
ADLS_ACUITY_SCORE: 29
ADLS_ACUITY_SCORE: 35
ADLS_ACUITY_SCORE: 29
ADLS_ACUITY_SCORE: 35
ADLS_ACUITY_SCORE: 35
ADLS_ACUITY_SCORE: 29
ADLS_ACUITY_SCORE: 29
ADLS_ACUITY_SCORE: 31
ADLS_ACUITY_SCORE: 34
ADLS_ACUITY_SCORE: 31
ADLS_ACUITY_SCORE: 35
ADLS_ACUITY_SCORE: 29
ADLS_ACUITY_SCORE: 34
ADLS_ACUITY_SCORE: 29
ADLS_ACUITY_SCORE: 35
ADLS_ACUITY_SCORE: 31

## 2024-07-13 NOTE — PROGRESS NOTES
[facility]  Cardiovascular and Thoracic Surgery Daily Note      Assessment and Plan  POD # 3 s/p Coronary artery bypass grafting x 3 (LIMA to LAD, vein to OM, vein to PDA), Ascending Aorta Aneurysm Repair with 30mm Hemashield graft on 7/10/24 with Dr. Mulvihill    - CVS: Pre-op TTE with EF 55-60%, mild biatrial enlargement. Postop vasoplegic shock, resolved. Lactic acidosis resolved. HD stable overnight in NSR. Hypervolemic, decrease lasix to 40 mg IV daily  mg BID, metoprolol tartrate 12.5 mg BID with hold parameters, resumed PTA rosuvastatin. Chest tubes: in place with serosanguinous output, no air leak, will consider remove today vs tomorrow with TPWs.     PTA cardiac meds on hold: Olmesartan 20mg daily    - Resp: Postoperative mechanical ventilation, resolved. Extubated at 0249 on POD#1. Saturating well on 2-5L, bipap at HS for BRITTNI. Continue to encourage IS, pulmonary toilet.    - Neuro: Neuros intact, pain controlled on current regimen. Resumed PTA mirapex (modified dose) and trazodone     - Renal: No history of significant renal disease. SAMANTHA, will monitor and trend BMP. Diuretic decreased as above.  Recent Labs   Lab 07/13/24  0543 07/12/24  0611 07/11/24  0502   CR 1.30* 1.24* 1.13       - GI: no BM, + flatus, continue bowel regimen    - : Voiding well on own    - Endo: Postop stress hyperglycemia, No hx DM. Insulin infusion with minimal needs. Transitioned to sliding scale insulin, now discontinued  Hemoglobin A1C   Date Value Ref Range Status   05/28/2024 5.7 (H) <5.7 % Final     Comment:     Normal <5.7%   Prediabetes 5.7-6.4%    Diabetes 6.5% or higher     Note: Adopted from ADA consensus guidelines.        - FEN: Replace electrolytes as needed. Tolerating diet    - ID: Postop leukocytosis, resolved. Afebrile. Periop abx prophylaxis completed. Trend CBC and fever curve.   Recent Labs   Lab 07/13/24  0543 07/12/24  0611 07/11/24  0502   WBC 9.5 10.6 10.6       - Heme: Acute blood  "loss anemia and thrombocytopenia due to surgery. Hgb and PLT stable. Trend CBC, transfuse PRN.   Recent Labs   Lab 07/13/24  0543 07/12/24  0611 07/11/24  0502   HGB 10.5* 10.3* 10.9*   * 112* 146*       - Proph: SCD, subcutaneous heparin, PPI    - Other:        # Hypocalcemia: Lowest iCa = 4.3 mg/dL in last 2 days, will monitor and replace as appropriate     # Hypoalbuminemia: Lowest albumin = 3.3 g/dL at 7/10/2024  6:02 PM, will monitor as appropriate     # Thrombocytopenia: Lowest platelets = 112 in last 2 days, will monitor for bleeding             #Precipitous drop in Hgb/Hct: Lowest Hgb this hospitalization: 9.2 g/dL. Will continue to monitor and treat/transfuse as appropriate.     # Obesity: Estimated body mass index is 37.7 kg/m  as calculated from the following:    Height as of this encounter: 1.689 m (5' 6.5\").    Weight as of this encounter: 107.5 kg (237 lb 1.6 oz)., PRESENT ON ADMISSION      # History of CABG: noted on surgical history       - Dispo: Continue on [address]. Continue mobility, therapies and pulm hygiene. Medically Ready for Discharge: Anticipated in 2-4 Days, dispo pending return of bowel and bladder function, removal of chest tubes, wean off oxygen and initiation of guideline medications.      Interval History  No acute events overnight. States pain is well managed on current regimen, slept well. Breathing is stable on NC, working with IS. Tolerating diet, is passing flatus, no BM, no n/v. Denies chest pain, palpitations, dizziness, syncopal symptoms, chills, myalgias, sternal popping/clicking.      Medications  Current Facility-Administered Medications   Medication Dose Route Frequency Provider Last Rate Last Admin    acetaminophen (TYLENOL) tablet 975 mg  975 mg Oral Q8H Mulvihill, Michael, MD   975 mg at 07/13/24 0328    aspirin (ASA) chewable tablet 162 mg  162 mg Oral or NG Tube Daily Shalini Walker PA-C   162 mg at 07/13/24 0810    brimonidine (ALPHAGAN) 0.2 % ophthalmic " solution 1 drop  1 drop Left Eye BID Stephanie Hanson NP        furosemide (LASIX) injection 40 mg  40 mg Intravenous BID Stephanie Hanson NP   40 mg at 07/13/24 0813    gabapentin (NEURONTIN) capsule 100 mg  100 mg Oral At Bedtime Shalini Walker PA-C   100 mg at 07/12/24 2119    heparin ANTICOAGULANT injection 5,000 Units  5,000 Units Subcutaneous Q8H Shalini Walker PA-C   5,000 Units at 07/13/24 0616    metoprolol tartrate (LOPRESSOR) half-tab 12.5 mg  12.5 mg Oral BID Stephanie Hanson NP   12.5 mg at 07/13/24 0813    pantoprazole (PROTONIX) 2 mg/mL suspension 40 mg  40 mg Oral or NG Tube Daily Shalini Walker PA-C   40 mg at 07/10/24 1846    Or    pantoprazole (PROTONIX) EC tablet 40 mg  40 mg Oral Daily Shalini Walker PA-C   40 mg at 07/13/24 0813    polyethylene glycol (MIRALAX) Packet 17 g  17 g Oral Daily Shalini Walker PA-C   17 g at 07/13/24 0809    potassium & sodium phosphates (NEUTRA-PHOS) Packet 1 packet  1 packet Oral or Feeding Tube Q4H Mulvihill, Michael, MD   1 packet at 07/13/24 0810    pramipexole (MIRAPEX) tablet 0.25 mg  0.25 mg Oral At Bedtime Stephanie Hanson NP   0.25 mg at 07/12/24 2120    rosuvastatin (CRESTOR) tablet 40 mg  40 mg Oral At Bedtime Shalini Walker PA-C   40 mg at 07/12/24 2119    senna-docusate (SENOKOT-S/PERICOLACE) 8.6-50 MG per tablet 1 tablet  1 tablet Oral BID Shalini Wlaker PA-C   1 tablet at 07/13/24 0813    sodium chloride (PF) 0.9% PF flush 3 mL  3 mL Intracatheter Q8H Shalini Walker PA-C   3 mL at 07/13/24 0810    traZODone (DESYREL) tablet 100 mg  100 mg Oral At Bedtime Stephanie Hanson NP   100 mg at 07/12/24 2120     Current Facility-Administered Medications   Medication Dose Route Frequency Provider Last Rate Last Admin    acetaminophen (TYLENOL) tablet 650 mg  650 mg Oral Q4H PRN Shalini Walker PA-C        bisacodyl (DULCOLAX) suppository 10 mg  10 mg Rectal Daily PRN Shalini Walker PA-C        calcium gluconate 1 g in 50 mL in  sodium chloride intermittent infusion  1 g Intravenous Q6H PRN Shalini Walker PA-C   1 g at 07/13/24 1002    calcium gluconate 2 g in  mL intermittent infusion  2 g Intravenous Q6H PRN Shalini Walker PA-C        calcium gluconate 3 g in sodium chloride 0.9 % 100 mL intermittent infusion  3 g Intravenous Q6H PRN Shalini Walker PA-C        glucose gel 15-30 g  15-30 g Oral Q15 Min PRN Shalini Walker PA-C        Or    dextrose 50 % injection 25-50 mL  25-50 mL Intravenous Q15 Min PRN Shalini Walker PA-C        Or    glucagon injection 1 mg  1 mg Subcutaneous Q15 Min PRN Shalini Walker PA-C        furosemide (LASIX) injection 20 mg  20 mg Intravenous Once PRN Shalini Walker PA-C        hydrALAZINE (APRESOLINE) injection 10 mg  10 mg Intravenous Q30 Min PRN Shalini Walker PA-C        HYDROmorphone (DILAUDID) injection 0.2 mg  0.2 mg Intravenous Q2H PRN Shalini Walker PA-C        Or    HYDROmorphone (DILAUDID) injection 0.4 mg  0.4 mg Intravenous Q2H PRN Shalini Walker PA-C   0.4 mg at 07/11/24 1323    lidocaine (LMX4) cream   Topical Q1H PRN Shalini Walker PA-C        lidocaine 1 % 0.1-1 mL  0.1-1 mL Other Q1H PRN Shalini Walker PA-C        magnesium hydroxide (MILK OF MAGNESIA) suspension 30 mL  30 mL Oral Daily PRN Shalini Walker PA-C        methocarbamol (ROBAXIN) tablet 500 mg  500 mg Oral Q6H PRN Shalini Walker PA-C   500 mg at 07/13/24 0813    naloxone (NARCAN) injection 0.2 mg  0.2 mg Intravenous Q2 Min PRShalini Rico PA-C        Or    naloxone (NARCAN) injection 0.4 mg  0.4 mg Intravenous Q2 Min PRN Shalini Walker PA-C        Or    naloxone (NARCAN) injection 0.2 mg  0.2 mg Intramuscular Q2 Min PRN Shalini Walker PA-C        Or    naloxone (NARCAN) injection 0.4 mg  0.4 mg Intramuscular Q2 Min PRN Shalini Walker PA-C        ondansetron (ZOFRAN ODT) ODT tab 4 mg  4 mg Oral Q6H PRN Shalini Walker PA-C        Or    ondansetron  "(ZOFRAN) injection 4 mg  4 mg Intravenous Q6H PRN Shalini Walker PA-C        oxyCODONE (ROXICODONE) tablet 5 mg  5 mg Oral Q4H PRN Shalini Walker PA-C   5 mg at 07/11/24 2303    Or    oxyCODONE (ROXICODONE) tablet 10 mg  10 mg Oral Q4H PRN Shalini Walker PA-C   10 mg at 07/11/24 1514    prochlorperazine (COMPAZINE) injection 5 mg  5 mg Intravenous Q6H PRN Shalini Walker PA-C        Or    prochlorperazine (COMPAZINE) tablet 5 mg  5 mg Oral Q6H PRShalini Rico PA-C        Reason beta blocker order not selected   Does not apply DOES NOT GO TO Shalini Peralta PA-C        sodium chloride (PF) 0.9% PF flush 3 mL  3 mL Intracatheter q1 min prShalini Rico PA-C             Physical Exam  Vitals were reviewed  Blood pressure 115/64, pulse 78, temperature 98.2  F (36.8  C), temperature source Oral, resp. rate 17, height 1.689 m (5' 6.5\"), weight 107.5 kg (237 lb 1.6 oz), SpO2 92%.  Rhythm: NSR    Lungs: diminished throughout, +O2    Cardiovascular: S1, S2, RRR, no m/r/g    Abdomen: soft, NT, ND, +BS    Extremeties: warm, 2+ BLE and BUE edema    Incision: CDI    CT: serosang output, no air leak    Weight:   Vitals:    07/10/24 1800 07/11/24 0215 07/11/24 1821 07/12/24 0500   Weight: 112.1 kg (247 lb 2.2 oz) 114.7 kg (252 lb 13.9 oz) 113.4 kg (250 lb 1.6 oz) 112.4 kg (247 lb 14.4 oz)    07/13/24 0638   Weight: 107.5 kg (237 lb 1.6 oz)         Data  Recent Labs   Lab 07/13/24  0543 07/12/24  2229 07/12/24  1208 07/12/24  0743 07/12/24  0611 07/11/24  0505 07/11/24  0502 07/10/24  1851 07/10/24  1802 07/10/24  1727 07/10/24  1558 07/10/24  1558   WBC 9.5  --   --   --  10.6  --  10.6  --   --  18.2*  --  16.4*   HGB 10.5*  --   --   --  10.3*  --  10.9*   < >  --  12.1*  --  9.8*  9.8*   MCV 94  --   --   --  93  --  91  --   --  93  --  93   *  --   --   --  112*  --  146*  --   --  144*  --  133*   INR  --   --   --   --   --   --   --   --   --  1.47*  --  1.77*     --   --   " --  137  --  140  --  143 142  --  141  142   POTASSIUM 4.2  --   --   --  4.1  --  4.4  --  4.0 3.8  --  3.8  3.9   CHLORIDE 100  --   --   --  102  --  105  --  108* 106  --  107   CO2 30*  --   --   --  24  --  28  --  17* 21*  --  23   BUN 22.4  --   --   --  17.1  --  16.6  --  13.7 14.2  --  14.7   CR 1.30*  --   --   --  1.24*  --  1.13  --  1.04 1.06  --  1.02   ANIONGAP 7  --   --   --  11  --  7  --  18* 15  --  11   NITHIN 8.3*  --   --   --  8.5*  --  8.1*  --  8.0* 7.8*  --  7.9*   * 127* 122*   < > 132*   < > 118*   < > 185* 180*  --  189*  198*   ALBUMIN  --   --   --   --   --   --  3.7  --  3.3* 3.4*   < >  --    PROTTOTAL  --   --   --   --   --   --  5.2*  --  5.2* 5.0*   < >  --    BILITOTAL  --   --   --   --   --   --  0.5  --  0.7 0.6   < >  --    ALKPHOS  --   --   --   --   --   --  44  --  56 54   < >  --    ALT  --   --   --   --   --   --  38  --  44 45   < >  --    AST  --   --   --   --   --   --  65*  --  70*  --   --   --     < > = values in this interval not displayed.       Imaging:  No results found for this or any previous visit (from the past 24 hour(s)).        Patient seen and discussed with Dr. Yudith Hanson, DEANNA, [facility]-, CCRN  Nurse Practitioner  Cardiothoracic Surgery  Pager: [phone]    CV Surgery Rounding Pager: [phone]  After hours please page surgeon on-call

## 2024-07-13 NOTE — PLAN OF CARE
[id number]:    ORIENTATION/NEURO: A/o x 4, neuro intact  VITALS/TELE: VSS, tele NSR  PAIN: Sternal incision pain increases with activity. Scheduled tylenol effective.  RESP: 4L NC, LS clear/dim  DIET: Regular diet, tolerating well  GI/: Voiding adequately in urinal. BM x 1  LINES/DRAINS: Chest tubes removed at 1145. L PIV SL  LABS: Phos replaced per protocol. K, Mg, Phos rechecks in AM. Ionized calcium replaced x 1.  SKIN: Surgical incisions WDL: sternum, RLE harvest sites x 2. Old CT sites covered with dry gauze  ASSIST/AMBULATION: Ax1, GB  FALL RISK: Yes  PLAN/DISPOSITION: CT removed, plan for CXR in AM.  Encourage ambulation and resp toileting. Wean O2 as able.        Goal Outcome Evaluation:      Plan of Care Reviewed With: patient, spouse    Overall Patient Progress: improving

## 2024-07-13 NOTE — PLAN OF CARE
Pt A&O x4. VS WDL on 4L NC while awake and 5L with CPAP while sleeping. Pain managed using scheduled Tylenol and prn Robaxin. Needs encouragement to perform IS/flutter. Tele shows SR with PVCs. CT 3:1 to suction with no airleak noted. 80 ml serosanguineous output during the shift. Midline sternal incision WDL. Femoral harvest site with ecchymosis and some tenderness; no palpable hematoma. All other harvest sites WDL. Neurovascular checks intact. No numbness, pain, or tingling noted; pulses palpable. Pacer wires capped with pacer at bedside. BS+, BM-, passing gas. PIV SL. Voiding adequately. Pt resting between cares.    Plan: Possible removal of CT; Encourage IS/flutter and ambulation; Wean O2 as able

## 2024-07-14 ENCOUNTER — APPOINTMENT (OUTPATIENT)
Dept: GENERAL RADIOLOGY | Facility: CLINIC | Age: 79
DRG: 235 | End: 2024-07-14
Attending: NURSE PRACTITIONER
Payer: COMMERCIAL

## 2024-07-14 ENCOUNTER — APPOINTMENT (OUTPATIENT)
Dept: PHYSICAL THERAPY | Facility: CLINIC | Age: 79
DRG: 235 | End: 2024-07-14
Attending: STUDENT IN AN ORGANIZED HEALTH CARE EDUCATION/TRAINING PROGRAM
Payer: COMMERCIAL

## 2024-07-14 LAB
ANION GAP SERPL CALCULATED.3IONS-SCNC: 6 MMOL/L (ref 7–15)
BUN SERPL-MCNC: 25.9 MG/DL (ref 8–23)
CA-I BLD-MCNC: 4.4 MG/DL (ref 4.4–5.2)
CALCIUM SERPL-MCNC: 8.4 MG/DL (ref 8.8–10.2)
CHLORIDE SERPL-SCNC: 102 MMOL/L (ref 98–107)
CREAT SERPL-MCNC: 1.22 MG/DL (ref 0.67–1.17)
DEPRECATED HCO3 PLAS-SCNC: 30 MMOL/L (ref 22–29)
EGFRCR SERPLBLD CKD-EPI 2021: 61 ML/MIN/1.73M2
ERYTHROCYTE [DISTWIDTH] IN BLOOD BY AUTOMATED COUNT: 13.8 % (ref 10–15)
GLUCOSE SERPL-MCNC: 125 MG/DL (ref 70–99)
HCT VFR BLD AUTO: 29.7 % (ref 40–53)
HGB BLD-MCNC: 9.9 G/DL (ref 13.3–17.7)
MAGNESIUM SERPL-MCNC: 2.2 MG/DL (ref 1.7–2.3)
MCH RBC QN AUTO: 31.2 PG (ref 26.5–33)
MCHC RBC AUTO-ENTMCNC: 33.3 G/DL (ref 31.5–36.5)
MCV RBC AUTO: 94 FL (ref 78–100)
PHOSPHATE SERPL-MCNC: 2.7 MG/DL (ref 2.5–4.5)
PLATELET # BLD AUTO: 134 10E3/UL (ref 150–450)
POTASSIUM SERPL-SCNC: 3.8 MMOL/L (ref 3.4–5.3)
RBC # BLD AUTO: 3.17 10E6/UL (ref 4.4–5.9)
SODIUM SERPL-SCNC: 138 MMOL/L (ref 135–145)
WBC # BLD AUTO: 6.7 10E3/UL (ref 4–11)

## 2024-07-14 PROCEDURE — 85027 COMPLETE CBC AUTOMATED: CPT | Performed by: NURSE PRACTITIONER

## 2024-07-14 PROCEDURE — 83735 ASSAY OF MAGNESIUM: CPT | Performed by: NURSE PRACTITIONER

## 2024-07-14 PROCEDURE — 93010 ELECTROCARDIOGRAM REPORT: CPT | Performed by: INTERNAL MEDICINE

## 2024-07-14 PROCEDURE — 36415 COLL VENOUS BLD VENIPUNCTURE: CPT | Performed by: NURSE PRACTITIONER

## 2024-07-14 PROCEDURE — 250N000011 HC RX IP 250 OP 636: Performed by: NURSE PRACTITIONER

## 2024-07-14 PROCEDURE — 93005 ELECTROCARDIOGRAM TRACING: CPT

## 2024-07-14 PROCEDURE — 84100 ASSAY OF PHOSPHORUS: CPT | Performed by: STUDENT IN AN ORGANIZED HEALTH CARE EDUCATION/TRAINING PROGRAM

## 2024-07-14 PROCEDURE — 82330 ASSAY OF CALCIUM: CPT | Performed by: PHYSICIAN ASSISTANT

## 2024-07-14 PROCEDURE — 71046 X-RAY EXAM CHEST 2 VIEWS: CPT

## 2024-07-14 PROCEDURE — 250N000011 HC RX IP 250 OP 636: Performed by: PHYSICIAN ASSISTANT

## 2024-07-14 PROCEDURE — 120N000001 HC R&B MED SURG/OB

## 2024-07-14 PROCEDURE — 250N000013 HC RX MED GY IP 250 OP 250 PS 637: Performed by: PHYSICIAN ASSISTANT

## 2024-07-14 PROCEDURE — 250N000013 HC RX MED GY IP 250 OP 250 PS 637: Performed by: NURSE PRACTITIONER

## 2024-07-14 PROCEDURE — 250N000013 HC RX MED GY IP 250 OP 250 PS 637: Performed by: STUDENT IN AN ORGANIZED HEALTH CARE EDUCATION/TRAINING PROGRAM

## 2024-07-14 PROCEDURE — 80048 BASIC METABOLIC PNL TOTAL CA: CPT | Performed by: NURSE PRACTITIONER

## 2024-07-14 PROCEDURE — 97110 THERAPEUTIC EXERCISES: CPT | Mod: GP

## 2024-07-14 PROCEDURE — 999N000127 HC STATISTIC PERIPHERAL IV START W US GUIDANCE

## 2024-07-14 RX ORDER — METOPROLOL SUCCINATE 25 MG/1
25 TABLET, EXTENDED RELEASE ORAL DAILY
Status: DISCONTINUED | OUTPATIENT
Start: 2024-07-14 | End: 2024-07-14

## 2024-07-14 RX ORDER — POTASSIUM CHLORIDE 1500 MG/1
20 TABLET, EXTENDED RELEASE ORAL ONCE
Status: COMPLETED | OUTPATIENT
Start: 2024-07-14 | End: 2024-07-14

## 2024-07-14 RX ORDER — BRIMONIDINE TARTRATE 2 MG/ML
1 SOLUTION/ DROPS OPHTHALMIC 2 TIMES DAILY PRN
Status: DISCONTINUED | OUTPATIENT
Start: 2024-07-14 | End: 2024-07-16 | Stop reason: HOSPADM

## 2024-07-14 RX ADMIN — FUROSEMIDE 40 MG: 10 INJECTION, SOLUTION INTRAMUSCULAR; INTRAVENOUS at 08:39

## 2024-07-14 RX ADMIN — PANTOPRAZOLE SODIUM 40 MG: 40 TABLET, DELAYED RELEASE ORAL at 08:40

## 2024-07-14 RX ADMIN — GABAPENTIN 100 MG: 100 CAPSULE ORAL at 22:04

## 2024-07-14 RX ADMIN — OXYCODONE HYDROCHLORIDE 5 MG: 5 TABLET ORAL at 22:03

## 2024-07-14 RX ADMIN — METHOCARBAMOL 500 MG: 500 TABLET ORAL at 06:59

## 2024-07-14 RX ADMIN — TRAZODONE HYDROCHLORIDE 100 MG: 100 TABLET ORAL at 22:05

## 2024-07-14 RX ADMIN — HEPARIN SODIUM 5000 UNITS: 5000 INJECTION, SOLUTION INTRAVENOUS; SUBCUTANEOUS at 13:03

## 2024-07-14 RX ADMIN — HEPARIN SODIUM 5000 UNITS: 5000 INJECTION, SOLUTION INTRAVENOUS; SUBCUTANEOUS at 06:04

## 2024-07-14 RX ADMIN — POTASSIUM CHLORIDE 20 MEQ: 1500 TABLET, EXTENDED RELEASE ORAL at 08:40

## 2024-07-14 RX ADMIN — POTASSIUM & SODIUM PHOSPHATES POWDER PACK 280-160-250 MG 1 PACKET: 280-160-250 PACK at 08:38

## 2024-07-14 RX ADMIN — METHOCARBAMOL 500 MG: 500 TABLET ORAL at 13:03

## 2024-07-14 RX ADMIN — METOPROLOL SUCCINATE 37.5 MG: 25 TABLET, EXTENDED RELEASE ORAL at 08:39

## 2024-07-14 RX ADMIN — HEPARIN SODIUM 5000 UNITS: 5000 INJECTION, SOLUTION INTRAVENOUS; SUBCUTANEOUS at 22:03

## 2024-07-14 RX ADMIN — PRAMIPEXOLE DIHYDROCHLORIDE 0.25 MG: 0.25 TABLET ORAL at 22:05

## 2024-07-14 RX ADMIN — METHOCARBAMOL 500 MG: 500 TABLET ORAL at 19:55

## 2024-07-14 RX ADMIN — SENNOSIDES AND DOCUSATE SODIUM 1 TABLET: 50; 8.6 TABLET ORAL at 22:04

## 2024-07-14 RX ADMIN — ASPIRIN 81 MG CHEWABLE TABLET 162 MG: 81 TABLET CHEWABLE at 08:40

## 2024-07-14 RX ADMIN — OXYCODONE HYDROCHLORIDE 5 MG: 5 TABLET ORAL at 12:02

## 2024-07-14 RX ADMIN — POTASSIUM & SODIUM PHOSPHATES POWDER PACK 280-160-250 MG 1 PACKET: 280-160-250 PACK at 12:02

## 2024-07-14 RX ADMIN — ROSUVASTATIN CALCIUM 40 MG: 20 TABLET, FILM COATED ORAL at 22:05

## 2024-07-14 RX ADMIN — POLYETHYLENE GLYCOL 3350 17 G: 17 POWDER, FOR SOLUTION ORAL at 22:05

## 2024-07-14 RX ADMIN — OXYCODONE HYDROCHLORIDE 10 MG: 5 TABLET ORAL at 16:02

## 2024-07-14 ASSESSMENT — ACTIVITIES OF DAILY LIVING (ADL)
ADLS_ACUITY_SCORE: 28
ADLS_ACUITY_SCORE: 28
ADLS_ACUITY_SCORE: 29
ADLS_ACUITY_SCORE: 28
ADLS_ACUITY_SCORE: 29
ADLS_ACUITY_SCORE: 28
ADLS_ACUITY_SCORE: 29
ADLS_ACUITY_SCORE: 28
ADLS_ACUITY_SCORE: 29
ADLS_ACUITY_SCORE: 28
ADLS_ACUITY_SCORE: 29
ADLS_ACUITY_SCORE: 28
ADLS_ACUITY_SCORE: 29
ADLS_ACUITY_SCORE: 28

## 2024-07-14 NOTE — PLAN OF CARE
Pt A&O x4. VS WDL on 4L NC. Pt refusing CPAP last night. Pain managed using scheduled medications only. Tele shows SR. Midline sternal incision and harvest sites WDL. UTV CT removal sites, though dressings CDI. Ecchymosis to R posterior thigh. CMS intact. Voiding adequately. Pt transferring with SBA and is steady. Doing well with following sternal precautions. Resting between cares.    Plan: Encourage IS and ambulation; Wean O2 as tolerated

## 2024-07-14 NOTE — PROGRESS NOTES
[facility]  Cardiovascular and Thoracic Surgery Daily Note      Assessment and Plan  POD # 4 s/p Coronary artery bypass grafting x 3 (LIMA to LAD, vein to OM, vein to PDA), Ascending Aorta Aneurysm Repair with 30mm Hemashield graft on 7/10/24 with Dr. Mulvihill    - CVS: Pre-op TTE with EF 55-60%, mild biatrial enlargement. Postop vasoplegic shock, resolved. Lactic acidosis resolved. HD stable overnight in NSR. Hypervolemic, continue lasix to 40 mg IV daily,  mg BID, Toprol 37.5 mg daily, resumed PTA rosuvastatin. Chest tubes/TPWs removed 7/13, CXR stable.    PTA cardiac meds on hold: Olmesartan 20mg daily    - Resp: Postoperative mechanical ventilation, resolved. Extubated at 0249 on POD#1. Saturating well on 4L, wean as able; bipap at HS for BRITTNI. Continue to encourage IS, pulmonary toilet.    - Neuro: Neuros intact, pain controlled on current regimen. Resumed PTA mirapex (modified dose) and trazodone     - Renal: No history of significant renal disease. SAMANTHA improving, will monitor and trend BMP. Diuretic decreased as above.  Recent Labs   Lab 07/14/24  0539 07/13/24  0543 07/12/24  0611   CR 1.22* 1.30* 1.24*       - GI: + BM, + flatus, continue bowel regimen    - : Voiding well on own    - Endo: Postop stress hyperglycemia, No hx DM. Insulin infusion with minimal needs. Transitioned to sliding scale insulin now discontinued  Hemoglobin A1C   Date Value Ref Range Status   05/28/2024 5.7 (H) <5.7 % Final     Comment:     Normal <5.7%   Prediabetes 5.7-6.4%    Diabetes 6.5% or higher     Note: Adopted from ADA consensus guidelines.        - FEN: Replace electrolytes as needed. Tolerating diet    - ID: Postop leukocytosis, resolved. Afebrile. Periop abx prophylaxis completed. Trend CBC and fever curve.   Recent Labs   Lab 07/14/24  0539 07/13/24  0543 07/12/24  0611   WBC 6.7 9.5 10.6       - Heme: Acute blood loss anemia and thrombocytopenia due to surgery. Hgb and PLT stable. Trend CBC,  "transfuse PRN.   Recent Labs   Lab 07/14/24  0539 07/13/24  0543 07/12/24  0611   HGB 9.9* 10.5* 10.3*   * 123* 112*       - Proph: SCD, subcutaneous heparin, PPI    - Other:        # Hypocalcemia: Lowest iCa = 4.3 mg/dL in last 2 days, will monitor and replace as appropriate     # Hypoalbuminemia: Lowest albumin = 3.3 g/dL at 7/10/2024  6:02 PM, will monitor as appropriate     # Thrombocytopenia: Lowest platelets = 123 in last 2 days, will monitor for bleeding             #Precipitous drop in Hgb/Hct: Lowest Hgb this hospitalization: 9.2 g/dL. Will continue to monitor and treat/transfuse as appropriate.     # Obesity: Estimated body mass index is 37.22 kg/m  as calculated from the following:    Height as of this encounter: 1.689 m (5' 6.5\").    Weight as of this encounter: 106.2 kg (234 lb 1.6 oz).       # History of CABG: noted on surgical history       - Dispo: Continue on [address]. Continue mobility, therapies and pulm hygiene. Medically Ready for Discharge: Anticipated in 2-4 Days, dispo pending wean of oxygen    Interval History  No acute events overnight. States pain is well managed on current regimen, slept well. Breathing is stable on NC, working with IS. Tolerating diet, is passing flatus, + BM, no n/v. Denies chest pain, palpitations, dizziness, syncopal symptoms, chills, myalgias, sternal popping/clicking.      Medications  Current Facility-Administered Medications   Medication Dose Route Frequency Provider Last Rate Last Admin    aspirin (ASA) chewable tablet 162 mg  162 mg Oral or NG Tube Daily Shalini Walker PA-C   162 mg at 07/13/24 0810    furosemide (LASIX) injection 40 mg  40 mg Intravenous Daily Stephanie Hanson, NP        gabapentin (NEURONTIN) capsule 100 mg  100 mg Oral At Bedtime Shalini Walker PA-C   100 mg at 07/13/24 2237    heparin ANTICOAGULANT injection 5,000 Units  5,000 Units Subcutaneous Q8H Shalini Walker PA-C   5,000 Units at 07/14/24 0604    metoprolol succinate ER " (TOPROL-XL) 24 hr half-tab 37.5 mg  37.5 mg Oral Daily Stephanie Hanson NP        pantoprazole (PROTONIX) 2 mg/mL suspension 40 mg  40 mg Oral or NG Tube Daily Shalini Walker PA-C   40 mg at 07/10/24 1846    Or    pantoprazole (PROTONIX) EC tablet 40 mg  40 mg Oral Daily Shalini Walker PA-C   40 mg at 07/13/24 0813    polyethylene glycol (MIRALAX) Packet 17 g  17 g Oral BID Stephanie Hanson NP        potassium & sodium phosphates (NEUTRA-PHOS) Packet 1 packet  1 packet Oral or Feeding Tube Q4H Mulvihill, Michael, MD        potassium chloride ginette ER (KLOR-CON M20) CR tablet 20 mEq  20 mEq Oral Once Mulvihill, Michael, MD        pramipexole (MIRAPEX) tablet 0.25 mg  0.25 mg Oral At Bedtime Stephanie Hanson NP   0.25 mg at 07/13/24 2237    rosuvastatin (CRESTOR) tablet 40 mg  40 mg Oral At Bedtime Shalini Walker PA-C   40 mg at 07/13/24 2237    senna-docusate (SENOKOT-S/PERICOLACE) 8.6-50 MG per tablet 1 tablet  1 tablet Oral BID Shalini Walker PA-C   1 tablet at 07/13/24 0813    sodium chloride (PF) 0.9% PF flush 3 mL  3 mL Intracatheter Q8H Shalini Walker PA-C   3 mL at 07/14/24 0604    traZODone (DESYREL) tablet 100 mg  100 mg Oral At Bedtime Stephanie Hanson NP   100 mg at 07/13/24 2237     Current Facility-Administered Medications   Medication Dose Route Frequency Provider Last Rate Last Admin    acetaminophen (TYLENOL) tablet 650 mg  650 mg Oral Q4H PRN Shalini Walker PA-C        bisacodyl (DULCOLAX) suppository 10 mg  10 mg Rectal Daily PRN Shalini Walker PA-C        brimonidine (ALPHAGAN) 0.2 % ophthalmic solution 1 drop  1 drop Left Eye BID PRN Stephanie Hanson NP        calcium gluconate 1 g in 50 mL in sodium chloride intermittent infusion  1 g Intravenous Q6H PRN Shalini Walker PA-C   1 g at 07/13/24 1002    calcium gluconate 2 g in  mL intermittent infusion  2 g Intravenous Q6H PRN Shalini Walker PA-C        calcium gluconate 3 g in sodium chloride 0.9 % 100 mL  intermittent infusion  3 g Intravenous Q6H PRN Shalini Walker PA-C        glucose gel 15-30 g  15-30 g Oral Q15 Min PRN Shalini Walker PA-C        Or    dextrose 50 % injection 25-50 mL  25-50 mL Intravenous Q15 Min PRN Shalini Walker PA-C        Or    glucagon injection 1 mg  1 mg Subcutaneous Q15 Min PRN Shalini Walker PA-C        furosemide (LASIX) injection 20 mg  20 mg Intravenous Once PRN Shalini Walker PA-C        hydrALAZINE (APRESOLINE) injection 10 mg  10 mg Intravenous Q30 Min PRN Shalini Walker PA-C        HYDROmorphone (DILAUDID) injection 0.2 mg  0.2 mg Intravenous Q2H PRN Shalini Walker PA-C        Or    HYDROmorphone (DILAUDID) injection 0.4 mg  0.4 mg Intravenous Q2H PRN Shalini Walker PA-C   0.4 mg at 07/11/24 1323    lidocaine (LMX4) cream   Topical Q1H PRN Shalini Walker PA-C        lidocaine 1 % 0.1-1 mL  0.1-1 mL Other Q1H PRN Shalini Walker PA-C        magnesium hydroxide (MILK OF MAGNESIA) suspension 30 mL  30 mL Oral Daily PRN Shalini Walker PA-C        methocarbamol (ROBAXIN) tablet 500 mg  500 mg Oral Q6H PRN Shalini Walker PA-C   500 mg at 07/14/24 0659    naloxone (NARCAN) injection 0.2 mg  0.2 mg Intravenous Q2 Min PRShalini Rico PA-C        Or    naloxone (NARCAN) injection 0.4 mg  0.4 mg Intravenous Q2 Min PRN Shalini Walker PA-C        Or    naloxone (NARCAN) injection 0.2 mg  0.2 mg Intramuscular Q2 Min PRShalini Rico PA-C        Or    naloxone (NARCAN) injection 0.4 mg  0.4 mg Intramuscular Q2 Min PRN Shalini Walker PA-C        ondansetron (ZOFRAN ODT) ODT tab 4 mg  4 mg Oral Q6H PRN Shalini Walker PA-C        Or    ondansetron (ZOFRAN) injection 4 mg  4 mg Intravenous Q6H PRN Shalini Walker PA-C        oxyCODONE (ROXICODONE) tablet 5 mg  5 mg Oral Q4H PRN Shalini Walker PA-C   5 mg at 07/11/24 2303    Or    oxyCODONE (ROXICODONE) tablet 10 mg  10 mg Oral Q4H PRN Shalini Walker PA-C   10 mg at  "07/11/24 1514    prochlorperazine (COMPAZINE) injection 5 mg  5 mg Intravenous Q6H PRN Shalini Walker PA-C        Or    prochlorperazine (COMPAZINE) tablet 5 mg  5 mg Oral Q6H PRN Shalini Walker PA-C        Reason beta blocker order not selected   Does not apply DOES NOT GO TO Shalini Peralta PA-C        sodium chloride (PF) 0.9% PF flush 3 mL  3 mL Intracatheter q1 min prn Shalini Walker PA-C             Physical Exam  Vitals were reviewed  Blood pressure 133/64, pulse 95, temperature 99  F (37.2  C), temperature source Oral, resp. rate 19, height 1.689 m (5' 6.5\"), weight 106.2 kg (234 lb 1.6 oz), SpO2 94%.  Rhythm: NSR    Lungs: diminished throughout, +O2    Cardiovascular: S1, S2, RRR, no m/r/g    Abdomen: soft, NT, ND, +BS    Extremeties: warm, 2+ BLE and BUE edema    Incision: CDI    CT: N/A    Weight:   Vitals:    07/11/24 0215 07/11/24 1821 07/12/24 0500 07/13/24 0638   Weight: 114.7 kg (252 lb 13.9 oz) 113.4 kg (250 lb 1.6 oz) 112.4 kg (247 lb 14.4 oz) 107.5 kg (237 lb 1.6 oz)    07/14/24 0627   Weight: 106.2 kg (234 lb 1.6 oz)         Data  Recent Labs   Lab 07/14/24  0539 07/13/24  0543 07/12/24  2229 07/12/24  0743 07/12/24  0611 07/11/24  0505 07/11/24  0502 07/10/24  1851 07/10/24  1802 07/10/24  1727 07/10/24  1558 07/10/24  1558   WBC 6.7 9.5  --   --  10.6  --  10.6  --   --  18.2*  --  16.4*   HGB 9.9* 10.5*  --   --  10.3*  --  10.9*   < >  --  12.1*  --  9.8*  9.8*   MCV 94 94  --   --  93  --  91  --   --  93  --  93   * 123*  --   --  112*  --  146*  --   --  144*  --  133*   INR  --   --   --   --   --   --   --   --   --  1.47*  --  1.77*    137  --   --  137  --  140  --  143 142  --  141  142   POTASSIUM 3.8 4.2  --   --  4.1  --  4.4  --  4.0 3.8  --  3.8  3.9   CHLORIDE 102 100  --   --  102  --  105  --  108* 106  --  107   CO2 30* 30*  --   --  24  --  28  --  17* 21*  --  23   BUN 25.9* 22.4  --   --  17.1  --  16.6  --  13.7 14.2  --  14.7   CR 1.22* " 1.30*  --   --  1.24*  --  1.13  --  1.04 1.06  --  1.02   ANIONGAP 6* 7  --   --  11  --  7  --  18* 15  --  11   NITHIN 8.4* 8.3*  --   --  8.5*  --  8.1*  --  8.0* 7.8*  --  7.9*   * 126* 127*   < > 132*   < > 118*   < > 185* 180*  --  189*  198*   ALBUMIN  --   --   --   --   --   --  3.7  --  3.3* 3.4*   < >  --    PROTTOTAL  --   --   --   --   --   --  5.2*  --  5.2* 5.0*   < >  --    BILITOTAL  --   --   --   --   --   --  0.5  --  0.7 0.6   < >  --    ALKPHOS  --   --   --   --   --   --  44  --  56 54   < >  --    ALT  --   --   --   --   --   --  38  --  44 45   < >  --    AST  --   --   --   --   --   --  65*  --  70*  --   --   --     < > = values in this interval not displayed.       Imaging:  Recent Results (from the past 24 hour(s))   XR Chest 2 Views    Narrative    EXAM: XR CHEST 2 VIEWS  LOCATION: [facility]  DATE: 7/14/2024    INDICATION: Chest tube removal  COMPARISON: Chest radiograph 7/11/2024.      Impression    IMPRESSION: Interval removal of the chest left chest tube. No convincing pneumothorax. Small left and trace right pleural effusions and atelectasis. Cardiomediastinal silhouette           Patient seen and discussed with Dr. Yudith Hanson, APRN, [facility]-AG, CCRN  Nurse Practitioner  Cardiothoracic Surgery  Pager: [phone]    CV Surgery Rounding Pager: [phone]  After hours please page surgeon on-call

## 2024-07-14 NOTE — PLAN OF CARE
[id number]:     ORIENTATION/NEURO: A/o x 4, neuro intact  VITALS/TELE: VSS, tele NSR  PAIN: Sternal incision pain managed with robaxin and oxy. Pt developed 8/10 chest heaviness at 1700. 12 lead EKG was NSR, vitals stable. Improved after oxy 10 mg.  RESP: 1.5L NC, LS clear/dim, occasional dry cough  DIET: Regular diet, tolerating well  GI/: Voiding adequately in urinal. BM x 1  LINES/DRAINS: R PIV SL  LABS: Phos and K replaced per protocol. K, Mg, Phos rechecks in AM.  SKIN: Surgical incisions WDL: sternum, RLE harvest sites x 2. Old CT sites covered with dry gauze  ASSIST/AMBULATION: Ax1, GB, no shower yet this evening due to chest pain  FALL RISK: Yes  PLAN/DISPOSITION: Continue ambulation and resp toileting. Wean O2 as able. May discharge home next 1-2 days if able to wean O2.           Goal Outcome Evaluation:       Plan of Care Reviewed With: patient, spouse     Overall Patient Progress: improving

## 2024-07-14 NOTE — PROVIDER NOTIFICATION
"MD Notification    Notified Person: MD    Notified Person Name: soraida Hanson NP    Notification Date/Time: 7/14/24 @ 9613    Notification Interaction: Text    Purpose of Notification: Pt c/o new chest heaviness/pressure. 7-8/10. Pt was sleeping at onset. Vitals stable. 12 lead EKG done and shows \"NSR w/ ST and T wave abnormality\". Oxy 10 mg given.     Orders Received: no new orders    Comments: chest pain improved to 3/10 after oxycodone    "

## 2024-07-15 ENCOUNTER — APPOINTMENT (OUTPATIENT)
Dept: PHYSICAL THERAPY | Facility: CLINIC | Age: 79
DRG: 235 | End: 2024-07-15
Attending: STUDENT IN AN ORGANIZED HEALTH CARE EDUCATION/TRAINING PROGRAM
Payer: COMMERCIAL

## 2024-07-15 LAB
ANION GAP SERPL CALCULATED.3IONS-SCNC: 8 MMOL/L (ref 7–15)
ATRIAL RATE - MUSE: 91 BPM
BUN SERPL-MCNC: 27.2 MG/DL (ref 8–23)
CA-I BLD-MCNC: 4.4 MG/DL (ref 4.4–5.2)
CALCIUM SERPL-MCNC: 8.9 MG/DL (ref 8.8–10.2)
CHLORIDE SERPL-SCNC: 99 MMOL/L (ref 98–107)
CREAT SERPL-MCNC: 1.26 MG/DL (ref 0.67–1.17)
DEPRECATED HCO3 PLAS-SCNC: 31 MMOL/L (ref 22–29)
DIASTOLIC BLOOD PRESSURE - MUSE: NORMAL MMHG
EGFRCR SERPLBLD CKD-EPI 2021: 59 ML/MIN/1.73M2
ERYTHROCYTE [DISTWIDTH] IN BLOOD BY AUTOMATED COUNT: 13.9 % (ref 10–15)
GLUCOSE BLDC GLUCOMTR-MCNC: 102 MG/DL (ref 70–99)
GLUCOSE BLDC GLUCOMTR-MCNC: 113 MG/DL (ref 70–99)
GLUCOSE BLDC GLUCOMTR-MCNC: 142 MG/DL (ref 70–99)
GLUCOSE SERPL-MCNC: 123 MG/DL (ref 70–99)
HCT VFR BLD AUTO: 33.6 % (ref 40–53)
HGB BLD-MCNC: 11.2 G/DL (ref 13.3–17.7)
INTERPRETATION ECG - MUSE: NORMAL
MAGNESIUM SERPL-MCNC: 2.3 MG/DL (ref 1.7–2.3)
MCH RBC QN AUTO: 31.6 PG (ref 26.5–33)
MCHC RBC AUTO-ENTMCNC: 33.3 G/DL (ref 31.5–36.5)
MCV RBC AUTO: 95 FL (ref 78–100)
P AXIS - MUSE: 38 DEGREES
PATH REPORT.COMMENTS IMP SPEC: NORMAL
PATH REPORT.COMMENTS IMP SPEC: NORMAL
PATH REPORT.FINAL DX SPEC: NORMAL
PATH REPORT.GROSS SPEC: NORMAL
PATH REPORT.MICROSCOPIC SPEC OTHER STN: NORMAL
PATH REPORT.RELEVANT HX SPEC: NORMAL
PHOSPHATE SERPL-MCNC: 3.2 MG/DL (ref 2.5–4.5)
PHOTO IMAGE: NORMAL
PLATELET # BLD AUTO: 153 10E3/UL (ref 150–450)
POTASSIUM SERPL-SCNC: 3.5 MMOL/L (ref 3.4–5.3)
PR INTERVAL - MUSE: 188 MS
QRS DURATION - MUSE: 94 MS
QT - MUSE: 370 MS
QTC - MUSE: 455 MS
R AXIS - MUSE: 10 DEGREES
RBC # BLD AUTO: 3.54 10E6/UL (ref 4.4–5.9)
SODIUM SERPL-SCNC: 138 MMOL/L (ref 135–145)
SYSTOLIC BLOOD PRESSURE - MUSE: NORMAL MMHG
T AXIS - MUSE: 31 DEGREES
VENTRICULAR RATE- MUSE: 91 BPM
WBC # BLD AUTO: 6.2 10E3/UL (ref 4–11)

## 2024-07-15 PROCEDURE — 250N000013 HC RX MED GY IP 250 OP 250 PS 637: Performed by: PHYSICIAN ASSISTANT

## 2024-07-15 PROCEDURE — 250N000013 HC RX MED GY IP 250 OP 250 PS 637: Performed by: NURSE PRACTITIONER

## 2024-07-15 PROCEDURE — 36415 COLL VENOUS BLD VENIPUNCTURE: CPT | Performed by: PHYSICIAN ASSISTANT

## 2024-07-15 PROCEDURE — 94640 AIRWAY INHALATION TREATMENT: CPT

## 2024-07-15 PROCEDURE — 88305 TISSUE EXAM BY PATHOLOGIST: CPT | Mod: 26

## 2024-07-15 PROCEDURE — 83735 ASSAY OF MAGNESIUM: CPT | Performed by: NURSE PRACTITIONER

## 2024-07-15 PROCEDURE — 120N000001 HC R&B MED SURG/OB

## 2024-07-15 PROCEDURE — 250N000013 HC RX MED GY IP 250 OP 250 PS 637: Performed by: STUDENT IN AN ORGANIZED HEALTH CARE EDUCATION/TRAINING PROGRAM

## 2024-07-15 PROCEDURE — 82330 ASSAY OF CALCIUM: CPT | Performed by: PHYSICIAN ASSISTANT

## 2024-07-15 PROCEDURE — 80048 BASIC METABOLIC PNL TOTAL CA: CPT | Performed by: NURSE PRACTITIONER

## 2024-07-15 PROCEDURE — 250N000011 HC RX IP 250 OP 636: Performed by: PHYSICIAN ASSISTANT

## 2024-07-15 PROCEDURE — 97110 THERAPEUTIC EXERCISES: CPT | Mod: GP

## 2024-07-15 PROCEDURE — 85027 COMPLETE CBC AUTOMATED: CPT | Performed by: NURSE PRACTITIONER

## 2024-07-15 PROCEDURE — 999N000157 HC STATISTIC RCP TIME EA 10 MIN

## 2024-07-15 PROCEDURE — 250N000009 HC RX 250: Performed by: NURSE PRACTITIONER

## 2024-07-15 PROCEDURE — 250N000011 HC RX IP 250 OP 636: Performed by: NURSE PRACTITIONER

## 2024-07-15 PROCEDURE — 84100 ASSAY OF PHOSPHORUS: CPT | Performed by: STUDENT IN AN ORGANIZED HEALTH CARE EDUCATION/TRAINING PROGRAM

## 2024-07-15 PROCEDURE — 97530 THERAPEUTIC ACTIVITIES: CPT | Mod: GP

## 2024-07-15 RX ORDER — GUAIFENESIN 600 MG/1
600 TABLET, EXTENDED RELEASE ORAL 2 TIMES DAILY
Status: DISCONTINUED | OUTPATIENT
Start: 2024-07-15 | End: 2024-07-16 | Stop reason: HOSPADM

## 2024-07-15 RX ORDER — IPRATROPIUM BROMIDE AND ALBUTEROL SULFATE 2.5; .5 MG/3ML; MG/3ML
3 SOLUTION RESPIRATORY (INHALATION)
Status: DISCONTINUED | OUTPATIENT
Start: 2024-07-15 | End: 2024-07-16 | Stop reason: HOSPADM

## 2024-07-15 RX ORDER — POTASSIUM CHLORIDE 1500 MG/1
20 TABLET, EXTENDED RELEASE ORAL ONCE
Status: COMPLETED | OUTPATIENT
Start: 2024-07-15 | End: 2024-07-15

## 2024-07-15 RX ADMIN — GUAIFENESIN 600 MG: 600 TABLET, EXTENDED RELEASE ORAL at 21:28

## 2024-07-15 RX ADMIN — HEPARIN SODIUM 5000 UNITS: 5000 INJECTION, SOLUTION INTRAVENOUS; SUBCUTANEOUS at 05:42

## 2024-07-15 RX ADMIN — ACETAMINOPHEN 650 MG: 325 TABLET, FILM COATED ORAL at 19:55

## 2024-07-15 RX ADMIN — PANTOPRAZOLE SODIUM 40 MG: 40 TABLET, DELAYED RELEASE ORAL at 09:34

## 2024-07-15 RX ADMIN — GABAPENTIN 100 MG: 100 CAPSULE ORAL at 21:28

## 2024-07-15 RX ADMIN — GUAIFENESIN 600 MG: 600 TABLET, EXTENDED RELEASE ORAL at 12:59

## 2024-07-15 RX ADMIN — TRAZODONE HYDROCHLORIDE 100 MG: 100 TABLET ORAL at 21:28

## 2024-07-15 RX ADMIN — IPRATROPIUM BROMIDE AND ALBUTEROL SULFATE 3 ML: .5; 3 SOLUTION RESPIRATORY (INHALATION) at 20:25

## 2024-07-15 RX ADMIN — FUROSEMIDE 40 MG: 10 INJECTION, SOLUTION INTRAMUSCULAR; INTRAVENOUS at 09:34

## 2024-07-15 RX ADMIN — POTASSIUM CHLORIDE 20 MEQ: 1500 TABLET, EXTENDED RELEASE ORAL at 09:34

## 2024-07-15 RX ADMIN — HEPARIN SODIUM 5000 UNITS: 5000 INJECTION, SOLUTION INTRAVENOUS; SUBCUTANEOUS at 21:28

## 2024-07-15 RX ADMIN — METHOCARBAMOL 500 MG: 500 TABLET ORAL at 13:33

## 2024-07-15 RX ADMIN — ROSUVASTATIN CALCIUM 40 MG: 20 TABLET, FILM COATED ORAL at 21:28

## 2024-07-15 RX ADMIN — PRAMIPEXOLE DIHYDROCHLORIDE 0.25 MG: 0.25 TABLET ORAL at 21:28

## 2024-07-15 RX ADMIN — METHOCARBAMOL 500 MG: 500 TABLET ORAL at 05:42

## 2024-07-15 RX ADMIN — ASPIRIN 81 MG CHEWABLE TABLET 162 MG: 81 TABLET CHEWABLE at 09:33

## 2024-07-15 RX ADMIN — HEPARIN SODIUM 5000 UNITS: 5000 INJECTION, SOLUTION INTRAVENOUS; SUBCUTANEOUS at 13:00

## 2024-07-15 RX ADMIN — METHOCARBAMOL 500 MG: 500 TABLET ORAL at 19:42

## 2024-07-15 RX ADMIN — METOPROLOL SUCCINATE 37.5 MG: 25 TABLET, EXTENDED RELEASE ORAL at 09:33

## 2024-07-15 ASSESSMENT — ACTIVITIES OF DAILY LIVING (ADL)
ADLS_ACUITY_SCORE: 30
ADLS_ACUITY_SCORE: 28
ADLS_ACUITY_SCORE: 30
ADLS_ACUITY_SCORE: 28
ADLS_ACUITY_SCORE: 30
ADLS_ACUITY_SCORE: 29
ADLS_ACUITY_SCORE: 29
ADLS_ACUITY_SCORE: 30
ADLS_ACUITY_SCORE: 28
ADLS_ACUITY_SCORE: 30
ADLS_ACUITY_SCORE: 30
ADLS_ACUITY_SCORE: 29
ADLS_ACUITY_SCORE: 30

## 2024-07-15 NOTE — PROVIDER NOTIFICATION
MD Notification    Notified Person: MD    Notified Person Name: Stephanie Hanson    Notification Date/Time: 7/15 1522    Notification Interaction: text message    Purpose of Notification: Pt C/O new onset numbness/tingling in Left hand     Orders Received: provider at bedside    Comments:

## 2024-07-15 NOTE — PROGRESS NOTES
[facility]  Cardiovascular and Thoracic Surgery Daily Note      Assessment and Plan  POD # 5 s/p Coronary artery bypass grafting x 3 (LIMA to LAD, vein to OM, vein to PDA), Ascending Aorta Aneurysm Repair with 30mm Hemashield graft on 7/10/24 with Dr. Mulvihill    - CVS: Pre-op TTE with EF 55-60%, mild biatrial enlargement. Postop vasoplegic shock, resolved. Lactic acidosis resolved. HD stable overnight in NSR. Hypervolemic, continue lasix to 40 mg IV daily,  mg BID, Toprol 37.5 mg daily, resumed PTA rosuvastatin. Chest tubes/TPWs removed 7/13, CXR stable.    PTA cardiac meds on hold: Olmesartan 20mg daily    - Resp: Postoperative mechanical ventilation, resolved. Extubated at 0249 on POD#1. Saturating well on 2L, wean as able; bipap at HS for BRITTNI. Continue to encourage IS, pulmonary toilet. Will obtain walk test. Is tolerating room air while at rest today during rounds.    - Neuro: Neuros intact, pain controlled on current regimen. Resumed PTA mirapex (modified dose) and trazodone     - Renal: No history of significant renal disease. SAMANTHA improving, will monitor and trend BMP. Diuretic as above.  Recent Labs   Lab 07/15/24  0618 07/14/24  0539 07/13/24  0543   CR 1.26* 1.22* 1.30*       - GI: + BM, + flatus, continue bowel regimen    - : Voiding well on own    - Endo: Postop stress hyperglycemia, No hx DM. Insulin infusion with minimal needs. Transitioned to sliding scale insulin now discontinued  Hemoglobin A1C   Date Value Ref Range Status   05/28/2024 5.7 (H) <5.7 % Final     Comment:     Normal <5.7%   Prediabetes 5.7-6.4%    Diabetes 6.5% or higher     Note: Adopted from ADA consensus guidelines.        - FEN: Replace electrolytes as needed. Tolerating diet    - ID: Postop leukocytosis, resolved. Afebrile. Periop abx prophylaxis completed. Trend CBC and fever curve.   Recent Labs   Lab 07/15/24  0618 07/14/24  0539 07/13/24  0543   WBC 6.2 6.7 9.5       - Heme: Acute blood loss anemia  "and thrombocytopenia due to surgery. Hgb and PLT stable. Trend CBC, transfuse PRN.   Recent Labs   Lab 07/15/24  0618 07/14/24  0539 07/13/24  0543   HGB 11.2* 9.9* 10.5*    134* 123*       - Proph: SCD, subcutaneous heparin, PPI    - Other:            # Hypoalbuminemia: Lowest albumin = 3.3 g/dL at 7/10/2024  6:02 PM, will monitor as appropriate                 #Precipitous drop in Hgb/Hct: Lowest Hgb this hospitalization: 9.2 g/dL. Will continue to monitor and treat/transfuse as appropriate.     # Obesity: Estimated body mass index is 37.14 kg/m  as calculated from the following:    Height as of this encounter: 1.689 m (5' 6.5\").    Weight as of this encounter: 106 kg (233 lb 9.6 oz).       # History of CABG: noted on surgical history       - Dispo: Continue on [address]. Continue mobility, therapies and pulm hygiene. Medically Ready for Discharge: Anticipated Today vs tomorrow, dispo pending wean of oxygen    Interval History  No acute events overnight. States pain is well managed on current regimen, slept well. Breathing is stable on 2 LNC --> room air, working with IS. Tolerating diet, is passing flatus, +BM, no n/v. Denies chest pain, palpitations, dizziness, syncopal symptoms, chills, myalgias, sternal popping/clicking.    Medications  Current Facility-Administered Medications   Medication Dose Route Frequency Provider Last Rate Last Admin    aspirin (ASA) chewable tablet 162 mg  162 mg Oral or NG Tube Daily Shalini Walker PA-C   162 mg at 07/15/24 0933    furosemide (LASIX) injection 40 mg  40 mg Intravenous Daily Stephanie Hanson NP   40 mg at 07/15/24 0934    gabapentin (NEURONTIN) capsule 100 mg  100 mg Oral At Bedtime Shalini Walker PA-C   100 mg at 07/14/24 2204    heparin ANTICOAGULANT injection 5,000 Units  5,000 Units Subcutaneous Q8H Shalini Walker PA-C   5,000 Units at 07/15/24 0542    metoprolol succinate ER (TOPROL-XL) 24 hr half-tab 37.5 mg  37.5 mg Oral Daily Stephanie Hanson, NP   " 37.5 mg at 07/15/24 0933    pantoprazole (PROTONIX) 2 mg/mL suspension 40 mg  40 mg Oral or NG Tube Daily Shalini Walker PA-C   40 mg at 07/10/24 1846    Or    pantoprazole (PROTONIX) EC tablet 40 mg  40 mg Oral Daily Shalini Walker PA-C   40 mg at 07/15/24 0934    polyethylene glycol (MIRALAX) Packet 17 g  17 g Oral BID Stephanie Hanson NP   17 g at 07/14/24 2205    pramipexole (MIRAPEX) tablet 0.25 mg  0.25 mg Oral At Bedtime Stephanie Hanson NP   0.25 mg at 07/14/24 2205    rosuvastatin (CRESTOR) tablet 40 mg  40 mg Oral At Bedtime Shalini Walker PA-C   40 mg at 07/14/24 2205    senna-docusate (SENOKOT-S/PERICOLACE) 8.6-50 MG per tablet 1 tablet  1 tablet Oral BID Shalini Walker PA-C   1 tablet at 07/14/24 2204    sodium chloride (PF) 0.9% PF flush 3 mL  3 mL Intracatheter Q8H Shalini Walker PA-C   3 mL at 07/15/24 0935    traZODone (DESYREL) tablet 100 mg  100 mg Oral At Bedtime Stephanie Hanson NP   100 mg at 07/14/24 2205     Current Facility-Administered Medications   Medication Dose Route Frequency Provider Last Rate Last Admin    acetaminophen (TYLENOL) tablet 650 mg  650 mg Oral Q4H PRN Shalini Walker PA-C        bisacodyl (DULCOLAX) suppository 10 mg  10 mg Rectal Daily PRN Shalini Walker PA-C        brimonidine (ALPHAGAN) 0.2 % ophthalmic solution 1 drop  1 drop Left Eye BID PRN Stephanie Hanson NP        calcium gluconate 1 g in 50 mL in sodium chloride intermittent infusion  1 g Intravenous Q6H PRN Shalini Walker PA-C   1 g at 07/13/24 1002    calcium gluconate 2 g in  mL intermittent infusion  2 g Intravenous Q6H PRN Shalini Walker PA-C        calcium gluconate 3 g in sodium chloride 0.9 % 100 mL intermittent infusion  3 g Intravenous Q6H PRN Shalini Walker PA-C        glucose gel 15-30 g  15-30 g Oral Q15 Min PRShalini Rico PA-C        Or    dextrose 50 % injection 25-50 mL  25-50 mL Intravenous Q15 Min PRShalini Rico PA-C        Or     glucagon injection 1 mg  1 mg Subcutaneous Q15 Min PRN Shalini Walker PA-C        furosemide (LASIX) injection 20 mg  20 mg Intravenous Once PRN Shalini Walker PA-C        hydrALAZINE (APRESOLINE) injection 10 mg  10 mg Intravenous Q30 Min PRN Shalini Walker PA-C        HYDROmorphone (DILAUDID) injection 0.2 mg  0.2 mg Intravenous Q2H PRN Shalini Walker PA-C        Or    HYDROmorphone (DILAUDID) injection 0.4 mg  0.4 mg Intravenous Q2H PRN Shalini Walker PA-C   0.4 mg at 07/11/24 1323    lidocaine (LMX4) cream   Topical Q1H PRN Shalini Walker PA-C        lidocaine 1 % 0.1-1 mL  0.1-1 mL Other Q1H PRN Shalini Walker PA-C        magnesium hydroxide (MILK OF MAGNESIA) suspension 30 mL  30 mL Oral Daily PRN Shalini Walker PA-C        methocarbamol (ROBAXIN) tablet 500 mg  500 mg Oral Q6H PRN Shalini Walker PA-C   500 mg at 07/15/24 0542    naloxone (NARCAN) injection 0.2 mg  0.2 mg Intravenous Q2 Min PRShalini Rico PA-C        Or    naloxone (NARCAN) injection 0.4 mg  0.4 mg Intravenous Q2 Min PRShalini Rico PA-C        Or    naloxone (NARCAN) injection 0.2 mg  0.2 mg Intramuscular Q2 Min PRShalini Rico PA-C        Or    naloxone (NARCAN) injection 0.4 mg  0.4 mg Intramuscular Q2 Min PRN Shalini Walker PA-C        ondansetron (ZOFRAN ODT) ODT tab 4 mg  4 mg Oral Q6H PRShalini Rico PA-C        Or    ondansetron (ZOFRAN) injection 4 mg  4 mg Intravenous Q6H PRN Shalini Walker PA-C        oxyCODONE (ROXICODONE) tablet 5 mg  5 mg Oral Q4H PRN Shalini Walker PA-C   5 mg at 07/14/24 2203    Or    oxyCODONE (ROXICODONE) tablet 10 mg  10 mg Oral Q4H PRN Shalini Walker PA-C   10 mg at 07/14/24 1602    prochlorperazine (COMPAZINE) injection 5 mg  5 mg Intravenous Q6H PRN Shalini Walker PA-C        Or    prochlorperazine (COMPAZINE) tablet 5 mg  5 mg Oral Q6H PRN Shalini Walker PA-C        Reason beta blocker order not selected   Does not  "apply DOES NOT GO TO Shalini Peralta PA-C        sodium chloride (PF) 0.9% PF flush 3 mL  3 mL Intracatheter q1 min prn Shalini Wlaker PA-C             Physical Exam  Vitals were reviewed  Blood pressure 126/70, pulse 108, temperature 99  F (37.2  C), temperature source Oral, resp. rate 16, height 1.689 m (5' 6.5\"), weight 106 kg (233 lb 9.6 oz), SpO2 93%.  Rhythm: NSR    Lungs: diminished throughout, +O2    Cardiovascular: S1, S2, RRR, no m/r/g    Abdomen: soft, NT, ND, +BS    Extremeties: warm, 1+ BLE and BUE edema    Incision: CDI    CT: N/A    Weight:   Vitals:    07/11/24 1821 07/12/24 0500 07/13/24 0638 07/14/24 0627   Weight: 113.4 kg (250 lb 1.6 oz) 112.4 kg (247 lb 14.4 oz) 107.5 kg (237 lb 1.6 oz) 106.2 kg (234 lb 1.6 oz)    07/15/24 0633   Weight: 106 kg (233 lb 9.6 oz)         Data  Recent Labs   Lab 07/15/24  0744 07/15/24  0618 07/14/24  0539 07/13/24  0543 07/11/24  0505 07/11/24  0502 07/10/24  1851 07/10/24  1802 07/10/24  1727 07/10/24  1558 07/10/24  1558   WBC  --  6.2 6.7 9.5   < > 10.6  --   --  18.2*  --  16.4*   HGB  --  11.2* 9.9* 10.5*   < > 10.9*   < >  --  12.1*  --  9.8*  9.8*   MCV  --  95 94 94   < > 91  --   --  93  --  93   PLT  --  153 134* 123*   < > 146*  --   --  144*  --  133*   INR  --   --   --   --   --   --   --   --  1.47*  --  1.77*   NA  --  138 138 137   < > 140  --  143 142  --  141  142   POTASSIUM  --  3.5 3.8 4.2   < > 4.4  --  4.0 3.8  --  3.8  3.9   CHLORIDE  --  99 102 100   < > 105  --  108* 106  --  107   CO2  --  31* 30* 30*   < > 28  --  17* 21*  --  23   BUN  --  27.2* 25.9* 22.4   < > 16.6  --  13.7 14.2  --  14.7   CR  --  1.26* 1.22* 1.30*   < > 1.13  --  1.04 1.06  --  1.02   ANIONGAP  --  8 6* 7   < > 7  --  18* 15  --  11   NITHIN  --  8.9 8.4* 8.3*   < > 8.1*  --  8.0* 7.8*  --  7.9*   * 123* 125* 126*   < > 118*   < > 185* 180*  --  189*  198*   ALBUMIN  --   --   --   --   --  3.7  --  3.3* 3.4*   < >  --    PROTTOTAL  --   --   -- "   --   --  5.2*  --  5.2* 5.0*   < >  --    BILITOTAL  --   --   --   --   --  0.5  --  0.7 0.6   < >  --    ALKPHOS  --   --   --   --   --  44  --  56 54   < >  --    ALT  --   --   --   --   --  38  --  44 45   < >  --    AST  --   --   --   --   --  65*  --  70*  --   --   --     < > = values in this interval not displayed.       Imaging:  No results found for this or any previous visit (from the past 24 hour(s)).          Patient seen and discussed with Dr. Yudith Hanson, DEANNA, [facility]-AG, CCRN  Nurse Practitioner  Cardiothoracic Surgery  Pager: [phone]    CV Surgery Rounding Pager: [phone]  After hours please page surgeon on-call

## 2024-07-15 NOTE — PLAN OF CARE
Pt A&O x4. VS WDL on 2.5-5L NC while awake, CPAP w/ O2 while asleep. Pain managed with scheduled meds and prn Robaxin and prn oxycodone. Tele shows SR. Midline incision and harvest sites clean, dry, and approximated. Voiding well. Ambulating with SBA. Resting between cares:    Plan: 6 minute walk test today; wean O2 as tolerated; Possible discharge today or tomorrow

## 2024-07-15 NOTE — PLAN OF CARE
Orientation: A/Ox4    Vitals/Tele: VSS - Normotensive, LSD 2-3L NC, Afebrile, SR    IV Access/drains: 1 PIV    Diet: regular - adequate appetite    Mobility: Ax1 GB/W    GI/: Adequate UO via toilet/urinal, BS audible    Wound/Skin: CT sites, Sternal incision, Graft sites    Consults: CT surg    Discharge Plan: Pending      See Flow sheets for assessment      Goal Outcome Evaluation:

## 2024-07-16 ENCOUNTER — APPOINTMENT (OUTPATIENT)
Dept: PHYSICAL THERAPY | Facility: CLINIC | Age: 79
DRG: 235 | End: 2024-07-16
Attending: STUDENT IN AN ORGANIZED HEALTH CARE EDUCATION/TRAINING PROGRAM
Payer: COMMERCIAL

## 2024-07-16 VITALS
HEIGHT: 67 IN | WEIGHT: 231.92 LBS | RESPIRATION RATE: 18 BRPM | SYSTOLIC BLOOD PRESSURE: 146 MMHG | DIASTOLIC BLOOD PRESSURE: 56 MMHG | OXYGEN SATURATION: 94 % | BODY MASS INDEX: 36.4 KG/M2 | TEMPERATURE: 98.8 F | HEART RATE: 98 BPM

## 2024-07-16 LAB
ANION GAP SERPL CALCULATED.3IONS-SCNC: 7 MMOL/L (ref 7–15)
BUN SERPL-MCNC: 26.8 MG/DL (ref 8–23)
CA-I BLD-MCNC: 4.5 MG/DL (ref 4.4–5.2)
CALCIUM SERPL-MCNC: 8.9 MG/DL (ref 8.8–10.2)
CHLORIDE SERPL-SCNC: 100 MMOL/L (ref 98–107)
CREAT SERPL-MCNC: 1.2 MG/DL (ref 0.67–1.17)
EGFRCR SERPLBLD CKD-EPI 2021: 62 ML/MIN/1.73M2
ERYTHROCYTE [DISTWIDTH] IN BLOOD BY AUTOMATED COUNT: 13.9 % (ref 10–15)
GLUCOSE SERPL-MCNC: 132 MG/DL (ref 70–99)
HCO3 SERPL-SCNC: 31 MMOL/L (ref 22–29)
HCT VFR BLD AUTO: 30.1 % (ref 40–53)
HGB BLD-MCNC: 9.7 G/DL (ref 13.3–17.7)
MAGNESIUM SERPL-MCNC: 2.3 MG/DL (ref 1.7–2.3)
MCH RBC QN AUTO: 30.3 PG (ref 26.5–33)
MCHC RBC AUTO-ENTMCNC: 32.2 G/DL (ref 31.5–36.5)
MCV RBC AUTO: 94 FL (ref 78–100)
PHOSPHATE SERPL-MCNC: 3.4 MG/DL (ref 2.5–4.5)
PLATELET # BLD AUTO: 158 10E3/UL (ref 150–450)
POTASSIUM SERPL-SCNC: 4 MMOL/L (ref 3.4–5.3)
RBC # BLD AUTO: 3.2 10E6/UL (ref 4.4–5.9)
SODIUM SERPL-SCNC: 138 MMOL/L (ref 135–145)
WBC # BLD AUTO: 5.4 10E3/UL (ref 4–11)

## 2024-07-16 PROCEDURE — 80048 BASIC METABOLIC PNL TOTAL CA: CPT | Performed by: NURSE PRACTITIONER

## 2024-07-16 PROCEDURE — 83735 ASSAY OF MAGNESIUM: CPT | Performed by: STUDENT IN AN ORGANIZED HEALTH CARE EDUCATION/TRAINING PROGRAM

## 2024-07-16 PROCEDURE — 250N000013 HC RX MED GY IP 250 OP 250 PS 637: Performed by: PHYSICIAN ASSISTANT

## 2024-07-16 PROCEDURE — 999N000157 HC STATISTIC RCP TIME EA 10 MIN

## 2024-07-16 PROCEDURE — 36415 COLL VENOUS BLD VENIPUNCTURE: CPT | Performed by: NURSE PRACTITIONER

## 2024-07-16 PROCEDURE — 250N000011 HC RX IP 250 OP 636: Performed by: PHYSICIAN ASSISTANT

## 2024-07-16 PROCEDURE — 85027 COMPLETE CBC AUTOMATED: CPT | Performed by: NURSE PRACTITIONER

## 2024-07-16 PROCEDURE — 250N000009 HC RX 250: Performed by: NURSE PRACTITIONER

## 2024-07-16 PROCEDURE — 250N000013 HC RX MED GY IP 250 OP 250 PS 637: Performed by: NURSE PRACTITIONER

## 2024-07-16 PROCEDURE — 82330 ASSAY OF CALCIUM: CPT | Performed by: PHYSICIAN ASSISTANT

## 2024-07-16 PROCEDURE — 94640 AIRWAY INHALATION TREATMENT: CPT

## 2024-07-16 PROCEDURE — 97110 THERAPEUTIC EXERCISES: CPT | Mod: GP

## 2024-07-16 PROCEDURE — 84100 ASSAY OF PHOSPHORUS: CPT | Performed by: STUDENT IN AN ORGANIZED HEALTH CARE EDUCATION/TRAINING PROGRAM

## 2024-07-16 PROCEDURE — 250N000011 HC RX IP 250 OP 636: Performed by: NURSE PRACTITIONER

## 2024-07-16 PROCEDURE — 97530 THERAPEUTIC ACTIVITIES: CPT | Mod: GP

## 2024-07-16 RX ORDER — ACETAMINOPHEN 325 MG/1
650 TABLET ORAL EVERY 4 HOURS PRN
Qty: 30 TABLET | Refills: 0 | Status: SHIPPED | OUTPATIENT
Start: 2024-07-16

## 2024-07-16 RX ORDER — POLYETHYLENE GLYCOL 3350 17 G/17G
17 POWDER, FOR SOLUTION ORAL DAILY
Qty: 510 G | Refills: 0 | Status: SHIPPED | OUTPATIENT
Start: 2024-07-16 | End: 2024-08-07

## 2024-07-16 RX ORDER — IPRATROPIUM BROMIDE AND ALBUTEROL SULFATE 2.5; .5 MG/3ML; MG/3ML
1 SOLUTION RESPIRATORY (INHALATION) EVERY 6 HOURS PRN
Qty: 90 ML | Refills: 3 | Status: SHIPPED | OUTPATIENT
Start: 2024-07-16

## 2024-07-16 RX ORDER — METOPROLOL SUCCINATE 50 MG/1
50 TABLET, EXTENDED RELEASE ORAL DAILY
Status: DISCONTINUED | OUTPATIENT
Start: 2024-07-16 | End: 2024-07-16 | Stop reason: HOSPADM

## 2024-07-16 RX ORDER — GUAIFENESIN 600 MG/1
600 TABLET, EXTENDED RELEASE ORAL 2 TIMES DAILY
Qty: 30 TABLET | Refills: 0 | Status: SHIPPED | OUTPATIENT
Start: 2024-07-16 | End: 2024-07-19

## 2024-07-16 RX ORDER — METOPROLOL SUCCINATE 50 MG/1
50 TABLET, EXTENDED RELEASE ORAL DAILY
Qty: 60 TABLET | Refills: 3 | Status: SHIPPED | OUTPATIENT
Start: 2024-07-16

## 2024-07-16 RX ORDER — ASPIRIN 81 MG/1
162 TABLET, CHEWABLE ORAL DAILY
Qty: 60 TABLET | Refills: 0 | Status: SHIPPED | OUTPATIENT
Start: 2024-07-16

## 2024-07-16 RX ORDER — METHOCARBAMOL 500 MG/1
500 TABLET, FILM COATED ORAL EVERY 6 HOURS PRN
Qty: 30 TABLET | Refills: 0 | Status: SHIPPED | OUTPATIENT
Start: 2024-07-16 | End: 2024-08-27

## 2024-07-16 RX ADMIN — FUROSEMIDE 40 MG: 10 INJECTION, SOLUTION INTRAMUSCULAR; INTRAVENOUS at 08:31

## 2024-07-16 RX ADMIN — PANTOPRAZOLE SODIUM 40 MG: 40 TABLET, DELAYED RELEASE ORAL at 08:31

## 2024-07-16 RX ADMIN — HEPARIN SODIUM 5000 UNITS: 5000 INJECTION, SOLUTION INTRAVENOUS; SUBCUTANEOUS at 05:42

## 2024-07-16 RX ADMIN — ASPIRIN 81 MG CHEWABLE TABLET 162 MG: 81 TABLET CHEWABLE at 08:31

## 2024-07-16 RX ADMIN — IPRATROPIUM BROMIDE AND ALBUTEROL SULFATE 3 ML: .5; 3 SOLUTION RESPIRATORY (INHALATION) at 07:39

## 2024-07-16 RX ADMIN — GUAIFENESIN 600 MG: 600 TABLET, EXTENDED RELEASE ORAL at 08:31

## 2024-07-16 RX ADMIN — METOPROLOL SUCCINATE 50 MG: 50 TABLET, EXTENDED RELEASE ORAL at 08:31

## 2024-07-16 ASSESSMENT — ACTIVITIES OF DAILY LIVING (ADL)
ADLS_ACUITY_SCORE: 29
ADLS_ACUITY_SCORE: 26
ADLS_ACUITY_SCORE: 29
ADLS_ACUITY_SCORE: 26

## 2024-07-16 NOTE — PLAN OF CARE
AxOx4, pleasant. Tele = NSR.    VSS on RA / home CPAP w/ 2-5L bleed. SBA; sternal precautions. Regular diet. Up to bathroom.    PIV x1 SL.    Sternal incision, CT sites, harvest sites CDI w/ liquid bandage.    Pain managed with PRN Robaxin x1. Denies nausea / shortness of breath. Endorses mild dyspnea on exertion.   Borderline febrile at 100.2* at 2000, improved with PRN Tylenol + cool cloth, fan.    Ambulated in [place] x2 this shift.    Wean O2 as able. Continue plan of care.

## 2024-07-16 NOTE — PROGRESS NOTES
[facility]  Cardiovascular and Thoracic Surgery Daily Note      Assessment and Plan  POD # 6 s/p Coronary artery bypass grafting x 3 (LIMA to LAD, vein to OM, vein to PDA), Ascending Aorta Aneurysm Repair with 30mm Hemashield graft on 7/10/24 with Dr. Mulvihill    - CVS: Pre-op TTE with EF 55-60%, mild biatrial enlargement. Postop vasoplegic shock, resolved. Lactic acidosis resolved. HD stable overnight in NSR. Hypervolemic, continue lasix to 40 mg IV daily,  mg BID, Toprol 50 mg daily, resumed PTA rosuvastatin. Chest tubes/TPWs removed 7/13, CXR stable.    PTA cardiac meds on hold: Olmesartan 20mg daily    - Resp: Postoperative mechanical ventilation, resolved. Extubated at 0249 on POD#1. Saturating well on RA, wean as able; bipap at HS for BRITTNI. Continue to encourage IS, pulmonary toilet. Will obtain walk test. Is tolerating room air while at rest today during rounds.    - Neuro: Neuros intact, pain controlled on current regimen. Resumed PTA mirapex (modified dose) and trazodone     - Renal: No history of significant renal disease. SAMANTHA improving, will monitor and trend BMP. Diuretic as above.  Recent Labs   Lab 07/16/24  0546 07/15/24  0618 07/14/24  0539   CR 1.20* 1.26* 1.22*       - GI: + BM, + flatus, continue bowel regimen    - : Voiding well on own    - Endo: Postop stress hyperglycemia, No hx DM. Insulin infusion with minimal needs. Transitioned to sliding scale insulin now discontinued  Hemoglobin A1C   Date Value Ref Range Status   05/28/2024 5.7 (H) <5.7 % Final     Comment:     Normal <5.7%   Prediabetes 5.7-6.4%    Diabetes 6.5% or higher     Note: Adopted from ADA consensus guidelines.        - FEN: Replace electrolytes as needed. Tolerating diet    - ID: Postop leukocytosis, resolved. Afebrile. Periop abx prophylaxis completed. Trend CBC and fever curve.   Recent Labs   Lab 07/16/24  0546 07/15/24  0618 07/14/24  0539   WBC 5.4 6.2 6.7       - Heme: Acute blood loss anemia  "and thrombocytopenia due to surgery. Hgb and PLT stable. Trend CBC, transfuse PRN.   Recent Labs   Lab 07/16/24  0546 07/15/24  0618 07/14/24  0539   HGB 9.7* 11.2* 9.9*    153 134*       - Proph: SCD, subcutaneous heparin, PPI    - Other:            # Hypoalbuminemia: Lowest albumin = 3.3 g/dL at 7/10/2024  6:02 PM, will monitor as appropriate                 #Precipitous drop in Hgb/Hct: Lowest Hgb this hospitalization: 9.2 g/dL. Will continue to monitor and treat/transfuse as appropriate.     # Obesity: Estimated body mass index is 36.87 kg/m  as calculated from the following:    Height as of this encounter: 1.689 m (5' 6.5\").    Weight as of this encounter: 105.2 kg (231 lb 14.8 oz).       # History of CABG: noted on surgical history       - Dispo: Continue on [address]. Home this am. Continue mobility, therapies and pulm hygiene. Medically Ready for Discharge: Anticipated Today     Interval History    No acute overnight events, breathing stable, tolerating diet, pain controlled, off oxygen, +BM denies chest pain, palpitations, dizziness, chills, sternal popping/clicking.     Medications  Current Facility-Administered Medications   Medication Dose Route Frequency Provider Last Rate Last Admin    aspirin (ASA) chewable tablet 162 mg  162 mg Oral or NG Tube Daily Shalini Walker PA-C   162 mg at 07/16/24 0831    furosemide (LASIX) injection 40 mg  40 mg Intravenous Daily Stephanie Hanson NP   40 mg at 07/16/24 0831    gabapentin (NEURONTIN) capsule 100 mg  100 mg Oral At Bedtime Shalini Walker PA-C   100 mg at 07/15/24 2128    guaiFENesin (MUCINEX) 12 hr tablet 600 mg  600 mg Oral BID Stephanie Hanson NP   600 mg at 07/16/24 0831    heparin ANTICOAGULANT injection 5,000 Units  5,000 Units Subcutaneous Q8H Shalini Walker PA-C   5,000 Units at 07/16/24 0542    ipratropium - albuterol 0.5 mg/2.5 mg/3 mL (DUONEB) neb solution 3 mL  3 mL Nebulization 2 times daily Stephanie Hanson, NP   3 mL at 07/16/24 0739 "    metoprolol succinate ER (TOPROL XL) 24 hr tablet 50 mg  50 mg Oral Daily Dorota Franco PA-C   50 mg at 07/16/24 0831    pantoprazole (PROTONIX) 2 mg/mL suspension 40 mg  40 mg Oral or NG Tube Daily Shalini Walker PA-C   40 mg at 07/10/24 1846    Or    pantoprazole (PROTONIX) EC tablet 40 mg  40 mg Oral Daily Shalini Walker PA-C   40 mg at 07/16/24 0831    polyethylene glycol (MIRALAX) Packet 17 g  17 g Oral BID Stephanie Hanson NP   17 g at 07/14/24 2205    pramipexole (MIRAPEX) tablet 0.25 mg  0.25 mg Oral At Bedtime Stephanie Hanson NP   0.25 mg at 07/15/24 2128    rosuvastatin (CRESTOR) tablet 40 mg  40 mg Oral At Bedtime Shalini Walker PA-C   40 mg at 07/15/24 2128    senna-docusate (SENOKOT-S/PERICOLACE) 8.6-50 MG per tablet 1 tablet  1 tablet Oral BID Shalini Walker PA-C   1 tablet at 07/14/24 2204    sodium chloride (PF) 0.9% PF flush 3 mL  3 mL Intracatheter Q8H Shalini Walker PA-C   3 mL at 07/16/24 0831    traZODone (DESYREL) tablet 100 mg  100 mg Oral At Bedtime Stephanie Hanson NP   100 mg at 07/15/24 2128     Current Facility-Administered Medications   Medication Dose Route Frequency Provider Last Rate Last Admin    acetaminophen (TYLENOL) tablet 650 mg  650 mg Oral Q4H PRN Shalini Walker PA-C   650 mg at 07/15/24 1955    bisacodyl (DULCOLAX) suppository 10 mg  10 mg Rectal Daily PRN Shalini Walker PA-C        brimonidine (ALPHAGAN) 0.2 % ophthalmic solution 1 drop  1 drop Left Eye BID PRN Stephanie Hanson NP        calcium gluconate 1 g in 50 mL in sodium chloride intermittent infusion  1 g Intravenous Q6H PRN Shalini Walker PA-C   1 g at 07/13/24 1002    calcium gluconate 2 g in  mL intermittent infusion  2 g Intravenous Q6H PRN Shalini Walker PA-C        calcium gluconate 3 g in sodium chloride 0.9 % 100 mL intermittent infusion  3 g Intravenous Q6H PRN Shalini Walker PA-C        glucose gel 15-30 g  15-30 g Oral Q15 Min PRN Shalini Walker PA-C         Or    dextrose 50 % injection 25-50 mL  25-50 mL Intravenous Q15 Min PRN Shalini Walker PA-C        Or    glucagon injection 1 mg  1 mg Subcutaneous Q15 Min PRN Shalini Walker PA-C        furosemide (LASIX) injection 20 mg  20 mg Intravenous Once PRN Shalini Walker PA-C        hydrALAZINE (APRESOLINE) injection 10 mg  10 mg Intravenous Q30 Min PRN Shalini Walker PA-C        HYDROmorphone (DILAUDID) injection 0.2 mg  0.2 mg Intravenous Q2H PRN Shalini Walker PA-C        Or    HYDROmorphone (DILAUDID) injection 0.4 mg  0.4 mg Intravenous Q2H PRN Shalini Walker PA-C   0.4 mg at 07/11/24 1323    lidocaine (LMX4) cream   Topical Q1H PRN Shalini Walker PA-C        lidocaine 1 % 0.1-1 mL  0.1-1 mL Other Q1H PRN Shalini Walker PA-C        magnesium hydroxide (MILK OF MAGNESIA) suspension 30 mL  30 mL Oral Daily PRN Shalini Walker PA-C        methocarbamol (ROBAXIN) tablet 500 mg  500 mg Oral Q6H PRN Shalini Walker PA-C   500 mg at 07/15/24 1942    naloxone (NARCAN) injection 0.2 mg  0.2 mg Intravenous Q2 Min PRShalini Rico PA-C        Or    naloxone (NARCAN) injection 0.4 mg  0.4 mg Intravenous Q2 Min PRShalini Rico PA-C        Or    naloxone (NARCAN) injection 0.2 mg  0.2 mg Intramuscular Q2 Min PRShalini Rico PA-C        Or    naloxone (NARCAN) injection 0.4 mg  0.4 mg Intramuscular Q2 Min PRN Shalini Walker PA-C        ondansetron (ZOFRAN ODT) ODT tab 4 mg  4 mg Oral Q6H PRN Shalini Walker PA-C        Or    ondansetron (ZOFRAN) injection 4 mg  4 mg Intravenous Q6H PRN Shalini Walker PA-C        oxyCODONE (ROXICODONE) tablet 5 mg  5 mg Oral Q4H PRN Shalini Walker PA-C   5 mg at 07/14/24 2203    Or    oxyCODONE (ROXICODONE) tablet 10 mg  10 mg Oral Q4H PRN Shalini Walker PA-C   10 mg at 07/14/24 1602    prochlorperazine (COMPAZINE) injection 5 mg  5 mg Intravenous Q6H PRN Shalini Walker PA-C        Or    prochlorperazine  "(COMPAZINE) tablet 5 mg  5 mg Oral Q6H PRN Shalini Walker PA-C        Reason beta blocker order not selected   Does not apply DOES NOT GO TO Shalini Peralta PA-C        sodium chloride (PF) 0.9% PF flush 3 mL  3 mL Intracatheter q1 min prn Shalini Walker PA-C             Physical Exam  Vitals were reviewed  Blood pressure 119/58, pulse 94, temperature 98.8  F (37.1  C), temperature source Oral, resp. rate 18, height 1.689 m (5' 6.5\"), weight 105.2 kg (231 lb 14.8 oz), SpO2 93%.  Rhythm: NSR    Lungs: course, +O2    Cardiovascular: S1, S2, RRR, no m/r/g    Abdomen: soft, NT, ND, +BS    Extremeties: warm, 1+ BLE and BUE edema    Incision: CDI    CT: N/A    Weight:   Vitals:    07/12/24 0500 07/13/24 0638 07/14/24 0627 07/15/24 0633   Weight: 112.4 kg (247 lb 14.4 oz) 107.5 kg (237 lb 1.6 oz) 106.2 kg (234 lb 1.6 oz) 106 kg (233 lb 9.6 oz)    07/16/24 0500   Weight: 105.2 kg (231 lb 14.8 oz)         Data  Recent Labs   Lab 07/16/24  0546 07/15/24  1605 07/15/24  1201 07/15/24  0744 07/15/24  0618 07/14/24  0539 07/11/24  0505 07/11/24  0502 07/10/24  1851 07/10/24  1802 07/10/24  1727 07/10/24  1558 07/10/24  1558   WBC 5.4  --   --   --  6.2 6.7   < > 10.6  --   --  18.2*  --  16.4*   HGB 9.7*  --   --   --  11.2* 9.9*   < > 10.9*   < >  --  12.1*  --  9.8*  9.8*   MCV 94  --   --   --  95 94   < > 91  --   --  93  --  93     --   --   --  153 134*   < > 146*  --   --  144*  --  133*   INR  --   --   --   --   --   --   --   --   --   --  1.47*  --  1.77*     --   --   --  138 138   < > 140  --  143 142  --  141  142   POTASSIUM 4.0  --   --   --  3.5 3.8   < > 4.4  --  4.0 3.8  --  3.8  3.9   CHLORIDE 100  --   --   --  99 102   < > 105  --  108* 106  --  107   CO2 31*  --   --   --  31* 30*   < > 28  --  17* 21*  --  23   BUN 26.8*  --   --   --  27.2* 25.9*   < > 16.6  --  13.7 14.2  --  14.7   CR 1.20*  --   --   --  1.26* 1.22*   < > 1.13  --  1.04 1.06  --  1.02   ANIONGAP 7  --   " --   --  8 6*   < > 7  --  18* 15  --  11   NITHIN 8.9  --   --   --  8.9 8.4*   < > 8.1*  --  8.0* 7.8*  --  7.9*   * 102* 113*   < > 123* 125*   < > 118*   < > 185* 180*  --  189*  198*   ALBUMIN  --   --   --   --   --   --   --  3.7  --  3.3* 3.4*   < >  --    PROTTOTAL  --   --   --   --   --   --   --  5.2*  --  5.2* 5.0*   < >  --    BILITOTAL  --   --   --   --   --   --   --  0.5  --  0.7 0.6   < >  --    ALKPHOS  --   --   --   --   --   --   --  44  --  56 54   < >  --    ALT  --   --   --   --   --   --   --  38  --  44 45   < >  --    AST  --   --   --   --   --   --   --  65*  --  70*  --   --   --     < > = values in this interval not displayed.       Imaging:  No results found for this or any previous visit (from the past 24 hour(s)).          Patient seen and discussed with Dr. Mulvihill Erin Frendin, PA-C  [phone]    CV Surgery Rounding Pager: [phone]  After hours please page surgeon on-call

## 2024-07-16 NOTE — DISCHARGE INSTRUCTIONS
AFTER YOU GO HOME FROM YOUR HEART SURGERY      You had a sternotomy, avoid lifting, pushing, or pulling anything greater than ten pounds for 8 weeks after surgery and then less than 20-30 pounds for an additional 4 weeks. Do not reach backwards or use arms to push out of chair. Do not let people pull on your arms to assist with standing. Avoid twisting or reaching too far across your body.  Avoid strenuous activities such as bowling, vacuuming, raking, shoveling, golf or tennis for 12 weeks after your surgery. Splint your chest incision by hugging a pillow or bringing your arms across your chest when coughing or sneezing. Please try to sleep on your back for the first 4-6 weeks to avoid extra stress on your sternum (breastbone) while it is healing.   No driving for 4 weeks after surgery or while on pain medication.     Shower or wash your incisions daily with antibacterial soap and water (or as instructed), pat dry. Keep wound clean and dry, showers are okay after discharge, but don't let spray hit directly on incision. No baths or swimming for 6 weeks and/or until incisions are completely healed over. Cover chest tube sites with gauze until they stop draining, then leave open to air. It is normal for chest tube sites to drain fluid for up to 2-3 weeks after surgery. It is not normal to have drainage from other incisions.   Watch for signs of infection: increased redness, tenderness, warmth or any drainage from sternum incision.  Also a temperature > 100.5 F or chills. Call your surgeon or primary care provider's office immediately.     Exercise is very important in your recovery. Please follow the guidelines set up for you in your cardiac rehab classes at the hospital. If outpatient cardiac rehab was ordered for you, we highly recommend you participate. If you have problems arranging your cardiac rehab, please call [phone] for all locations, with the exception of [place], please call [phone] and [place], please call [phone].      Avoid sitting for prolonged periods of time, try to walk every hour during the day. If you have a leg incision, elevate your leg often when you are not walking.    Check your weight when you get home from the hospital and continue to check it daily through your recovery for at least a month. If you notice a weight gain of 2-3 pounds in a week, notify your primary care physician, cardiologist or surgeon.    Bowel activity may be slow after surgery. If necessary, you may take an over the counter laxative such as Milk of Magnesia or Miralax. You may have stool softeners prescribed (docusate sodium, Senokot). We recommend using stool softeners while using narcotics for pain (oxycodone/percocet, hydrocodone/vicodin, hydromorphone/dilaudid).      Wean OFF of narcotics (oxycodone, dilaudid, hydrocodone) as soon as possible. You should continue taking acetaminophen as long as you have any surgical pain as the first choice for pain control and add narcotics as necessary for pain to be tolerable.        REGARDING PRESCRIPTION REFILLS.  If you need a refill on your pain medication contact us to discuss your pain and a possible one time refill.   All other medications will be adjusted, discontinued and re-filled by your primary care physician and/or your cardiologist as they were prior to your surgery. We have given you enough for one to three month with possibly one refill. You may have refills available to you for some of your medications through the [facility]. If you would like your refills sent to a different a different pharmacy, please contact your preferred pharmacy and let them know that you have prescriptions at [facility] that you would like transferred.    POST-OPERATIVE CLINIC VISITS  You have a follow up visit with CV Surgery Advance Care Practitioners on 7/31/24 at 1:30 PM at the Heart Clinic at [facility] in [place].    Primary Care Doctor,  please call to make apt  Follow up with Malina Jennings in Cardiology  If you need to cancel or reschedule your cardiology appointments please call [phone].     SURGICAL QUESTIONS  Please call our nurse coordinator, Kelly, at [phone] with questions or concerns related to surgery. For other non-urgent questions and requests, our nurse coordinators can also be reached via email; Kelly at [email or web address] and Alicia at [email or web address]    On weekends or after hours, please call [phone] and ask the  to page the Cardiothoracic Surgeon on-call.      Thank you,    Your Cardiothoracic Surgery Team

## 2024-07-16 NOTE — PLAN OF CARE
Discharge time/date: 7/16/24 at 1200   Walked or Wheelchair: Wheelchair  PIV removed: Yes  Reviewed AVS with patient: Yes  Medication due times added to AVS in EPIC: Yes  Verbalized understanding of discharge with teachback: Yes  Medications retrieved from pharmacy: Yes  Supplies sent home: Yes  Belongings from security with patient: NA

## 2024-07-16 NOTE — PLAN OF CARE
Cardiac Rehab Discharge Summary    Reason for therapy discharge:    Discharged to home with outpatient therapy.  All goals and outcomes met, no further needs identified.    Progress towards therapy goal(s). See goals on Care Plan in [facility] electronic health record for goal details.  Goals met    Therapy recommendation(s):    Continued therapy is recommended.  Rationale/Recommendations:   .  Continue home exercise program. Pt tolerated session well. Pt limited by decreased activity tolerance, strength, sternal precautions. Pt at baseline is ind with mobility, lives with spouse, stairs at home. Pt reports family is able to help during recovery. Pt currently ind for transfers, amb with no AD, able to tolerate 10 min on treadmill this date. Anticipate with continued mobilization with nursing staff and IP CR when medically ready, pt will progress to be able to discharge to home with family assistance as needed for heavier tasks such as grocery shopping, laundry, cooking, cleaning, etc. Recommend pt continue with HEP and OP CR. Will continue to update as appropriate.  PT Brief overview of current status: ind

## 2024-07-17 ENCOUNTER — PATIENT OUTREACH (OUTPATIENT)
Dept: CARE COORDINATION | Facility: CLINIC | Age: 79
End: 2024-07-17

## 2024-07-17 DIAGNOSIS — Z95.1 S/P CABG (CORONARY ARTERY BYPASS GRAFT): Primary | ICD-10-CM

## 2024-07-17 RX ORDER — OXYCODONE HYDROCHLORIDE 5 MG/1
2.5-5 TABLET ORAL EVERY 6 HOURS PRN
Qty: 20 TABLET | Refills: 0 | Status: SHIPPED | OUTPATIENT
Start: 2024-07-17 | End: 2024-08-27

## 2024-07-17 RX ORDER — SENNA AND DOCUSATE SODIUM 50; 8.6 MG/1; MG/1
1 TABLET, FILM COATED ORAL DAILY PRN
Qty: 30 TABLET | Refills: 0 | Status: SHIPPED | OUTPATIENT
Start: 2024-07-17 | End: 2024-08-07

## 2024-07-17 NOTE — PROGRESS NOTES
Clinic Care Coordination Contact  Transitions of Care Outreach  Chief Complaint   Patient presents with    Clinic Care Coordination - Post Hospital       Most Recent Admission Date: 7/10/2024   Most Recent Admission Diagnosis: CAD (coronary artery disease) - I25.10  Ascending aortic aneurysm (H24) - I71.21     Most Recent Discharge Date: 7/16/2024   Most Recent Discharge Diagnosis: S/P CABG (coronary artery bypass graft) - Z95.1     Transitions of Care Assessment    Discharge Assessment  How are you doing now that you are home?: Doing ok.  How are your symptoms? (Red Flag symptoms escalate to triage hotline per guidelines): Improved  Do you know how to contact your clinic care team if you have future questions or changes to your health status? : Yes  Does the patient have their discharge instructions? : Yes  Does the patient have questions regarding their discharge instructions? : No  Were you started on any new medications or were there changes to any of your previous medications? : Yes  Does the patient have all of their medications?: Yes  Do you have questions regarding any of your medications? : No  Do you have all of your needed medical supplies or equipment (DME)?  (i.e. oxygen tank, CPAP, cane, etc.): Yes         Post-op (Clinicians Only)  Did the patient have surgery or a procedure: Yes  Incision: closed;healing  Drainage: No  Bleeding: none  Fever: No (100.3 every evening while in the hospital.)  Chills: No  Redness: No  Warmth: No  Swelling: No  Incision site pain: No  Closure: other (glue)  Pressure Reduction Devices:  (N/A)  Eating & Drinking: eating and drinking without complaints/concerns  PO Intake: regular diet  Additional Symptoms: decreased appetite  Bowel Function: normal  Date of last BM: 07/17/24  Urinary Status: voiding without complaint/concerns    Care Management   None    Follow up Plan          Future Appointments   Date Time Provider Department Center   7/17/2024  3:00 PM Kelly Lindsay  MELLISA VALDEZ EA   7/23/2024 10:00 AM [facility] CARDIAC REHAB 5 [facility] [place] [facility]   7/31/2024  1:45 PM [facility] CARDIOTHORACIC SURGERY, JOSEY FINK [place] [facility]   8/12/2024  9:00 AM RU LAB [facility] [place] [facility]   8/16/2024  2:30 PM Malina Jennings PA-C [facility] PSA CLIN   8/27/2024  2:30 PM Yordan Hernandez MD [facility]   11/14/2024  1:30 PM Yordan Hernandez MD [facility]       Outpatient Plan as outlined on AVS reviewed with patient.    For any urgent concerns, please contact our 24 hour nurse triage line: [phone] ([phone])       Dawna Schilling RN Clinic Care Coordinator  [facility] Clinics: [place], [place] (on-site Wednesdays), [facility] (on-site Thursdays) & [facility].  [email or web address]  Phone: [phone]

## 2024-07-19 ENCOUNTER — TELEPHONE (OUTPATIENT)
Dept: CARDIOLOGY | Facility: CLINIC | Age: 79
End: 2024-07-19

## 2024-07-19 ENCOUNTER — VIRTUAL VISIT (OUTPATIENT)
Dept: PEDIATRICS | Facility: CLINIC | Age: 79
End: 2024-07-19
Payer: COMMERCIAL

## 2024-07-19 DIAGNOSIS — R05.2 SUBACUTE COUGH: ICD-10-CM

## 2024-07-19 DIAGNOSIS — Z95.1 S/P CABG (CORONARY ARTERY BYPASS GRAFT): Primary | ICD-10-CM

## 2024-07-19 DIAGNOSIS — K22.5 ZENKER DIVERTICULUM: ICD-10-CM

## 2024-07-19 PROBLEM — R22.1 THROAT SWELLING: Status: RESOLVED | Noted: 2024-02-10 | Resolved: 2024-07-19

## 2024-07-19 PROBLEM — Z98.890 STATUS POST CORONARY ANGIOGRAM: Status: RESOLVED | Noted: 2024-06-25 | Resolved: 2024-07-19

## 2024-07-19 PROBLEM — R79.89 ELEVATED TROPONIN I MEASUREMENT: Status: RESOLVED | Noted: 2024-06-25 | Resolved: 2024-07-19

## 2024-07-19 PROBLEM — G89.18 ACUTE POST-OPERATIVE PAIN: Status: RESOLVED | Noted: 2024-02-10 | Resolved: 2024-07-19

## 2024-07-19 PROCEDURE — 99495 TRANSJ CARE MGMT MOD F2F 14D: CPT | Mod: 95 | Performed by: INTERNAL MEDICINE

## 2024-07-19 NOTE — TELEPHONE ENCOUNTER
Cardiovascular Surgery    DISCHARGE FOLLOW UP PHONE CALL      POST OP MONITORING  How is your pain on a 0-10 scale, how are you managing your pain? Pain is being managed well with robaxin.    ACTIVITY  How is your activity tolerance? Up and walking well.  Are you still doing sternal precautions? Yes.  Do you hear any clicking when you are moving or taking a deep breath? No.    Are you weighing yourself daily? Yes pt is currently 228 lbs.    SIGNS AND SYMPTOMS OF INFECTION  INCREASE IN PAIN - No  FEVER - No  DRAINAGE - No If so, color: NA  REDNESS - No  SWELLING - No    ASSISTANCE  Do you have someone at home to assist you with your daily activities? Spouse and kids available.    MEDICATIONS  Is someone helping you to set up your medications? Spouse is helping pt.  Do you have any questions about your medications? No.    Are you on a blood thinner? No.  Who is managing your INRs? NA    FOLLOW UP  You are scheduled to see your primary care physician on 7/19.  You are scheduled to see our surgery advanced practive provider for post operative follow up on 7/31.    You are scheduled for cardiac rehab on 7/23.  You are scheduled to see your cardiologist on 8/16.    CONTACT INFORMATION  Please feel free to call us with any questions or symptoms that are concerning for you at [phone]. If it is after 4:00 in the afternoon, or a weekend please call [phone] and ask for the on call specialist. We want to do everything we can to help prevent you needing to return to the ED, so please do not hesitate to call us.    Lina Lockhart, RNCC  Cardiovascular Surgery  [phone]  July 19, 2024 9:35 AM

## 2024-07-19 NOTE — PROGRESS NOTES
"Johnny is a 78 year old who is being evaluated via a billable video visit.    How would you like to obtain your AVS? MyChart  If the video visit is dropped, the invitation should be resent by:   Will anyone else be joining your video visit? No      Assessment & Plan     Subacute cough  Likely normal given his thoracotomy; will get chest x-ray early next week if any progression.  No fevers, no significant hypoxia at present, and is nontoxic in appearance.   - XR Chest 2 Views; Future    Zenker diverticulum  Surgery unsuccessful; will need repeat barium study once has full healing form CABG.     S/P CABG (coronary artery bypass graft)  Interestingly had a normal NM scan 3 months before; not sure if this was a false negative.  For now, secondary risk factor modification.         MED REC REQUIRED  Post Medication Reconciliation Status:     BMI  Estimated body mass index is 36.87 kg/m  as calculated from the following:    Height as of 7/10/24: 1.689 m (5' 6.5\").    Weight as of 7/16/24: 105.2 kg (231 lb 14.8 oz).   Weight management plan: Discussed healthy diet and exercise guidelines          Subjective   Johnny is a 78 year old, presenting for the following health issues:  No chief complaint on file.        2/2/2024     9:45 AM   Additional Questions   Roomed by LA       Video Start Time:  4:30    HPI       Hospital Follow-up Visit:    Hospital/Nursing Home/IP Rehab Facility: [facility]  Date of Admission: 7/11  Date of Discharge: 7/16  Reason(s) for Admission: CABG  Was the patient in the ICU or did the patient experience delirium during hospitalization?  No  Do you have any other stressors you would like to discuss with your provider? No    Problems taking medications regularly:  None  Medication changes since discharge: None  Problems adhering to non-medication therapy:  None    Summary of hospitalization:  [facility] discharge summary reviewed  Diagnostic Tests/Treatments " reviewed.  Follow up needed: none  Other Healthcare Providers Involved in Patient s Care:         None  Update since discharge: improved.           Admitted for CABG for 6 days. 3 vessel bypass. Had mammary artery and 2 saphenous vein grafts.  Had resection. Also had aortic aneurysm.     No complications.  Has been home last 3 days.      Discharged on some guafenasin for cough.  Some phlegm.  Mild temps.  If coughs hard can get light headed.  Has history of Zenker's diverticulum.      On oxycodone; using this infrequently. Generally going well.     No major issues.  No fevers or chills; did have a lower grade temp responding to Tylenol.  Tm 100.3.  Has surgical post op on 7/31.      Was just told that he needed to contact us for a post op. Blood pressure at home has been running within normal limits.  O2 near 90.  Heart rate 88-90.  Normal urine output and bowel movements.       Interestingly had a normal nuclear scan 4 months ago, which on retrospect had associated artifact and issues with body habitus.       Plan of care communicated with patient                 Review of Systems  Constitutional, HEENT, cardiovascular, pulmonary, GI, , musculoskeletal, neuro, skin, endocrine and psych systems are negative, except as otherwise noted.      Objective           Vitals:  No vitals were obtained today due to virtual visit.    Physical Exam   GENERAL: alert and no distress  EYES: Eyes grossly normal to inspection.  No discharge or erythema, or obvious scleral/conjunctival abnormalities.  RESP: No audible wheeze, cough, or visible cyanosis.    SKIN: Visible skin clear. No significant rash, abnormal pigmentation or lesions.  NEURO: Cranial nerves grossly intact.  Mentation and speech appropriate for age.  PSYCH: Appropriate affect, tone, and pace of words          Video-Visit Details    Type of service:  Video Visit   Video End Time:4:59 PM  Originating Location (pt. Location): Home    Distant Location (provider  location):  On-site  Platform used for Video Visit: Enoch  Signed Electronically by: Yordan Hernandez MD

## 2024-07-22 ENCOUNTER — TELEPHONE (OUTPATIENT)
Dept: OTHER | Facility: CLINIC | Age: 79
End: 2024-07-22
Payer: COMMERCIAL

## 2024-07-22 NOTE — TELEPHONE ENCOUNTER
Message left for patient with a request for a return call. Contact information provided. Post hospital follow up.

## 2024-07-23 ENCOUNTER — HOSPITAL ENCOUNTER (OUTPATIENT)
Dept: CARDIAC REHAB | Facility: CLINIC | Age: 79
Discharge: HOME | End: 2024-07-23
Attending: PHYSICIAN ASSISTANT
Payer: COMMERCIAL

## 2024-07-23 ENCOUNTER — TELEPHONE (OUTPATIENT)
Dept: OTHER | Facility: CLINIC | Age: 79
End: 2024-07-23
Payer: COMMERCIAL

## 2024-07-23 DIAGNOSIS — Z95.1 S/P CABG (CORONARY ARTERY BYPASS GRAFT): ICD-10-CM

## 2024-07-23 PROCEDURE — 93797 PHYS/QHP OP CAR RHAB WO ECG: CPT | Mod: 59

## 2024-07-23 PROCEDURE — 93798 PHYS/QHP OP CAR RHAB W/ECG: CPT

## 2024-07-24 ENCOUNTER — ANCILLARY PROCEDURE (OUTPATIENT)
Dept: GENERAL RADIOLOGY | Facility: CLINIC | Age: 79
End: 2024-07-24
Attending: INTERNAL MEDICINE
Payer: COMMERCIAL

## 2024-07-24 DIAGNOSIS — R05.2 SUBACUTE COUGH: ICD-10-CM

## 2024-07-24 PROCEDURE — 71046 X-RAY EXAM CHEST 2 VIEWS: CPT | Mod: TC | Performed by: RADIOLOGY

## 2024-07-25 ENCOUNTER — HOSPITAL ENCOUNTER (OUTPATIENT)
Dept: CARDIAC REHAB | Facility: CLINIC | Age: 79
Discharge: HOME | End: 2024-07-25
Attending: STUDENT IN AN ORGANIZED HEALTH CARE EDUCATION/TRAINING PROGRAM
Payer: COMMERCIAL

## 2024-07-25 PROCEDURE — 93798 PHYS/QHP OP CAR RHAB W/ECG: CPT

## 2024-07-26 PROBLEM — R73.9 TRANSIENT HYPERGLYCEMIA POST PROCEDURE: Status: ACTIVE | Noted: 2024-07-26

## 2024-07-26 PROBLEM — R06.2 WHEEZING: Status: ACTIVE | Noted: 2024-07-26

## 2024-07-26 PROBLEM — E87.70 FLUID OVERLOAD: Status: ACTIVE | Noted: 2024-07-26

## 2024-07-26 NOTE — DISCHARGE SUMMARY
"Discharge Summary    Toni Collado MRN# [id number]   YOB: 1945 Age: 78 year old     Date of Admission:  7/10/2024  Date of Discharge:  7/26/2024  Admitting Physician:  Michael Mulvihill, MD  Discharge Physician:  Dr. Mulvihill/Dorota Franco PA-C  Discharging Service:  Cardiovascular and Thoracic Surgery     Home clinic: [facility]   Primary Provider: Yordan Hernandez          Admission Diagnoses:   CAD (coronary artery disease) [I25.10]  Ascending aortic aneurysm (H24) [I71.21]          Discharge Diagnosis:     Patient Active Problem List   Diagnosis    RLS (restless legs syndrome)    BRITTNI on CPAP    Elevated prostate specific antigen (PSA)    Gastroesophageal reflux disease without esophagitis    History of colonic polyps    Tinnitus of both ears    Oropharyngeal dysphagia    Varicose veins of left lower extremity    Adenomatous colon polyp    Bulbar polio    Benign paroxysmal positional vertigo due to bilateral vestibular disorder    Primary osteoarthritis of both hands    BMI 38.0-38.9,adult    Class 2 severe obesity due to excess calories with serious comorbidity in adult (H)    Zenker diverticulum    Accelerating angina (H)    Coronary artery disease due to calcified coronary lesion    S/P CABG (coronary artery bypass graft) LIMA>LAD, V>OM, V>PL    Fluid overload    Transient hyperglycemia post procedure    Wheezing                Discharge Disposition:     Discharged to home           Condition on Discharge:     Discharge condition: Stable   Discharge vitals: Blood pressure (!) 146/56, pulse 98, temperature 98.8  F (37.1  C), temperature source Oral, resp. rate 18, height 1.689 m (5' 6.5\"), weight 105.2 kg (231 lb 14.8 oz), SpO2 94%.   Code status on discharge: Full Code           Procedures:   On 7/11/24 Mr Collado successfully underwent  CABG x3 (LIMA-LAD, V-OM, V-PL), 2. Endoscopic Vein [place], 3. Epiaortic Echo, 4. Replacement of the ascending aorta (30mm dacron hemashield platinum) by " Dr. Mulvihill          Medications Prior to Admission:     No medications prior to admission.             Discharge Medications:     Discharge Medication List as of 7/16/2024 10:40 AM        START taking these medications    Details   aspirin (ASA) 81 MG chewable tablet Take 2 tablets (162 mg) by mouth or NG Tube daily, Disp-60 tablet, R-0, E-Prescribe      ipratropium - albuterol 0.5 mg/2.5 mg/3 mL (DUONEB) 0.5-2.5 (3) MG/3ML neb solution Take 1 vial (3 mLs) by nebulization every 6 hours as needed for shortness of breath, wheezing or cough, Disp-90 mL, R-3, E-Prescribe      methocarbamol (ROBAXIN) 500 MG tablet Take 1 tablet (500 mg) by mouth every 6 hours as needed for muscle spasms, Disp-30 tablet, R-0, E-Prescribe      metoprolol succinate ER (TOPROL XL) 50 MG 24 hr tablet Take 1 tablet (50 mg) by mouth daily, Disp-60 tablet, R-3, E-Prescribe      polyethylene glycol (MIRALAX) 17 GM/Dose powder Take 17 g by mouth daily, Disp-510 g, R-0, E-Prescribe      guaiFENesin (MUCINEX) 600 MG 12 hr tablet Take 1 tablet (600 mg) by mouth 2 times daily, Disp-30 tablet, R-0, E-Prescribe            CONTINUE these medications which have CHANGED    Details   acetaminophen (TYLENOL) 325 MG tablet Take 2 tablets (650 mg) by mouth every 4 hours as needed for mild pain, Disp-30 tablet, R-0, E-Prescribe           CONTINUE these medications which have NOT CHANGED    Details   brimonidine (ALPHAGAN) 0.2 % ophthalmic solution Place 1 drop Into the left eye 2 times daily, Historical      ferrous sulfate (FEROSUL) 325 (65 Fe) MG tablet Take 1 tablet (325 mg) by mouth every other day for 90 days, Disp-45 tablet, R-0, E-Prescribe      Multiple Vitamins-Minerals (MULTIVITAMIN PO) Take 1 tablet by mouth daily, Historical      omeprazole (PRILOSEC) 20 MG DR capsule Take 2 capsules (40 mg) by mouth daily, Disp-90 capsule, R-3, E-PrescribeProfile Rx: patient will contact pharmacy when needed      pramipexole (MIRAPEX) 1 MG tablet TAKE 1 TABLET  BY MOUTH TWICE DAILY FOR RESTLESS LEG SYNDROME.., Disp-180 tablet, R-0, E-Prescribe      rosuvastatin (CRESTOR) 40 MG tablet Take 1 tablet (40 mg) by mouth at bedtime, Disp-90 tablet, R-3, E-Prescribe      traZODone (DESYREL) 100 MG tablet Take 1 tablet (100 mg) by mouth at bedtime, Disp-90 tablet, R-3, E-PrescribeProfile Rx: patient will contact pharmacy when needed           STOP taking these medications       aspirin 81 MG EC tablet Comments:   Reason for Stopping:         meloxicam (MOBIC) 15 MG tablet Comments:   Reason for Stopping:         nitroGLYcerin (NITROSTAT) 0.4 MG sublingual tablet Comments:   Reason for Stopping:         olmesartan (BENICAR) 20 MG tablet Comments:   Reason for Stopping:         sildenafil (REVATIO) 20 MG tablet Comments:   Reason for Stopping:                     Consultations:     Nutrition, Intensivist             Brief History of Illness:   78-year-old male with a past medical history of BRITTNI, hypertension, obesity, GERD.  Presented for elective coronary angiogram after having episodes of chest pain at home.  Episodes preceded by activity.  Improved with rest.  He did undergo an exercise stress test which was inconclusive. Angiogram demonstrates multivessel CAD.  Echo shows preserved ejection fraction and no valvular abnormalities.  He also has a dilated ascending aorta on CT PE and echo. He was evaluated and judged to be a very good candidate for surgical revascularization with replacement of his ascending aneurysm on account of the small ascending aneurysm.           Hospital Course:     On 7/11/24 Mr Collado successfully underwent  CABG x3 (LIMA-LAD, V-OM, V-PL), 2. Endoscopic Vein [place], 3. Epiaortic Echo, 4. Replacement of the ascending aorta (30mm dacron hemashield platinum) by Dr. Mulvihill  Was extubated within 24 hours of surgery. Once he was weaned off hemodynamic stabilizing gtt's, transferred to the surgical floor.   Patient noted to have profound wheezing/COPD  "exacerbation that was relieved with nebulizer treatments. He is sent home with nebulizer and duo nebs for further management.  Had some transient hyperglycemia treated with an insulin gtt, transitioned to sliding scale insulin and has no need at discharge.  Had some fluid overload treated with diuretic medication. At discharge he is below his pre-op weight and is not sent with diuretics.   Heart rhythm remained stable. He progressed well with cardiac rehab. He is discharged to home on pod# [phone] with the help of their family.              Significant Results:     Lab Results   Component Value Date    WBC 5.4 07/16/2024     Lab Results   Component Value Date    RBC 3.20 07/16/2024     Lab Results   Component Value Date    HGB 9.7 07/16/2024     Lab Results   Component Value Date    HCT 30.1 07/16/2024     No components found for: \"MCT\"  Lab Results   Component Value Date    MCV 94 07/16/2024     Lab Results   Component Value Date    MCH 30.3 07/16/2024     Lab Results   Component Value Date    MCHC 32.2 07/16/2024     Lab Results   Component Value Date    RDW 13.9 07/16/2024     Lab Results   Component Value Date     07/16/2024       Last Basic Metabolic Panel:  Lab Results   Component Value Date     07/16/2024      Lab Results   Component Value Date    POTASSIUM 4.0 07/16/2024    POTASSIUM 3.8 07/10/2024    POTASSIUM 3.8 06/15/2022     Lab Results   Component Value Date    CHLORIDE 100 07/16/2024    CHLORIDE 103 06/15/2022     Lab Results   Component Value Date    NITHIN 8.9 07/16/2024     Lab Results   Component Value Date    CO2 31 07/16/2024    CO2 23 06/15/2022     Lab Results   Component Value Date    BUN 26.8 07/16/2024    BUN 16 06/15/2022     Lab Results   Component Value Date    CR 1.20 07/16/2024     Lab Results   Component Value Date     07/16/2024     07/15/2024    GLC 86 06/15/2022                  Pending Results:     None           Discharge Instructions and Follow-Up: "     Discharge diet: Regular   Discharge activity: Daily weights: Call if weight gain 2-3 lbs over 24 hours or if greater than 5 lbs in 1 week.  No lifting more than 10 lbs for 8-12 weeks.  No driving for 1 month.  Call for pain medication refill.  Call for temperature greater than 101.0  Daily shower with antibacterial soap.   Discharge follow-up: *Follow up primary care provider in 7-10 days after discharge in order to review your medication, vital signs, obtain any necessary lab work your doctor may want, and to update them on your hospitalization and medical issues. *Follow up with Dorota/Shalini/Ajay/Indigo Ricci with Dr Mulvihill, heart surgeon, at [facility] at [facility] [address] on 7/31/24 at 1:30 PM. If any questions or concerns call [phone]. You will see us once at this visit and then if everything is going well you will not need to see us again. You will follow long term with your cardiologist. *Follow up with Malina Jennings 8/16/24 at 2:30 PM at [facility], cardiologist, in 3 months. This is who you will follow with long term about your heart issues. [phone].    Outpatient therapy: Cardiac rehab   Home Care agency: None    Supplies and equipment: None   Lines and drains: None    Wound care: Wash incision daily with antibacterial soap   Other instructions: None

## 2024-07-29 ENCOUNTER — TELEPHONE (OUTPATIENT)
Dept: OTHER | Facility: CLINIC | Age: 79
End: 2024-07-29
Payer: COMMERCIAL

## 2024-07-29 ENCOUNTER — MYC MEDICAL ADVICE (OUTPATIENT)
Dept: PEDIATRICS | Facility: CLINIC | Age: 79
End: 2024-07-29
Payer: COMMERCIAL

## 2024-07-29 ENCOUNTER — HOSPITAL ENCOUNTER (OUTPATIENT)
Dept: CARDIAC REHAB | Facility: CLINIC | Age: 79
Discharge: HOME | End: 2024-07-29
Attending: STUDENT IN AN ORGANIZED HEALTH CARE EDUCATION/TRAINING PROGRAM
Payer: COMMERCIAL

## 2024-07-29 PROCEDURE — 93798 PHYS/QHP OP CAR RHAB W/ECG: CPT

## 2024-07-29 NOTE — TELEPHONE ENCOUNTER
I returned a call to pt after having Stephanie Hanson ACNP review chest x-ray done by pt's PCP  last week. States he is getting his IS up to 2250 consistently. Has a frequent dry cough with occasional sputum production of green -yellow coloring. Low grade fever of 99.5 X 1 this weekend. Offered early CVTS follow up. Pt going to cardiac rehab this pm, PCP seeing back in clinic tomorrow. Will keep 7/30 appointment.

## 2024-07-30 ENCOUNTER — OFFICE VISIT (OUTPATIENT)
Dept: PEDIATRICS | Facility: CLINIC | Age: 79
End: 2024-07-30
Payer: COMMERCIAL

## 2024-07-30 ENCOUNTER — TELEPHONE (OUTPATIENT)
Dept: CARDIOLOGY | Facility: CLINIC | Age: 79
End: 2024-07-30

## 2024-07-30 VITALS
HEIGHT: 66 IN | TEMPERATURE: 98.7 F | SYSTOLIC BLOOD PRESSURE: 130 MMHG | BODY MASS INDEX: 35.86 KG/M2 | HEART RATE: 94 BPM | OXYGEN SATURATION: 96 % | WEIGHT: 223.1 LBS | DIASTOLIC BLOOD PRESSURE: 60 MMHG | RESPIRATION RATE: 16 BRPM

## 2024-07-30 DIAGNOSIS — J18.9 PNEUMONIA OF LEFT LUNG DUE TO INFECTIOUS ORGANISM, UNSPECIFIED PART OF LUNG: Primary | ICD-10-CM

## 2024-07-30 PROCEDURE — 99214 OFFICE O/P EST MOD 30 MIN: CPT | Performed by: PHYSICIAN ASSISTANT

## 2024-07-30 RX ORDER — CEFUROXIME AXETIL 500 MG/1
500 TABLET ORAL 2 TIMES DAILY
Qty: 10 TABLET | Refills: 0 | Status: SHIPPED | OUTPATIENT
Start: 2024-07-30 | End: 2024-08-04

## 2024-07-30 RX ORDER — AZITHROMYCIN 250 MG/1
TABLET, FILM COATED ORAL
Qty: 6 TABLET | Refills: 0 | Status: SHIPPED | OUTPATIENT
Start: 2024-07-30 | End: 2024-08-04

## 2024-07-30 ASSESSMENT — PAIN SCALES - GENERAL: PAINLEVEL: NO PAIN (0)

## 2024-07-30 NOTE — TELEPHONE ENCOUNTER
Patient s/p CABG with Mulvihill on 7/10, discharged on 7/26.   Patient called to report that his HR has increased this morning. Patient checking HR with a pulse oximeter and recorded -118 this morning around 0830 AM, regular rhythm, states he feels his heart beating; /72 reports no chest pain or shortness of breath, no dizziness, states he feels well other than the increased HR. Patient called around 0900 AM and reports that HR is down to 102-103 BPM and is consistent. Reports no pain. Patient scheduled to see his PCP today and CVTS KRIS tomorrow.   Reviewed the information above with CVTS KRIS Lamine who recommends patient continue to monitor HR/BP at this time and call Kelly/Josep RNCC if HR increases or new symptoms present. Patient verbalized understanding and agreed to the plan.

## 2024-07-30 NOTE — TELEPHONE ENCOUNTER
See result note 7/24/24, patient has OV scheduled for today 7/30/24.     Jean Paul RENO RN 7/30/2024 at 7:43 AM

## 2024-07-30 NOTE — PROGRESS NOTES
Assessment & Plan     Pneumonia of left lung due to infectious organism, unspecified part of lung  X-ray findings suggestive of pneumonia.   Will treat patient today. Continue to monitor symptoms.   Follow-up in 1 week to make sure he is getting better.  Recommend follow-up sooner if develops fever, chest pain, difficulty breathing, SOB.   - cefuroxime (CEFTIN) 500 MG tablet  Dispense: 10 tablet; Refill: 0  - azithromycin (ZITHROMAX) 250 MG tablet  Dispense: 6 tablet; Refill: 0    FUTURE APPOINTMENTS:       - Follow-up visit in 1 week    Vasiliy Turner is a 78 year old, presenting for the following health issues:  RECHECK      7/30/2024     1:15 PM   Additional Questions   Roomed by Shwetabearnoe ENRIQUEZ   Accompanied by Spouse, Payam         7/30/2024     1:15 PM   Patient Reported Additional Medications   Patient reports taking the following new medications No     History of Present Illness       Vascular Disease:  He presents for follow up of vascular disease.     He never takes nitroglycerin. He takes daily aspirin.    He eats 2-3 servings of fruits and vegetables daily.He consumes 0 sweetened beverage(s) daily.He exercises with enough effort to increase his heart rate 10 to 19 minutes per day.  He exercises with enough effort to increase his heart rate 6 days per week.   He is taking medications regularly.     Patient is a 78 y.o. male who presents to the clinic to follow-up on recent chest x-ray. Patient reports he has had a cough since leaving the hospital on 7/16 following CABG and replacement of ascending aneurysm. Pt reports he is slowly getting better. Still has some fatigue. The cough has been bothersome. He has been using his nebulizer. He is using his spirometer that he was given in the hospital. He reports some productive coughs. Pt denies fevers, chills, chest pain. He does have follow-up with cardiology and has already been going to cardiac rehab.     Wt Readings from Last 4 Encounters:   07/30/24 101.2 kg  "(223 lb 1.6 oz)   07/16/24 105.2 kg (231 lb 14.8 oz)   06/25/24 106.5 kg (234 lb 14.4 oz)   05/29/24 109.9 kg (242 lb 3.2 oz)       Review of Systems  Constitutional, HEENT, cardiovascular, pulmonary, gi and gu systems are negative, except as otherwise noted.      Objective    /60 (BP Location: Right arm, Patient Position: Sitting, Cuff Size: Adult Large)   Pulse 94   Temp 98.7  F (37.1  C) (Tympanic)   Resp 16   Ht 1.688 m (5' 6.46\")   Wt 101.2 kg (223 lb 1.6 oz)   SpO2 96%   BMI 35.52 kg/m    Body mass index is 35.52 kg/m .    Physical Exam   GENERAL: alert and no distress  EYES: Eyes grossly normal to inspection, PERRL and conjunctivae and sclerae normal  HENT: ear canals and TM's normal, nose and mouth without ulcers or lesions  RESP: slight crackle of left lung. No rhonchi or wheezes. No increased effort.  CV: regular rate and rhythm, normal S1 S2, no S3 or S4, no murmur, click or rub, no peripheral edema  MS: no gross musculoskeletal defects noted, no edema  SKIN: no suspicious lesions or rashes  PSYCH: mentation appears normal, affect normal/bright      Signed Electronically by: Marylin Cueto PA-C    "

## 2024-07-31 ENCOUNTER — OFFICE VISIT (OUTPATIENT)
Dept: CARDIOLOGY | Facility: CLINIC | Age: 79
End: 2024-07-31
Payer: COMMERCIAL

## 2024-07-31 VITALS
BODY MASS INDEX: 35 KG/M2 | WEIGHT: 223 LBS | SYSTOLIC BLOOD PRESSURE: 112 MMHG | HEIGHT: 67 IN | OXYGEN SATURATION: 98 % | DIASTOLIC BLOOD PRESSURE: 68 MMHG | HEART RATE: 91 BPM

## 2024-07-31 DIAGNOSIS — Z95.1 S/P CABG (CORONARY ARTERY BYPASS GRAFT): Primary | ICD-10-CM

## 2024-07-31 PROCEDURE — 99024 POSTOP FOLLOW-UP VISIT: CPT | Performed by: NURSE PRACTITIONER

## 2024-07-31 RX ORDER — ALBUTEROL SULFATE 90 UG/1
1-2 AEROSOL, METERED RESPIRATORY (INHALATION) EVERY 4 HOURS PRN
Qty: 18 G | Refills: 0 | Status: SHIPPED | OUTPATIENT
Start: 2024-07-31 | End: 2024-08-27

## 2024-07-31 RX ORDER — CODEINE PHOSPHATE AND GUAIFENESIN 10; 100 MG/5ML; MG/5ML
1-2 SOLUTION ORAL EVERY 4 HOURS PRN
Qty: 118 ML | Refills: 0 | Status: SHIPPED | OUTPATIENT
Start: 2024-07-31 | End: 2024-08-27

## 2024-07-31 RX ORDER — FLUTICASONE PROPIONATE AND SALMETEROL 250; 50 UG/1; UG/1
1 POWDER RESPIRATORY (INHALATION) 2 TIMES DAILY
Qty: 1 EACH | Refills: 0 | Status: SHIPPED | OUTPATIENT
Start: 2024-07-31 | End: 2024-08-07

## 2024-07-31 NOTE — PROGRESS NOTES
CARDIOTHORACIC SURGERY FOLLOW-UP VISIT     Toni Collado   1945   [id number]      Reason for visit: Post-Op CABG x3 and replacement of the ascending aorta with Dr. Mulvihill on 7/11/2024    ASSESSMENT/PLAN:  Toni Collado is a 78 year old year old male who returns to clinic for postop visit.     Severe multivessel CAD and ascending aortic aneurysm S/P CABG x3 and replacement of the ascending aorta  HTN/HLD  BRITTNI on CPAP  Today in clinic patient sounds to be in a  regular rhythm with HR <100 bpm.   Discharge weight 231 lbs; weight today 220 lbs; Appears  euvolemic upon examinination with no appreciable JVD, BLE edema, abdominal bloating. LS are CTA.  Discussed the importance of nutrition in healing and staying adequately hydrated.   Midline sternal incision healing well with no noted erythema or drainage or signs of infection. Increasing activity and strength overall.    Hemodynamics are stable. Medications reviewed and no changes were needed today.  Added Ceritussin PRN for cough, Advair inhaler BID for 30 days and PRN albuterol inhaler (to cut down on need for neb treatments). If shortness of breath/cough does not improve after one month, consider pulmonology referral.  Follow up with your cardiologist as scheduled.  Continue Outpatient Cardiac Rehab until completed.   Continue sternal precautions for 12 weeks from surgery date.   No driving for 4 weeks from surgery date.     Postoperative restrictions have been reviewed and all of the patient's questions were answered. Our contact information was given to the patient if he should have any further questions/concerns. No further follow up is needed with us.  The total time spent with the patient was 40 minutes, > 50% of which was spent in counseling and coordination of care.    DEANNA Azul, ACNPC-AG, CCRN  Nurse Practitioner  Cardiothoracic Surgery  Pager: [phone]    HPI: Per discharge summary:    78-year-old male with a past medical history of  BRITTNI, hypertension, obesity, GERD.  Presented for elective coronary angiogram after having episodes of chest pain at home.  Episodes preceded by activity.  Improved with rest.  He did undergo an exercise stress test which was inconclusive. Angiogram demonstrates multivessel CAD.  Echo shows preserved ejection fraction and no valvular abnormalities.  He also has a dilated ascending aorta on CT PE and echo. He was evaluated and judged to be a very good candidate for surgical revascularization with replacement of his ascending aneurysm on account of the small ascending aneurysm.   Course was notable for transient hyperglycemia treated with insulin infusion, hypervolemia treated with diuretics and COPD exacerbation for which he was discharged on nebulizer treatments.    Had PCP visit yesterday and was placed on antibiotics for presumed pneumonia.    Returns to clinic today for postoperative visit.    Since discharge, has been recovering well at home.    States pain is well controlled. He continues to need occasional tylenol for pain management. Sleep is fragmented, mostly due to persistent cough. Participating in therapy at cardiac rehab, this has been going well. Breathing has been stable/improving but continues to use neb treatments daily. Continues to work on C &DB and work with IS. Denies SOB at rest, PND, orthopnea. Non-productive, frequent, dry cough. Patient denies any fever, chills, chest pain, palpitations, edema, SOB, lightheadedness, nausea, and vomiting.  Has had a poor appetite, food is quite bland and nothing tastes good. Despite this, is consuming ensure every morning and trying to eat protein. Having regular bowel movements and voiding without difficulty.  Denies  sternal popping or clicking or incisional drainage/erythema. No psychiatric concerns.    PAST MEDICAL HISTORY:  Past Medical History:   Diagnosis Date    Adenomatous colon polyp 07/2019    Asymptomatic varicose veins of both lower extremities      Bulbar polio 1952    residual dysphagia    Cervical osteoarthritis     Chronic anxiety     Chronic depression     Chronic depression 09/19/2022    Familial tremor     Gastroesophageal reflux disease without esophagitis 04/12/2021    Hallux limitus of left foot     Localized, primary osteoarthritis of hand     Oropharyngeal dysphagia     Pre-op evaluation 06/15/2022    Primary osteoarthritis of left hip 06/15/2022    Primary osteoarthritis of left hip 06/15/2022    RLS (restless legs syndrome)     Sleep apnea, obstructive     CPAP    Status post coronary angiogram 6/25/2024    Status post total replacement of left hip     Varicose veins of left lower extremity        PAST SURGICAL HISTORY:  Past Surgical History:   Procedure Laterality Date    ABDOMEN SURGERY      ARTHROSCOPY KNEE      BYPASS GRAFT ARTERY CORONARY N/A 7/10/2024    Procedure: CORONARY ARTERY BYPASS GRAFTING X 3 WITH RIGHT GREATER SAPHENOUS ENDOSCOPIC VEIN HARVEST LEFT INTERNAL MAMMARY ARTERY-LEFT ANTERIOR DESCENDING ARTERY RIGHT GREATER SAPHENOUS-OBTUSE MARGINAL ARTERY/ POSERTIOR DESCENDING ARTERY;  Surgeon: Mulvihill, Michael, MD;  Location:  OR    CATARACT IOL, RT/LT Bilateral      and     COLONOSCOPY N/A 09/13/2023    Procedure: Colonoscopy with polypectomies using cold snare;  Surgeon: Yue Cline MD;  Location:  GI    CV CORONARY ANGIOGRAM N/A 6/25/2024    Procedure: Coronary Angiogram;  Surgeon: Renny Rene MD;  Location:  HEART CARDIAC CATH LAB    CV LEFT HEART CATH N/A 6/25/2024    Procedure: Left Heart Catheterization;  Surgeon: Renny Rene MD;  Location:  HEART CARDIAC CATH LAB    CV LEFT VENTRICULOGRAM N/A 6/25/2024    Procedure: Left Ventriculogram;  Surgeon: Renny Rene MD;  Location:  HEART CARDIAC CATH LAB    ESOPHAGOSCOPY, GASTROSCOPY, DUODENOSCOPY (EGD), COMBINED N/A 09/13/2023    Procedure: Esophagoscopy, gastroscopy, duodenoscopy (EGD), combined with biopsy using  "cold forcep;  Surgeon: Yue Cline MD;  Location: [facility]    HERNIA REPAIR, UMBILICAL      KNEE SURGERY Left     \"bone on bone\" d/t arthritis    RADIOFREQUENCY ABLATION NERVES      cervical spine    REPAIR ANEURYSM ASCENDING AORTA N/A 7/10/2024    Procedure: ASCENDING AORTA  ANEURYSM REPAIR WITH HEMASHIED PLATINUM WOVEN DOUBLE VELOUR VASCULAR GRAFT SIZE:30MM  ON PUMP/ TRANSESPHOGEAL ECHOCARIDOGRAM PER ANESTHESIA;  Surgeon: Mulvihill, Michael, MD;  Location: [facility] OR    RHINOPLASTY      SEPTOPLASTY      SHOULDER SURGERY Bilateral     right (pitching injury), left (bone spur)    SHOULDER SURGERY Right     reconstructive surgery with hardware    TOE SURGERY Left     left big toe d/t arthritis    TOTAL KNEE ARTHROPLASTY Left     unicompartmental       CURRENT MEDICATIONS:   Current Outpatient Medications   Medication Sig Dispense Refill    acetaminophen (TYLENOL) 325 MG tablet Take 2 tablets (650 mg) by mouth every 4 hours as needed for mild pain 30 tablet 0    albuterol (PROAIR HFA/PROVENTIL HFA/VENTOLIN HFA) 108 (90 Base) MCG/ACT inhaler Inhale 1-2 puffs into the lungs every 4 hours as needed for shortness of breath, wheezing or cough 18 g 0    aspirin (ASA) 81 MG chewable tablet Take 2 tablets (162 mg) by mouth or NG Tube daily 60 tablet 0    azithromycin (ZITHROMAX) 250 MG tablet Take 2 tablets (500 mg) by mouth daily for 1 day, THEN 1 tablet (250 mg) daily for 4 days. 6 tablet 0    cefuroxime (CEFTIN) 500 MG tablet Take 1 tablet (500 mg) by mouth 2 times daily for 5 days 10 tablet 0    ferrous sulfate (FEROSUL) 325 (65 Fe) MG tablet Take 1 tablet (325 mg) by mouth every other day for 90 days 45 tablet 0    fluticasone-salmeterol (ADVAIR) 250-50 MCG/ACT inhaler Inhale 1 puff into the lungs 2 times daily for 30 days 1 each 0    guaiFENesin-codeine (ROBITUSSIN AC) 100-10 MG/5ML solution Take 5-10 mLs by mouth every 4 hours as needed for cough 118 mL 0    ipratropium - albuterol 0.5 mg/2.5 mg/3 mL (DUONEB) 0.5-2.5 " (3) MG/3ML neb solution Take 1 vial (3 mLs) by nebulization every 6 hours as needed for shortness of breath, wheezing or cough 90 mL 3    methocarbamol (ROBAXIN) 500 MG tablet Take 1 tablet (500 mg) by mouth every 6 hours as needed for muscle spasms 30 tablet 0    metoprolol succinate ER (TOPROL XL) 50 MG 24 hr tablet Take 1 tablet (50 mg) by mouth daily 60 tablet 3    Multiple Vitamins-Minerals (MULTIVITAMIN PO) Take 1 tablet by mouth daily      omeprazole (PRILOSEC) 20 MG DR capsule Take 2 capsules (40 mg) by mouth daily 90 capsule 3    oxyCODONE (ROXICODONE) 5 MG tablet Take 0.5-1 tablets (2.5-5 mg) by mouth every 6 hours as needed for moderate to severe pain 20 tablet 0    pramipexole (MIRAPEX) 1 MG tablet TAKE 1 TABLET BY MOUTH TWICE DAILY FOR RESTLESS LEG SYNDROME.. 180 tablet 0    rosuvastatin (CRESTOR) 40 MG tablet Take 1 tablet (40 mg) by mouth at bedtime 90 tablet 3    SENNA-docusate sodium (SENNA S) 8.6-50 MG tablet Take 1 tablet by mouth daily as needed (constipation) Take while using narcotic pain medication and/or until bowel function returned to normal 30 tablet 0    traZODone (DESYREL) 100 MG tablet Take 1 tablet (100 mg) by mouth at bedtime 90 tablet 3    brimonidine (ALPHAGAN) 0.2 % ophthalmic solution Place 1 drop Into the left eye 2 times daily (Patient not taking: Reported on 7/31/2024)      polyethylene glycol (MIRALAX) 17 GM/Dose powder Take 17 g by mouth daily (Patient not taking: Reported on 7/31/2024) 510 g 0     No current facility-administered medications for this visit.       ALLERGIES:      Allergies   Allergen Reactions    Amoxicillin Other (See Comments) and Unknown     Facial and neck flushing, felt hot    Seasonal Allergies        ROS:  Review of symptoms otherwise negative unless commented about in HPI.     LABS:  Last Basic Metabolic Panel:  Lab Results   Component Value Date     07/16/2024      Lab Results   Component Value Date    POTASSIUM 4.0 07/16/2024    POTASSIUM 3.8  "07/10/2024    POTASSIUM 3.8 06/15/2022     Lab Results   Component Value Date    CHLORIDE 100 07/16/2024    CHLORIDE 103 06/15/2022     Lab Results   Component Value Date    NITHIN 8.9 07/16/2024     Lab Results   Component Value Date    CO2 31 07/16/2024    CO2 23 06/15/2022     Lab Results   Component Value Date    BUN 26.8 07/16/2024    BUN 16 06/15/2022     Lab Results   Component Value Date    CR 1.20 07/16/2024     Lab Results   Component Value Date     07/16/2024     07/15/2024    GLC 86 06/15/2022       Last CBC:   Lab Results   Component Value Date    WBC 5.4 07/16/2024     Lab Results   Component Value Date    RBC 3.20 07/16/2024     Lab Results   Component Value Date    HGB 9.7 07/16/2024     Lab Results   Component Value Date    HCT 30.1 07/16/2024     No components found for: \"MCT\"  Lab Results   Component Value Date    MCV 94 07/16/2024     Lab Results   Component Value Date    MCH 30.3 07/16/2024     Lab Results   Component Value Date    MCHC 32.2 07/16/2024     Lab Results   Component Value Date    RDW 13.9 07/16/2024     Lab Results   Component Value Date     07/16/2024       INR:  Lab Results   Component Value Date    INR 1.47 07/10/2024    INR 1.77 07/10/2024    INR 1.06 06/25/2024         IMAGING:  None    PHYSICAL EXAM:   /68   Pulse 91   Ht 1.702 m (5' 7\")   Wt 101.2 kg (223 lb)   SpO2 98%   BMI 34.93 kg/m    General: alert and oriented x 3, pleasant, no acute distress, normal mood and affect  Neuro: Intact with no focal deficits   CV: S1 S2, no murmurs, rubs or gallops, regular rate and rhythm, no peripheral edema  Pulm: bilateral breath sounds, clear to auscultation, easy work of breathing  Incision: incisions clean dry and intact without erythema, swelling or drainage    PROCEDURES: None         AUDELIA Hernandez     "

## 2024-07-31 NOTE — PATIENT INSTRUCTIONS
Your surgery was on 7/11/24    Ok to start driving four weeks after the date of surgery as long as you are no longer taking narcotic pain medications.    From the date of surgery: no lifting greater than 10 pounds for 8 weeks, then no lifting greater than 20 pounds for an additional 4 weeks.    Do not soak your incisions until fully healed over with no scabbing; this includes hot tubs, baths, pools etc.      If you have questions or concerns, please call us:    Kelly Oscar RN Care Coordinator  [phone]

## 2024-08-02 ENCOUNTER — HOSPITAL ENCOUNTER (OUTPATIENT)
Dept: CARDIAC REHAB | Facility: CLINIC | Age: 79
Discharge: HOME | End: 2024-08-02
Attending: STUDENT IN AN ORGANIZED HEALTH CARE EDUCATION/TRAINING PROGRAM
Payer: COMMERCIAL

## 2024-08-02 PROCEDURE — 93798 PHYS/QHP OP CAR RHAB W/ECG: CPT

## 2024-08-05 ENCOUNTER — HOSPITAL ENCOUNTER (OUTPATIENT)
Dept: CARDIAC REHAB | Facility: CLINIC | Age: 79
Discharge: HOME | End: 2024-08-05
Attending: STUDENT IN AN ORGANIZED HEALTH CARE EDUCATION/TRAINING PROGRAM
Payer: COMMERCIAL

## 2024-08-05 PROCEDURE — 93797 PHYS/QHP OP CAR RHAB WO ECG: CPT

## 2024-08-05 PROCEDURE — 93797 PHYS/QHP OP CAR RHAB WO ECG: CPT | Mod: 59

## 2024-08-05 PROCEDURE — 93798 PHYS/QHP OP CAR RHAB W/ECG: CPT

## 2024-08-05 PROCEDURE — 93798 PHYS/QHP OP CAR RHAB W/ECG: CPT | Performed by: REHABILITATION PRACTITIONER

## 2024-08-07 ENCOUNTER — OFFICE VISIT (OUTPATIENT)
Dept: PEDIATRICS | Facility: CLINIC | Age: 79
End: 2024-08-07
Payer: COMMERCIAL

## 2024-08-07 VITALS
SYSTOLIC BLOOD PRESSURE: 122 MMHG | OXYGEN SATURATION: 95 % | DIASTOLIC BLOOD PRESSURE: 64 MMHG | WEIGHT: 221.7 LBS | RESPIRATION RATE: 16 BRPM | HEIGHT: 66 IN | BODY MASS INDEX: 35.63 KG/M2 | TEMPERATURE: 99.2 F | HEART RATE: 90 BPM

## 2024-08-07 DIAGNOSIS — R05.2 SUBACUTE COUGH: ICD-10-CM

## 2024-08-07 DIAGNOSIS — Z95.1 S/P CABG (CORONARY ARTERY BYPASS GRAFT): ICD-10-CM

## 2024-08-07 DIAGNOSIS — J18.9 PNEUMONIA OF LEFT LUNG DUE TO INFECTIOUS ORGANISM, UNSPECIFIED PART OF LUNG: Primary | ICD-10-CM

## 2024-08-07 PROCEDURE — 99213 OFFICE O/P EST LOW 20 MIN: CPT | Mod: 24 | Performed by: PHYSICIAN ASSISTANT

## 2024-08-07 PROCEDURE — G2211 COMPLEX E/M VISIT ADD ON: HCPCS | Performed by: PHYSICIAN ASSISTANT

## 2024-08-07 ASSESSMENT — PAIN SCALES - GENERAL: PAINLEVEL: NO PAIN (0)

## 2024-08-07 NOTE — PROGRESS NOTES
Assessment & Plan     Pneumonia of left lung due to infectious organism, resolved  Subacute cough  Patient feeling much better and less cough.   Can continue with nebulizer as needed.   Pt will try OTC Robitussin and if not helping will send in one additional refill of Robitussin with Codeine.   Suspect post pneumonia inflammation and recovering from recent cardiovascular surgery    S/P CABG (coronary artery bypass graft)  Severe multivessel CAD and ascending aortic aneurysm S/P CABG x3 and replacement of the ascending aorta on 7/11.  Continue with cardiac rehab.  Continue to follow-up with cardiology as advised.    The longitudinal plan of care for the diagnosis(es)/condition(s) as documented were addressed during this visit. Due to the added complexity in care, I will continue to support Johnny in the subsequent management and with ongoing continuity of care with PCP.    FUTURE APPOINTMENTS:       - Follow-up with PCP in 1 month (appt already in place)       - Follow-up sooner for new or worsening symptoms    Subjective   Johnny is a 78 year old, presenting for the following health issues:  Follow Up        8/7/2024     1:32 PM   Additional Questions   Roomed by Janet   Accompanied by self         8/7/2024     1:32 PM   Patient Reported Additional Medications   Patient reports taking the following new medications no     HPI       Concern - Patient is here for a follow up visit. He reports that he is feeling better.    Patient is a 78 y.o. male who presents to the clinic for follow-up pneumonia. Pt completed course of antibiotics and has noticed improvement. He is using a nebulizer that cardiology gave him to help with breathing. They also prescribed Advair and that causes more cough so he didn't continue on that medication. Patient states he still has occasional cough. Robitussin-codeine prescribed by cardiology has been helpful at night. Pt states he is struggling to sleep at night because he is a side sleepier and  "that is still too uncomfortable. Reports cardiac rehab is going well (3 times weekly). Reports fatigue is improving and has not had any fevers. Pt states he went for a 1 mile walk today without SOB. He does get some shortness of breath with stairs.     Review of Systems  Constitutional, HEENT, cardiovascular, pulmonary, gi and gu systems are negative, except as otherwise noted.      Objective    /64 (BP Location: Right arm, Patient Position: Sitting, Cuff Size: Adult Regular)   Pulse 90   Temp 99.2  F (37.3  C) (Tympanic)   Resp 16   Ht 1.676 m (5' 6\")   Wt 100.6 kg (221 lb 11.2 oz)   SpO2 95%   BMI 35.78 kg/m    Body mass index is 35.78 kg/m .    Physical Exam   GENERAL: alert and no distress  EYES: Eyes grossly normal to inspection, PERRL and conjunctivae and sclerae normal  RESP: lungs clear to auscultation - no rales, rhonchi or wheezes  CV: regular rate and rhythm, normal S1 S2, no S3 or S4, no murmur, click or rub, no peripheral edema  MS: no gross musculoskeletal defects noted, no edema  SKIN: no suspicious lesions or rashes  PSYCH: mentation appears normal, affect normal/bright        Signed Electronically by: Marylin Cueto PA-C    "

## 2024-08-09 ENCOUNTER — HOSPITAL ENCOUNTER (OUTPATIENT)
Dept: CARDIAC REHAB | Facility: CLINIC | Age: 79
Discharge: HOME | End: 2024-08-09
Attending: STUDENT IN AN ORGANIZED HEALTH CARE EDUCATION/TRAINING PROGRAM
Payer: COMMERCIAL

## 2024-08-09 PROCEDURE — 93798 PHYS/QHP OP CAR RHAB W/ECG: CPT

## 2024-08-12 ENCOUNTER — LAB (OUTPATIENT)
Dept: LAB | Facility: CLINIC | Age: 79
End: 2024-08-12
Payer: COMMERCIAL

## 2024-08-12 ENCOUNTER — HOSPITAL ENCOUNTER (OUTPATIENT)
Dept: CARDIAC REHAB | Facility: CLINIC | Age: 79
Discharge: HOME | End: 2024-08-12
Attending: STUDENT IN AN ORGANIZED HEALTH CARE EDUCATION/TRAINING PROGRAM
Payer: COMMERCIAL

## 2024-08-12 DIAGNOSIS — I25.10 CAD (CORONARY ARTERY DISEASE): ICD-10-CM

## 2024-08-12 LAB
ALT SERPL W P-5'-P-CCNC: 25 U/L (ref 0–70)
ANION GAP SERPL CALCULATED.3IONS-SCNC: 14 MMOL/L (ref 7–15)
BUN SERPL-MCNC: 14.3 MG/DL (ref 8–23)
CALCIUM SERPL-MCNC: 9.2 MG/DL (ref 8.8–10.4)
CHLORIDE SERPL-SCNC: 102 MMOL/L (ref 98–107)
CHOLEST SERPL-MCNC: 88 MG/DL
CREAT SERPL-MCNC: 1.05 MG/DL (ref 0.67–1.17)
EGFRCR SERPLBLD CKD-EPI 2021: 73 ML/MIN/1.73M2
ERYTHROCYTE [DISTWIDTH] IN BLOOD BY AUTOMATED COUNT: 13.6 % (ref 10–15)
FASTING STATUS PATIENT QL REPORTED: YES
GLUCOSE SERPL-MCNC: 105 MG/DL (ref 70–99)
HCO3 SERPL-SCNC: 22 MMOL/L (ref 22–29)
HCT VFR BLD AUTO: 37.2 % (ref 40–53)
HDLC SERPL-MCNC: 42 MG/DL
HGB BLD-MCNC: 11.8 G/DL (ref 13.3–17.7)
LDLC SERPL CALC-MCNC: 26 MG/DL
MCH RBC QN AUTO: 30 PG (ref 26.5–33)
MCHC RBC AUTO-ENTMCNC: 31.7 G/DL (ref 31.5–36.5)
MCV RBC AUTO: 95 FL (ref 78–100)
NONHDLC SERPL-MCNC: 46 MG/DL
PLATELET # BLD AUTO: 176 10E3/UL (ref 150–450)
POTASSIUM SERPL-SCNC: 4 MMOL/L (ref 3.4–5.3)
RBC # BLD AUTO: 3.93 10E6/UL (ref 4.4–5.9)
SODIUM SERPL-SCNC: 138 MMOL/L (ref 135–145)
TRIGL SERPL-MCNC: 98 MG/DL
WBC # BLD AUTO: 4.9 10E3/UL (ref 4–11)

## 2024-08-12 PROCEDURE — 93798 PHYS/QHP OP CAR RHAB W/ECG: CPT

## 2024-08-12 PROCEDURE — 80048 BASIC METABOLIC PNL TOTAL CA: CPT

## 2024-08-12 PROCEDURE — 85027 COMPLETE CBC AUTOMATED: CPT

## 2024-08-12 PROCEDURE — 84460 ALANINE AMINO (ALT) (SGPT): CPT

## 2024-08-12 PROCEDURE — 80061 LIPID PANEL: CPT

## 2024-08-12 PROCEDURE — 36415 COLL VENOUS BLD VENIPUNCTURE: CPT

## 2024-08-14 ENCOUNTER — HOSPITAL ENCOUNTER (OUTPATIENT)
Dept: CARDIAC REHAB | Facility: CLINIC | Age: 79
Discharge: HOME | End: 2024-08-14
Attending: STUDENT IN AN ORGANIZED HEALTH CARE EDUCATION/TRAINING PROGRAM
Payer: COMMERCIAL

## 2024-08-14 DIAGNOSIS — Z95.1 S/P CABG (CORONARY ARTERY BYPASS GRAFT): ICD-10-CM

## 2024-08-14 PROCEDURE — 93798 PHYS/QHP OP CAR RHAB W/ECG: CPT

## 2024-08-16 ENCOUNTER — HOSPITAL ENCOUNTER (OUTPATIENT)
Dept: GENERAL RADIOLOGY | Facility: CLINIC | Age: 79
Discharge: HOME | End: 2024-08-16
Attending: INTERNAL MEDICINE | Admitting: INTERNAL MEDICINE
Payer: COMMERCIAL

## 2024-08-16 ENCOUNTER — HOSPITAL ENCOUNTER (OUTPATIENT)
Dept: CARDIAC REHAB | Facility: CLINIC | Age: 79
Discharge: HOME | End: 2024-08-16
Attending: STUDENT IN AN ORGANIZED HEALTH CARE EDUCATION/TRAINING PROGRAM
Payer: COMMERCIAL

## 2024-08-16 ENCOUNTER — OFFICE VISIT (OUTPATIENT)
Dept: CARDIOLOGY | Facility: CLINIC | Age: 79
End: 2024-08-16
Payer: COMMERCIAL

## 2024-08-16 VITALS
WEIGHT: 222.8 LBS | BODY MASS INDEX: 34.97 KG/M2 | HEIGHT: 67 IN | DIASTOLIC BLOOD PRESSURE: 68 MMHG | HEART RATE: 84 BPM | SYSTOLIC BLOOD PRESSURE: 120 MMHG

## 2024-08-16 DIAGNOSIS — Z95.1 S/P CABG (CORONARY ARTERY BYPASS GRAFT): Primary | ICD-10-CM

## 2024-08-16 DIAGNOSIS — J90 PLEURAL EFFUSION: ICD-10-CM

## 2024-08-16 PROCEDURE — 93798 PHYS/QHP OP CAR RHAB W/ECG: CPT

## 2024-08-16 PROCEDURE — 99214 OFFICE O/P EST MOD 30 MIN: CPT | Mod: 24 | Performed by: INTERNAL MEDICINE

## 2024-08-16 PROCEDURE — 71046 X-RAY EXAM CHEST 2 VIEWS: CPT

## 2024-08-16 PROCEDURE — G2211 COMPLEX E/M VISIT ADD ON: HCPCS | Performed by: INTERNAL MEDICINE

## 2024-08-16 RX ORDER — ALBUTEROL SULFATE 90 UG/1
1-2 AEROSOL, METERED RESPIRATORY (INHALATION) EVERY 4 HOURS PRN
Qty: 18 G | Refills: 0 | OUTPATIENT
Start: 2024-08-16

## 2024-08-16 NOTE — LETTER
8/16/2024    Yordan Hernandez MD  [address] [place] [address]    RE: Toni Collado       Dear Colleague,     I had the pleasure of seeing Toni Collado in the [facility].  Cardiology Clinic Progress Note  Toni Collado MRN# [id number]   YOB: 1945 Age: 78 year old   Primary Cardiologist: Dr. Cruz Reason for visit: follow-up             Assessment and Plan:   Toni Collado is a very pleasant 78 year old male who is here today for follow-up.      1.  Severe multivessel coronary artery disease and ascending aortic aneurysm, now status post CABG x 3 and replacement of ascending aorta with Dr. Mulvihill on 7/11/2024.  2. L pleural effusion, repeat chest XR today.   3.  Hyperlipidemia, LDL 26 on rosuvastatin 40 mg once daily  4.  BRITTNI on CPAP  5.  Obesity, BMI 35  6.  Persistent cough  7.  Recent pneumonia left lung, completed antibiotics    Changes today: none    Mr. Collado has been improving every day since his CV surgery 7/11/2024. He appears euvolemic on exam. Denies anginal symptoms. Participating in regular cardiac rehab. His only complaint is of a persistent cough with exertion and when attempting to lay flat. Known small L pleural effusion, last CXR 7/24/2024. Recommend repeating CXR today given absent breath sounds in LLB.  Of note, he was recently treated for left lung pneumonia with antibiotics.  PCP felt ongoing cough secondary to post pneumonia inflammation.    Follow up with [facility] in 3-4 months, sooner if needed.     Malina Jennings PA-C  [facility]  Pager: [phone]          History of Presenting Illness:    Toni Collado is a very pleasant 78 year old male with a history of BRITTNI, hypertension, obesity and GERD.    Patient presented to cardiology clinic 5/2024 for evaluation of chest discomfort with exertion.  He was assessed with a Lexiscan stress test 2/2024 for further evaluation of his symptoms.  The study was technically  difficult due to diaphragmatic attenuation but was negative for inducible myocardial ischemia or infarction.  Given recurrent symptoms, echocardiogram as well as invasive coronary angiogram are recommended.    Echocardiogram 6/24/2024 revealed LVEF 55 to 60%, no regional wall motion abnormalities, moderate concentric LVH, normal RV size and function, and dilated ascending aorta at 4.5 cm.  Coronary angiogram was completed 6/25/2024 and revealed severe multivessel coronary artery disease.  Patient ultimately underwent CABG x 3 and replacement of the ascending aorta with Dr. Mulvihill on 7/11/2024.  He did well, with course complicated by transient hyperglycemia treated with insulin infusion and hypervolemia treated with diuretics.  He also had COPD exacerbation for which he was discharged on nebulizer treatments.  Ultimately discharged in stable condition 7/16/2024.    He was last seen by CV surgery 7/31/2024 at which time he was doing well.    Had labs 8/12/2024. BMP with normal renal function and electrolytes. CBC stable/improved when compared to prior. Lipid panel with TC 88, triglycerides 98, HDL 42, LDL 26. ALT WNL.     Patient is here today for routine general cardiology follow-up.  He is overall doing well and feels he is improving each and every day.  No recurrent chest discomfort.  Denies dizziness, palpitations, PND.  His main complaint is of a persistent cough (which has improved some).  He coughs more with exertion and when trying to lie flat.  Sleeps in recliner.    Taking all medications daily as prescribed.  Blood pressure 120/68 and heart rate 84.  Engaging in regular cardiac rehab.        Social History       Social History     Socioeconomic History     Marital status:      Spouse name: Not on file     Number of children: Not on file     Years of education: Not on file     Highest education level: Not on file   Occupational History     Not on file   Tobacco Use     Smoking status: Former      Current packs/day: 0.00     Types: Cigarettes     Quit date: 1975     Years since quittin.7     Passive exposure: Past     Smokeless tobacco: Never   Vaping Use     Vaping status: Never Used   Substance and Sexual Activity     Alcohol use: Not Currently     Drug use: No     Sexual activity: Not Currently     Partners: Female     Birth control/protection: None   Other Topics Concern     Not on file   Social History Narrative    WIFE-JESSIE RETIRED SON SHERMAN STEPDAUGHTER RETIRED , [employer] RETIRED -IT, takes trailer down to [facility] [place].       Social Determinants of Health     Financial Resource Strain: Low Risk  (2024)    Financial Resource Strain      Within the past 12 months, have you or your family members you live with been unable to get utilities (heat, electricity) when it was really needed?: No   Food Insecurity: Low Risk  (2024)    Food Insecurity      Within the past 12 months, did you worry that your food would run out before you got money to buy more?: No      Within the past 12 months, did the food you bought just not last and you didn t have money to get more?: No   Transportation Needs: Low Risk  (2024)    Transportation Needs      Within the past 12 months, has lack of transportation kept you from medical appointments, getting your medicines, non-medical meetings or appointments, work, or from getting things that you need?: No   Physical Activity: Sufficiently Active (10/31/2023)    Exercise Vital Sign      Days of Exercise per Week: 3 days      Minutes of Exercise per Session: 50 min   Stress: No Stress Concern Present (10/31/2023)    [employer] - Occupational Stress Questionnaire      Feeling of Stress : Not at all   Social Connections: Moderately Isolated (10/31/2023)    Social Connection and Isolation Panel [NHANES]      Frequency of Communication with Friends and Family: More than three  "times a week      Frequency of Social Gatherings with Friends and Family: Once a week      Attends [employer]ism Services: Never      Active Member of Clubs or Organizations: No      Attends Club or Organization Meetings: Not on file      Marital Status:    Interpersonal Safety: Low Risk  (11/7/2023)    Interpersonal Safety      Do you feel physically and emotionally safe where you currently live?: Yes      Within the past 12 months, have you been hit, slapped, kicked or otherwise physically hurt by someone?: No      Within the past 12 months, have you been humiliated or emotionally abused in other ways by your partner or ex-partner?: No   Housing Stability: Low Risk  (1/27/2024)    Housing Stability      Do you have housing? : Yes      Are you worried about losing your housing?: No            Review of Systems:   Please see HPI         Physical Exam:   Vitals: /68 (BP Location: Right arm, Patient Position: Sitting, Cuff Size: Adult Regular)   Pulse 84   Ht 1.702 m (5' 7\")   Wt 101.1 kg (222 lb 12.8 oz)   BMI 34.90 kg/m     Wt Readings from Last 4 Encounters:   08/07/24 100.6 kg (221 lb 11.2 oz)   07/31/24 101.2 kg (223 lb)   07/30/24 101.2 kg (223 lb 1.6 oz)   07/16/24 105.2 kg (231 lb 14.8 oz)     GEN: well nourished, in no acute distress.  HEENT:  Pupils equal, round. Sclerae nonicteric.   NECK: Supple, no masses appreciated. No JVD  C/V:  Regular rate and rhythm, no murmur appreciated  RESP: Respirations are unlabored. Absent breath sounds in left lung base  GI: Abdomen soft, nontender.  EXTREM: no LE edema.  NEURO: Alert and oriented, cooperative.  SKIN: Warm and dry.        Data:   LIPID RESULTS:  Lab Results   Component Value Date    CHOL 88 08/12/2024    HDL 42 08/12/2024    LDL 26 08/12/2024    TRIG 98 08/12/2024     LIVER ENZYME RESULTS:  Lab Results   Component Value Date    AST 65 (H) 07/11/2024    ALT 25 08/12/2024     CBC RESULTS:  Lab Results   Component Value Date    WBC 4.9 08/12/2024 "    RBC 3.93 (L) 08/12/2024    HGB 11.8 (L) 08/12/2024    HCT 37.2 (L) 08/12/2024    MCV 95 08/12/2024    MCH 30.0 08/12/2024    MCHC 31.7 08/12/2024    RDW 13.6 08/12/2024     08/12/2024     BMP RESULTS:  Lab Results   Component Value Date     08/12/2024    POTASSIUM 4.0 08/12/2024    POTASSIUM 3.8 07/10/2024    POTASSIUM 3.8 06/15/2022    CHLORIDE 102 08/12/2024    CHLORIDE 103 06/15/2022    CO2 22 08/12/2024    CO2 23 06/15/2022    ANIONGAP 14 08/12/2024    ANIONGAP 13 06/15/2022     (H) 08/12/2024     (H) 07/15/2024    GLC 86 06/15/2022    BUN 14.3 08/12/2024    BUN 16 06/15/2022    CR 1.05 08/12/2024    GFRESTIMATED 73 08/12/2024    GFRESTIMATED 57 (L) 02/10/2024    GFRESTIMATED >60 04/12/2021    GFRESTBLACK >60 04/12/2021    NITHIN 9.2 08/12/2024      A1C RESULTS:  Lab Results   Component Value Date    A1C 5.7 (H) 05/28/2024     INR RESULTS:  Lab Results   Component Value Date    INR 1.47 (H) 07/10/2024    INR 1.77 (H) 07/10/2024            Medications     Current Outpatient Medications   Medication Sig Dispense Refill     acetaminophen (TYLENOL) 325 MG tablet Take 2 tablets (650 mg) by mouth every 4 hours as needed for mild pain 30 tablet 0     albuterol (PROAIR HFA/PROVENTIL HFA/VENTOLIN HFA) 108 (90 Base) MCG/ACT inhaler Inhale 1-2 puffs into the lungs every 4 hours as needed for shortness of breath, wheezing or cough (Patient not taking: Reported on 8/7/2024) 18 g 0     aspirin (ASA) 81 MG chewable tablet Take 2 tablets (162 mg) by mouth or NG Tube daily 60 tablet 0     brimonidine (ALPHAGAN) 0.2 % ophthalmic solution Place 1 drop Into the left eye 2 times daily (Patient not taking: Reported on 7/31/2024)       ferrous sulfate (FEROSUL) 325 (65 Fe) MG tablet Take 1 tablet (325 mg) by mouth every other day for 90 days 45 tablet 0     guaiFENesin-codeine (ROBITUSSIN AC) 100-10 MG/5ML solution Take 5-10 mLs by mouth every 4 hours as needed for cough 118 mL 0     ipratropium - albuterol  0.5 mg/2.5 mg/3 mL (DUONEB) 0.5-2.5 (3) MG/3ML neb solution Take 1 vial (3 mLs) by nebulization every 6 hours as needed for shortness of breath, wheezing or cough 90 mL 3     methocarbamol (ROBAXIN) 500 MG tablet Take 1 tablet (500 mg) by mouth every 6 hours as needed for muscle spasms (Patient not taking: Reported on 8/7/2024) 30 tablet 0     metoprolol succinate ER (TOPROL XL) 50 MG 24 hr tablet Take 1 tablet (50 mg) by mouth daily 60 tablet 3     Multiple Vitamins-Minerals (MULTIVITAMIN PO) Take 1 tablet by mouth daily       omeprazole (PRILOSEC) 20 MG DR capsule Take 2 capsules (40 mg) by mouth daily 90 capsule 3     oxyCODONE (ROXICODONE) 5 MG tablet Take 0.5-1 tablets (2.5-5 mg) by mouth every 6 hours as needed for moderate to severe pain (Patient not taking: Reported on 8/7/2024) 20 tablet 0     pramipexole (MIRAPEX) 1 MG tablet TAKE 1 TABLET BY MOUTH TWICE DAILY FOR RESTLESS LEG SYNDROME.. 180 tablet 0     rosuvastatin (CRESTOR) 40 MG tablet Take 1 tablet (40 mg) by mouth at bedtime 90 tablet 3     traZODone (DESYREL) 100 MG tablet Take 1 tablet (100 mg) by mouth at bedtime 90 tablet 3          Past Medical History     Past Medical History:   Diagnosis Date     Adenomatous colon polyp 07/2019     Asymptomatic varicose veins of both lower extremities      Bulbar polio 1952    residual dysphagia     Cervical osteoarthritis      Chronic anxiety      Chronic depression      Chronic depression 09/19/2022     Familial tremor      Gastroesophageal reflux disease without esophagitis 04/12/2021     Hallux limitus of left foot      Localized, primary osteoarthritis of hand      Oropharyngeal dysphagia      Pre-op evaluation 06/15/2022     Primary osteoarthritis of left hip 06/15/2022     Primary osteoarthritis of left hip 06/15/2022     RLS (restless legs syndrome)      Sleep apnea, obstructive     CPAP     Status post coronary angiogram 6/25/2024     Status post total replacement of left hip      Varicose veins of  "left lower extremity      Past Surgical History:   Procedure Laterality Date     ABDOMEN SURGERY       ARTHROSCOPY KNEE       BYPASS GRAFT ARTERY CORONARY N/A 7/10/2024    Procedure: CORONARY ARTERY BYPASS GRAFTING X 3 WITH RIGHT GREATER SAPHENOUS ENDOSCOPIC VEIN HARVEST LEFT INTERNAL MAMMARY ARTERY-LEFT ANTERIOR DESCENDING ARTERY RIGHT GREATER SAPHENOUS-OBTUSE MARGINAL ARTERY/ POSERTIOR DESCENDING ARTERY;  Surgeon: Mulvihill, Michael, MD;  Location:  OR     CATARACT IOL, RT/LT Bilateral      and      COLONOSCOPY N/A 09/13/2023    Procedure: Colonoscopy with polypectomies using cold snare;  Surgeon: Yue Cline MD;  Location:  GI     CV CORONARY ANGIOGRAM N/A 6/25/2024    Procedure: Coronary Angiogram;  Surgeon: Renny Rene MD;  Location:  HEART CARDIAC CATH LAB     CV LEFT HEART CATH N/A 6/25/2024    Procedure: Left Heart Catheterization;  Surgeon: Renny Rene MD;  Location:  HEART CARDIAC CATH LAB     CV LEFT VENTRICULOGRAM N/A 6/25/2024    Procedure: Left Ventriculogram;  Surgeon: Renny Rene MD;  Location:  HEART CARDIAC CATH LAB     ESOPHAGOSCOPY, GASTROSCOPY, DUODENOSCOPY (EGD), COMBINED N/A 09/13/2023    Procedure: Esophagoscopy, gastroscopy, duodenoscopy (EGD), combined with biopsy using cold forcep;  Surgeon: Yue Cline MD;  Location:  GI     HERNIA REPAIR, UMBILICAL       KNEE SURGERY Left     \"bone on bone\" d/t arthritis     RADIOFREQUENCY ABLATION NERVES      cervical spine     REPAIR ANEURYSM ASCENDING AORTA N/A 7/10/2024    Procedure: ASCENDING AORTA  ANEURYSM REPAIR WITH HEMASHIED PLATINUM WOVEN DOUBLE VELOUR VASCULAR GRAFT SIZE:30MM  ON PUMP/ TRANSESPHOGEAL ECHOCARIDOGRAM PER ANESTHESIA;  Surgeon: Mulvihill, Michael, MD;  Location:  OR     RHINOPLASTY       SEPTOPLASTY       SHOULDER SURGERY Bilateral     right (pitching injury), left (bone spur)     SHOULDER SURGERY Right     reconstructive surgery with hardware     " TOE SURGERY Left     left big toe d/t arthritis     TOTAL KNEE ARTHROPLASTY Left     unicompartmental     Family History   Problem Relation Age of Onset     Heart Failure Mother         valvular heart disease     Heart Failure Father         ihd     Myelodysplastic syndrome Father      Other Cancer Father         Mylodisplasia     Cancer Brother         renal     Other Cancer Brother         Kidney cancer     Heart Failure Brother         ihd     Substance Abuse Brother         Alcoholic     Substance Abuse Maternal Grandfather         Alcoholic     Diabetes Sister      Colon Cancer No family hx of      Thyroid Disease No family hx of             Allergies   Amoxicillin, Fluticasone-salmeterol, and Seasonal allergies    The longitudinal plan of care for the diagnosis(es)/condition(s) as documented were addressed during this visit. Due to the added complexity in care, I will continue to support Johnny in the subsequent management and with ongoing continuity of care.     30 minutes spent on the date of the encounter doing chart review, history and exam, documentation and further activities as noted above    MELLISA Rose [facility]  Pager: [phone]      Thank you for allowing me to participate in the care of your patient.      Sincerely,     MELLISA Rose [facility]  cc:   Nain Cruz MD  [address], [address]  [place] [address]

## 2024-08-16 NOTE — PATIENT INSTRUCTIONS
If you have a device related question, please call [phone].    Plan:  1. Medication changes: none    2. CXR today    3. Follow up in 3 months with KRIS, sooner if needed   -Scheduling phone number: [phone]    It was great seeing you today!    Malina Jennings PA-C  Physician Assistant  [facility] [place]

## 2024-08-16 NOTE — PROGRESS NOTES
Cardiology Clinic Progress Note  Toni Collado MRN# [id number]   YOB: 1945 Age: 78 year old   Primary Cardiologist: Dr. Cruz Reason for visit: follow-up             Assessment and Plan:   Toni Collado is a very pleasant 78 year old male who is here today for follow-up.      1.  Severe multivessel coronary artery disease and ascending aortic aneurysm, now status post CABG x 3 and replacement of ascending aorta with Dr. Mulvihill on 7/11/2024.  2. L pleural effusion, repeat chest XR today.   3.  Hyperlipidemia, LDL 26 on rosuvastatin 40 mg once daily  4.  BRITTNI on CPAP  5.  Obesity, BMI 35  6.  Persistent cough  7.  Recent pneumonia left lung, completed antibiotics    Changes today: none    Mr. Collado has been improving every day since his CV surgery 7/11/2024. He appears euvolemic on exam. Denies anginal symptoms. Participating in regular cardiac rehab. His only complaint is of a persistent cough with exertion and when attempting to lay flat. Known small L pleural effusion, last CXR 7/24/2024. Recommend repeating CXR today given absent breath sounds in LLB.  Of note, he was recently treated for left lung pneumonia with antibiotics.  PCP felt ongoing cough secondary to post pneumonia inflammation.    Follow up with KRIS in 3-4 months, sooner if needed.     Malina Jennings PA-C  [facility]  Pager: [phone]          History of Presenting Illness:    Toni Collado is a very pleasant 78 year old male with a history of BRITTNI, hypertension, obesity and GERD.    Patient presented to cardiology clinic 5/2024 for evaluation of chest discomfort with exertion.  He was assessed with a Lexiscan stress test 2/2024 for further evaluation of his symptoms.  The study was technically difficult due to diaphragmatic attenuation but was negative for inducible myocardial ischemia or infarction.  Given recurrent symptoms, echocardiogram as well as invasive coronary angiogram are  recommended.    Echocardiogram 2024 revealed LVEF 55 to 60%, no regional wall motion abnormalities, moderate concentric LVH, normal RV size and function, and dilated ascending aorta at 4.5 cm.  Coronary angiogram was completed 2024 and revealed severe multivessel coronary artery disease.  Patient ultimately underwent CABG x 3 and replacement of the ascending aorta with Dr. Mulvihill on 2024.  He did well, with course complicated by transient hyperglycemia treated with insulin infusion and hypervolemia treated with diuretics.  He also had COPD exacerbation for which he was discharged on nebulizer treatments.  Ultimately discharged in stable condition 2024.    He was last seen by CV surgery 2024 at which time he was doing well.    Had labs 2024. BMP with normal renal function and electrolytes. CBC stable/improved when compared to prior. Lipid panel with TC 88, triglycerides 98, HDL 42, LDL 26. ALT WNL.     Patient is here today for routine general cardiology follow-up.  He is overall doing well and feels he is improving each and every day.  No recurrent chest discomfort.  Denies dizziness, palpitations, PND.  His main complaint is of a persistent cough (which has improved some).  He coughs more with exertion and when trying to lie flat.  Sleeps in recliner.    Taking all medications daily as prescribed.  Blood pressure 120/68 and heart rate 84.  Engaging in regular cardiac rehab.        Social History       Social History     Socioeconomic History    Marital status:      Spouse name: Not on file    Number of children: Not on file    Years of education: Not on file    Highest education level: Not on file   Occupational History    Not on file   Tobacco Use    Smoking status: Former     Current packs/day: 0.00     Types: Cigarettes     Quit date: 1975     Years since quittin.7     Passive exposure: Past    Smokeless tobacco: Never   Vaping Use    Vaping status: Never Used    Substance and Sexual Activity    Alcohol use: Not Currently    Drug use: No    Sexual activity: Not Currently     Partners: Female     Birth control/protection: None   Other Topics Concern    Not on file   Social History Narrative    WIFE-JESSIE RETIRED SON SHERMAN 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       Social Determinants of Health     Financial Resource Strain: Low Risk  (1/27/2024)    Financial Resource Strain     Within the past 12 months, have you or your family members you live with been unable to get utilities (heat, electricity) when it was really needed?: No   Food Insecurity: Low Risk  (1/27/2024)    Food Insecurity     Within the past 12 months, did you worry that your food would run out before you got money to buy more?: No     Within the past 12 months, did the food you bought just not last and you didn t have money to get more?: No   Transportation Needs: Low Risk  (1/27/2024)    Transportation Needs     Within the past 12 months, has lack of transportation kept you from medical appointments, getting your medicines, non-medical meetings or appointments, work, or from getting things that you need?: No   Physical Activity: Sufficiently Active (10/31/2023)    Exercise Vital Sign     Days of Exercise per Week: 3 days     Minutes of Exercise per Session: 50 min   Stress: No Stress Concern Present (10/31/2023)    [employer] - Occupational Stress Questionnaire     Feeling of Stress : Not at all   Social Connections: Moderately Isolated (10/31/2023)    Social Connection and Isolation Panel [NHANES]     Frequency of Communication with Friends and Family: More than three times a week     Frequency of Social Gatherings with Friends and Family: Once a week     Attends Scientologist Services: Never     Active Member of Clubs or Organizations: No     Attends Club or Organization Meetings: Not  "on file     Marital Status:    Interpersonal Safety: Low Risk  (11/7/2023)    Interpersonal Safety     Do you feel physically and emotionally safe where you currently live?: Yes     Within the past 12 months, have you been hit, slapped, kicked or otherwise physically hurt by someone?: No     Within the past 12 months, have you been humiliated or emotionally abused in other ways by your partner or ex-partner?: No   Housing Stability: Low Risk  (1/27/2024)    Housing Stability     Do you have housing? : Yes     Are you worried about losing your housing?: No            Review of Systems:   Please see HPI         Physical Exam:   Vitals: /68 (BP Location: Right arm, Patient Position: Sitting, Cuff Size: Adult Regular)   Pulse 84   Ht 1.702 m (5' 7\")   Wt 101.1 kg (222 lb 12.8 oz)   BMI 34.90 kg/m     Wt Readings from Last 4 Encounters:   08/07/24 100.6 kg (221 lb 11.2 oz)   07/31/24 101.2 kg (223 lb)   07/30/24 101.2 kg (223 lb 1.6 oz)   07/16/24 105.2 kg (231 lb 14.8 oz)     GEN: well nourished, in no acute distress.  HEENT:  Pupils equal, round. Sclerae nonicteric.   NECK: Supple, no masses appreciated. No JVD  C/V:  Regular rate and rhythm, no murmur appreciated  RESP: Respirations are unlabored. Absent breath sounds in left lung base  GI: Abdomen soft, nontender.  EXTREM: no LE edema.  NEURO: Alert and oriented, cooperative.  SKIN: Warm and dry.        Data:   LIPID RESULTS:  Lab Results   Component Value Date    CHOL 88 08/12/2024    HDL 42 08/12/2024    LDL 26 08/12/2024    TRIG 98 08/12/2024     LIVER ENZYME RESULTS:  Lab Results   Component Value Date    AST 65 (H) 07/11/2024    ALT 25 08/12/2024     CBC RESULTS:  Lab Results   Component Value Date    WBC 4.9 08/12/2024    RBC 3.93 (L) 08/12/2024    HGB 11.8 (L) 08/12/2024    HCT 37.2 (L) 08/12/2024    MCV 95 08/12/2024    MCH 30.0 08/12/2024    MCHC 31.7 08/12/2024    RDW 13.6 08/12/2024     08/12/2024     BMP RESULTS:  Lab Results "   Component Value Date     08/12/2024    POTASSIUM 4.0 08/12/2024    POTASSIUM 3.8 07/10/2024    POTASSIUM 3.8 06/15/2022    CHLORIDE 102 08/12/2024    CHLORIDE 103 06/15/2022    CO2 22 08/12/2024    CO2 23 06/15/2022    ANIONGAP 14 08/12/2024    ANIONGAP 13 06/15/2022     (H) 08/12/2024     (H) 07/15/2024    GLC 86 06/15/2022    BUN 14.3 08/12/2024    BUN 16 06/15/2022    CR 1.05 08/12/2024    GFRESTIMATED 73 08/12/2024    GFRESTIMATED 57 (L) 02/10/2024    GFRESTIMATED >60 04/12/2021    GFRESTBLACK >60 04/12/2021    NITHIN 9.2 08/12/2024      A1C RESULTS:  Lab Results   Component Value Date    A1C 5.7 (H) 05/28/2024     INR RESULTS:  Lab Results   Component Value Date    INR 1.47 (H) 07/10/2024    INR 1.77 (H) 07/10/2024            Medications     Current Outpatient Medications   Medication Sig Dispense Refill    acetaminophen (TYLENOL) 325 MG tablet Take 2 tablets (650 mg) by mouth every 4 hours as needed for mild pain 30 tablet 0    albuterol (PROAIR HFA/PROVENTIL HFA/VENTOLIN HFA) 108 (90 Base) MCG/ACT inhaler Inhale 1-2 puffs into the lungs every 4 hours as needed for shortness of breath, wheezing or cough (Patient not taking: Reported on 8/7/2024) 18 g 0    aspirin (ASA) 81 MG chewable tablet Take 2 tablets (162 mg) by mouth or NG Tube daily 60 tablet 0    brimonidine (ALPHAGAN) 0.2 % ophthalmic solution Place 1 drop Into the left eye 2 times daily (Patient not taking: Reported on 7/31/2024)      ferrous sulfate (FEROSUL) 325 (65 Fe) MG tablet Take 1 tablet (325 mg) by mouth every other day for 90 days 45 tablet 0    guaiFENesin-codeine (ROBITUSSIN AC) 100-10 MG/5ML solution Take 5-10 mLs by mouth every 4 hours as needed for cough 118 mL 0    ipratropium - albuterol 0.5 mg/2.5 mg/3 mL (DUONEB) 0.5-2.5 (3) MG/3ML neb solution Take 1 vial (3 mLs) by nebulization every 6 hours as needed for shortness of breath, wheezing or cough 90 mL 3    methocarbamol (ROBAXIN) 500 MG tablet Take 1 tablet (500  mg) by mouth every 6 hours as needed for muscle spasms (Patient not taking: Reported on 8/7/2024) 30 tablet 0    metoprolol succinate ER (TOPROL XL) 50 MG 24 hr tablet Take 1 tablet (50 mg) by mouth daily 60 tablet 3    Multiple Vitamins-Minerals (MULTIVITAMIN PO) Take 1 tablet by mouth daily      omeprazole (PRILOSEC) 20 MG DR capsule Take 2 capsules (40 mg) by mouth daily 90 capsule 3    oxyCODONE (ROXICODONE) 5 MG tablet Take 0.5-1 tablets (2.5-5 mg) by mouth every 6 hours as needed for moderate to severe pain (Patient not taking: Reported on 8/7/2024) 20 tablet 0    pramipexole (MIRAPEX) 1 MG tablet TAKE 1 TABLET BY MOUTH TWICE DAILY FOR RESTLESS LEG SYNDROME.. 180 tablet 0    rosuvastatin (CRESTOR) 40 MG tablet Take 1 tablet (40 mg) by mouth at bedtime 90 tablet 3    traZODone (DESYREL) 100 MG tablet Take 1 tablet (100 mg) by mouth at bedtime 90 tablet 3          Past Medical History     Past Medical History:   Diagnosis Date    Adenomatous colon polyp 07/2019    Asymptomatic varicose veins of both lower extremities     Bulbar polio 1952    residual dysphagia    Cervical osteoarthritis     Chronic anxiety     Chronic depression     Chronic depression 09/19/2022    Familial tremor     Gastroesophageal reflux disease without esophagitis 04/12/2021    Hallux limitus of left foot     Localized, primary osteoarthritis of hand     Oropharyngeal dysphagia     Pre-op evaluation 06/15/2022    Primary osteoarthritis of left hip 06/15/2022    Primary osteoarthritis of left hip 06/15/2022    RLS (restless legs syndrome)     Sleep apnea, obstructive     CPAP    Status post coronary angiogram 6/25/2024    Status post total replacement of left hip     Varicose veins of left lower extremity      Past Surgical History:   Procedure Laterality Date    ABDOMEN SURGERY      ARTHROSCOPY KNEE      BYPASS GRAFT ARTERY CORONARY N/A 7/10/2024    Procedure: CORONARY ARTERY BYPASS GRAFTING X 3 WITH RIGHT GREATER SAPHENOUS ENDOSCOPIC VEIN  "HARVEST LEFT INTERNAL MAMMARY ARTERY-LEFT ANTERIOR DESCENDING ARTERY RIGHT GREATER SAPHENOUS-OBTUSE MARGINAL ARTERY/ POSERTIOR DESCENDING ARTERY;  Surgeon: Mulvihill, Michael, MD;  Location:  OR    CATARACT IOL, RT/LT Bilateral      and     COLONOSCOPY N/A 09/13/2023    Procedure: Colonoscopy with polypectomies using cold snare;  Surgeon: Yue Cline MD;  Location:  GI    CV CORONARY ANGIOGRAM N/A 6/25/2024    Procedure: Coronary Angiogram;  Surgeon: Renny Rene MD;  Location:  HEART CARDIAC CATH LAB    CV LEFT HEART CATH N/A 6/25/2024    Procedure: Left Heart Catheterization;  Surgeon: Renny Rene MD;  Location:  HEART CARDIAC CATH LAB    CV LEFT VENTRICULOGRAM N/A 6/25/2024    Procedure: Left Ventriculogram;  Surgeon: Renny Rene MD;  Location:  HEART CARDIAC CATH LAB    ESOPHAGOSCOPY, GASTROSCOPY, DUODENOSCOPY (EGD), COMBINED N/A 09/13/2023    Procedure: Esophagoscopy, gastroscopy, duodenoscopy (EGD), combined with biopsy using cold forcep;  Surgeon: Yue Cline MD;  Location:  GI    HERNIA REPAIR, UMBILICAL      KNEE SURGERY Left     \"bone on bone\" d/t arthritis    RADIOFREQUENCY ABLATION NERVES      cervical spine    REPAIR ANEURYSM ASCENDING AORTA N/A 7/10/2024    Procedure: ASCENDING AORTA  ANEURYSM REPAIR WITH HEMASHIED PLATINUM WOVEN DOUBLE VELOUR VASCULAR GRAFT SIZE:30MM  ON PUMP/ TRANSESPHOGEAL ECHOCARIDOGRAM PER ANESTHESIA;  Surgeon: Mulvihill, Michael, MD;  Location:  OR    RHINOPLASTY      SEPTOPLASTY      SHOULDER SURGERY Bilateral     right (pitching injury), left (bone spur)    SHOULDER SURGERY Right     reconstructive surgery with hardware    TOE SURGERY Left     left big toe d/t arthritis    TOTAL KNEE ARTHROPLASTY Left     unicompartmental     Family History   Problem Relation Age of Onset    Heart Failure Mother         valvular heart disease    Heart Failure Father         ihd    Myelodysplastic syndrome Father     " Other Cancer Father         Mylodisplasia    Cancer Brother         renal    Other Cancer Brother         Kidney cancer    Heart Failure Brother         ihd    Substance Abuse Brother         Alcoholic    Substance Abuse Maternal Grandfather         Alcoholic    Diabetes Sister     Colon Cancer No family hx of     Thyroid Disease No family hx of             Allergies   Amoxicillin, Fluticasone-salmeterol, and Seasonal allergies    The longitudinal plan of care for the diagnosis(es)/condition(s) as documented were addressed during this visit. Due to the added complexity in care, I will continue to support Johnny in the subsequent management and with ongoing continuity of care.     30 minutes spent on the date of the encounter doing chart review, history and exam, documentation and further activities as noted above    Malina Jennings PA-C  [facility]  Pager: [phone]

## 2024-08-19 ENCOUNTER — HOSPITAL ENCOUNTER (OUTPATIENT)
Dept: CARDIAC REHAB | Facility: CLINIC | Age: 79
Discharge: HOME | End: 2024-08-19
Attending: STUDENT IN AN ORGANIZED HEALTH CARE EDUCATION/TRAINING PROGRAM
Payer: COMMERCIAL

## 2024-08-19 PROCEDURE — 93798 PHYS/QHP OP CAR RHAB W/ECG: CPT | Performed by: REHABILITATION PRACTITIONER

## 2024-08-21 ENCOUNTER — HOSPITAL ENCOUNTER (OUTPATIENT)
Dept: CARDIAC REHAB | Facility: CLINIC | Age: 79
Discharge: HOME | End: 2024-08-21
Attending: STUDENT IN AN ORGANIZED HEALTH CARE EDUCATION/TRAINING PROGRAM
Payer: COMMERCIAL

## 2024-08-21 PROCEDURE — 93798 PHYS/QHP OP CAR RHAB W/ECG: CPT

## 2024-08-23 ENCOUNTER — HOSPITAL ENCOUNTER (OUTPATIENT)
Dept: CARDIAC REHAB | Facility: CLINIC | Age: 79
Discharge: HOME | End: 2024-08-23
Attending: STUDENT IN AN ORGANIZED HEALTH CARE EDUCATION/TRAINING PROGRAM
Payer: COMMERCIAL

## 2024-08-23 PROCEDURE — 93798 PHYS/QHP OP CAR RHAB W/ECG: CPT | Performed by: OCCUPATIONAL THERAPIST

## 2024-08-26 ENCOUNTER — HOSPITAL ENCOUNTER (OUTPATIENT)
Dept: CARDIAC REHAB | Facility: CLINIC | Age: 79
Discharge: HOME | End: 2024-08-26
Attending: STUDENT IN AN ORGANIZED HEALTH CARE EDUCATION/TRAINING PROGRAM
Payer: COMMERCIAL

## 2024-08-26 PROCEDURE — 93798 PHYS/QHP OP CAR RHAB W/ECG: CPT

## 2024-08-27 ENCOUNTER — OFFICE VISIT (OUTPATIENT)
Dept: PEDIATRICS | Facility: CLINIC | Age: 79
End: 2024-08-27
Payer: COMMERCIAL

## 2024-08-27 VITALS
OXYGEN SATURATION: 95 % | HEIGHT: 66 IN | HEART RATE: 84 BPM | TEMPERATURE: 98.7 F | DIASTOLIC BLOOD PRESSURE: 73 MMHG | BODY MASS INDEX: 35.45 KG/M2 | RESPIRATION RATE: 18 BRPM | WEIGHT: 220.6 LBS | SYSTOLIC BLOOD PRESSURE: 114 MMHG

## 2024-08-27 DIAGNOSIS — Z95.1 S/P CABG (CORONARY ARTERY BYPASS GRAFT): ICD-10-CM

## 2024-08-27 DIAGNOSIS — R05.3 CHRONIC COUGH: Primary | ICD-10-CM

## 2024-08-27 DIAGNOSIS — Z23 NEED FOR TDAP VACCINATION: ICD-10-CM

## 2024-08-27 DIAGNOSIS — K21.9 GASTROESOPHAGEAL REFLUX DISEASE WITHOUT ESOPHAGITIS: ICD-10-CM

## 2024-08-27 DIAGNOSIS — G47.33 OSA ON CPAP: ICD-10-CM

## 2024-08-27 PROBLEM — I20.0 ACCELERATING ANGINA (H): Status: RESOLVED | Noted: 2024-06-25 | Resolved: 2024-08-27

## 2024-08-27 PROBLEM — R73.9 TRANSIENT HYPERGLYCEMIA POST PROCEDURE: Status: RESOLVED | Noted: 2024-07-26 | Resolved: 2024-08-27

## 2024-08-27 PROBLEM — R06.2 WHEEZING: Status: RESOLVED | Noted: 2024-07-26 | Resolved: 2024-08-27

## 2024-08-27 PROBLEM — E87.70 FLUID OVERLOAD: Status: RESOLVED | Noted: 2024-07-26 | Resolved: 2024-08-27

## 2024-08-27 PROCEDURE — G2211 COMPLEX E/M VISIT ADD ON: HCPCS | Performed by: INTERNAL MEDICINE

## 2024-08-27 PROCEDURE — 99214 OFFICE O/P EST MOD 30 MIN: CPT | Performed by: INTERNAL MEDICINE

## 2024-08-27 RX ORDER — CODEINE PHOSPHATE AND GUAIFENESIN 10; 100 MG/5ML; MG/5ML
1-2 SOLUTION ORAL EVERY 4 HOURS PRN
Qty: 118 ML | Refills: 0 | Status: SHIPPED | OUTPATIENT
Start: 2024-08-27 | End: 2024-10-02

## 2024-08-27 ASSESSMENT — PAIN SCALES - GENERAL: PAINLEVEL: NO PAIN (0)

## 2024-08-27 NOTE — PROGRESS NOTES
The longitudinal plan of care for the diagnosis(es)/condition(s) as documented were addressed during this visit. Due to the added complexity in care, I will continue to support Johnny in the subsequent management and with ongoing continuity of care.

## 2024-08-27 NOTE — PATIENT INSTRUCTIONS
Continue with the duoneb before bed.    May take as needed guafenasin with codeine.  (No driving).    Steam showers can help keep mucous loose.       Might benefit from antihistamines like Zyrtec (cetirizine) for relief of congestion; the dose is usually 10 mg for adults.    Follow-up in January for a complete physcial exam.     Get your FLU and COVID this fall.      Yordan Hernandez MD  Internal Medicine and Pediatrics

## 2024-08-27 NOTE — PROGRESS NOTES
"  Assessment & Plan       BRITTNI on CPAP  Ongoing continuous positive airway pressure.     Gastroesophageal reflux disease without esophagitis  ONgoing proton pump inhibitor.     S/P CABG (coronary artery bypass graft)  Secondary risk factor modification.    - guaiFENesin-codeine (ROBITUSSIN AC) 100-10 MG/5ML solution; Take 5-10 mLs by mouth every 4 hours as needed for cough.    Chronic cough  Still present, but latest chest x-ray 10 days ago showed improvement, no pneumonia and only minimal effusion.  Ongoing duoneb, physical therapy, and as needed robitussin with codeine.         MED REC REQUIRED  Post Medication Reconciliation Status:  Discharge medications reconciled, continue medications without change        Subjective   Johnny is a 78 year old, presenting for the following health issues:  Surgical Followup      8/27/2024     2:13 PM   Additional Questions   Roomed by KM   Accompanied by Darlin - Spouse         8/27/2024     2:13 PM   Patient Reported Additional Medications   Patient reports taking the following new medications None     HPI     Ongoing cough, but overall feels better.  Still using nebulizer  Overall does not feel like cough has improved.   Was treated with antibiotic for pneumonia in July.  Repeat xray in 8/16 showed mostly resolution. But did show elevation of left hemidiaphragm      Doing cardiac rehab.  Overall feels like energy level has gotten better.  Feels like can get out more.  Does rehab 3 days per week.  Using weights.    Leg swelling has improved              Review of Systems  Constitutional, HEENT, cardiovascular, pulmonary, GI, , musculoskeletal, neuro, skin, endocrine and psych systems are negative, except as otherwise noted.      Objective    /73 (BP Location: Right arm, Patient Position: Sitting, Cuff Size: Adult Large)   Pulse 84   Temp 98.7  F (37.1  C) (Tympanic)   Resp 18   Ht 1.67 m (5' 5.75\")   Wt 100.1 kg (220 lb 9.6 oz)   SpO2 95%   BMI 35.88 kg/m    Body mass " index is 35.88 kg/m .  Physical Exam   GENERAL: alert and no distress  EYES: Eyes grossly normal to inspection, PERRL and conjunctivae and sclerae normal  HENT: ear canals and TM's normal, nose and mouth without ulcers or lesions  NECK: no adenopathy, no asymmetry, masses, or scars  RESP: lmild diffuse crackles on right and left; good air mvt.   CV: regular rate and rhythm, normal S1 S2, no S3 or S4, no murmur, click or rub, no peripheral edema  ABDOMEN: soft, nontender, no hepatosplenomegaly, no masses and bowel sounds normal  MS: no gross musculoskeletal defects noted, no edema  SKIN: no suspicious lesions or rashes  NEURO: Normal strength and tone, mentation intact and speech normal  PSYCH: mentation appears normal, affect normal/bright            Signed Electronically by: Yordan Hernandez MD

## 2024-08-28 ENCOUNTER — HOSPITAL ENCOUNTER (OUTPATIENT)
Dept: CARDIAC REHAB | Facility: CLINIC | Age: 79
Discharge: HOME | End: 2024-08-28
Attending: STUDENT IN AN ORGANIZED HEALTH CARE EDUCATION/TRAINING PROGRAM
Payer: COMMERCIAL

## 2024-08-28 PROCEDURE — 93798 PHYS/QHP OP CAR RHAB W/ECG: CPT

## 2024-08-30 ENCOUNTER — HOSPITAL ENCOUNTER (OUTPATIENT)
Dept: CARDIAC REHAB | Facility: CLINIC | Age: 79
Discharge: HOME | End: 2024-08-30
Attending: STUDENT IN AN ORGANIZED HEALTH CARE EDUCATION/TRAINING PROGRAM
Payer: COMMERCIAL

## 2024-08-30 PROCEDURE — 93798 PHYS/QHP OP CAR RHAB W/ECG: CPT

## 2024-09-04 ENCOUNTER — HOSPITAL ENCOUNTER (OUTPATIENT)
Dept: CARDIAC REHAB | Facility: CLINIC | Age: 79
Discharge: HOME | End: 2024-09-04
Attending: STUDENT IN AN ORGANIZED HEALTH CARE EDUCATION/TRAINING PROGRAM
Payer: COMMERCIAL

## 2024-09-04 PROCEDURE — 93798 PHYS/QHP OP CAR RHAB W/ECG: CPT

## 2024-09-06 ENCOUNTER — HOSPITAL ENCOUNTER (OUTPATIENT)
Dept: CARDIAC REHAB | Facility: CLINIC | Age: 79
Discharge: HOME | End: 2024-09-06
Attending: STUDENT IN AN ORGANIZED HEALTH CARE EDUCATION/TRAINING PROGRAM
Payer: COMMERCIAL

## 2024-09-06 DIAGNOSIS — G25.81 RLS (RESTLESS LEGS SYNDROME): ICD-10-CM

## 2024-09-06 DIAGNOSIS — K21.9 GASTROESOPHAGEAL REFLUX DISEASE WITHOUT ESOPHAGITIS: ICD-10-CM

## 2024-09-06 PROCEDURE — 93798 PHYS/QHP OP CAR RHAB W/ECG: CPT

## 2024-09-06 RX ORDER — PRAMIPEXOLE DIHYDROCHLORIDE 1 MG/1
TABLET ORAL
Qty: 180 TABLET | Refills: 2 | Status: SHIPPED | OUTPATIENT
Start: 2024-09-06

## 2024-09-09 ENCOUNTER — HOSPITAL ENCOUNTER (OUTPATIENT)
Dept: CARDIAC REHAB | Facility: CLINIC | Age: 79
Discharge: HOME | End: 2024-09-09
Attending: STUDENT IN AN ORGANIZED HEALTH CARE EDUCATION/TRAINING PROGRAM
Payer: COMMERCIAL

## 2024-09-09 PROCEDURE — 93798 PHYS/QHP OP CAR RHAB W/ECG: CPT

## 2024-09-10 ENCOUNTER — NURSE TRIAGE (OUTPATIENT)
Dept: PEDIATRICS | Facility: CLINIC | Age: 79
End: 2024-09-10
Payer: COMMERCIAL

## 2024-09-10 ENCOUNTER — MYC MEDICAL ADVICE (OUTPATIENT)
Dept: PEDIATRICS | Facility: CLINIC | Age: 79
End: 2024-09-10
Payer: COMMERCIAL

## 2024-09-10 NOTE — TELEPHONE ENCOUNTER
5:48 PM: Attempted to reach pt via phone to relay provider recommendations. Left voicemail to call back and speak to a triage nurse.     5:55 PM: Attempted to reach patient via phone and again left voicemail to call back. [employer] message sent.  Darrel Shahid RN on 9/10/2024 at 5:56 PM

## 2024-09-10 NOTE — TELEPHONE ENCOUNTER
Nurse Triage SBAR    Is this a 2nd Level Triage? YES, LICENSED PRACTITIONER REVIEW IS REQUIRED    Situation: Called pt to triage swelling reported in mychart message.     Background: Pt reports localized swelling began about 1.5 weeks ago. Pt denies known history of blood clots. Pt states he had open heart surgery on 7/10/24. Pt reports injuring his left shin around May 11-12 while stepping out of a boat and scarping his skin on the dock. Pt states he was on two rounds of antibiotics after this injury but noted significant improvement since then. Pt notes this injury was over the shin bone, while his current area of swelling is left of the shin.    Assessment: Pt describes a 4 in x 7 in area (pt estimation) of oblong swelling on his left calf nearer to the shin. Pt notes he tried icing the area without improvement. Pt notes the area has remained the same size over the past 1.5 weeks when it started. Pt denies discoloration, pain, weeping, discharge, fever, chest pain and difficulty breathing.     Protocol Recommended Disposition:   Go To Office Now    Recommendation: Routing to provider 2nd level triage per protocol. Please review and advise, thank you!    Advised calling back for any new or worsening symptoms. Pt verbalized understanding and agrees with plan.     Routed to provider    Reason for Disposition   Thigh, calf, or ankle swelling in only one leg    Additional Information   Negative: Sounds like a life-threatening emergency to the triager   Negative: Chest pain   Negative: Followed an insect bite and has localized swelling (e.g., small area of puffy or swollen skin)   Negative: Followed a knee injury   Negative: Ankle or foot injury   Negative: Pregnant with leg swelling or edema   Negative: Difficulty breathing at rest   Negative: Entire foot is cool or blue in comparison to other side   Negative: SEVERE swelling (e.g., swelling extends above knee, entire leg is swollen, weeping fluid)   Negative: Cast on  leg or ankle and has increasing pain   Negative: Can't walk or can barely stand (new-onset)   Negative: Fever and red area (or area very tender to touch)   Negative: Patient sounds very sick or weak to the triager   Negative: Swelling of face, arm or hands  (Exception: Slight puffiness of fingers during hot weather.)   Negative: Pregnant 20 or more weeks and sudden weight gain (i.e., > 2 lbs, 1 kg in one week)   Negative: Thigh or calf pain and only 1 side and present > 1 hour    Protocols used: Leg Swelling and Edema-A-SASKIA Shahid RN on 9/10/2024 at 5:18 PM

## 2024-09-10 NOTE — TELEPHONE ENCOUNTER
Called patient, please see nurse triage encounter 9/10.  Darrel Shahid RN on 9/10/2024 at 5:21 PM

## 2024-09-10 NOTE — TELEPHONE ENCOUNTER
Recommend be seen in office tomorrow.    Deirdre Milligan, MD  Internal Medicine & Pediatrics  [facility]  She/her

## 2024-09-11 ENCOUNTER — OFFICE VISIT (OUTPATIENT)
Dept: PEDIATRICS | Facility: CLINIC | Age: 79
End: 2024-09-11
Payer: COMMERCIAL

## 2024-09-11 ENCOUNTER — HOSPITAL ENCOUNTER (OUTPATIENT)
Dept: ULTRASOUND IMAGING | Facility: CLINIC | Age: 79
Discharge: HOME | End: 2024-09-11
Attending: PHYSICIAN ASSISTANT
Payer: COMMERCIAL

## 2024-09-11 ENCOUNTER — HOSPITAL ENCOUNTER (OUTPATIENT)
Dept: CT IMAGING | Facility: CLINIC | Age: 79
Discharge: HOME | End: 2024-09-11
Attending: PHYSICIAN ASSISTANT
Payer: COMMERCIAL

## 2024-09-11 ENCOUNTER — HOSPITAL ENCOUNTER (OUTPATIENT)
Dept: CARDIAC REHAB | Facility: CLINIC | Age: 79
Discharge: HOME | End: 2024-09-11
Attending: STUDENT IN AN ORGANIZED HEALTH CARE EDUCATION/TRAINING PROGRAM
Payer: COMMERCIAL

## 2024-09-11 VITALS
TEMPERATURE: 98 F | HEIGHT: 66 IN | WEIGHT: 219.3 LBS | RESPIRATION RATE: 16 BRPM | SYSTOLIC BLOOD PRESSURE: 122 MMHG | BODY MASS INDEX: 35.24 KG/M2 | DIASTOLIC BLOOD PRESSURE: 73 MMHG | OXYGEN SATURATION: 97 % | HEART RATE: 98 BPM

## 2024-09-11 VITALS
OXYGEN SATURATION: 97 % | DIASTOLIC BLOOD PRESSURE: 81 MMHG | TEMPERATURE: 98.2 F | BODY MASS INDEX: 35.62 KG/M2 | SYSTOLIC BLOOD PRESSURE: 132 MMHG | HEART RATE: 63 BPM | WEIGHT: 219 LBS

## 2024-09-11 DIAGNOSIS — R79.89 ELEVATED D-DIMER: ICD-10-CM

## 2024-09-11 DIAGNOSIS — R05.3 CHRONIC COUGH: ICD-10-CM

## 2024-09-11 DIAGNOSIS — R60.0 LEG EDEMA, LEFT: Primary | ICD-10-CM

## 2024-09-11 DIAGNOSIS — R60.0 LEG EDEMA, LEFT: ICD-10-CM

## 2024-09-11 DIAGNOSIS — J90 PLEURAL EFFUSION: Primary | ICD-10-CM

## 2024-09-11 DIAGNOSIS — J90 PLEURAL EFFUSION: ICD-10-CM

## 2024-09-11 DIAGNOSIS — J18.9 PNEUMONIA OF LEFT LOWER LOBE DUE TO INFECTIOUS ORGANISM: ICD-10-CM

## 2024-09-11 DIAGNOSIS — J98.01 ACUTE BRONCHOSPASM: ICD-10-CM

## 2024-09-11 LAB
D DIMER PPP FEU-MCNC: 1.45 UG/ML FEU (ref 0–0.5)
NT-PROBNP SERPL-MCNC: 396 PG/ML (ref 0–1800)

## 2024-09-11 PROCEDURE — 93798 PHYS/QHP OP CAR RHAB W/ECG: CPT

## 2024-09-11 PROCEDURE — 250N000011 HC RX IP 250 OP 636: Performed by: PHYSICIAN ASSISTANT

## 2024-09-11 PROCEDURE — 85379 FIBRIN DEGRADATION QUANT: CPT | Performed by: PHYSICIAN ASSISTANT

## 2024-09-11 PROCEDURE — 36415 COLL VENOUS BLD VENIPUNCTURE: CPT | Performed by: PHYSICIAN ASSISTANT

## 2024-09-11 PROCEDURE — 99214 OFFICE O/P EST MOD 30 MIN: CPT | Performed by: PHYSICIAN ASSISTANT

## 2024-09-11 PROCEDURE — 99207 REFERRAL TO ACUTE AND DIAGNOSTIC SERVICES: CPT | Performed by: PHYSICIAN ASSISTANT

## 2024-09-11 PROCEDURE — 250N000009 HC RX 250: Performed by: PHYSICIAN ASSISTANT

## 2024-09-11 PROCEDURE — 93971 EXTREMITY STUDY: CPT | Mod: LT

## 2024-09-11 PROCEDURE — 83880 ASSAY OF NATRIURETIC PEPTIDE: CPT | Performed by: PHYSICIAN ASSISTANT

## 2024-09-11 PROCEDURE — 71275 CT ANGIOGRAPHY CHEST: CPT

## 2024-09-11 RX ORDER — DOXYCYCLINE HYCLATE 100 MG
100 TABLET ORAL 2 TIMES DAILY
Qty: 20 TABLET | Refills: 0 | Status: SHIPPED | OUTPATIENT
Start: 2024-09-11 | End: 2024-09-21

## 2024-09-11 RX ORDER — IOPAMIDOL 755 MG/ML
500 INJECTION, SOLUTION INTRAVASCULAR ONCE
Status: COMPLETED | OUTPATIENT
Start: 2024-09-11 | End: 2024-09-11

## 2024-09-11 RX ADMIN — SODIUM CHLORIDE 98 ML: 9 INJECTION, SOLUTION INTRAVENOUS at 16:01

## 2024-09-11 RX ADMIN — IOPAMIDOL 81 ML: 755 INJECTION, SOLUTION INTRAVENOUS at 16:00

## 2024-09-11 ASSESSMENT — PAIN SCALES - GENERAL: PAINLEVEL: NO PAIN (0)

## 2024-09-11 NOTE — TELEPHONE ENCOUNTER
Received call back from patient, scheduled for Office visit today per provider recommendation.     Jean Paul RENO RN 9/11/2024 at 8:50 AM

## 2024-09-11 NOTE — Clinical Note
Thank you for your referral to the [facility]  I had the pleasure of seeing Johnny today in the clinic Leg ultrasound is NEG for DVT, likely resolving hematoma BNP was normal, D-dimer was elevated We had time to CT his chest, neg for PE and right side heart strain.  Pos for trace bilateral lower lung effusion and POS for LLL infitrate/pneumonia. He was placed on doxy He may need a 2-3 week follow up to make sure pneumonia has resolved.  Thank you THERON White PA-C 
no abdominal pain/no back pain

## 2024-09-11 NOTE — PROGRESS NOTES
"  Assessment & Plan     Leg edema, left  Patient referred to [facility] for US to r/o clot given trauma.  - Referral to Acute and Diagnostic Services (Day of diagnostic / First order acute); Future    Chronic cough  Patient referred to [facility] for labs to ensure no acute etiology.  - Referral to Acute and Diagnostic Services (Day of diagnostic / First order acute); Future          Subjective   Jhonny is a 79 year old, presenting for the following health issues:  Leg Swelling  Patient has two concerns today.   Left leg swelling x 1-2 weeks. Patient's wife noticed. No pain, itching, warmth. Patient sustained trauma to the left leg in May. He fell and was admitted to the hospital for IV antibiotics.   Chronic cough. History of pleural effusions. S/p CABG. Coughing is causing chest pain. CXR normal. Mild sob. Patient in cardiopulm rehab.   Echo completed in June and normal.    Review of Systems  Constitutional, HEENT, cardiovascular, pulmonary, gi and gu systems are negative, except as otherwise noted.      Objective    /73 (BP Location: Right arm, Patient Position: Sitting, Cuff Size: Adult Large)   Pulse 98   Temp 98  F (36.7  C) (Temporal)   Resp 16   Ht 1.67 m (5' 5.75\")   Wt 99.5 kg (219 lb 4.8 oz)   SpO2 97%   BMI 35.67 kg/m    Body mass index is 35.67 kg/m .  Physical Exam   GENERAL: alert and no distress  RESP: lungs clear to auscultation - no rales, rhonchi or wheezes  CV: regular rate and rhythm, normal S1 S2, no S3 or S4  LEG: left mid lateral leg reveals mild fullness/edema.     No results found for any visits on 09/11/24.        Signed Electronically by: Jadiel Marcos PA-C    "

## 2024-09-11 NOTE — PROGRESS NOTES
Acute and Diagnostic Services Clinic Visit    Assessment & Plan     Leg edema, left    Leg ultrasound is neg for DVT  Pos for soft tissue swelling, likely a resolving hematoma  Advised to alternate ice and warm moist compresses  - Referral to Acute and Diagnostic Services (Day of diagnostic / First order acute)  - US Lower Extremity Venous Duplex Left; Future    Chronic cough    D-dimer is POS  CT scan of chest is NEG for PE  CT scan is pos for LLL infiltrate  CT scan is also pos for bilateral trace fluid in lungs    - Referral to Acute and Diagnostic Services (Day of diagnostic / First order acute)  - D dimer quantitative  - CT Chest Pulmonary Embolism w Contrast; Future  - doxycycline hyclate (VIBRA-TABS) 100 MG tablet; Take 1 tablet (100 mg) by mouth 2 times daily for 10 days.    Pleural effusion    BNP is normal  CT scan does show trace fluid in lower lungs  Advised to follow up with PCP  - BNP-N terminal pro; Future  - BNP-N terminal pro  - CT Chest Pulmonary Embolism w Contrast; Future    Acute bronchospasm    Due to trace fluid in lungs and pneumonia  - BNP-N terminal pro; Future  - BNP-N terminal pro    Elevated d-dimer    CT scan rule out PE  - CT Chest Pulmonary Embolism w Contrast; Future    Pneumonia of left lower lobe due to infectious organism    Pneumonia is an infection of the lungs. Most cases are caused by infections from bacteria or viruses.  Pneumonia may be mild or very severe. If it is caused by bacteria, you will be treated with antibiotics. It may take a few weeks to a few months to recover fully from pneumonia, depending on how sick you were and whether your overall health is good.  - doxycycline hyclate (VIBRA-TABS) 100 MG tablet; Take 1 tablet (100 mg) by mouth 2 times daily for 10 days.      At today's visit with Toni Collado , we discussed results, diagnosis, medications and formulated a plan.  We also discussed red flags for immediate return to clinic/ER, as well as indications for  follow up with PCP if not improved in 3 days. Patient understood and agreed to plan. Toni Collado was discharged with stable vitals and has no further questions.     Follow up with PCP in 2 weeks for recheck    No follow-ups on file.    Subjective   Johnny is a 79 year old, presenting for the following health issues:  No chief complaint on file.    HPI     Evaluation for possible DVT  Onset/Duration: 1-2 weeks  Description:       Location: LLE       Redness: YES       Pain: mild       Warmth: no        Joint swelling no   Progression of symptoms intermittent  Accompanying signs and symptoms:       Fevers: no        Numbness/tingling/weakness: no        Chest pain/pleurisy: no        Shortness of breath: YES at baseline  History        Trauma: YES- in May had a bad scrap from [employer]        Recent travel/when: no         Previous history of DVT: no         Family history of DVT: no         Recent surgery: YES- Open heart in July this year  Aggravating factors include: standing, walking, and overuse  Therapies tried and outcome: nothing  Prior surgery on arteries of veins in this area: No          Review of Systems  Constitutional, neuro, ENT, endocrine, pulmonary, cardiac, gastrointestinal, genitourinary, musculoskeletal, integument and psychiatric systems are negative, except as otherwise noted.      Objective    There were no vitals taken for this visit.  There is no height or weight on file to calculate BMI.  Physical Exam   GENERAL: alert and no distress  EYES: Eyes grossly normal to inspection, PERRL and conjunctivae and sclerae normal  HENT: ear canals and TM's normal, nose and mouth without ulcers or lesions  NECK: no adenopathy, no asymmetry, masses, or scars  RESP: lungs clear to auscultation - no rales, rhonchi or wheezes  CV: regular rate and rhythm, normal S1 S2, no S3 or S4, no murmur, click or rub, no peripheral edema  ABDOMEN: soft, nontender, no hepatosplenomegaly, no masses and bowel sounds  normal  MS: no gross musculoskeletal defects noted, no edema  SKIN: pos for lateral and localized swelling lf lower left lateral leg  NEURO: Normal strength and tone, mentation intact and speech normal  PSYCH: mentation appears normal, affect normal/bright    Results for orders placed or performed during the hospital encounter of 09/11/24   CT Chest Pulmonary Embolism w Contrast     Status: None    Narrative    EXAM: CT CHEST PULMONARY EMBOLISM W CONTRAST  LOCATION: [facility]  DATE: 9/11/2024    INDICATION:  Chronic cough, Pleural effusion, Elevated d-dimer  COMPARISON: 5/15/2024  TECHNIQUE: CT chest pulmonary angiogram during arterial phase injection of IV contrast. Multiplanar reformats and MIP reconstructions were performed. Dose reduction techniques were used.   CONTRAST: 98mL Isovue 370    FINDINGS:  ANGIOGRAM CHEST: Pulmonary arteries are normal caliber and negative for pulmonary emboli. Thoracic aorta is not well opacified and is  indeterminate for dissection. No CT evidence of right heart strain.    LUNGS AND PLEURA: Patchy consolidation in the left lower lobe with a tiny amount of pleural fluid. Trace right pleural effusion also present.    MEDIASTINUM/AXILLAE: Normal.    CORONARY ARTERY CALCIFICATION: Previous intervention (stents or CABG).    UPPER ABDOMEN: Normal.    MUSCULOSKELETAL: Mild degenerative changes in the spine. Median sternotomy wires.      Impression    IMPRESSION:  1.  No evidence of pulmonary embolism.  2.  Left lower lobe consolidation suggesting pneumonia.  3.  Trace bilateral pleural effusions.     Results for orders placed or performed during the hospital encounter of 09/11/24   US Lower Extremity Venous Duplex Left     Status: None    Narrative    ULTRASOUND VENOUS LOWER EXTREMITY UNILATERAL LEFT    9/11/2024 3:15 PM     HISTORY: Leg edema, left    COMPARISON: None.    TECHNIQUE: Ultrasound gray scale, Color Doppler flow, and spectral  Doppler waveform analysis  performed.    FINDINGS:   The left common femoral, superficial femoral, popliteal and  segmentally visualized calf veins are patent and fully compressible  and demonstrate normal venous Doppler flow. The visualized greater  saphenous vein is negative for thrombus.    The contralateral right common femoral vein is patent without deep  venous thrombosis.      Impression    IMPRESSION:   No DVT demonstrated.    AMIRAH CARO MD         SYSTEM ID:  [id number]   Results for orders placed or performed in visit on 09/11/24   D dimer quantitative     Status: Abnormal   Result Value Ref Range    D-Dimer Quantitative 1.45 (H) 0.00 - 0.50 ug/mL FEU    Narrative    This D-dimer assay is intended for use in conjunction with a clinical pretest probability assessment model to exclude pulmonary embolism (PE) and deep venous thrombosis (DVT) in outpatients suspected of PE or DVT. The cut-off value is 0.50 ug/mL FEU.    For patients 50 years of age or older, the application of age-adjusted cut-off values for D-Dimer may increase the specificity without significant effect on sensitivity. The literature suggested calculation age adjusted cut-off in ug/L = age in years x 10 ug/L. The results in this laboratory are reported as ug/mL rather than ug/L. The calculation for age adjusted cut off in ug/mL= age in years x 0.01 ug/mL. For example, the cut off for a 76 year old male is 76 x 0.01 ug/mL = 0.76 ug/mL (760 ug/L).    M Felicitas et al. Age adjusted D-dimer cut-off levels to rule out pulmonary embolism: The ADJUST-PE Study. DARY 2014;311:1941-7373.; HJ Trevin et al. Diagnostic accuracy of conventional or age adjusted D-dimer cutoff values in older patients with suspected venous thromboembolism. Systemic review and meta-analysis. BMJ 2013:346:f2492.   BNP-N terminal pro     Status: Normal   Result Value Ref Range    N Terminal Pro BNP Outpatient 396 0 - 1,800 pg/mL             Signed Electronically by: THERON White, MELLISA

## 2024-09-11 NOTE — TELEPHONE ENCOUNTER
Flaviar message not read at this time. RN attempted to reach patient, left voicemail to call back and speak with any triage nurse. Will try again later this morning.       Jean Paul RENO RN 9/11/2024 at 7:11 AM

## 2024-09-13 ENCOUNTER — HOSPITAL ENCOUNTER (OUTPATIENT)
Dept: CARDIAC REHAB | Facility: CLINIC | Age: 79
Discharge: HOME | End: 2024-09-13
Attending: STUDENT IN AN ORGANIZED HEALTH CARE EDUCATION/TRAINING PROGRAM
Payer: COMMERCIAL

## 2024-09-13 PROCEDURE — 93798 PHYS/QHP OP CAR RHAB W/ECG: CPT | Performed by: OCCUPATIONAL THERAPIST

## 2024-09-16 ENCOUNTER — HOSPITAL ENCOUNTER (OUTPATIENT)
Dept: CARDIAC REHAB | Facility: CLINIC | Age: 79
Discharge: HOME | End: 2024-09-16
Attending: STUDENT IN AN ORGANIZED HEALTH CARE EDUCATION/TRAINING PROGRAM
Payer: COMMERCIAL

## 2024-09-16 PROCEDURE — 93798 PHYS/QHP OP CAR RHAB W/ECG: CPT

## 2024-09-18 ENCOUNTER — HOSPITAL ENCOUNTER (OUTPATIENT)
Dept: CARDIAC REHAB | Facility: CLINIC | Age: 79
Discharge: HOME | End: 2024-09-18
Attending: STUDENT IN AN ORGANIZED HEALTH CARE EDUCATION/TRAINING PROGRAM
Payer: COMMERCIAL

## 2024-09-18 PROCEDURE — 93798 PHYS/QHP OP CAR RHAB W/ECG: CPT

## 2024-09-20 ENCOUNTER — HOSPITAL ENCOUNTER (OUTPATIENT)
Dept: CARDIAC REHAB | Facility: CLINIC | Age: 79
Discharge: HOME | End: 2024-09-20
Attending: STUDENT IN AN ORGANIZED HEALTH CARE EDUCATION/TRAINING PROGRAM
Payer: COMMERCIAL

## 2024-09-20 PROCEDURE — 93798 PHYS/QHP OP CAR RHAB W/ECG: CPT

## 2024-09-23 ENCOUNTER — HOSPITAL ENCOUNTER (OUTPATIENT)
Dept: CARDIAC REHAB | Facility: CLINIC | Age: 79
Discharge: HOME | End: 2024-09-23
Attending: STUDENT IN AN ORGANIZED HEALTH CARE EDUCATION/TRAINING PROGRAM
Payer: COMMERCIAL

## 2024-09-23 PROCEDURE — 93798 PHYS/QHP OP CAR RHAB W/ECG: CPT

## 2024-09-26 ENCOUNTER — HOSPITAL ENCOUNTER (OUTPATIENT)
Dept: CARDIAC REHAB | Facility: CLINIC | Age: 79
Discharge: HOME | End: 2024-09-26
Attending: STUDENT IN AN ORGANIZED HEALTH CARE EDUCATION/TRAINING PROGRAM
Payer: COMMERCIAL

## 2024-09-26 PROCEDURE — 93797 PHYS/QHP OP CAR RHAB WO ECG: CPT

## 2024-09-26 PROCEDURE — 93798 PHYS/QHP OP CAR RHAB W/ECG: CPT

## 2024-10-02 ENCOUNTER — OFFICE VISIT (OUTPATIENT)
Dept: PEDIATRICS | Facility: CLINIC | Age: 79
End: 2024-10-02
Payer: COMMERCIAL

## 2024-10-02 VITALS
WEIGHT: 218 LBS | HEART RATE: 78 BPM | RESPIRATION RATE: 24 BRPM | OXYGEN SATURATION: 96 % | HEIGHT: 66 IN | SYSTOLIC BLOOD PRESSURE: 126 MMHG | BODY MASS INDEX: 35.03 KG/M2 | DIASTOLIC BLOOD PRESSURE: 76 MMHG | TEMPERATURE: 98.2 F

## 2024-10-02 DIAGNOSIS — R97.20 ELEVATED PROSTATE SPECIFIC ANTIGEN (PSA): ICD-10-CM

## 2024-10-02 DIAGNOSIS — R05.3 PERSISTENT COUGH: ICD-10-CM

## 2024-10-02 DIAGNOSIS — J18.9 PNEUMONIA OF LEFT LOWER LOBE DUE TO INFECTIOUS ORGANISM: Primary | ICD-10-CM

## 2024-10-02 PROCEDURE — 99214 OFFICE O/P EST MOD 30 MIN: CPT | Mod: 24 | Performed by: PHYSICIAN ASSISTANT

## 2024-10-02 RX ORDER — CEFDINIR 300 MG/1
300 CAPSULE ORAL 2 TIMES DAILY
Qty: 20 CAPSULE | Refills: 0 | Status: SHIPPED | OUTPATIENT
Start: 2024-10-02 | End: 2024-10-14

## 2024-10-02 RX ORDER — BENZONATATE 200 MG/1
200 CAPSULE ORAL 3 TIMES DAILY PRN
Qty: 20 CAPSULE | Refills: 0 | Status: SHIPPED | OUTPATIENT
Start: 2024-10-02 | End: 2024-10-14

## 2024-10-02 RX ORDER — AZITHROMYCIN 250 MG/1
TABLET, FILM COATED ORAL
Qty: 6 TABLET | Refills: 0 | Status: SHIPPED | OUTPATIENT
Start: 2024-10-02 | End: 2024-10-14

## 2024-10-02 ASSESSMENT — PAIN SCALES - GENERAL: PAINLEVEL: NO PAIN (0)

## 2024-10-02 NOTE — PROGRESS NOTES
"  Assessment & Plan     Pneumonia of left lower lobe due to infectious organism  No change with full course of doxy. Begin antibiotics. Close follow up in one week.   - azithromycin (ZITHROMAX) 250 MG tablet; Two tablets first day, then one tablet daily for four days.  - cefdinir (OMNICEF) 300 MG capsule; Take 1 capsule (300 mg) by mouth 2 times daily.  - benzonatate (TESSALON) 200 MG capsule; Take 1 capsule (200 mg) by mouth 3 times daily as needed.    Persistent cough    Elevated prostate specific antigen (PSA)  Patient given number for [facility] to schedule MR prostate.       Subjective   Johnny is a 79 year old, presenting for the following health issues:  History of Present Illness       Vascular Disease:  He presents for follow up of vascular disease.     He never takes nitroglycerin. He takes daily aspirin.    He eats 4 or more servings of fruits and vegetables daily.He consumes 0 sweetened beverage(s) daily.He exercises with enough effort to increase his heart rate 30 to 60 minutes per day.  He exercises with enough effort to increase his heart rate 4 days per week.   He is taking medications regularly.  Acute Illness  Acute illness concerns: cough   Onset/Duration: for about 3 months   Symptoms:  Fever: No  Chills/Sweats: No  Headache (location?): No  Sinus Pressure: No  Conjunctivitis:  No  Ear Pain: no  Rhinorrhea: YES  Congestion: unsure   Sore Throat: No  Cough: YES-non-productive the majority of the time   Wheeze: No  Decreased Appetite: No  Nausea: No  Vomiting: No  Diarrhea: No  Dysuria/Freq.: No  Dysuria or Hematuria: No  Fatigue/Achiness: No  Sick/Strep Exposure: No  Therapies tried and outcome: codeine cough medicine, antibiotics, nebulizer     Patient states he started to notice a \"clicking sound\" about 1 month ago when coughing or sneezing even blowing nose or exertion.     Patient has finished his cardiac rehab.     In addition, patient had an elevated PSA. He was supposed to have an MR of " "the prostate. This was never completed due to CABG.    Review of Systems  Constitutional, HEENT, cardiovascular, pulmonary, gi and gu systems are negative, except as otherwise noted.      Objective    /76 (BP Location: Right arm, Patient Position: Sitting, Cuff Size: Adult Large)   Pulse 78   Temp 98.2  F (36.8  C) (Temporal)   Resp 24   Ht 1.67 m (5' 5.75\")   Wt 98.9 kg (218 lb)   SpO2 96%   BMI 35.45 kg/m    Body mass index is 35.45 kg/m .  Physical Exam   GENERAL: coughing through exam, alert  EYES: Eyes grossly normal to inspection, PERRL and conjunctivae and sclerae normal  HENT: ear canals and TM's normal, nose and mouth without ulcers or lesions  NECK: no adenopathy  RESP: diminished breath sounds - no rales, rhonchi or wheezes  CV: regular rate and rhythm, normal S1 S2, no S3 or S4    No results found for any visits on 10/02/24.        Signed Electronically by: Jadiel Marcos PA-C    "

## 2024-10-14 ENCOUNTER — OFFICE VISIT (OUTPATIENT)
Dept: PEDIATRICS | Facility: CLINIC | Age: 79
End: 2024-10-14
Payer: COMMERCIAL

## 2024-10-14 VITALS
RESPIRATION RATE: 18 BRPM | BODY MASS INDEX: 34.69 KG/M2 | HEIGHT: 67 IN | WEIGHT: 221 LBS | OXYGEN SATURATION: 99 % | HEART RATE: 54 BPM | TEMPERATURE: 98.6 F | DIASTOLIC BLOOD PRESSURE: 70 MMHG | SYSTOLIC BLOOD PRESSURE: 123 MMHG

## 2024-10-14 DIAGNOSIS — Z95.1 S/P CABG (CORONARY ARTERY BYPASS GRAFT): ICD-10-CM

## 2024-10-14 DIAGNOSIS — K21.9 GASTROESOPHAGEAL REFLUX DISEASE WITHOUT ESOPHAGITIS: ICD-10-CM

## 2024-10-14 DIAGNOSIS — G25.81 RLS (RESTLESS LEGS SYNDROME): ICD-10-CM

## 2024-10-14 DIAGNOSIS — J18.9 PNEUMONIA OF LEFT LOWER LOBE DUE TO INFECTIOUS ORGANISM: Primary | ICD-10-CM

## 2024-10-14 PROCEDURE — 99213 OFFICE O/P EST LOW 20 MIN: CPT | Performed by: PHYSICIAN ASSISTANT

## 2024-10-14 RX ORDER — ASPIRIN 81 MG/1
162 TABLET, CHEWABLE ORAL DAILY
Qty: 180 TABLET | Refills: 3 | Status: SHIPPED | OUTPATIENT
Start: 2024-10-14

## 2024-10-14 RX ORDER — TRAZODONE HYDROCHLORIDE 100 MG/1
100 TABLET ORAL AT BEDTIME
Qty: 90 TABLET | Refills: 3 | Status: SHIPPED | OUTPATIENT
Start: 2024-10-14

## 2024-10-14 NOTE — PROGRESS NOTES
"  Assessment & Plan     Pneumonia of left lower lobe due to infectious organism  Improving.     RLS (restless legs syndrome)  - traZODone (DESYREL) 100 MG tablet; Take 1 tablet (100 mg) by mouth at bedtime.    Gastroesophageal reflux disease without esophagitis  - omeprazole (PRILOSEC) 20 MG DR capsule; Take 2 capsules (40 mg) by mouth daily.    S/P CABG (coronary artery bypass graft)  - aspirin (ASA) 81 MG chewable tablet; Take 2 tablets (162 mg) by mouth daily.    Subjective   Johnny is a 79 year old, presenting for the following health issues:  Cough (Follow up )    HPI     Patient returns today for follow up. He was diagnosed with LLL pna on recent CT chest. No improvement with doxy. Patient finished course of azith and cefdinir. Tolerated well. Overall improving. Energy level is good. Patient coughing less frequently and breathing better. No symptoms otherwise. No sob or wheezing.    MR prostate scheduled in Nov.    Review of Systems  Constitutional, HEENT, cardiovascular, pulmonary, gi and gu systems are negative, except as otherwise noted.      Objective    /70 (BP Location: Right arm, Patient Position: Sitting, Cuff Size: Adult Large)   Pulse 54   Temp 98.6  F (37  C) (Oral)   Resp 18   Ht 1.689 m (5' 6.5\")   Wt 100.2 kg (221 lb)   SpO2 99%   BMI 35.14 kg/m    Body mass index is 35.14 kg/m .  Physical Exam   GENERAL: alert and no distress  NECK: no adenopathy, no asymmetry, masses, or scars  RESP: mildly diminished breath sounds in the LLL compared to right. No rales, rhonchi, wheezing.  CV: regular rate and rhythm, normal S1 S2, no S3 or S4    No results found for any visits on 10/14/24.        Signed Electronically by: Jadiel Marcos PA-C    "

## 2024-11-08 ENCOUNTER — ANCILLARY PROCEDURE (OUTPATIENT)
Dept: MRI IMAGING | Facility: CLINIC | Age: 79
End: 2024-11-08
Attending: STUDENT IN AN ORGANIZED HEALTH CARE EDUCATION/TRAINING PROGRAM
Payer: COMMERCIAL

## 2024-11-08 DIAGNOSIS — R97.20 ELEVATED PROSTATE SPECIFIC ANTIGEN (PSA): ICD-10-CM

## 2024-11-08 PROCEDURE — A9585 GADOBUTROL INJECTION: HCPCS | Mod: JZ | Performed by: RADIOLOGY

## 2024-11-08 PROCEDURE — 72197 MRI PELVIS W/O & W/DYE: CPT | Performed by: RADIOLOGY

## 2024-11-08 RX ORDER — GADOBUTROL 604.72 MG/ML
10 INJECTION INTRAVENOUS ONCE
Status: COMPLETED | OUTPATIENT
Start: 2024-11-08 | End: 2024-11-08

## 2024-11-08 RX ADMIN — GADOBUTROL 10 ML: 604.72 INJECTION INTRAVENOUS at 15:02

## 2024-11-08 NOTE — DISCHARGE INSTRUCTIONS
MRI Contrast Discharge Instructions    The IV contrast you received today will pass out of your body in your  urine. This will happen in the next 24 hours. You will not feel this process.  Your urine will not change color.    Drink at least 4 extra glasses of water or juice today (unless your doctor  has restricted your fluids). This reduces the stress on your kidneys.  You may take your regular medicines.    If you are on dialysis: It is best to have dialysis today.    If you have a reaction: Most reactions happen right away. If you have  any new symptoms after leaving the hospital (such as hives or swelling),  call your hospital at the correct number below. Or call your family doctor.  If you have breathing distress or wheezing, call 911.    Special instructions: ***    I have read and understand the above information.    Signature:______________________________________ Date:___________    Staff:__________________________________________ Date:___________     Time:__________    [facility] Departments:    ___[place]: [phone]  ___[facility]: [phone]  ___[place]: [phone] ___[facility]: [phone]  ___[facility]: [phone]  ___[facility]: [phone]  ___[phone]  ___[facility]: [phone]  ___[phone]

## 2024-11-11 SDOH — HEALTH STABILITY: PHYSICAL HEALTH: ON AVERAGE, HOW MANY DAYS PER WEEK DO YOU ENGAGE IN MODERATE TO STRENUOUS EXERCISE (LIKE A BRISK WALK)?: 4 DAYS

## 2024-11-11 SDOH — HEALTH STABILITY: PHYSICAL HEALTH: ON AVERAGE, HOW MANY MINUTES DO YOU ENGAGE IN EXERCISE AT THIS LEVEL?: 30 MIN

## 2024-11-11 ASSESSMENT — SOCIAL DETERMINANTS OF HEALTH (SDOH): HOW OFTEN DO YOU GET TOGETHER WITH FRIENDS OR RELATIVES?: ONCE A WEEK

## 2024-11-14 ENCOUNTER — OFFICE VISIT (OUTPATIENT)
Dept: PEDIATRICS | Facility: CLINIC | Age: 79
End: 2024-11-14
Attending: INTERNAL MEDICINE
Payer: COMMERCIAL

## 2024-11-14 VITALS
HEART RATE: 66 BPM | BODY MASS INDEX: 35.53 KG/M2 | DIASTOLIC BLOOD PRESSURE: 66 MMHG | WEIGHT: 221.1 LBS | HEIGHT: 66 IN | SYSTOLIC BLOOD PRESSURE: 130 MMHG | TEMPERATURE: 97.5 F | RESPIRATION RATE: 14 BRPM | OXYGEN SATURATION: 97 %

## 2024-11-14 DIAGNOSIS — H81.13 BENIGN PAROXYSMAL POSITIONAL VERTIGO DUE TO BILATERAL VESTIBULAR DISORDER: ICD-10-CM

## 2024-11-14 DIAGNOSIS — I25.10 CORONARY ARTERY DISEASE DUE TO CALCIFIED CORONARY LESION: ICD-10-CM

## 2024-11-14 DIAGNOSIS — Z95.1 S/P CABG (CORONARY ARTERY BYPASS GRAFT): ICD-10-CM

## 2024-11-14 DIAGNOSIS — I25.84 CORONARY ARTERY DISEASE DUE TO CALCIFIED CORONARY LESION: ICD-10-CM

## 2024-11-14 DIAGNOSIS — G47.33 OSA ON CPAP: ICD-10-CM

## 2024-11-14 DIAGNOSIS — Z80.51 FAMILY HISTORY OF RENAL CELL CARCINOMA: ICD-10-CM

## 2024-11-14 DIAGNOSIS — Z00.00 ENCOUNTER FOR MEDICARE ANNUAL WELLNESS EXAM: Primary | ICD-10-CM

## 2024-11-14 LAB
ALBUMIN UR-MCNC: NEGATIVE MG/DL
APPEARANCE UR: CLEAR
BACTERIA #/AREA URNS HPF: ABNORMAL /HPF
BILIRUB UR QL STRIP: NEGATIVE
COLOR UR AUTO: YELLOW
GLUCOSE UR STRIP-MCNC: NEGATIVE MG/DL
HGB UR QL STRIP: NEGATIVE
KETONES UR STRIP-MCNC: NEGATIVE MG/DL
LEUKOCYTE ESTERASE UR QL STRIP: ABNORMAL
NITRATE UR QL: NEGATIVE
PH UR STRIP: 5.5 [PH] (ref 5–7)
RBC #/AREA URNS AUTO: ABNORMAL /HPF
SP GR UR STRIP: 1.02 (ref 1–1.03)
SQUAMOUS #/AREA URNS AUTO: ABNORMAL /LPF
UROBILINOGEN UR STRIP-ACNC: 0.2 E.U./DL
WBC #/AREA URNS AUTO: ABNORMAL /HPF

## 2024-11-14 PROCEDURE — G0439 PPPS, SUBSEQ VISIT: HCPCS | Performed by: INTERNAL MEDICINE

## 2024-11-14 PROCEDURE — 81001 URINALYSIS AUTO W/SCOPE: CPT | Performed by: INTERNAL MEDICINE

## 2024-11-14 RX ORDER — METOPROLOL SUCCINATE 50 MG/1
50 TABLET, EXTENDED RELEASE ORAL DAILY
Qty: 90 TABLET | Refills: 3 | Status: SHIPPED | OUTPATIENT
Start: 2024-11-14

## 2024-11-14 NOTE — PROGRESS NOTES
Preventive Care Visit  [facility] MONY Hernandez MD, Internal Medicine - Pediatrics  Nov 14, 2024      Assessment & Plan     S/P CABG (coronary artery bypass graft)  Secondary risk factor modification.   - metoprolol succinate ER (TOPROL XL) 50 MG 24 hr tablet; Take 1 tablet (50 mg) by mouth daily.    Family history of renal cell carcinoma  Urinalysis today.   - UA Macroscopic with reflex to Microscopic and Culture - Lab Collect; Future  - UA Macroscopic with reflex to Microscopic and Culture - Lab Collect  - UA Microscopic with Reflex to Culture    Encounter for Medicare annual wellness exam  Discussed diet, exercise, testicular self exam, blood pressure, cholesterol, and need for cancer surveillance at appropriate ages.     BRITTNI on CPAP  Ongoing continuous positive airway pressure.     Coronary artery disease due to calcified coronary lesion  Secondary risk factor modification.     Benign paroxysmal positional vertigo due to bilateral vestibular disorder  Resolved s/p Epley.     Patient has been advised of split billing requirements and indicates understanding: No        Counseling  Appropriate preventive services were addressed with this patient via screening, questionnaire, or discussion as appropriate for fall prevention, nutrition, physical activity, Tobacco-use cessation, social engagement, weight loss and cognition.  Checklist reviewing preventive services available has been given to the patient.  Reviewed patient's diet, addressing concerns and/or questions.   The patient was provided with written information regarding signs of hearing loss.           Subjective   Johnny is a 79 year old, presenting for the following:  Physical        11/14/2024     1:04 PM   Additional Questions   Roomed by Liliana Ruiz CMA           HPI  Vertigo has resolved.    Since his CABG, when sneezes gets sternal pain.     Getting into dentist and eye doc; just got new glasses.    Given [facility]  Directive.                Health Care Directive      11/11/2024   General Health   How would you rate your overall physical health? Good   Feel stress (tense, anxious, or unable to sleep) Not at all            11/11/2024   Nutrition   Diet: Low salt    Low fat/cholesterol       Multiple values from one day are sorted in reverse-chronological order         11/11/2024   Exercise   Days per week of moderate/strenous exercise 4 days   Average minutes spent exercising at this level 30 min            11/11/2024   Social Factors   Frequency of gathering with friends or relatives Once a week   Worry food won't last until get money to buy more No   Food not last or not have enough money for food? No   Do you have housing? (Housing is defined as stable permanent housing and does not include staying ouside in a car, in a tent, in an abandoned building, in an overnight shelter, or couch-surfing.) Yes   Are you worried about losing your housing? No   Lack of transportation? No   Unable to get utilities (heat,electricity)? No            11/11/2024   Fall Risk   Fallen 2 or more times in the past year? No    Trouble with walking or balance? No        Patient-reported          11/11/2024   Activities of Daily Living- Home Safety   Needs help with the following daily activites None of the above   Safety concerns in the home None of the above            11/11/2024   Dental   Dentist two times every year? Yes            11/11/2024   Hearing Screening   Hearing concerns? (!) I NEED TO ASK PEOPLE TO SPEAK UP OR REPEAT THEMSELVES.    (!) IT'S HARD TO FOLLOW A CONVERSATION IN A NOISY RESTAURANT OR CROWDED ROOM.    (!) TROUBLE UNDERSTANDING SOFT OR WHISPERED SPEECH.       Multiple values from one day are sorted in reverse-chronological order         11/11/2024   Driving Risk Screening   Patient/family members have concerns about driving No            11/11/2024   General Alertness/Fatigue Screening   Have you been more tired than usual  lately? No            2024   Urinary Incontinence Screening   Bothered by leaking urine in past 6 months No            2024   TB Screening   Were you born outside of the [place]? No            Today's PHQ-2 Score:       2024     2:23 PM   PHQ-2 (  [employer])   Q1: Little interest or pleasure in doing things 0    Q2: Feeling down, depressed or hopeless 0    PHQ-2 Score 0    Q1: Little interest or pleasure in doing things Not at all   Q2: Feeling down, depressed or hopeless Not at all   PHQ-2 Score 0       Patient-reported           2024   Substance Use   Alcohol more than 3/day or more than 7/wk No   Do you have a current opioid prescription? No   How severe/bad is pain from 1 to 10? 0/10 (No Pain)   Do you use any other substances recreationally? No        Social History     Tobacco Use    Smoking status: Former     Current packs/day: 0.00     Types: Cigarettes     Quit date: 1975     Years since quittin.0     Passive exposure: Past    Smokeless tobacco: Never   Vaping Use    Vaping status: Never Used   Substance Use Topics    Alcohol use: Not Currently    Drug use: No       ASCVD Risk   The ASCVD Risk score (Opal DK, et al., 2019) failed to calculate for the following reasons:    The valid total cholesterol range is 130 to 320 mg/dL            Reviewed and updated as needed this visit by Provider                      Current providers sharing in care for this patient include:  Patient Care Team:  Yordan Hernandez MD as PCP - General (Internal Medicine - Pediatrics)  Kurtis Raya MD as MD (Ophthalmology)  Alexy Yu MD as Referring Physician (Ophthalmology)  Yordan Hernandez MD as Assigned PCP  Jasson Nails MD as Assigned Musculoskeletal Provider  Noel Brannon MD as Assigned Surgical Provider  Nain Cruz MD as MD (Cardiovascular Disease)  Dolores Luther EP as Cardiac Rehabilitation Therapist  Malina Jennings PA-C as Assigned Heart and  "Vascular Provider    The following health maintenance items are reviewed in Epic and correct as of today:  Health Maintenance   Topic Date Due    DTAP/TDAP/TD IMMUNIZATION (1 - Tdap) 04/13/2021    ANNUAL REVIEW OF HM ORDERS  10/13/2023    MEDICARE ANNUAL WELLNESS VISIT  11/07/2024    COVID-19 Vaccine (7 - 2024-25 season) 11/29/2024    BMP  08/12/2025    LIPID  08/12/2025    FALL RISK ASSESSMENT  11/14/2025    GLUCOSE  08/12/2027    ADVANCE CARE PLANNING  02/02/2029    HEPATITIS C SCREENING  Completed    PHQ-2 (once per calendar year)  Completed    INFLUENZA VACCINE  Completed    Pneumococcal Vaccine: 65+ Years  Completed    ZOSTER IMMUNIZATION  Completed    RSV VACCINE  Completed    HPV IMMUNIZATION  Aged Out    MENINGITIS IMMUNIZATION  Aged Out    RSV MONOCLONAL ANTIBODY  Aged Out    COLORECTAL CANCER SCREENING  Discontinued            Objective    Exam  /66 (BP Location: Right arm, Patient Position: Sitting, Cuff Size: Adult Large)   Pulse 66   Temp 97.5  F (36.4  C) (Temporal)   Resp 14   Ht 1.676 m (5' 6\")   Wt 100.3 kg (221 lb 1.6 oz)   SpO2 97%   BMI 35.69 kg/m     Estimated body mass index is 35.69 kg/m  as calculated from the following:    Height as of this encounter: 1.676 m (5' 6\").    Weight as of this encounter: 100.3 kg (221 lb 1.6 oz).    Physical Exam  GENERAL: alert and no distress  EYES: Eyes grossly normal to inspection, PERRL and conjunctivae and sclerae normal  HENT: ear canals and TM's normal, nose and mouth without ulcers or lesions  NECK: no adenopathy, no asymmetry, masses, or scars  RESP: lungs clear to auscultation - no rales, rhonchi or wheezes  CV: regular rate and rhythm, normal S1 S2, no S3 or S4, no murmur, click or rub, no peripheral edema  ABDOMEN: soft, nontender, no hepatosplenomegaly, no masses and bowel sounds normal   (male): normal male genitalia without lesions or urethral discharge, no hernia  MS: no gross musculoskeletal defects noted, no edema  SKIN: no " suspicious lesions or rashes  NEURO: Normal strength and tone, mentation intact and speech normal  PSYCH: mentation appears normal, affect normal/bright        11/14/2024   Mini Cog   Clock Draw Score 2 Normal   3 Item Recall 2 objects recalled   Mini Cog Total Score 4                 Signed Electronically by: Yordan Hernandez MD

## 2024-11-14 NOTE — PATIENT INSTRUCTIONS
No labs due.    No Shots due.    No new cancer screenings due.    Meds are up to date until next summer/fall.      Fill out and return the Health Care Directive form.    Yordan Hernandez MD  Internal Medicine and Pediatrics       Patient Education   Preventive Care Advice   This is general advice given by our system to help you stay healthy. However, your care team may have specific advice just for you. Please talk to your care team about your preventive care needs.  Nutrition  Eat 5 or more servings of fruits and vegetables each day.  Try wheat bread, brown rice and whole grain pasta (instead of white bread, rice, and pasta).  Get enough calcium and vitamin D. Check the label on foods and aim for 100% of the RDA (recommended daily allowance).  Lifestyle  Exercise at least 150 minutes each week  (30 minutes a day, 5 days a week).  Do muscle strengthening activities 2 days a week. These help control your weight and prevent disease.  No smoking.  Wear sunscreen to prevent skin cancer.  Have a dental exam and cleaning every 6 months.  Yearly exams  See your health care team every year to talk about:  Any changes in your health.  Any medicines your care team has prescribed.  Preventive care, family planning, and ways to prevent chronic diseases.  Shots (vaccines)   HPV shots (up to age 26), if you've never had them before.  Hepatitis B shots (up to age 59), if you've never had them before.  COVID-19 shot: Get this shot when it's due.  Flu shot: Get a flu shot every year.  Tetanus shot: Get a tetanus shot every 10 years.  Pneumococcal, hepatitis A, and RSV shots: Ask your care team if you need these based on your risk.  Shingles shot (for age 50 and up)  General health tests  Diabetes screening:  Starting at age 35, Get screened for diabetes at least every 3 years.  If you are younger than age 35, ask your care team if you should be screened for diabetes.  Cholesterol test: At age 39, start having a cholesterol test every 5  years, or more often if advised.  Bone density scan (DEXA): At age 50, ask your care team if you should have this scan for osteoporosis (brittle bones).  Hepatitis C: Get tested at least once in your life.  STIs (sexually transmitted infections)  Before age 24: Ask your care team if you should be screened for STIs.  After age 24: Get screened for STIs if you're at risk. You are at risk for STIs (including HIV) if:  You are sexually active with more than one person.  You don't use condoms every time.  You or a partner was diagnosed with a sexually transmitted infection.  If you are at risk for HIV, ask about PrEP medicine to prevent HIV.  Get tested for HIV at least once in your life, whether you are at risk for HIV or not.  Cancer screening tests  Cervical cancer screening: If you have a cervix, begin getting regular cervical cancer screening tests starting at age 21.  Breast cancer scan (mammogram): If you've ever had breasts, begin having regular mammograms starting at age 40. This is a scan to check for breast cancer.  Colon cancer screening: It is important to start screening for colon cancer at age 45.  Have a colonoscopy test every 10 years (or more often if you're at risk) Or, ask your provider about stool tests like a FIT test every year or Cologuard test every 3 years.  To learn more about your testing options, visit:   .  For help making a decision, visit:   https://bit.ly/ad39299.  Prostate cancer screening test: If you have a prostate, ask your care team if a prostate cancer screening test (PSA) at age 55 is right for you.  Lung cancer screening: If you are a current or former smoker ages 50 to 80, ask your care team if ongoing lung cancer screenings are right for you.  For informational purposes only. Not to replace the advice of your health care provider. Copyright   2023 [employer]. All rights reserved. Clinically reviewed by the [facility] Transitions Program. [employer] 865821 -  REV 01/24.  Hearing Loss: Care Instructions  Overview     Hearing loss is a sudden or slow decrease in how well you hear. It can range from slight to profound. Permanent hearing loss can occur with aging. It also can happen when you are exposed long-term to loud noise. Examples include listening to loud music, riding motorcycles, or being around other loud machines.  Hearing loss can affect your work and home life. It can make you feel lonely or depressed. You may feel that you have lost your independence. But hearing aids and other devices can help you hear better and feel connected to others.  Follow-up care is a key part of your treatment and safety. Be sure to make and go to all appointments, and call your doctor if you are having problems. It's also a good idea to know your test results and keep a list of the medicines you take.  How can you care for yourself at home?  Avoid loud noises whenever possible. This helps keep your hearing from getting worse.  Always wear hearing protection around loud noises.  Wear a hearing aid as directed.  A professional can help you pick a hearing aid that will work best for you.  You can also get hearing aids over the counter for mild to moderate hearing loss.  Have hearing tests as your doctor suggests. They can show whether your hearing has changed. Your hearing aid may need to be adjusted.  Use other devices as needed. These may include:  Telephone amplifiers and hearing aids that can connect to a television, stereo, radio, or microphone.  Devices that use lights or vibrations. These alert you to the doorbell, a ringing telephone, or a baby monitor.  Television closed-captioning. This shows the words at the bottom of the screen. Most new TVs can do this.  TTY (text telephone). This lets you type messages back and forth on the telephone instead of talking or listening. These devices are also called TDD. When messages are typed on the keyboard, they are sent over the phone  "line to a receiving TTY. The message is shown on a monitor.  Use text messaging, social media, and email if it is hard for you to communicate by telephone.  Try to learn a listening technique called speechreading. It is not lipreading. You pay attention to people's gestures, expressions, posture, and tone of voice. These clues can help you understand what a person is saying. Face the person you are talking to, and have them face you. Make sure the lighting is good. You need to see the other person's face clearly.  Think about counseling if you need help to adjust to your hearing loss.  When should you call for help?  Watch closely for changes in your health, and be sure to contact your doctor if:    You think your hearing is getting worse.     You have new symptoms, such as dizziness or nausea.   Where can you learn more?  Go to https://www.[employer].net/patiented  Enter R798 in the search box to learn more about \"Hearing Loss: Care Instructions.\"  Current as of: September 27, 2023  Content Version: 14.2 2024 [facility] [employer].   Care instructions adapted under license by your healthcare professional. If you have questions about a medical condition or this instruction, always ask your healthcare professional. [employer] disclaims any warranty or liability for your use of this information.       "

## 2024-11-15 NOTE — PROGRESS NOTES
CARDIOLOGY CLINIC NOTE    PRIMARY CARDIOLOGIST  Dr. Cruz     PRIMARY CARE PHYSICIAN:  Yordan Hernandez    HISTORY OF PRESENT ILLNESS:  Toni Collado is a very pleasant 79-year-old male with a past medical history significant for coronary artery disease status post CABG x 3 on 7/11/2024, pleural effusion, hyperlipidemia, and obstructive sleep apnea on CPAP.     In review, patient presented for cardiology consultation 5/2024 for evaluation of exertional chest pain.  He underwent a Lexiscan that was negative for inducible myocardial ischemia or infarction.  Echocardiogram showed a normal ejection fraction estimated at 55 to 60%, moderate concentric LVH, no regional wall motion abnormalities, normal RV size and function, and trace to mild mitral and tricuspid regurgitation.  Due to persistent symptoms he underwent an invasive coronary angiogram that showed severe multivessel disease.  He underwent CABG x 3 on 7/11/2024 using a LIMA to the LAD, SVG to OM and SVG to PL. He struggled with a postoperative cough.  Chest x-ray showed a small pleural effusion.  He also had left leg edema, negative ultrasound for DVT.     He was seen in follow-up by his PCP on 9/11/2024.  Due to chronic cough, he underwent a CT chest that was negative for PE but did show a left lower lobe infiltrate and was initiated on doxycycline.    He presents to the office today for follow-up.  He is feeling well on a cardiac standpoint, denies chest pain, shortness of breath, palpitations, PND, orthopnea, presyncope, syncope or edema.  He has a complete resolution of his cough after antibiotic therapy.    Blood pressure is well-controlled 132/74, managed on metoprolol  Labs in August showed a normal BMP.  CBC showed a mildly reduced hemoglobin at 11.8 otherwise normal.   Lipid panel is excellent with a total cholesterol of 88, HDL 42, LDL 26 and triglycerides 98.  Weight stable at 220 pounds    He continues to be very active working out regularly.  He does not  smoke or use alcohol  Faithful with CPAP  Compliant with all medications.      PAST MEDICAL HISTORY:  Past Medical History:   Diagnosis Date    Adenomatous colon polyp 07/2019    Asymptomatic varicose veins of both lower extremities     Bulbar polio 1952    residual dysphagia    Cervical osteoarthritis     Chronic anxiety     Chronic depression     Chronic depression 09/19/2022    Familial tremor     Gastroesophageal reflux disease without esophagitis 04/12/2021    Hallux limitus of left foot     Localized, primary osteoarthritis of hand     Oropharyngeal dysphagia     Pre-op evaluation 06/15/2022    Primary osteoarthritis of left hip 06/15/2022    Primary osteoarthritis of left hip 06/15/2022    RLS (restless legs syndrome)     Sleep apnea, obstructive     CPAP    Status post coronary angiogram 6/25/2024    Status post total replacement of left hip     Varicose veins of left lower extremity        MEDICATIONS:  Current Outpatient Medications   Medication Sig Dispense Refill    acetaminophen (TYLENOL) 325 MG tablet Take 2 tablets (650 mg) by mouth every 4 hours as needed for mild pain 30 tablet 0    aspirin (ASA) 81 MG chewable tablet Take 2 tablets (162 mg) by mouth daily. 180 tablet 3    metoprolol succinate ER (TOPROL XL) 50 MG 24 hr tablet Take 1 tablet (50 mg) by mouth daily. 90 tablet 3    Multiple Vitamins-Minerals (MULTIVITAMIN PO) Take 1 tablet by mouth daily      omeprazole (PRILOSEC) 20 MG DR capsule Take 2 capsules (40 mg) by mouth daily. 90 capsule 3    pramipexole (MIRAPEX) 1 MG tablet TAKE 1 TABLET BY MOUTH TWICE DAILY FOR  tablet 2    rosuvastatin (CRESTOR) 40 MG tablet Take 1 tablet (40 mg) by mouth at bedtime 90 tablet 3    traZODone (DESYREL) 100 MG tablet Take 1 tablet (100 mg) by mouth at bedtime. 90 tablet 3     No current facility-administered medications for this visit.       SOCIAL HISTORY:  I have reviewed this patient's social history and updated it with pertinent information if  needed. Toni Collado  reports that he quit smoking about 49 years ago. His smoking use included cigarettes. He has been exposed to tobacco smoke. He has never used smokeless tobacco. He reports that he does not currently use alcohol. He reports that he does not use drugs.    PHYSICAL EXAM:  Pulse:  [70] 70  BP: (132)/(74) 132/74  SpO2:  [98 %] 98 %  220 lbs 6.4 oz    Constitutional: alert, no distress  Respiratory: Good bilateral air entry  Cardiovascular: Regular rate and rhythm  GI: nondistended  Neuropsychiatric: appropriate affact    ASSESSMENT/PLAN:  Pertinent issues addressed/ reviewed during this cardiology visit  Coronary artery disease -status post CABG x 3 using a LIMA to the LAD, SVG to OM and SVG to PL on 7/11/2024.  No symptoms of ischemia.  Continue aspirin, metoprolol and rosuvastatin.  Encourage continued exercise, weight loss and heart healthy diet.  Hyperlipidemia -LDL at goal, continue rosuvastatin.  Due for fasting lipid panel in August  Obstructive sleep apnea -continue CPAP  Obesity -encourage continued exercise and weight loss.    Follow-up in 1 year with Dr. Cruz or sooner if needed.    It was a pleasure seeing this patient in clinic today. Please do not hesitate to contact me with any future questions.     DEANNA Cortez, CNP  Cardiology - [facility]  11/18/2024       The level of medical decision making during this visit was of moderate complexity.    This note was completed in part using dictation via the Dragon voice recognition software. Some word and grammatical errors may occur and must be interpreted in the appropriate clinical context.  If there are any questions pertaining to this issue, please contact me for further clarification.

## 2024-11-18 ENCOUNTER — OFFICE VISIT (OUTPATIENT)
Dept: CARDIOLOGY | Facility: CLINIC | Age: 79
End: 2024-11-18
Attending: INTERNAL MEDICINE
Payer: COMMERCIAL

## 2024-11-18 VITALS
SYSTOLIC BLOOD PRESSURE: 132 MMHG | HEIGHT: 66 IN | DIASTOLIC BLOOD PRESSURE: 74 MMHG | OXYGEN SATURATION: 98 % | WEIGHT: 220.4 LBS | HEART RATE: 70 BPM | BODY MASS INDEX: 35.42 KG/M2

## 2024-11-18 DIAGNOSIS — Z95.1 S/P CABG (CORONARY ARTERY BYPASS GRAFT): ICD-10-CM

## 2024-11-18 DIAGNOSIS — E78.5 HYPERLIPIDEMIA WITH TARGET LDL LESS THAN 70: ICD-10-CM

## 2024-11-18 DIAGNOSIS — E66.812 CLASS 2 SEVERE OBESITY DUE TO EXCESS CALORIES WITH SERIOUS COMORBIDITY AND BODY MASS INDEX (BMI) OF 35.0 TO 35.9 IN ADULT (H): ICD-10-CM

## 2024-11-18 DIAGNOSIS — E66.01 CLASS 2 SEVERE OBESITY DUE TO EXCESS CALORIES WITH SERIOUS COMORBIDITY AND BODY MASS INDEX (BMI) OF 35.0 TO 35.9 IN ADULT (H): ICD-10-CM

## 2024-11-18 DIAGNOSIS — G47.33 OSA ON CPAP: ICD-10-CM

## 2024-11-18 DIAGNOSIS — I25.810 CORONARY ARTERY DISEASE INVOLVING CORONARY BYPASS GRAFT OF NATIVE HEART WITHOUT ANGINA PECTORIS: Primary | ICD-10-CM

## 2024-11-18 NOTE — LETTER
11/18/2024    Yordan Hernandez MD  [address] [place] [address]    RE: Toni Collado       Dear Colleague,     I had the pleasure of seeing Toni Collado in the [facility].  CARDIOLOGY CLINIC NOTE    PRIMARY CARDIOLOGIST  Dr. Cruz     PRIMARY CARE PHYSICIAN:  Yordan Hernandez    HISTORY OF PRESENT ILLNESS:  Toni Collado is a very pleasant 79-year-old male with a past medical history significant for coronary artery disease status post CABG x 3 on 7/11/2024, pleural effusion, hyperlipidemia, and obstructive sleep apnea on CPAP.     In review, patient presented for cardiology consultation 5/2024 for evaluation of exertional chest pain.  He underwent a Lexiscan that was negative for inducible myocardial ischemia or infarction.  Echocardiogram showed a normal ejection fraction estimated at 55 to 60%, moderate concentric LVH, no regional wall motion abnormalities, normal RV size and function, and trace to mild mitral and tricuspid regurgitation.  Due to persistent symptoms he underwent an invasive coronary angiogram that showed severe multivessel disease.  He underwent CABG x 3 on 7/11/2024 using a LIMA to the LAD, SVG to OM and SVG to PL. He struggled with a postoperative cough.  Chest x-ray showed a small pleural effusion.  He also had left leg edema, negative ultrasound for DVT.     He was seen in follow-up by his PCP on 9/11/2024.  Due to chronic cough, he underwent a CT chest that was negative for PE but did show a left lower lobe infiltrate and was initiated on doxycycline.    He presents to the office today for follow-up.  He is feeling well on a cardiac standpoint, denies chest pain, shortness of breath, palpitations, PND, orthopnea, presyncope, syncope or edema.  He has a complete resolution of his cough after antibiotic therapy.    Blood pressure is well-controlled 132/74, managed on metoprolol  Labs in August showed a normal BMP.  CBC showed a mildly reduced hemoglobin at 11.8  otherwise normal.   Lipid panel is excellent with a total cholesterol of 88, HDL 42, LDL 26 and triglycerides 98.  Weight stable at 220 pounds    He continues to be very active working out regularly.  He does not smoke or use alcohol  Faithful with CPAP  Compliant with all medications.      PAST MEDICAL HISTORY:  Past Medical History:   Diagnosis Date     Adenomatous colon polyp 07/2019     Asymptomatic varicose veins of both lower extremities      Bulbar polio 1952    residual dysphagia     Cervical osteoarthritis      Chronic anxiety      Chronic depression      Chronic depression 09/19/2022     Familial tremor      Gastroesophageal reflux disease without esophagitis 04/12/2021     Hallux limitus of left foot      Localized, primary osteoarthritis of hand      Oropharyngeal dysphagia      Pre-op evaluation 06/15/2022     Primary osteoarthritis of left hip 06/15/2022     Primary osteoarthritis of left hip 06/15/2022     RLS (restless legs syndrome)      Sleep apnea, obstructive     CPAP     Status post coronary angiogram 6/25/2024     Status post total replacement of left hip      Varicose veins of left lower extremity        MEDICATIONS:  Current Outpatient Medications   Medication Sig Dispense Refill     acetaminophen (TYLENOL) 325 MG tablet Take 2 tablets (650 mg) by mouth every 4 hours as needed for mild pain 30 tablet 0     aspirin (ASA) 81 MG chewable tablet Take 2 tablets (162 mg) by mouth daily. 180 tablet 3     metoprolol succinate ER (TOPROL XL) 50 MG 24 hr tablet Take 1 tablet (50 mg) by mouth daily. 90 tablet 3     Multiple Vitamins-Minerals (MULTIVITAMIN PO) Take 1 tablet by mouth daily       omeprazole (PRILOSEC) 20 MG DR capsule Take 2 capsules (40 mg) by mouth daily. 90 capsule 3     pramipexole (MIRAPEX) 1 MG tablet TAKE 1 TABLET BY MOUTH TWICE DAILY FOR  tablet 2     rosuvastatin (CRESTOR) 40 MG tablet Take 1 tablet (40 mg) by mouth at bedtime 90 tablet 3     traZODone (DESYREL) 100 MG  tablet Take 1 tablet (100 mg) by mouth at bedtime. 90 tablet 3     No current facility-administered medications for this visit.       SOCIAL HISTORY:  I have reviewed this patient's social history and updated it with pertinent information if needed. Toni Collado  reports that he quit smoking about 49 years ago. His smoking use included cigarettes. He has been exposed to tobacco smoke. He has never used smokeless tobacco. He reports that he does not currently use alcohol. He reports that he does not use drugs.    PHYSICAL EXAM:  Pulse:  [70] 70  BP: (132)/(74) 132/74  SpO2:  [98 %] 98 %  220 lbs 6.4 oz    Constitutional: alert, no distress  Respiratory: Good bilateral air entry  Cardiovascular: Regular rate and rhythm  GI: nondistended  Neuropsychiatric: appropriate affact    ASSESSMENT/PLAN:  Pertinent issues addressed/ reviewed during this cardiology visit  Coronary artery disease -status post CABG x 3 using a LIMA to the LAD, SVG to OM and SVG to PL on 7/11/2024.  No symptoms of ischemia.  Continue aspirin, metoprolol and rosuvastatin.  Encourage continued exercise, weight loss and heart healthy diet.  Hyperlipidemia -LDL at goal, continue rosuvastatin.  Due for fasting lipid panel in August  Obstructive sleep apnea -continue CPAP  Obesity -encourage continued exercise and weight loss.    Follow-up in 1 year with Dr. Cruz or sooner if needed.    It was a pleasure seeing this patient in clinic today. Please do not hesitate to contact me with any future questions.     DEANNA Cortez, CNP  Cardiology - [facility]  11/18/2024       The level of medical decision making during this visit was of moderate complexity.    This note was completed in part using dictation via the Dragon voice recognition software. Some word and grammatical errors may occur and must be interpreted in the appropriate clinical context.  If there are any questions pertaining to this issue, please contact me for further  clarification.      Thank you for allowing me to participate in the care of your patient.      Sincerely,     DEANNA Cortez [facility]  cc:   Malina Jennings PA-C  [address] [id number]

## 2024-11-18 NOTE — PATIENT INSTRUCTIONS
Thanks for participating in a office visit with the [facility] clinic today.    Doing well on a cardiac standpoint   Blood pressures well controlled.   Cholesterol levels at goal.  Continue current medical therapy  Encourage continued exercise, weight loss, and heart healthy diet   Labs in August    Follow up in 1 year with Dr. Cruz     Please call my nurse at   [phone]. Call with any questions or concerns.    Scheduling phone number: [phone]  Reminder: Please bring in all current medications, over the counter supplements and vitamin bottles to your next appointment.

## 2024-11-19 DIAGNOSIS — R97.20 ELEVATED PROSTATE SPECIFIC ANTIGEN (PSA): Primary | ICD-10-CM

## 2024-12-16 ENCOUNTER — OFFICE VISIT (OUTPATIENT)
Dept: URGENT CARE | Facility: URGENT CARE | Age: 79
End: 2024-12-16
Payer: COMMERCIAL

## 2024-12-16 VITALS
TEMPERATURE: 97 F | SYSTOLIC BLOOD PRESSURE: 125 MMHG | BODY MASS INDEX: 34.7 KG/M2 | HEART RATE: 81 BPM | DIASTOLIC BLOOD PRESSURE: 69 MMHG | OXYGEN SATURATION: 97 % | WEIGHT: 215 LBS | RESPIRATION RATE: 18 BRPM

## 2024-12-16 DIAGNOSIS — R05.1 ACUTE COUGH: ICD-10-CM

## 2024-12-16 DIAGNOSIS — R42 DIZZINESS: ICD-10-CM

## 2024-12-16 DIAGNOSIS — H83.09 VIRAL LABYRINTHITIS, UNSPECIFIED LATERALITY: Primary | ICD-10-CM

## 2024-12-16 LAB
ANION GAP SERPL CALCULATED.3IONS-SCNC: 5 MMOL/L (ref 3–14)
BASOPHILS # BLD AUTO: 0 10E3/UL (ref 0–0.2)
BASOPHILS NFR BLD AUTO: 1 %
BUN SERPL-MCNC: 21 MG/DL (ref 7–30)
CALCIUM SERPL-MCNC: 9.9 MG/DL (ref 8.5–10.1)
CHLORIDE BLD-SCNC: 107 MMOL/L (ref 94–109)
CO2 SERPL-SCNC: 30 MMOL/L (ref 20–32)
CREAT SERPL-MCNC: 1.2 MG/DL (ref 0.66–1.25)
DEPRECATED S PYO AG THROAT QL EIA: NEGATIVE
EGFRCR SERPLBLD CKD-EPI 2021: 62 ML/MIN/1.73M2
EOSINOPHIL # BLD AUTO: 0.2 10E3/UL (ref 0–0.7)
EOSINOPHIL NFR BLD AUTO: 2 %
ERYTHROCYTE [DISTWIDTH] IN BLOOD BY AUTOMATED COUNT: 14.5 % (ref 10–15)
FLUAV AG SPEC QL IA: NEGATIVE
FLUBV AG SPEC QL IA: NEGATIVE
GLUCOSE BLD-MCNC: 118 MG/DL (ref 70–99)
HCT VFR BLD AUTO: 42.2 % (ref 40–53)
HGB BLD-MCNC: 13.4 G/DL (ref 13.3–17.7)
IMM GRANULOCYTES # BLD: 0 10E3/UL
IMM GRANULOCYTES NFR BLD: 0 %
LYMPHOCYTES # BLD AUTO: 1 10E3/UL (ref 0.8–5.3)
LYMPHOCYTES NFR BLD AUTO: 13 %
MCH RBC QN AUTO: 28.9 PG (ref 26.5–33)
MCHC RBC AUTO-ENTMCNC: 31.8 G/DL (ref 31.5–36.5)
MCV RBC AUTO: 91 FL (ref 78–100)
MONOCYTES # BLD AUTO: 0.7 10E3/UL (ref 0–1.3)
MONOCYTES NFR BLD AUTO: 10 %
NEUTROPHILS # BLD AUTO: 5.4 10E3/UL (ref 1.6–8.3)
NEUTROPHILS NFR BLD AUTO: 74 %
PLATELET # BLD AUTO: 165 10E3/UL (ref 150–450)
POTASSIUM BLD-SCNC: 4.8 MMOL/L (ref 3.4–5.3)
RBC # BLD AUTO: 4.64 10E6/UL (ref 4.4–5.9)
S PYO DNA THROAT QL NAA+PROBE: NOT DETECTED
SODIUM SERPL-SCNC: 142 MMOL/L (ref 135–145)
WBC # BLD AUTO: 7.3 10E3/UL (ref 4–11)

## 2024-12-16 PROCEDURE — 36415 COLL VENOUS BLD VENIPUNCTURE: CPT | Performed by: NURSE PRACTITIONER

## 2024-12-16 PROCEDURE — 93000 ELECTROCARDIOGRAM COMPLETE: CPT | Performed by: NURSE PRACTITIONER

## 2024-12-16 PROCEDURE — 85025 COMPLETE CBC W/AUTO DIFF WBC: CPT | Performed by: NURSE PRACTITIONER

## 2024-12-16 PROCEDURE — 87651 STREP A DNA AMP PROBE: CPT | Performed by: NURSE PRACTITIONER

## 2024-12-16 PROCEDURE — 87804 INFLUENZA ASSAY W/OPTIC: CPT | Performed by: NURSE PRACTITIONER

## 2024-12-16 PROCEDURE — 80048 BASIC METABOLIC PNL TOTAL CA: CPT | Performed by: NURSE PRACTITIONER

## 2024-12-16 PROCEDURE — 99214 OFFICE O/P EST MOD 30 MIN: CPT | Performed by: NURSE PRACTITIONER

## 2024-12-16 PROCEDURE — 87635 SARS-COV-2 COVID-19 AMP PRB: CPT | Performed by: NURSE PRACTITIONER

## 2024-12-16 RX ORDER — MECLIZINE HYDROCHLORIDE 25 MG/1
25 TABLET ORAL 3 TIMES DAILY PRN
Qty: 21 TABLET | Refills: 0 | Status: SHIPPED | OUTPATIENT
Start: 2024-12-16 | End: 2024-12-23

## 2024-12-16 RX ORDER — ONDANSETRON 4 MG/1
4 TABLET, ORALLY DISINTEGRATING ORAL EVERY 8 HOURS PRN
Qty: 9 TABLET | Refills: 0 | Status: SHIPPED | OUTPATIENT
Start: 2024-12-16 | End: 2024-12-19

## 2024-12-16 ASSESSMENT — ENCOUNTER SYMPTOMS
SINUS PRESSURE: 1
EYES NEGATIVE: 1
CONSTITUTIONAL NEGATIVE: 1
NAUSEA: 1
RHINORRHEA: 1
CHEST TIGHTNESS: 0
SHORTNESS OF BREATH: 0
CARDIOVASCULAR NEGATIVE: 1
COUGH: 1
WHEEZING: 0
VOMITING: 0
DIARRHEA: 0
MUSCULOSKELETAL NEGATIVE: 1
SORE THROAT: 1

## 2024-12-16 NOTE — PROGRESS NOTES
Assessment & Plan       ICD-10-CM    1. Viral labyrinthitis, unspecified laterality  H83.09 meclizine (ANTIVERT) 25 MG tablet     ondansetron (ZOFRAN ODT) 4 MG ODT tab      2. Dizziness  R42 EKG 12-lead complete w/read - Clinics      3. Acute cough  R05.1 CBC with platelets and differential     Basic metabolic panel  (Ca, Cl, CO2, Creat, Gluc, K, Na, BUN)     Streptococcus A Rapid Screen w/Reflex to PCR - Clinic Collect     Influenza A & B Antigen - Clinic Collect     COVID-19 Virus (Coronavirus) by PCR Nose     CBC with platelets and differential     Basic metabolic panel  (Ca, Cl, CO2, Creat, Gluc, K, Na, BUN)     Group A Streptococcus PCR Throat Swab      EKG unchanged from 12/01/2024 and improved from 074/10/2024.     CBC and BMP within normal limits (not fasting).    Patient states prior to discharge he is feeling essentially back to normal. He has no dizziness or nausea.     Prescription for meclizine and zofran to utilize as needed.        Patient Instructions   Strep (-), culture pending - will only call if positive    COVID PCR pending - only will call if positive.    Influenza negative. CBC and BMP reassuring. EKG reassuring.    Meclizine and Zofran prescriptions as needed.    Adequate fluids with water, Pedialyte, Gatorade, or rehydrating beverages.     If needed, follow soft food diet. Rest as much as possible. Decrease stress. Follow up in clinic if symptoms persist or worsen.       Follow up with any problems, questions or concerns or if symptoms worsen or fail to improve. Patient and his wife agreed to the treatment plan and verbalized understanding.     Results for orders placed or performed in visit on 12/16/24 (from the past 24 hours)   Streptococcus A Rapid Screen w/Reflex to PCR - Clinic Collect    Specimen: Throat; Swab   Result Value Ref Range    Group A Strep antigen Negative Negative   Influenza A & B Antigen - Clinic Collect    Specimen: Nose; Swab   Result Value Ref Range    Influenza A  antigen Negative Negative    Influenza B antigen Negative Negative    Narrative    Test results must be correlated with clinical data. If necessary, results should be confirmed by a molecular assay or viral culture.   CBC with platelets and differential    Narrative    The following orders were created for panel order CBC with platelets and differential.  Procedure                               Abnormality         Status                     ---------                               -----------         ------                     CBC with platelets and d...[[id number]]                      Final result                 Please view results for these tests on the individual orders.   Basic metabolic panel  (Ca, Cl, CO2, Creat, Gluc, K, Na, BUN)   Result Value Ref Range    Sodium 142 135 - 145 mmol/L    Potassium 4.8 3.4 - 5.3 mmol/L    Chloride 107 94 - 109 mmol/L    Carbon Dioxide (CO2) 30 20 - 32 mmol/L    Anion Gap 5 3 - 14 mmol/L    Urea Nitrogen 21 7 - 30 mg/dL    Creatinine 1.20 0.66 - 1.25 mg/dL    GFR Estimate 62 >60 mL/min/1.73m2    Calcium 9.9 8.5 - 10.1 mg/dL    Glucose 118 (H) 70 - 99 mg/dL   CBC with platelets and differential   Result Value Ref Range    WBC Count 7.3 4.0 - 11.0 10e3/uL    RBC Count 4.64 4.40 - 5.90 10e6/uL    Hemoglobin 13.4 13.3 - 17.7 g/dL    Hematocrit 42.2 40.0 - 53.0 %    MCV 91 78 - 100 fL    MCH 28.9 26.5 - 33.0 pg    MCHC 31.8 31.5 - 36.5 g/dL    RDW 14.5 10.0 - 15.0 %    Platelet Count 165 150 - 450 10e3/uL    % Neutrophils 74 %    % Lymphocytes 13 %    % Monocytes 10 %    % Eosinophils 2 %    % Basophils 1 %    % Immature Granulocytes 0 %    Absolute Neutrophils 5.4 1.6 - 8.3 10e3/uL    Absolute Lymphocytes 1.0 0.8 - 5.3 10e3/uL    Absolute Monocytes 0.7 0.0 - 1.3 10e3/uL    Absolute Eosinophils 0.2 0.0 - 0.7 10e3/uL    Absolute Basophils 0.0 0.0 - 0.2 10e3/uL    Absolute Immature Granulocytes 0.0 <=0.4 10e3/uL       Subjective     Johnny is a 79 year old male who presents to clinic  "today with his wife for the following health issues:  Chief Complaint   Patient presents with    Dizziness     20 minutes ago, lightheaded, loss of balance, nausea, sore throat, runny nose-recently had heart surgery in July HPI  Patient was getting out of his truck this morning and started to\" drift\" as he was trying to walk and then states he started to go down due to being off balance.  He states several people helped him back up to his truck and then he presented at the urgent care with his wife.  He states he has had a sore throat and nasal drainage for approximately 4 days.  He states he does have an occasional cough but no chest congestion or shortness of breath.  He denies any wheezing or chest pain.  He states he did have a protein shake this morning before going out. No fevers. He does have a history of BPPV but states this feels a little different than his typical BPPV.       Review of Systems   Constitutional: Negative.    HENT:  Positive for congestion, rhinorrhea, sinus pressure and sore throat. Negative for ear pain.    Eyes: Negative.    Respiratory:  Positive for cough. Negative for chest tightness, shortness of breath and wheezing.    Cardiovascular: Negative.    Gastrointestinal:  Positive for nausea. Negative for diarrhea and vomiting.   Musculoskeletal: Negative.        Problem List:  2024-07: Fluid overload  2024-07: Transient hyperglycemia post procedure  2024-07: Wheezing  2024-07: S/P CABG (coronary artery bypass graft) LIMA>LAD, V>OM, V>PL  2024-06: Accelerating angina (H)  2024-06: Status post coronary angiogram  2024-06: Elevated troponin I measurement  2024-06: Coronary artery disease due to calcified coronary lesion  2024-02: Throat swelling  2024-02: Acute post-operative pain  2024-02: Zenker diverticulum  2023-12: Class 2 severe obesity due to excess calories with serious   comorbidity in adult (H)  2023-11: Primary osteoarthritis of both hands  2023-11: BMI " 38.0-38.9,adult  2023: Benign paroxysmal positional vertigo due to bilateral   vestibular disorder  2022: Morbid obesity (H)  2022: Oropharyngeal dysphagia  2022: Chronic depression  2022: Varicose veins of left lower extremity  2022: Primary osteoarthritis of left hip  2022: Pre-op evaluation  2021: Gastroesophageal reflux disease without esophagitis  2021: History of colonic polyps  2021: Tinnitus of both ears  2020: Sleep disorder  2020: Obesity (BMI 35.0-39.9) with comorbidity (H)  2020: Elevated prostate specific antigen (PSA)  2019: Adenomatous colon polyp  2019: BRITTNI on CPAP  2018: RLS (restless legs syndrome)  : Bulbar polio      Past Medical History:   Diagnosis Date    Adenomatous colon polyp 2019    Asymptomatic varicose veins of both lower extremities     Bulbar polio     residual dysphagia    Cervical osteoarthritis     Chronic anxiety     Chronic depression     Chronic depression 2022    Familial tremor     Gastroesophageal reflux disease without esophagitis 2021    Hallux limitus of left foot     Localized, primary osteoarthritis of hand     Oropharyngeal dysphagia     Pre-op evaluation 06/15/2022    Primary osteoarthritis of left hip 06/15/2022    Primary osteoarthritis of left hip 06/15/2022    RLS (restless legs syndrome)     Sleep apnea, obstructive     CPAP    Status post coronary angiogram 2024    Status post total replacement of left hip     Varicose veins of left lower extremity          Social History     Tobacco Use    Smoking status: Former     Current packs/day: 0.00     Types: Cigarettes     Quit date: 1975     Years since quittin.1     Passive exposure: Past    Smokeless tobacco: Never   Substance Use Topics    Alcohol use: Not Currently           Objective    /69   Pulse 81   Temp 97  F (36.1  C)   Resp 18   Wt 97.5 kg (215 lb)   SpO2 97%   BMI 34.70 kg/m    Physical Exam  Vitals and  nursing note reviewed.      General Appearance:  Alert, cooperative, no distress, appears stated age. Afebrile.  Integument: Warm, dry, no rashes or lesions.  HEENT:  Head: Atraumatic, normocephalic. No cranial bone tenderness. Face nontraumatic.  Eyes: Conjunctiva clear, Lids normal. PERRL. Nystagmus when looking to the right.  Nose: nares patent. No erythema of nasal mucosa. Clear rhinorrhea.  Neck: Supple. No C-spine tenderness.  Respiratory: No distress. Lungs clear to ausculation bilaterally. No crackles, wheezes, rhonchi or stridor.  Cardiovascular: Regular rate, regular rhythm. No murmurs, rubs, clicks or gallops. No obvious chest wall deformities. Peripheral pulses 2+ bilaterally. No peripheral edema.  GI: Soft, nontender, normal bowel sounds. No masses, organomegaly, rigidity, or guarding.  Musculoskeletal: No swelling or erythema of extremities. Moves all extremities equally. Back: no Tspine or Lspine tenderness.   Neurologic: Alert & oriented to person, place and time. Normal tone. PERRL. Normal speech, no dysarthria.   Motor: RUE/LUE  strength 5/5 bilaterally, elbow flexion/extension 5/5 bilaterally, shoulder elevation 5/5 bilaterally. RLE/LLE knee flexion/extension 5/5 bilaterally, ankle plantar/dorsiflexion 5/5 bilaterally. No pronator drift. Sensory: intact distally  Gait: Normal, no assistive devices. Can heel walk as well as on tip toes. Psychomotor slowing (-). Abnormal Movements (-).Rapid alternating Movements intact. Heel to shin normal bilaterally.  Cranial Nerves:  CN II: visual acuity - is able to accurately count fingers with each eye.   CN III, IV, VI: EOM intact, pupils equal and reactive  CN V: facial sensation grosly intact  CN VII: face symmetric, no facial droop  CN VIII: hearing grossly intact bilaterally  CN IX: palate elevation symmetric, uvula at midline  CN XI SCM normal, shoulder shrug nl  CN XII: tongue midline  Psych: Normal mood and affect.          Piper Stanford NP

## 2024-12-16 NOTE — PATIENT INSTRUCTIONS
Strep (-), culture pending - will only call if positive    COVID PCR pending - only will call if positive.    Influenza negative. CBC and BMP reassuring. EKG reassuring.    Meclizine and Zofran prescriptions as needed.    Adequate fluids with water, Pedialyte, Gatorade, or rehydrating beverages.     If needed, follow soft food diet. Rest as much as possible. Decrease stress. Follow up in clinic if symptoms persist or worsen.

## 2024-12-17 LAB — SARS-COV-2 RNA RESP QL NAA+PROBE: NEGATIVE

## 2024-12-18 ENCOUNTER — MYC MEDICAL ADVICE (OUTPATIENT)
Dept: PEDIATRICS | Facility: CLINIC | Age: 79
End: 2024-12-18
Payer: COMMERCIAL

## 2024-12-18 NOTE — TELEPHONE ENCOUNTER
"Please see patient MyChart  -states had EKG with borderline rhythm  -inquiring if should be concerned    Per chart review  [facility] 12/16/24 with Piper Stanford  \"EKG unchanged from 12/01/2024 and improved from 074/10/2024.\"  EKG 12/16/24  \"Sinus Rhythm -First degree A-V block   Maddy = 276  BORDERLINE RHYTHM\"    Dr. Hernandez please review and advise.    Andra Sethi RN    "

## 2025-01-13 ENCOUNTER — LAB (OUTPATIENT)
Dept: LAB | Facility: CLINIC | Age: 80
End: 2025-01-13
Payer: COMMERCIAL

## 2025-01-13 DIAGNOSIS — R97.20 ELEVATED PROSTATE SPECIFIC ANTIGEN (PSA): ICD-10-CM

## 2025-01-13 LAB — PSA SERPL DL<=0.01 NG/ML-MCNC: 9.46 NG/ML (ref 0–6.5)

## 2025-01-13 PROCEDURE — 36415 COLL VENOUS BLD VENIPUNCTURE: CPT

## 2025-01-13 PROCEDURE — 84153 ASSAY OF PSA TOTAL: CPT

## 2025-01-16 ENCOUNTER — VIRTUAL VISIT (OUTPATIENT)
Dept: UROLOGY | Facility: CLINIC | Age: 80
End: 2025-01-16
Payer: COMMERCIAL

## 2025-01-16 DIAGNOSIS — Z79.2 PROPHYLACTIC ANTIBIOTIC: ICD-10-CM

## 2025-01-16 DIAGNOSIS — R97.20 ELEVATED PROSTATE SPECIFIC ANTIGEN (PSA): Primary | ICD-10-CM

## 2025-01-16 RX ORDER — CIPROFLOXACIN 500 MG/1
500 TABLET, FILM COATED ORAL 2 TIMES DAILY
Qty: 6 TABLET | Refills: 0 | Status: SHIPPED | OUTPATIENT
Start: 2025-01-16 | End: 2025-01-19

## 2025-01-16 ASSESSMENT — PAIN SCALES - GENERAL: PAINLEVEL_OUTOF10: NO PAIN (0)

## 2025-01-16 NOTE — PROGRESS NOTES
Virtual Visit Details    Type of service:  Video Visit   Video Start Time: 2:05 PM  Video End Time:2:15 PM    Originating Location (pt. Location): Home    Distant Location (provider location):  On-site  Platform used for Video Visit: [employer]    HPI:  Toni Collado is a 79 year old male being seen for discussion regarding elevated PSA.  Duration of problem: 1 year  Previous treatments: MRI done and subsequently repeat PSA no other treatment done      Reviewed previous notes  Recently had cardiac surgery  Has recovered well from it  Denies any issues or lower urinary tract symptoms         Review of Systems:  From intake questionnaire     Skin: negative  Eyes: negative  Ears/Nose/Throat: negative  Respiratory: No shortness of breath, dyspnea on exertion, cough, or hemoptysis  Cardiovascular: No chest pain or palpitations  Gastrointestinal: negative; no nausea/vomiting, constipation or diarrhea  Genitourinary: as per HPI  Musculoskeletal: negative  Neurologic: negative  Psychiatric: negative  Hematologic/Lymphatic/Immunologic: negative  Endocrine: negative         Physical Exam:   This is a virtual visit    Alert, no acute distress, oriented, conversant    Ears/nose/mouth: mouth:normal, good dentition  Respiratory: no respiratory distress, or pursed lip breathing  Cardiovascular:no obvious jugular venous distension present  Skin: no suspicious lesions or rashes on Visible body parts on the Screen  Neuro: Alert, oriented, speech and mentation normal  Psych: affect and mood normal, alert and oriented to person, place and time  Review of Imaging:  The following imaging exams were independently viewed and interpreted by me and discussed with patient:  MRI Abd/Pelvis: MRI PROSTATE: 11/8/2024 3:02 PM     CLINICAL HISTORY: Elevated prostate specific antigen (PSA)      Most Recent PSA: 7.7 ug/L     Comparison: None.     TECHNIQUE:  The following sequences were obtained: High-resolution axial  T2-weighted, coronal  T2-weighted, 3D volumetric T2-weighted, axial  pre-contrast T1, axial diffusion-weighted, axial apparent diffusion  coefficient and axial dynamic contrast-enhanced T1. Postcontrast  images were evaluated on a separate workstation to evaluate dynamic  contrast enhancement. The technique of this exam is PI-RADS v2.1  compliant. Contrast dose: 10mL Gadavist     FINDINGS:  Size: 4.5 x 2.7 x 4.9 cm. 31.0 grams  Hemorrhage: Absent  Peripheral zone: Heterogeneous on T2-weighted images. Regions of  mildly decreased signal on ADC or DWI which are best characterized as  PI-RADS 2 without highly suspicious lesion.  Transition zone: Enlarged with BPH changes.     Lesion(s) in rank order of severity (highest score- to lowest score,  then by size)         Neurovascular bundles: No neurovascular bundle involvement by  malignancy.  Seminal vesicles: No seminal vesicle involvement by malignancy.  Lymph nodes: No lymph node involvement  Bones: No suspicious lesions  Other pelvic organs: Degenerative changes of the lower lumbar spine.  Colonic diverticulosis.                                                                         IMPRESSION:  1. Based on the most suspicious abnormality, this exam is  characterized as PIRADS 2 - Clinically significant cancer is unlikely  to be present.    2. No suspicious adenopathy or evidence of pelvic metastases.        PIRADS? v2.1 Assessment Categories   PIRADS 1: Very low (clinically significant cancer is highly unlikely  to be present)   PIRADS 2: Low (clinically significant cancer is unlikely to be  present)   PIRADS 3: Intermediate (the presence of clinically significant cancer  is equivocal)   PIRADS 4: High (clinically significant cancer is likely to be present)     PIRADS 5: Very high (clinically significant cancer is highly likely to  be present)              I have personally reviewed the examination and initial interpretation  and I agree with the findings.     NAVI CERVANTES MD      Review of Labs:  The following labs were reviewed by me and discussed with the patient:  Component      Latest Ref Rng 1/19/2024  12:37 PM 1/13/2025  10:25 AM   PSA Tumor Marker      0.00 - 6.50 ng/mL 7.72 (H)  9.46 (H)           Assessment & Plan     Elevated prostate specific antigen (PSA)  We did discuss in detail about his continued PSA rise  His PSA is now closer to 10 and based on the MRI PSA density is approximately 0.3  We discussed that based on these values I think it is reasonable to consider prostate biopsy  We discussed in detail about issues with prostate biopsy likely complications and the follow-up plan we discussed about hematuria, blood in the stools as well as blood in the semen  We also talked about possible urinary retention and prostate infection post biopsy  I will have my team reach out to him for the same  I will also send him a prescription of ciprofloxacin that he needs to take a day prior to the prostate biopsy  Will see him on the day of the prostate biopsy      Noel Brannon MD  [facility] [place]      ==========================    Additional Billing and Coding Information:  Review of external notes as documented above   Review of the result(s) of each unique test - PSA, MRI prostate              15 minutes spent by me on the date of the encounter doing chart review, review of test results, interpretation of tests, patient visit, and documentation

## 2025-01-16 NOTE — NURSING NOTE
Current patient location: [address] [place] [id number]    Is the patient currently in the state of [place]? YES    Visit mode: VIDEO    If the visit is dropped, the patient can be reconnected by:VIDEO VISIT: Text to cell phone:   Telephone Information:   Mobile [phone]       Will anyone else be joining the visit? NO  (If patient encounters technical issues they should call [phone]    Are changes needed to the allergy or medication list? No    Are refills needed on medications prescribed by this physician? NO    Rooming Documentation:  Questionnaire(s) not done per department protocol    Reason for visit: RECHECK Shelby Kocher VVF

## 2025-01-16 NOTE — LETTER
1/16/2025       RE: Toni Collado  [address] [facility] [address]     Dear Colleague,    Thank you for referring your patient, Toni Collado, to the [facility] [place] at [facility]. Please see a copy of my visit note below.    Virtual Visit Details    Type of service:  Video Visit   Video Start Time: 2:05 PM  Video End Time:2:15 PM    Originating Location (pt. Location): Home    Distant Location (provider location):  On-site  Platform used for Video Visit: [facility]    HPI:  Toni Collado is a 79 year old male being seen for discussion regarding elevated PSA.  Duration of problem: 1 year  Previous treatments: MRI done and subsequently repeat PSA no other treatment done      Reviewed previous notes  Recently had cardiac surgery  Has recovered well from it  Denies any issues or lower urinary tract symptoms         Review of Systems:  From intake questionnaire     Skin: negative  Eyes: negative  Ears/Nose/Throat: negative  Respiratory: No shortness of breath, dyspnea on exertion, cough, or hemoptysis  Cardiovascular: No chest pain or palpitations  Gastrointestinal: negative; no nausea/vomiting, constipation or diarrhea  Genitourinary: as per HPI  Musculoskeletal: negative  Neurologic: negative  Psychiatric: negative  Hematologic/Lymphatic/Immunologic: negative  Endocrine: negative         Physical Exam:   This is a virtual visit    Alert, no acute distress, oriented, conversant    Ears/nose/mouth: mouth:normal, good dentition  Respiratory: no respiratory distress, or pursed lip breathing  Cardiovascular:no obvious jugular venous distension present  Skin: no suspicious lesions or rashes on Visible body parts on the Screen  Neuro: Alert, oriented, speech and mentation normal  Psych: affect and mood normal, alert and oriented to person, place and time  Review of Imaging:  The following imaging exams were independently viewed and interpreted  by me and discussed with patient:  MRI Abd/Pelvis: MRI PROSTATE: 11/8/2024 3:02 PM     CLINICAL HISTORY: Elevated prostate specific antigen (PSA)      Most Recent PSA: 7.7 ug/L     Comparison: None.     TECHNIQUE:  The following sequences were obtained: High-resolution axial  T2-weighted, coronal T2-weighted, 3D volumetric T2-weighted, axial  pre-contrast T1, axial diffusion-weighted, axial apparent diffusion  coefficient and axial dynamic contrast-enhanced T1. Postcontrast  images were evaluated on a separate workstation to evaluate dynamic  contrast enhancement. The technique of this exam is PI-RADS v2.1  compliant. Contrast dose: 10mL Gadavist     FINDINGS:  Size: 4.5 x 2.7 x 4.9 cm. 31.0 grams  Hemorrhage: Absent  Peripheral zone: Heterogeneous on T2-weighted images. Regions of  mildly decreased signal on ADC or DWI which are best characterized as  PI-RADS 2 without highly suspicious lesion.  Transition zone: Enlarged with BPH changes.     Lesion(s) in rank order of severity (highest score- to lowest score,  then by size)         Neurovascular bundles: No neurovascular bundle involvement by  malignancy.  Seminal vesicles: No seminal vesicle involvement by malignancy.  Lymph nodes: No lymph node involvement  Bones: No suspicious lesions  Other pelvic organs: Degenerative changes of the lower lumbar spine.  Colonic diverticulosis.                                                                         IMPRESSION:  1. Based on the most suspicious abnormality, this exam is  characterized as PIRADS 2 - Clinically significant cancer is unlikely  to be present.    2. No suspicious adenopathy or evidence of pelvic metastases.        PIRADS? v2.1 Assessment Categories   PIRADS 1: Very low (clinically significant cancer is highly unlikely  to be present)   PIRADS 2: Low (clinically significant cancer is unlikely to be  present)   PIRADS 3: Intermediate (the presence of clinically significant cancer  is equivocal)   PIRADS  4: High (clinically significant cancer is likely to be present)     PIRADS 5: Very high (clinically significant cancer is highly likely to  be present)              I have personally reviewed the examination and initial interpretation  and I agree with the findings.     NAVI CERVANTES MD     Review of Labs:  The following labs were reviewed by me and discussed with the patient:  Component      Latest Ref Rng 1/19/2024  12:37 PM 1/13/2025  10:25 AM   PSA Tumor Marker      0.00 - 6.50 ng/mL 7.72 (H)  9.46 (H)           Assessment & Plan    Elevated prostate specific antigen (PSA)  We did discuss in detail about his continued PSA rise  His PSA is now closer to 10 and based on the MRI PSA density is approximately 0.3  We discussed that based on these values I think it is reasonable to consider prostate biopsy  We discussed in detail about issues with prostate biopsy likely complications and the follow-up plan we discussed about hematuria, blood in the stools as well as blood in the semen  We also talked about possible urinary retention and prostate infection post biopsy  I will have my team reach out to him for the same  I will also send him a prescription of ciprofloxacin that he needs to take a day prior to the prostate biopsy  Will see him on the day of the prostate biopsy      Noel Brannon MD  [facility] [place]      ==========================    Additional Billing and Coding Information:  Review of external notes as documented above   Review of the result(s) of each unique test - PSA, MRI prostate              15 minutes spent by me on the date of the encounter doing chart review, review of test results, interpretation of tests, patient visit, and documentation       Again, thank you for allowing me to participate in the care of your patient.      Sincerely,    Noel Brannon MD

## 2025-01-26 ENCOUNTER — APPOINTMENT (OUTPATIENT)
Dept: GENERAL RADIOLOGY | Facility: CLINIC | Age: 80
End: 2025-01-26
Attending: EMERGENCY MEDICINE
Payer: COMMERCIAL

## 2025-01-26 ENCOUNTER — HOSPITAL ENCOUNTER (EMERGENCY)
Facility: CLINIC | Age: 80
Discharge: HOME | End: 2025-01-26
Attending: EMERGENCY MEDICINE | Admitting: EMERGENCY MEDICINE
Payer: COMMERCIAL

## 2025-01-26 VITALS
DIASTOLIC BLOOD PRESSURE: 78 MMHG | RESPIRATION RATE: 18 BRPM | BODY MASS INDEX: 35.57 KG/M2 | OXYGEN SATURATION: 95 % | SYSTOLIC BLOOD PRESSURE: 132 MMHG | WEIGHT: 226.63 LBS | HEART RATE: 59 BPM | HEIGHT: 67 IN | TEMPERATURE: 97.2 F

## 2025-01-26 DIAGNOSIS — E03.9 HYPOTHYROIDISM, UNSPECIFIED TYPE: ICD-10-CM

## 2025-01-26 DIAGNOSIS — I44.1 MOBITZ TYPE I WENCKEBACH ATRIOVENTRICULAR BLOCK: ICD-10-CM

## 2025-01-26 LAB
ANION GAP SERPL CALCULATED.3IONS-SCNC: 9 MMOL/L (ref 7–15)
BASOPHILS # BLD AUTO: 0.1 10E3/UL (ref 0–0.2)
BASOPHILS NFR BLD AUTO: 1 %
BUN SERPL-MCNC: 18.7 MG/DL (ref 8–23)
CALCIUM SERPL-MCNC: 9.4 MG/DL (ref 8.8–10.4)
CHLORIDE SERPL-SCNC: 105 MMOL/L (ref 98–107)
CREAT SERPL-MCNC: 1.07 MG/DL (ref 0.67–1.17)
EGFRCR SERPLBLD CKD-EPI 2021: 71 ML/MIN/1.73M2
EOSINOPHIL # BLD AUTO: 0.3 10E3/UL (ref 0–0.7)
EOSINOPHIL NFR BLD AUTO: 5 %
ERYTHROCYTE [DISTWIDTH] IN BLOOD BY AUTOMATED COUNT: 14.1 % (ref 10–15)
GLUCOSE SERPL-MCNC: 104 MG/DL (ref 70–99)
HCO3 SERPL-SCNC: 25 MMOL/L (ref 22–29)
HCT VFR BLD AUTO: 43.1 % (ref 40–53)
HGB BLD-MCNC: 14.4 G/DL (ref 13.3–17.7)
HOLD SPECIMEN: NORMAL
IMM GRANULOCYTES # BLD: 0 10E3/UL
IMM GRANULOCYTES NFR BLD: 0 %
LYMPHOCYTES # BLD AUTO: 1.4 10E3/UL (ref 0.8–5.3)
LYMPHOCYTES NFR BLD AUTO: 27 %
MAGNESIUM SERPL-MCNC: 1.9 MG/DL (ref 1.7–2.3)
MCH RBC QN AUTO: 30.3 PG (ref 26.5–33)
MCHC RBC AUTO-ENTMCNC: 33.4 G/DL (ref 31.5–36.5)
MCV RBC AUTO: 91 FL (ref 78–100)
MONOCYTES # BLD AUTO: 0.6 10E3/UL (ref 0–1.3)
MONOCYTES NFR BLD AUTO: 12 %
NEUTROPHILS # BLD AUTO: 2.8 10E3/UL (ref 1.6–8.3)
NEUTROPHILS NFR BLD AUTO: 55 %
NRBC # BLD AUTO: 0 10E3/UL
NRBC BLD AUTO-RTO: 0 /100
PLATELET # BLD AUTO: 163 10E3/UL (ref 150–450)
POTASSIUM SERPL-SCNC: 4.3 MMOL/L (ref 3.4–5.3)
RBC # BLD AUTO: 4.75 10E6/UL (ref 4.4–5.9)
SODIUM SERPL-SCNC: 139 MMOL/L (ref 135–145)
T4 FREE SERPL-MCNC: 0.79 NG/DL (ref 0.9–1.7)
TROPONIN T SERPL HS-MCNC: 23 NG/L
TROPONIN T SERPL HS-MCNC: 23 NG/L
TSH SERPL DL<=0.005 MIU/L-ACNC: 7.31 UIU/ML (ref 0.3–4.2)
WBC # BLD AUTO: 5.1 10E3/UL (ref 4–11)

## 2025-01-26 PROCEDURE — 83735 ASSAY OF MAGNESIUM: CPT | Performed by: EMERGENCY MEDICINE

## 2025-01-26 PROCEDURE — 99285 EMERGENCY DEPT VISIT HI MDM: CPT | Mod: 25

## 2025-01-26 PROCEDURE — 84443 ASSAY THYROID STIM HORMONE: CPT | Performed by: EMERGENCY MEDICINE

## 2025-01-26 PROCEDURE — 36415 COLL VENOUS BLD VENIPUNCTURE: CPT | Performed by: EMERGENCY MEDICINE

## 2025-01-26 PROCEDURE — 80048 BASIC METABOLIC PNL TOTAL CA: CPT | Performed by: EMERGENCY MEDICINE

## 2025-01-26 PROCEDURE — 84484 ASSAY OF TROPONIN QUANT: CPT | Performed by: EMERGENCY MEDICINE

## 2025-01-26 PROCEDURE — 85025 COMPLETE CBC W/AUTO DIFF WBC: CPT | Performed by: EMERGENCY MEDICINE

## 2025-01-26 PROCEDURE — 71046 X-RAY EXAM CHEST 2 VIEWS: CPT

## 2025-01-26 PROCEDURE — 93005 ELECTROCARDIOGRAM TRACING: CPT

## 2025-01-26 PROCEDURE — 84295 ASSAY OF SERUM SODIUM: CPT | Performed by: EMERGENCY MEDICINE

## 2025-01-26 PROCEDURE — 82310 ASSAY OF CALCIUM: CPT | Performed by: EMERGENCY MEDICINE

## 2025-01-26 PROCEDURE — 84439 ASSAY OF FREE THYROXINE: CPT | Performed by: EMERGENCY MEDICINE

## 2025-01-26 RX ORDER — LEVOTHYROXINE SODIUM 25 UG/1
25 TABLET ORAL
Qty: 30 TABLET | Refills: 0 | Status: SHIPPED | OUTPATIENT
Start: 2025-01-26 | End: 2025-02-25

## 2025-01-26 RX ORDER — CARVEDILOL 3.12 MG/1
3.12 TABLET ORAL 2 TIMES DAILY WITH MEALS
Qty: 60 TABLET | Refills: 0 | Status: SHIPPED | OUTPATIENT
Start: 2025-01-26 | End: 2025-02-25

## 2025-01-26 ASSESSMENT — ACTIVITIES OF DAILY LIVING (ADL)
ADLS_ACUITY_SCORE: 56

## 2025-01-26 ASSESSMENT — COLUMBIA-SUICIDE SEVERITY RATING SCALE - C-SSRS
6. HAVE YOU EVER DONE ANYTHING, STARTED TO DO ANYTHING, OR PREPARED TO DO ANYTHING TO END YOUR LIFE?: NO
2. HAVE YOU ACTUALLY HAD ANY THOUGHTS OF KILLING YOURSELF IN THE PAST MONTH?: NO
1. IN THE PAST MONTH, HAVE YOU WISHED YOU WERE DEAD OR WISHED YOU COULD GO TO SLEEP AND NOT WAKE UP?: NO

## 2025-01-26 NOTE — DISCHARGE INSTRUCTIONS
Return to the ER for chest pain, dizziness, shortness of breath, or any new concerns.    You should stop the metoprolol and instead start carvedilol.  You should expect a Zio patch to be sent to you in the mail with instructions for completion of the testing.    Your thyroid function is low and a prescription for Synthroid has been sent to your pharmacy.  You will need to recheck your thyroid function in the next 2 weeks your primary care clinic.    Please call the cardiology clinic on Monday to schedule further follow-up.

## 2025-01-26 NOTE — ED PROVIDER NOTES
Emergency Department Note      History of Present Illness     Chief Complaint   Irregular Heart Beat      HPI   Toni Collado is a 79 year old male with history of coronary artery disease.  He follows with cardiology.  In May 2024 he had chest pain that was evaluated with a stress test that was negative.  Ultimately he underwent angiogram showing severe multivessel disease and underwent CABG in July 2024.  He has done well since then.  He is on metoprolol and aspirin.  He says he exercises regularly and has been in his normal state of health.  No dietary changes or medication changes.    This morning the patient got up out of bed and around 6 AM began to have a sensation of skipped heartbeats.  He says it was quite consistent.  He felt very weak which is not usual for him in the morning.  He was not having chest pain.  At times he does feel slightly short of breath.  He is not dizzy and did not have any syncopal episodes.    The patient takes metoprolol daily typically in the morning.  He did not take it today.    Independent Historian   The patient's wife is present provides additional history and that he was quite fatigued today and saying he wanted to go back to sleep which is not usual for him in the morning.    Review of External Notes   Cardiology notes reviewed from November 18, 2024 detailing the patient's history related to coronary artery disease status post CABG x 3 on July 11, 2024.    Past Medical History     Medical History and Problem List   Adenomatous colon polyp  Varicose veins  Bulbar polio  Cervical osteoarthritis  Anxiety  Depression  GERD  Restless legs syndrome  BRITTNI    Medications   Asa  Toprol  Prilosec  Mirapex  Crestor  Desyrel    Surgical History   Knee arthroscopy  CABG x 4 stents  Colonoscopy  Bilateral cataract removal  Left heart catheterization and ventriculogram  EGD  Umbilical hernia repair left  Cervical spine nerve ablation  Repair ascending aortic  "aneurysm  Rhinoplasty  Bilateral shoulder surgery - right (pitching injury) left: bone spur  Left total knee arthroplasty  Left big toe arthroplasty  Left total hip arthroplasty  Physical Exam     Patient Vitals for the past 24 hrs:   BP Temp Temp src Pulse Resp SpO2 Height Weight   01/26/25 1045 132/78 -- -- 59 18 95 % -- --   01/26/25 1032 127/73 -- -- 58 -- 97 % -- --   01/26/25 1030 134/79 -- -- 58 -- 97 % -- --   01/26/25 0945 (!) 144/80 -- -- 56 -- 97 % -- --   01/26/25 0930 124/67 -- -- (!) 42 -- 96 % -- --   01/26/25 0915 135/72 -- -- (!) 42 -- 96 % -- --   01/26/25 0900 137/75 -- -- (!) 40 -- 95 % -- --   01/26/25 0845 127/71 -- -- 56 -- 97 % -- --   01/26/25 0815 126/76 -- -- 56 -- 96 % -- --   01/26/25 0800 133/67 -- -- 52 -- 97 % -- --   01/26/25 0745 127/72 -- -- 61 -- 98 % -- --   01/26/25 0718 (!) 150/79 97.2  F (36.2  C) Temporal 69 18 97 % 1.702 m (5' 7\") 102.8 kg (226 lb 10.1 oz)     Physical Exam  Constitutional:       General: He is not in acute distress.     Appearance: Normal appearance. He is not toxic-appearing.   HENT:      Head: Atraumatic.      Right Ear: External ear normal.      Left Ear: External ear normal.   Eyes:      General: No scleral icterus.     Conjunctiva/sclera: Conjunctivae normal.   Cardiovascular:      Rate and Rhythm: Bradycardia present. Rhythm irregular.      Heart sounds: Normal heart sounds.   Pulmonary:      Effort: Pulmonary effort is normal. No respiratory distress.      Breath sounds: Normal breath sounds.   Abdominal:      Palpations: Abdomen is soft.      Tenderness: There is no abdominal tenderness.   Musculoskeletal:         General: No deformity.      Cervical back: Neck supple.      Right lower leg: No edema.      Left lower leg: No edema.   Skin:     General: Skin is warm.      Capillary Refill: Capillary refill takes less than 2 seconds.   Neurological:      General: No focal deficit present.      Mental Status: He is alert and oriented to person, place, " and time.   Psychiatric:         Mood and Affect: Mood normal.         Behavior: Behavior normal.       Diagnostics     Lab Results   Labs Ordered and Resulted from Time of ED Arrival to Time of ED Departure   BASIC METABOLIC PANEL - Abnormal       Result Value    Sodium 139      Potassium 4.3      Chloride 105      Carbon Dioxide (CO2) 25      Anion Gap 9      Urea Nitrogen 18.7      Creatinine 1.07      GFR Estimate 71      Calcium 9.4      Glucose 104 (*)    TROPONIN T, HIGH SENSITIVITY - Abnormal    Troponin T, High Sensitivity 23 (*)    TSH WITH FREE T4 REFLEX - Abnormal    TSH 7.31 (*)    TROPONIN T, HIGH SENSITIVITY - Abnormal    Troponin T, High Sensitivity 23 (*)    T4 FREE - Abnormal    Free T4 0.79 (*)    MAGNESIUM - Normal    Magnesium 1.9     CBC WITH PLATELETS AND DIFFERENTIAL    WBC Count 5.1      RBC Count 4.75      Hemoglobin 14.4      Hematocrit 43.1      MCV 91      MCH 30.3      MCHC 33.4      RDW 14.1      Platelet Count 163      % Neutrophils 55      % Lymphocytes 27      % Monocytes 12      % Eosinophils 5      % Basophils 1      % Immature Granulocytes 0      NRBCs per 100 WBC 0      Absolute Neutrophils 2.8      Absolute Lymphocytes 1.4      Absolute Monocytes 0.6      Absolute Eosinophils 0.3      Absolute Basophils 0.1      Absolute Immature Granulocytes 0.0      Absolute NRBCs 0.0       Imaging   XR Chest 2 Views   Final Result   IMPRESSION: Stable cardiac silhouette with postsurgical changes of CABG. Aortic arch calcification. No focal airspace consolidation. No pleural effusion or discernible pneumothorax. Right rotator cuff repair.        EKG   ECG taken at 0731, ECG read at 0740  Sinus rhythm with 2nd degree AV block (Mobitz I). Abnormal ECG.   Second degree AV block as compared to prior, dated 12/16/24.  Rate 45 bpm. CO interval * ms. QRS duration 98 ms. QT/QTc 446/385 ms. P-R-T axes 50 2 26.    Independent Interpretation   Chest x-ray independently interpreted.  No infiltrate or  pneumothorax.    ED Course      Discussion of Management   Cardiology, Dr. Cruz.    ED Course   ED Course as of 01/26/25 1401   Sun Jan 26, 2025   0776 I obtained history and examined the patient as noted above.     0749 I spoke with Dr. Cruz, Cardiology, regarding the patient.     1038 I rechecked the patient and explained findings. We discussed plan for discharge and patient is in agreement with plan.        Medical Decision Making / Diagnosis     GT Collado is a 79 year old male who presents to the ED with a sensation of skipped heartbeat.  He has a type I second-degree heart block but is otherwise stable.  No chest pain or confusion or hypotension.  Troponins are flat.  Electrolytes look good.  He is perhaps slightly hypothyroid which is new for him.    I was able to discuss his case with Dr. Cruz of cardiology who is familiar I appreciate his input.  With the patient.  The patient does not require admission.  We will set him up with a Zio patch.  He has been changed from metoprolol to carvedilol.  He was started on Synthroid.  The patient feels comfortable with the follow-up plan.  We discussed return precautions.    Disposition   The patient was discharged.     Diagnosis     ICD-10-CM    1. Mobitz type I Wenckebach atrioventricular block  I44.1 Follow-Up with Cardiology KRIS     Zio Patch Mail Out      2. Hypothyroidism, unspecified type  E03.9          Discharge Medications   Discharge Medication List as of 1/26/2025 10:50 AM        START taking these medications    Details   carvedilol (COREG) 3.125 MG tablet Take 1 tablet (3.125 mg) by mouth 2 times daily (with meals)., Disp-60 tablet, R-0, E-Prescribe      levothyroxine (SYNTHROID/LEVOTHROID) 25 MCG tablet Take 1 tablet (25 mcg) by mouth every morning (before breakfast)., Disp-30 tablet, R-0, E-Prescribe             Scribe Disclosure:  I, Noemi Schmidt, am serving as a scribe at 7:39 AM on 1/26/2025 to document services personally performed by  Kennedy Maradiaga MD based on my observations and the provider's statements to me.        Kennedy Maradiaga MD  01/26/25 3008

## 2025-01-26 NOTE — ED TRIAGE NOTES
Patient reports feeling an irregular heart rate this morning when he woke up.  Hx of open heart surgery 7 months ago.  ABCs intact, A&OX4.     Triage Assessment (Adult)       Row Name 01/26/25 0717          Triage Assessment    Airway WDL WDL        Respiratory WDL    Respiratory WDL WDL        Skin Circulation/Temperature WDL    Skin Circulation/Temperature WDL WDL        Cardiac WDL    Cardiac WDL X        Peripheral/Neurovascular WDL    Peripheral Neurovascular WDL WDL        Cognitive/Neuro/Behavioral WDL    Cognitive/Neuro/Behavioral WDL WDL

## 2025-01-27 LAB
ATRIAL RATE - MUSE: 63 BPM
DIASTOLIC BLOOD PRESSURE - MUSE: NORMAL MMHG
INTERPRETATION ECG - MUSE: NORMAL
P AXIS - MUSE: 50 DEGREES
PR INTERVAL - MUSE: NORMAL MS
QRS DURATION - MUSE: 98 MS
QT - MUSE: 446 MS
QTC - MUSE: 385 MS
R AXIS - MUSE: 2 DEGREES
SYSTOLIC BLOOD PRESSURE - MUSE: NORMAL MMHG
T AXIS - MUSE: 26 DEGREES
VENTRICULAR RATE- MUSE: 45 BPM

## 2025-01-29 ENCOUNTER — MYC MEDICAL ADVICE (OUTPATIENT)
Dept: CARDIOLOGY | Facility: CLINIC | Age: 80
End: 2025-01-29
Payer: COMMERCIAL

## 2025-02-23 ENCOUNTER — MYC REFILL (OUTPATIENT)
Dept: PEDIATRICS | Facility: CLINIC | Age: 80
End: 2025-02-23
Payer: COMMERCIAL

## 2025-02-23 DIAGNOSIS — E03.9 HYPOTHYROIDISM, UNSPECIFIED TYPE: Primary | ICD-10-CM

## 2025-02-24 ENCOUNTER — MYC REFILL (OUTPATIENT)
Dept: PEDIATRICS | Facility: CLINIC | Age: 80
End: 2025-02-24
Payer: COMMERCIAL

## 2025-02-24 DIAGNOSIS — I25.84 CORONARY ARTERY DISEASE DUE TO CALCIFIED CORONARY LESION: Primary | ICD-10-CM

## 2025-02-24 DIAGNOSIS — I25.10 CORONARY ARTERY DISEASE DUE TO CALCIFIED CORONARY LESION: Primary | ICD-10-CM

## 2025-02-24 RX ORDER — LEVOTHYROXINE SODIUM 25 UG/1
25 TABLET ORAL
Qty: 90 TABLET | Refills: 0 | Status: SHIPPED | OUTPATIENT
Start: 2025-02-24

## 2025-02-24 RX ORDER — CARVEDILOL 3.12 MG/1
3.12 TABLET ORAL 2 TIMES DAILY WITH MEALS
Qty: 180 TABLET | Refills: 3 | Status: SHIPPED | OUTPATIENT
Start: 2025-02-24

## 2025-03-10 ENCOUNTER — VIRTUAL VISIT (OUTPATIENT)
Dept: UROLOGY | Facility: CLINIC | Age: 80
End: 2025-03-10
Payer: COMMERCIAL

## 2025-03-10 VITALS — BODY MASS INDEX: 34.53 KG/M2 | WEIGHT: 220 LBS | HEIGHT: 67 IN

## 2025-03-10 DIAGNOSIS — C61 PROSTATE CANCER (H): Primary | ICD-10-CM

## 2025-03-10 PROCEDURE — 1126F AMNT PAIN NOTED NONE PRSNT: CPT | Mod: 95 | Performed by: STUDENT IN AN ORGANIZED HEALTH CARE EDUCATION/TRAINING PROGRAM

## 2025-03-10 PROCEDURE — 98005 SYNCH AUDIO-VIDEO EST LOW 20: CPT | Performed by: STUDENT IN AN ORGANIZED HEALTH CARE EDUCATION/TRAINING PROGRAM

## 2025-03-10 ASSESSMENT — PAIN SCALES - GENERAL: PAINLEVEL_OUTOF10: NO PAIN (0)

## 2025-03-10 NOTE — PROGRESS NOTES
Virtual Visit Details    Type of service:  Video Visit   Video Start Time: 4:06 PM  Video End Time:4:11 PM    Originating Location (pt. Location): Home    Distant Location (provider location):  On-site  Platform used for Video Visit: Se:  Toni Collado is a 79 year old male being seen for discussion of prostate biopsy results.  Duration of problem: 10 days  Previous treatments: Not yet      Reviewed previous notes  No issues with the prostate biopsy  Has been doing well         Review of Systems:  From intake questionnaire     Skin: negative  Eyes: negative  Ears/Nose/Throat: negative  Respiratory: No shortness of breath, dyspnea on exertion, cough, or hemoptysis  Cardiovascular: No chest pain or palpitations  Gastrointestinal: negative; no nausea/vomiting, constipation or diarrhea  Genitourinary: as per HPI  Musculoskeletal: negative  Neurologic: negative  Psychiatric: negative  Hematologic/Lymphatic/Immunologic: negative  Endocrine: negative         Physical Exam:   This is a virtual visit    Alert, no acute distress, oriented, conversant    Ears/nose/mouth: mouth:normal, good dentition  Respiratory: no respiratory distress, or pursed lip breathing  Cardiovascular:no obvious jugular venous distension present  Skin: no suspicious lesions or rashes on Visible body parts on the Screen  Neuro: Alert, oriented, speech and mentation normal  Psych: affect and mood normal, alert and oriented to person, place and time  Review of Imaging:  The following imaging exams were independently viewed and interpreted by me and discussed with patient:  Narrative & Impression   MRI PROSTATE: 11/8/2024 3:02 PM     CLINICAL HISTORY: Elevated prostate specific antigen (PSA)      Most Recent PSA: 7.7 ug/L     Comparison: None.     TECHNIQUE:  The following sequences were obtained: High-resolution axial  T2-weighted, coronal T2-weighted, 3D volumetric T2-weighted, axial  pre-contrast T1, axial diffusion-weighted, axial apparent  diffusion  coefficient and axial dynamic contrast-enhanced T1. Postcontrast  images were evaluated on a separate workstation to evaluate dynamic  contrast enhancement. The technique of this exam is PI-RADS v2.1  compliant. Contrast dose: 10mL Gadavist     FINDINGS:  Size: 4.5 x 2.7 x 4.9 cm. 31.0 grams  Hemorrhage: Absent  Peripheral zone: Heterogeneous on T2-weighted images. Regions of  mildly decreased signal on ADC or DWI which are best characterized as  PI-RADS 2 without highly suspicious lesion.  Transition zone: Enlarged with BPH changes.     Lesion(s) in rank order of severity (highest score- to lowest score,  then by size)         Neurovascular bundles: No neurovascular bundle involvement by  malignancy.  Seminal vesicles: No seminal vesicle involvement by malignancy.  Lymph nodes: No lymph node involvement  Bones: No suspicious lesions  Other pelvic organs: Degenerative changes of the lower lumbar spine.  Colonic diverticulosis.                                                                         IMPRESSION:  1. Based on the most suspicious abnormality, this exam is  characterized as PIRADS 2 - Clinically significant cancer is unlikely  to be present.    2. No suspicious adenopathy or evidence of pelvic metastases.             Review of Labs:  The following labs were reviewed by me and discussed with the patient:  Final Diagnosis   A.  Prostate, left base x 2, biopsy   - Focal High-Grade Prostatic Intraepithelial Neoplasia.  Negative for invasive malignancy.        B.  Prostate, left mid x 2, biopsy   -Benign prostatic tissue.  Negative for malignancy     C.  Prostate, left apex x 2, biopsy   -PROSTATIC ADENOCARCINOMA, ACINAR TYPE, Cristofer's Score 3+ 3 = 6, (ISUP Grade Group 1) number of cores involved is 2 of 2, approximate total surface area involved is 15%.     D.  Prostate, right base x 2, biopsy   - Focal High-Grade Prostatic Intraepithelial Neoplasia.  Negative for invasive malignancy.        E.   "Prostate, right mid x 2, biopsy   -Benign prostatic tissue.  Negative for malignancy.     F.  Prostate, right apex x 2, biopsy   - Atypical small glands suspicious for malignancy (Please see comment)               Electronically signed by Aparna Guzmán MD on 3/6/2025 at 11:39 AM   Comment  [facility]   The diagnostic term \"atypical glands suspicious for malignancy\" in specimen F is used to describe a small focus of prostate glands that exhibits some architectural and cytologic atypia and is suspicious for prostate cancer yet falls below the diagnostic threshold for prostate cancer. It encompasses a broad range of lesions of varying clinical significance, including under sampled prostatic carcinoma, high-grade prostatic intraepithelialneoplasia (HGPIN), and benign cancer mimickers        Component      Latest Ref Rng 3/12/2025  10:11 AM   PSA Tumor Marker      0.00 - 6.50 ng/mL 9.44 (H)       Legend:  (H) High    Assessment & Plan     Prostate cancer (H)  Low risk prostate cancer based on the biopsy and the PSA results  Discussed about active surveillance strategies for prostate cancer with low risk  Recommended 6 monthly PSA testing and follow-up in 1 year for clinical exam and repeat PSA  Will plan for MRI subsequently after a year with a subsequent confirmation biopsy between 1 to 2 years  He was agreeable and agreeable to the plan  - PSA tumor marker; Standing      Noel Brannon MD  [facility] [place]      ==========================    Additional Billing and Coding Information:  Review of external notes as documented above   Review of the result(s) of each unique test - PSA, surgical pathology, MRI              10 minutes spent by me on the date of the encounter doing chart review, review of test results, interpretation of tests, patient visit, and documentation     "

## 2025-03-10 NOTE — PATIENT INSTRUCTIONS
PSA to be repeated 6 monthly  Follow-up in 1 year  Confirmation biopsy after MRI between 1 to 2 years

## 2025-03-10 NOTE — NURSING NOTE
Current patient location: [address] [place] [id number]    Is the patient currently in the state of [place]? YES    Visit mode: VIDEO    If the visit is dropped, the patient can be reconnected by:VIDEO VISIT: Text to cell phone:   Telephone Information:   Mobile [phone]       Will anyone else be joining the visit? NO  (If patient encounters technical issues they should call [phone]    Are changes needed to the allergy or medication list? No    Are refills needed on medications prescribed by this physician? NO    Rooming Documentation:  Questionnaire(s) completed    Reason for visit: RECHECK    Jeannie BLOUNTF

## 2025-03-10 NOTE — LETTER
3/10/2025       RE: Toni Collado  [address] [facility] [address]     Dear Colleague,    Thank you for referring your patient, Toni Collado, to the [facility] [place] at [facility]. Please see a copy of my visit note below.    Virtual Visit Details    Type of service:  Video Visit   Video Start Time: 4:06 PM  Video End Time:4:11 PM    Originating Location (pt. Location): Home    Distant Location (provider location):  On-site  Platform used for Video Visit: [facility]  Toni Collado is a 79 year old male being seen for discussion of prostate biopsy results.  Duration of problem: 10 days  Previous treatments: Not yet      Reviewed previous notes  No issues with the prostate biopsy  Has been doing well         Review of Systems:  From intake questionnaire     Skin: negative  Eyes: negative  Ears/Nose/Throat: negative  Respiratory: No shortness of breath, dyspnea on exertion, cough, or hemoptysis  Cardiovascular: No chest pain or palpitations  Gastrointestinal: negative; no nausea/vomiting, constipation or diarrhea  Genitourinary: as per HPI  Musculoskeletal: negative  Neurologic: negative  Psychiatric: negative  Hematologic/Lymphatic/Immunologic: negative  Endocrine: negative         Physical Exam:   This is a virtual visit    Alert, no acute distress, oriented, conversant    Ears/nose/mouth: mouth:normal, good dentition  Respiratory: no respiratory distress, or pursed lip breathing  Cardiovascular:no obvious jugular venous distension present  Skin: no suspicious lesions or rashes on Visible body parts on the Screen  Neuro: Alert, oriented, speech and mentation normal  Psych: affect and mood normal, alert and oriented to person, place and time  Review of Imaging:  The following imaging exams were independently viewed and interpreted by me and discussed with patient:  Narrative & Impression   MRI PROSTATE: 11/8/2024 3:02 PM      CLINICAL HISTORY: Elevated prostate specific antigen (PSA)      Most Recent PSA: 7.7 ug/L     Comparison: None.     TECHNIQUE:  The following sequences were obtained: High-resolution axial  T2-weighted, coronal T2-weighted, 3D volumetric T2-weighted, axial  pre-contrast T1, axial diffusion-weighted, axial apparent diffusion  coefficient and axial dynamic contrast-enhanced T1. Postcontrast  images were evaluated on a separate workstation to evaluate dynamic  contrast enhancement. The technique of this exam is PI-RADS v2.1  compliant. Contrast dose: 10mL Gadavist     FINDINGS:  Size: 4.5 x 2.7 x 4.9 cm. 31.0 grams  Hemorrhage: Absent  Peripheral zone: Heterogeneous on T2-weighted images. Regions of  mildly decreased signal on ADC or DWI which are best characterized as  PI-RADS 2 without highly suspicious lesion.  Transition zone: Enlarged with BPH changes.     Lesion(s) in rank order of severity (highest score- to lowest score,  then by size)         Neurovascular bundles: No neurovascular bundle involvement by  malignancy.  Seminal vesicles: No seminal vesicle involvement by malignancy.  Lymph nodes: No lymph node involvement  Bones: No suspicious lesions  Other pelvic organs: Degenerative changes of the lower lumbar spine.  Colonic diverticulosis.                                                                         IMPRESSION:  1. Based on the most suspicious abnormality, this exam is  characterized as PIRADS 2 - Clinically significant cancer is unlikely  to be present.    2. No suspicious adenopathy or evidence of pelvic metastases.             Review of Labs:  The following labs were reviewed by me and discussed with the patient:  Final Diagnosis   A.  Prostate, left base x 2, biopsy   - Focal High-Grade Prostatic Intraepithelial Neoplasia.  Negative for invasive malignancy.        B.  Prostate, left mid x 2, biopsy   -Benign prostatic tissue.  Negative for malignancy     C.  Prostate, left apex x 2, biopsy  "  -PROSTATIC ADENOCARCINOMA, ACINAR TYPE, Cristofer's Score 3+ 3 = 6, (ISUP Grade Group 1) number of cores involved is 2 of 2, approximate total surface area involved is 15%.     D.  Prostate, right base x 2, biopsy   - Focal High-Grade Prostatic Intraepithelial Neoplasia.  Negative for invasive malignancy.        E.  Prostate, right mid x 2, biopsy   -Benign prostatic tissue.  Negative for malignancy.     F.  Prostate, right apex x 2, biopsy   - Atypical small glands suspicious for malignancy (Please see comment)               Electronically signed by Aparna Guzmán MD on 3/6/2025 at 11:39 AM   Comment  [facility]   The diagnostic term \"atypical glands suspicious for malignancy\" in specimen F is used to describe a small focus of prostate glands that exhibits some architectural and cytologic atypia and is suspicious for prostate cancer yet falls below the diagnostic threshold for prostate cancer. It encompasses a broad range of lesions of varying clinical significance, including under sampled prostatic carcinoma, high-grade prostatic intraepithelialneoplasia (HGPIN), and benign cancer mimickers        Component      Latest Ref Rng 3/12/2025  10:11 AM   PSA Tumor Marker      0.00 - 6.50 ng/mL 9.44 (H)       Legend:  (H) High    Assessment & Plan    Prostate cancer (H)  Low risk prostate cancer based on the biopsy and the PSA results  Discussed about active surveillance strategies for prostate cancer with low risk  Recommended 6 monthly PSA testing and follow-up in 1 year for clinical exam and repeat PSA  Will plan for MRI subsequently after a year with a subsequent confirmation biopsy between 1 to 2 years  He was agreeable and agreeable to the plan  - PSA tumor marker; Standing      Noel Brannon MD  [facility] [place]      ==========================    Additional Billing and Coding Information:  Review of external notes as documented above   Review of the result(s) of each unique test - " PSA, surgical pathology, MRI              10 minutes spent by me on the date of the encounter doing chart review, review of test results, interpretation of tests, patient visit, and documentation       Again, thank you for allowing me to participate in the care of your patient.      Sincerely,    Noel Brannon MD

## 2025-03-12 ENCOUNTER — LAB (OUTPATIENT)
Dept: LAB | Facility: CLINIC | Age: 80
End: 2025-03-12
Payer: COMMERCIAL

## 2025-03-12 DIAGNOSIS — E03.9 HYPOTHYROIDISM, UNSPECIFIED TYPE: ICD-10-CM

## 2025-03-12 DIAGNOSIS — C61 PROSTATE CANCER (H): ICD-10-CM

## 2025-03-12 LAB
PSA SERPL DL<=0.01 NG/ML-MCNC: 9.44 NG/ML (ref 0–6.5)
TSH SERPL DL<=0.005 MIU/L-ACNC: 3.24 UIU/ML (ref 0.3–4.2)

## 2025-03-12 PROCEDURE — 84443 ASSAY THYROID STIM HORMONE: CPT

## 2025-03-12 PROCEDURE — 36415 COLL VENOUS BLD VENIPUNCTURE: CPT

## 2025-03-12 PROCEDURE — 84153 ASSAY OF PSA TOTAL: CPT

## 2025-03-19 ENCOUNTER — MYC MEDICAL ADVICE (OUTPATIENT)
Dept: CARDIOLOGY | Facility: CLINIC | Age: 80
End: 2025-03-19
Payer: COMMERCIAL

## 2025-03-24 ENCOUNTER — MYC MEDICAL ADVICE (OUTPATIENT)
Dept: PEDIATRICS | Facility: CLINIC | Age: 80
End: 2025-03-24
Payer: COMMERCIAL

## 2025-03-24 NOTE — TELEPHONE ENCOUNTER
See Mychart message.     Attempted to call pt to triage symptoms and assist with scheduling an appointment in clinic. Left a message to call back to [phone] and to ask to speak with a nurse.     Replied via [employer] to schedule an appointment.     Luba CHAVEZ, RN   Clinic RN  [facility]

## 2025-03-28 PROBLEM — I44.1 SECOND DEGREE AV BLOCK, MOBITZ TYPE I: Status: ACTIVE | Noted: 2025-03-28

## 2025-03-31 ENCOUNTER — PATIENT OUTREACH (OUTPATIENT)
Dept: CARE COORDINATION | Facility: CLINIC | Age: 80
End: 2025-03-31
Payer: COMMERCIAL

## 2025-04-02 NOTE — PLAN OF CARE
Please let her know I'm sorry for the confusion. Sometimes what we have in our chart is different then what the pharmacy has. I'll get this sent over to Ritu. Thanks!   Problem: Fall Injury Risk  Goal: Absence of Fall and Fall-Related Injury  Intervention: Promote Injury-Free Environment  Flowsheets  Taken 7/11/2024 1823  Safety Promotion/Fall Prevention:   activity supervised   assistive device/personal items within reach   clutter free environment maintained   lighting adjusted   nonskid shoes/slippers when out of bed   patient and family education   safety round/check completed   supervised activity   treat reversible contributory factors   treat underlying cause  Taken 7/11/2024 1735  Safety Promotion/Fall Prevention:   activity supervised   clutter free environment maintained   lighting adjusted   nonskid shoes/slippers when out of bed   patient and family education   safety round/check completed   supervised activity   treat reversible contributory factors   treat underlying cause   Goal Outcome Evaluation:  As noted above and charted in flowsheets, interventions completed to promote an injury-free environment. Pt did not have a fall during this shift. Goal is progressing.

## 2025-04-07 DIAGNOSIS — K21.9 GASTROESOPHAGEAL REFLUX DISEASE WITHOUT ESOPHAGITIS: ICD-10-CM

## 2025-04-07 RX ORDER — OMEPRAZOLE 20 MG/1
40 CAPSULE, DELAYED RELEASE ORAL DAILY
Qty: 90 CAPSULE | Refills: 1 | Status: SHIPPED | OUTPATIENT
Start: 2025-04-07

## 2025-05-04 NOTE — PROGRESS NOTES
HISTORY OF PRESENT ILLNESS     This is a 79 year old male who follows with Dr Cruz at [facility]  His past medical history includes:  Coronary artery disease, hyperlipidemia, sleep apnea, palpitations, obesity, anxiety/depression, and former smoker.    Mr Collado was eventually found to have significant multivessel CAD after an abnormal stress test in 2024.  ECHO then showed LVEF 55-60% with normal wall motion, moderate concentric LVH, normal RV function, trace/mild MR/TR  He underwent 3V CABG (7/2024) with LIMA to LAD and separate SVG to OM and CHEYENNE.  He did suffer with post-op small pleural effusion, pneumonia, COPD exacerbation and leg edema, all which eventually resolved    He was evaluated in the ED (1/2025) for palpitations   His TSH was 7.31  and he was started on Synthroid  EKG showed sinus rhythm with wenckebach  HR 45 bpm.  A follow up 7-day Ziopatch monitor showed sinus rhythm with first degree AV block  Average HR 70 bpm  He had some transient Wenckebach noted during nighttime which was appreciated by patient  No significant bradycardia  Subsequently, his Metoprolol was changed to Coreg  When seen by his primary provider, his Coreg was stopped due to ongoing symptoms and he was started on Losartan    Our visit today is for further review      Mr Collado is active without any limitations  His energy is good  He is exercising at the gym several days a week and reports on exercise intolerance or lightheadedness  He has no palpitations with exercise but reports feelings of skipped beats when he is resting quietly  He denies any racing heart beat  He has no chest pain, shortness of breath or orthopnea  He complains of some mild ankle swelling which improves by morning  Varicose veins are visible  I encouraged low salt diet, leg elevation, support socks  He uses his CPAP faithfully for the past 20 yrs and states that he cannot sleep without his mask  He has planned upcoming fishing trip      BP  "114/66 (BP Location: Right arm, Patient Position: Sitting, Cuff Size: Adult Large)   Pulse 72   Ht 1.676 m (5' 6\")   Wt 104.1 kg (229 lb 8 oz)   SpO2 96%   BMI 37.04 kg/m       EKG today:  Sinus bradycardia with second degree AV block type I  HR 54 bpm    IMPRESSION AND PLAN:    Coronary Artery Disease:  -s/p 3V CABG (8/2024) with LIMA to LAD and separate SVG to OM and CHEYENNE  -preserved LVEF  -denies angina    Bradycardia  Wenckebach  -now off beta-blocker with ongoing evidence of Wenckebach during daytime along with \"skipped beats\" at rest  -Recent TSH normal  -will further review with Dr Cruz and EP  -will check ECHO and ZIopatch monitor    Hypertension:  -BP controlled on Losartan  -will check BMP today    Hyperlipidemia:  -on Crestor 40 mg  -LDL  26    Obesity  -encouraged lifestyle modifications    The total time for the visit today was 45 minutes which includes patient visit, reviewing of records, discussion, and placing of orders of the outpatient coordination of cardiovascular care as described.  The level of medical decision making during this visit was of high complexity.  Thank you for allowing me to participate in their care.    The longitudinal plan of care for the diagnosis(es)/condition(s) as documented were addressed during this visit. Due to the added complexity in care, I will continue to support Johnny in the subsequent management and with ongoing continuity of care.        Orders Placed This Encounter   Procedures    Basic metabolic panel    EKG 12-lead complete w/read - Clinics (performed today)    Echocardiogram Complete       No orders of the defined types were placed in this encounter.      There are no discontinued medications.      Encounter Diagnoses   Name Primary?    Mobitz type I Wenckebach atrioventricular block     PVC's (premature ventricular contractions) Yes    Coronary artery disease involving coronary bypass graft of native heart without angina pectoris        CURRENT " MEDICATIONS:  Current Outpatient Medications   Medication Sig Dispense Refill    acetaminophen (TYLENOL) 325 MG tablet Take 2 tablets (650 mg) by mouth every 4 hours as needed for mild pain 30 tablet 0    aspirin (ASA) 81 MG chewable tablet Take 2 tablets (162 mg) by mouth daily. 180 tablet 3    levothyroxine (SYNTHROID/LEVOTHROID) 25 MCG tablet Take 1 tablet (25 mcg) by mouth every morning (before breakfast). 90 tablet 0    losartan (COZAAR) 25 MG tablet Take 1 tablet (25 mg) by mouth daily. 30 tablet 1    Multiple Vitamins-Minerals (MULTIVITAMIN PO) Take 1 tablet by mouth daily      omeprazole (PRILOSEC) 20 MG DR capsule TAKE 2 CAPSULES(40 MG) BY MOUTH DAILY 90 capsule 1    pramipexole (MIRAPEX) 1 MG tablet TAKE 1 TABLET BY MOUTH TWICE DAILY FOR  tablet 2    rosuvastatin (CRESTOR) 40 MG tablet Take 1 tablet (40 mg) by mouth at bedtime 90 tablet 3    traZODone (DESYREL) 100 MG tablet Take 1 tablet (100 mg) by mouth at bedtime. 90 tablet 3       ALLERGIES     Allergies   Allergen Reactions    Amoxicillin Other (See Comments) and Unknown     Facial and neck flushing, felt hot    Fluticasone-Salmeterol Cough     Bronchial spasms and coughing    Seasonal Allergies        PAST MEDICAL HISTORY:  Past Medical History:   Diagnosis Date    Adenomatous colon polyp 07/2019    Asymptomatic varicose veins of both lower extremities     Bulbar polio 1952    residual dysphagia    Cervical osteoarthritis     Chronic anxiety     Chronic depression     Chronic depression 09/19/2022    Familial tremor     Gastroesophageal reflux disease without esophagitis 04/12/2021    Hallux limitus of left foot     Localized, primary osteoarthritis of hand     Oropharyngeal dysphagia     Pre-op evaluation 06/15/2022    Primary osteoarthritis of left hip 06/15/2022    Primary osteoarthritis of left hip 06/15/2022    RLS (restless legs syndrome)     Sleep apnea, obstructive     CPAP    Status post coronary angiogram 06/25/2024    Status post  "total replacement of left hip     Varicose veins of left lower extremity        PAST SURGICAL HISTORY:  Past Surgical History:   Procedure Laterality Date    ABDOMEN SURGERY      ARTHROSCOPY KNEE      BYPASS GRAFT ARTERY CORONARY N/A 07/10/2024    Procedure: CORONARY ARTERY BYPASS GRAFTING X 3 WITH RIGHT GREATER SAPHENOUS ENDOSCOPIC VEIN HARVEST LEFT INTERNAL MAMMARY ARTERY-LEFT ANTERIOR DESCENDING ARTERY RIGHT GREATER SAPHENOUS-OBTUSE MARGINAL ARTERY/ POSERTIOR DESCENDING ARTERY;  Surgeon: Mulvihill, Michael, MD;  Location:  OR    CATARACT IOL, RT/LT Bilateral      and     COLONOSCOPY N/A 09/13/2023    Procedure: Colonoscopy with polypectomies using cold snare;  Surgeon: Yue Cline MD;  Location:  GI    CV CORONARY ANGIOGRAM N/A 06/25/2024    Procedure: Coronary Angiogram;  Surgeon: Renny Rene MD;  Location:  HEART CARDIAC CATH LAB    CV LEFT HEART CATH N/A 06/25/2024    Procedure: Left Heart Catheterization;  Surgeon: Renyn Rene MD;  Location:  HEART CARDIAC CATH LAB    CV LEFT VENTRICULOGRAM N/A 06/25/2024    Procedure: Left Ventriculogram;  Surgeon: Renny Rene MD;  Location:  HEART CARDIAC CATH LAB    ESOPHAGOSCOPY, GASTROSCOPY, DUODENOSCOPY (EGD), COMBINED N/A 09/13/2023    Procedure: Esophagoscopy, gastroscopy, duodenoscopy (EGD), combined with biopsy using cold forcep;  Surgeon: Yue Cline MD;  Location:  GI    HERNIA REPAIR, UMBILICAL      KNEE SURGERY Left     \"bone on bone\" d/t arthritis    RADIOFREQUENCY ABLATION NERVES      cervical spine    REPAIR ANEURYSM ASCENDING AORTA N/A 07/10/2024    Procedure: ASCENDING AORTA  ANEURYSM REPAIR WITH HEMASHIED PLATINUM WOVEN DOUBLE VELOUR VASCULAR GRAFT SIZE:30MM  ON PUMP/ TRANSESPHOGEAL ECHOCARIDOGRAM PER ANESTHESIA;  Surgeon: Mulvihill, Michael, MD;  Location:  OR    RHINOPLASTY      SEPTOPLASTY      SHOULDER SURGERY Bilateral     right (pitching injury), left (bone spur)    " SHOULDER SURGERY Right     reconstructive surgery with hardware    TOE SURGERY Left     left big toe d/t arthritis    TOTAL KNEE ARTHROPLASTY Left     unicompartmental       FAMILY HISTORY:  Family History   Problem Relation Age of Onset    Heart Failure Mother         valvular heart disease    Thyroid Disease Mother     Heart Failure Father         ihd    Myelodysplastic syndrome Father     Other Cancer Father         Mylodisplasia    Cancer Brother         renal    Other Cancer Brother         Kidney cancer    Heart Failure Brother         ihd    Substance Abuse Brother         Alcoholic    Substance Abuse Maternal Grandfather         Alcoholic    Diabetes Sister     Colon Cancer No family hx of        SOCIAL HISTORY:  Social History     Socioeconomic History    Marital status:      Spouse name: None    Number of children: None    Years of education: None    Highest education level: None   Tobacco Use    Smoking status: Former     Current packs/day: 0.00     Types: Cigarettes     Quit date: 1975     Years since quittin.4     Passive exposure: Past    Smokeless tobacco: Never   Vaping Use    Vaping status: Never Used   Substance and Sexual Activity    Alcohol use: Not Currently    Drug use: No    Sexual activity: Not Currently     Partners: Female     Birth control/protection: None   Other Topics Concern    Parent/sibling w/ CABG, MI or angioplasty before 65F 55M? 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       Social Drivers of Health     Financial Resource Strain: Low Risk  (2024)    Financial Resource Strain     Within the past 12 months, have you or your family members you live with been unable to get utilities (heat, electricity) when it was really needed?: No   Food Insecurity: Low Risk  (2024)    Food Insecurity     Within the  past 12 months, did you worry that your food would run out before you got money to buy more?: No     Within the past 12 months, did the food you bought just not last and you didn t have money to get more?: No   Transportation Needs: Low Risk  (11/11/2024)    Transportation Needs     Within the past 12 months, has lack of transportation kept you from medical appointments, getting your medicines, non-medical meetings or appointments, work, or from getting things that you need?: No   Physical Activity: Insufficiently Active (11/11/2024)    Exercise Vital Sign     Days of Exercise per Week: 4 days     Minutes of Exercise per Session: 30 min   Stress: No Stress Concern Present (11/11/2024)    [employer] - Occupational Stress Questionnaire     Feeling of Stress : Not at all   Social Connections: Unknown (11/11/2024)    Social Connection and Isolation Panel [NHANES]     Frequency of Social Gatherings with Friends and Family: Once a week   Interpersonal Safety: Low Risk  (11/14/2024)    Interpersonal Safety     Do you feel physically and emotionally safe where you currently live?: Yes     Within the past 12 months, have you been hit, slapped, kicked or otherwise physically hurt by someone?: No     Within the past 12 months, have you been humiliated or emotionally abused in other ways by your partner or ex-partner?: No   Housing Stability: Low Risk  (11/11/2024)    Housing Stability     Do you have housing? : Yes     Are you worried about losing your housing?: No       Review of Systems:  Skin:          Eyes:         ENT:         Respiratory:  Positive for sleep apnea, CPAP     Cardiovascular:    palpitations, Positive for, edema    Gastroenterology:        Genitourinary:         Musculoskeletal:         Neurologic:         Psychiatric:         Heme/Lymph/Imm:         Endocrine:           Physical Exam:  Vitals: /66 (BP Location: Right arm, Patient Position: Sitting, Cuff Size: Adult  "Large)   Pulse 72   Ht 1.676 m (5' 6\")   Wt 104.1 kg (229 lb 8 oz)   SpO2 96%   BMI 37.04 kg/m      Constitutional:  cooperative obese      Skin:  warm and dry to the touch          Head:  normocephalic        Eyes:  pupils equal and round        Lymph:      ENT:  no pallor or cyanosis        Neck:           Respiratory:  clear to auscultation, healed surgical scar         Cardiac: regular rhythm frequent premature beats   no presence of murmur          pulses full and equal                                        GI:    obese      Extremities and Muscular Skeletal:      trace          Neurological:  affect appropriate        Psych:  Alert and Oriented x 3          CC  Kennedy Maradiaga MD  [id number]  [address]  [place] [address]                    "

## 2025-05-05 ENCOUNTER — OFFICE VISIT (OUTPATIENT)
Dept: CARDIOLOGY | Facility: CLINIC | Age: 80
End: 2025-05-05
Payer: COMMERCIAL

## 2025-05-05 ENCOUNTER — TELEPHONE (OUTPATIENT)
Dept: CARDIOLOGY | Facility: CLINIC | Age: 80
End: 2025-05-05

## 2025-05-05 ENCOUNTER — LAB (OUTPATIENT)
Dept: LAB | Facility: CLINIC | Age: 80
End: 2025-05-05
Payer: COMMERCIAL

## 2025-05-05 VITALS
HEIGHT: 66 IN | WEIGHT: 229.5 LBS | OXYGEN SATURATION: 96 % | DIASTOLIC BLOOD PRESSURE: 66 MMHG | HEART RATE: 72 BPM | SYSTOLIC BLOOD PRESSURE: 114 MMHG | BODY MASS INDEX: 36.88 KG/M2

## 2025-05-05 DIAGNOSIS — I49.3 PVC'S (PREMATURE VENTRICULAR CONTRACTIONS): Primary | ICD-10-CM

## 2025-05-05 DIAGNOSIS — I25.810 CORONARY ARTERY DISEASE INVOLVING CORONARY BYPASS GRAFT OF NATIVE HEART WITHOUT ANGINA PECTORIS: ICD-10-CM

## 2025-05-05 DIAGNOSIS — I44.1 MOBITZ TYPE I WENCKEBACH ATRIOVENTRICULAR BLOCK: ICD-10-CM

## 2025-05-05 DIAGNOSIS — I44.1 MOBITZ TYPE I WENCKEBACH ATRIOVENTRICULAR BLOCK: Primary | ICD-10-CM

## 2025-05-05 LAB
ANION GAP SERPL CALCULATED.3IONS-SCNC: 10 MMOL/L (ref 7–15)
BUN SERPL-MCNC: 22.1 MG/DL (ref 8–23)
CALCIUM SERPL-MCNC: 9.1 MG/DL (ref 8.8–10.4)
CHLORIDE SERPL-SCNC: 105 MMOL/L (ref 98–107)
CREAT SERPL-MCNC: 1.07 MG/DL (ref 0.67–1.17)
EGFRCR SERPLBLD CKD-EPI 2021: 71 ML/MIN/1.73M2
GLUCOSE SERPL-MCNC: 101 MG/DL (ref 70–99)
HCO3 SERPL-SCNC: 24 MMOL/L (ref 22–29)
POTASSIUM SERPL-SCNC: 4.2 MMOL/L (ref 3.4–5.3)
SODIUM SERPL-SCNC: 139 MMOL/L (ref 135–145)

## 2025-05-05 PROCEDURE — 3078F DIAST BP <80 MM HG: CPT | Performed by: NURSE PRACTITIONER

## 2025-05-05 PROCEDURE — 80048 BASIC METABOLIC PNL TOTAL CA: CPT

## 2025-05-05 PROCEDURE — 93000 ELECTROCARDIOGRAM COMPLETE: CPT | Performed by: NURSE PRACTITIONER

## 2025-05-05 PROCEDURE — 99215 OFFICE O/P EST HI 40 MIN: CPT | Performed by: NURSE PRACTITIONER

## 2025-05-05 PROCEDURE — 3074F SYST BP LT 130 MM HG: CPT | Performed by: NURSE PRACTITIONER

## 2025-05-05 PROCEDURE — 36415 COLL VENOUS BLD VENIPUNCTURE: CPT

## 2025-05-05 NOTE — TELEPHONE ENCOUNTER
Would you mind looking at EKG today and give me your interpretation  He has no exercise intolerance/lightheadedness and has been off beta-blocker for a few weeks  He dose appreciate skipped beats  Thank you, JS

## 2025-05-05 NOTE — LETTER
5/5/2025    Yordan Hernandez MD  [address] [place] [address]    RE: Toni Rioskobe       Dear Colleague,     I had the pleasure of seeing Toni Collado in the [facility].  HISTORY OF PRESENT ILLNESS     This is a 79 year old male who follows with Dr Cruz at [facility]  His past medical history includes:  Coronary artery disease, hyperlipidemia, sleep apnea, palpitations, obesity, anxiety/depression, and former smoker.    Mr Collado was eventually found to have significant multivessel CAD after an abnormal stress test in 2024.  ECHO then showed LVEF 55-60% with normal wall motion, moderate concentric LVH, normal RV function, trace/mild MR/TR  He underwent 3V CABG (7/2024) with LIMA to LAD and separate SVG to OM and CHEYENNE.  He did suffer with post-op small pleural effusion, pneumonia, COPD exacerbation and leg edema, all which eventually resolved    He was evaluated in the ED (1/2025) for palpitations   His TSH was 7.31  and he was started on Synthroid  EKG showed sinus rhythm with wenckebach  HR 45 bpm.  A follow up 7-day Ziopatch monitor showed sinus rhythm with first degree AV block  Average HR 70 bpm  He had some transient Wenckebach noted during nighttime which was appreciated by patient  No significant bradycardia  Subsequently, his Metoprolol was changed to Coreg  When seen by his primary provider, his Coreg was stopped due to ongoing symptoms and he was started on Losartan    Our visit today is for further review      Mr Collado is active without any limitations  His energy is good  He is exercising at the gym several days a week and reports on exercise intolerance or lightheadedness  He has no palpitations with exercise but reports feelings of skipped beats when he is resting quietly  He denies any racing heart beat  He has no chest pain, shortness of breath or orthopnea  He complains of some mild ankle swelling which improves by morning  Varicose veins are  "visible  I encouraged low salt diet, leg elevation, support socks  He uses his CPAP faithfully for the past 20 yrs and states that he cannot sleep without his mask  He has planned upcoming fishing trip      /66 (BP Location: Right arm, Patient Position: Sitting, Cuff Size: Adult Large)   Pulse 72   Ht 1.676 m (5' 6\")   Wt 104.1 kg (229 lb 8 oz)   SpO2 96%   BMI 37.04 kg/m       EKG today:  Sinus bradycardia with second degree AV block type I  HR 54 bpm    IMPRESSION AND PLAN:    Coronary Artery Disease:  -s/p 3V CABG (8/2024) with LIMA to LAD and separate SVG to OM and CHEYENNE  -preserved LVEF  -denies angina    Bradycardia  Wenckebach  -now off beta-blocker with ongoing evidence of Wenckebach during daytime along with \"skipped beats\" at rest  -Recent TSH normal  -will further review with Dr Cruz and EP  -will check ECHO and ZIopatch monitor    Hypertension:  -BP controlled on Losartan  -will check BMP today    Hyperlipidemia:  -on Crestor 40 mg  -LDL  26    Obesity  -encouraged lifestyle modifications    The total time for the visit today was 45 minutes which includes patient visit, reviewing of records, discussion, and placing of orders of the outpatient coordination of cardiovascular care as described.  The level of medical decision making during this visit was of high complexity.  Thank you for allowing me to participate in their care.    The longitudinal plan of care for the diagnosis(es)/condition(s) as documented were addressed during this visit. Due to the added complexity in care, I will continue to support Johnny in the subsequent management and with ongoing continuity of care.        Orders Placed This Encounter   Procedures     Basic metabolic panel     EKG 12-lead complete w/read - Clinics (performed today)     Echocardiogram Complete       No orders of the defined types were placed in this encounter.      There are no discontinued medications.      Encounter Diagnoses   Name Primary?     Mobitz type " I Wenckebach atrioventricular block      PVC's (premature ventricular contractions) Yes     Coronary artery disease involving coronary bypass graft of native heart without angina pectoris        CURRENT MEDICATIONS:  Current Outpatient Medications   Medication Sig Dispense Refill     acetaminophen (TYLENOL) 325 MG tablet Take 2 tablets (650 mg) by mouth every 4 hours as needed for mild pain 30 tablet 0     aspirin (ASA) 81 MG chewable tablet Take 2 tablets (162 mg) by mouth daily. 180 tablet 3     levothyroxine (SYNTHROID/LEVOTHROID) 25 MCG tablet Take 1 tablet (25 mcg) by mouth every morning (before breakfast). 90 tablet 0     losartan (COZAAR) 25 MG tablet Take 1 tablet (25 mg) by mouth daily. 30 tablet 1     Multiple Vitamins-Minerals (MULTIVITAMIN PO) Take 1 tablet by mouth daily       omeprazole (PRILOSEC) 20 MG DR capsule TAKE 2 CAPSULES(40 MG) BY MOUTH DAILY 90 capsule 1     pramipexole (MIRAPEX) 1 MG tablet TAKE 1 TABLET BY MOUTH TWICE DAILY FOR  tablet 2     rosuvastatin (CRESTOR) 40 MG tablet Take 1 tablet (40 mg) by mouth at bedtime 90 tablet 3     traZODone (DESYREL) 100 MG tablet Take 1 tablet (100 mg) by mouth at bedtime. 90 tablet 3       ALLERGIES     Allergies   Allergen Reactions     Amoxicillin Other (See Comments) and Unknown     Facial and neck flushing, felt hot     Fluticasone-Salmeterol Cough     Bronchial spasms and coughing     Seasonal Allergies        PAST MEDICAL HISTORY:  Past Medical History:   Diagnosis Date     Adenomatous colon polyp 07/2019     Asymptomatic varicose veins of both lower extremities      Bulbar polio 1952    residual dysphagia     Cervical osteoarthritis      Chronic anxiety      Chronic depression      Chronic depression 09/19/2022     Familial tremor      Gastroesophageal reflux disease without esophagitis 04/12/2021     Hallux limitus of left foot      Localized, primary osteoarthritis of hand      Oropharyngeal dysphagia      Pre-op evaluation 06/15/2022  "    Primary osteoarthritis of left hip 06/15/2022     Primary osteoarthritis of left hip 06/15/2022     RLS (restless legs syndrome)      Sleep apnea, obstructive     CPAP     Status post coronary angiogram 06/25/2024     Status post total replacement of left hip      Varicose veins of left lower extremity        PAST SURGICAL HISTORY:  Past Surgical History:   Procedure Laterality Date     ABDOMEN SURGERY       ARTHROSCOPY KNEE       BYPASS GRAFT ARTERY CORONARY N/A 07/10/2024    Procedure: CORONARY ARTERY BYPASS GRAFTING X 3 WITH RIGHT GREATER SAPHENOUS ENDOSCOPIC VEIN HARVEST LEFT INTERNAL MAMMARY ARTERY-LEFT ANTERIOR DESCENDING ARTERY RIGHT GREATER SAPHENOUS-OBTUSE MARGINAL ARTERY/ POSERTIOR DESCENDING ARTERY;  Surgeon: Mulvihill, Michael, MD;  Location:  OR     CATARACT IOL, RT/LT Bilateral      and      COLONOSCOPY N/A 09/13/2023    Procedure: Colonoscopy with polypectomies using cold snare;  Surgeon: Yue Cline MD;  Location:  GI     CV CORONARY ANGIOGRAM N/A 06/25/2024    Procedure: Coronary Angiogram;  Surgeon: Renny Rene MD;  Location:  HEART CARDIAC CATH LAB     CV LEFT HEART CATH N/A 06/25/2024    Procedure: Left Heart Catheterization;  Surgeon: Renny Rene MD;  Location:  HEART CARDIAC CATH LAB     CV LEFT VENTRICULOGRAM N/A 06/25/2024    Procedure: Left Ventriculogram;  Surgeon: Renny Rene MD;  Location:  HEART CARDIAC CATH LAB     ESOPHAGOSCOPY, GASTROSCOPY, DUODENOSCOPY (EGD), COMBINED N/A 09/13/2023    Procedure: Esophagoscopy, gastroscopy, duodenoscopy (EGD), combined with biopsy using cold forcep;  Surgeon: Yue Cline MD;  Location:  GI     HERNIA REPAIR, UMBILICAL       KNEE SURGERY Left     \"bone on bone\" d/t arthritis     RADIOFREQUENCY ABLATION NERVES      cervical spine     REPAIR ANEURYSM ASCENDING AORTA N/A 07/10/2024    Procedure: ASCENDING AORTA  ANEURYSM REPAIR WITH HEMASHIED PLATINUM WOVEN DOUBLE VELOUR " VASCULAR GRAFT SIZE:30MM  ON PUMP/ TRANSESPHOGEAL ECHOCARIDOGRAM PER ANESTHESIA;  Surgeon: Mulvihill, Michael, MD;  Location: [facility] OR     RHINOPLASTY       SEPTOPLASTY       SHOULDER SURGERY Bilateral     right (pitching injury), left (bone spur)     SHOULDER SURGERY Right     reconstructive surgery with hardware     TOE SURGERY Left     left big toe d/t arthritis     TOTAL KNEE ARTHROPLASTY Left     unicompartmental       FAMILY HISTORY:  Family History   Problem Relation Age of Onset     Heart Failure Mother         valvular heart disease     Thyroid Disease Mother      Heart Failure Father         ihd     Myelodysplastic syndrome Father      Other Cancer Father         Mylodisplasia     Cancer Brother         renal     Other Cancer Brother         Kidney cancer     Heart Failure Brother         ihd     Substance Abuse Brother         Alcoholic     Substance Abuse Maternal Grandfather         Alcoholic     Diabetes Sister      Colon Cancer No family hx of        SOCIAL HISTORY:  Social History     Socioeconomic History     Marital status:      Spouse name: None     Number of children: None     Years of education: None     Highest education level: None   Tobacco Use     Smoking status: Former     Current packs/day: 0.00     Types: Cigarettes     Quit date: 1975     Years since quittin.4     Passive exposure: Past     Smokeless tobacco: Never   Vaping Use     Vaping status: Never Used   Substance and Sexual Activity     Alcohol use: Not Currently     Drug use: No     Sexual activity: Not Currently     Partners: Female     Birth control/protection: None   Other Topics Concern     Parent/sibling w/ CABG, MI or angioplasty before 65F 55M? Yes   Social History Narrative    WIFE-JESSIE RETIRED SON SHERMAN STEPDAUGHTER RETIRED , [employer] RET[employer] -[employer], takes trailer down to [facility].       Social Drivers of Health     Financial Resource Strain: Low Risk   (11/11/2024)    Financial Resource Strain      Within the past 12 months, have you or your family members you live with been unable to get utilities (heat, electricity) when it was really needed?: No   Food Insecurity: Low Risk  (11/11/2024)    Food Insecurity      Within the past 12 months, did you worry that your food would run out before you got money to buy more?: No      Within the past 12 months, did the food you bought just not last and you didn t have money to get more?: No   Transportation Needs: Low Risk  (11/11/2024)    Transportation Needs      Within the past 12 months, has lack of transportation kept you from medical appointments, getting your medicines, non-medical meetings or appointments, work, or from getting things that you need?: No   Physical Activity: Insufficiently Active (11/11/2024)    Exercise Vital Sign      Days of Exercise per Week: 4 days      Minutes of Exercise per Session: 30 min   Stress: No Stress Concern Present (11/11/2024)    [place] [employer] - Occupational Stress Questionnaire      Feeling of Stress : Not at all   Social Connections: Unknown (11/11/2024)    Social Connection and Isolation Panel [NHANES]      Frequency of Social Gatherings with Friends and Family: Once a week   Interpersonal Safety: Low Risk  (11/14/2024)    Interpersonal Safety      Do you feel physically and emotionally safe where you currently live?: Yes      Within the past 12 months, have you been hit, slapped, kicked or otherwise physically hurt by someone?: No      Within the past 12 months, have you been humiliated or emotionally abused in other ways by your partner or ex-partner?: No   Housing Stability: Low Risk  (11/11/2024)    Housing Stability      Do you have housing? : Yes      Are you worried about losing your housing?: No       Review of Systems:  Skin:          Eyes:         ENT:         Respiratory:  Positive for sleep apnea, CPAP     Cardiovascular:    palpitations,  "Positive for, edema    Gastroenterology:        Genitourinary:         Musculoskeletal:         Neurologic:         Psychiatric:         Heme/Lymph/Imm:         Endocrine:           Physical Exam:  Vitals: /66 (BP Location: Right arm, Patient Position: Sitting, Cuff Size: Adult Large)   Pulse 72   Ht 1.676 m (5' 6\")   Wt 104.1 kg (229 lb 8 oz)   SpO2 96%   BMI 37.04 kg/m      Constitutional:  cooperative obese      Skin:  warm and dry to the touch          Head:  normocephalic        Eyes:  pupils equal and round        Lymph:      ENT:  no pallor or cyanosis        Neck:           Respiratory:  clear to auscultation, healed surgical scar         Cardiac: regular rhythm frequent premature beats   no presence of murmur          pulses full and equal                                        GI:    obese      Extremities and Muscular Skeletal:      trace          Neurological:  affect appropriate        Psych:  Alert and Oriented x 3          CC  Kennedy Maradiaga MD  [address]  [place] [address]                      Thank you for allowing me to participate in the care of your patient.      Sincerely,     DEANNA Cervantes [facility]  cc:   Kennedy Maradiaga MD  [address]  [place] [address]      "

## 2025-05-05 NOTE — TELEPHONE ENCOUNTER
"Pls review EKG today to assure diagnosis  Pt has no exercise intolerance/lightheadedness but dose appreciate \"skipped beats\"  Off beta-blocker now for a few weeks   Pls advise for ongoing care/assessment   thanks, Soumya  "

## 2025-05-06 ENCOUNTER — OFFICE VISIT (OUTPATIENT)
Dept: PEDIATRICS | Facility: CLINIC | Age: 80
End: 2025-05-06
Attending: INTERNAL MEDICINE
Payer: COMMERCIAL

## 2025-05-06 ENCOUNTER — ORDERS ONLY (AUTO-RELEASED) (OUTPATIENT)
Dept: CARDIOLOGY | Facility: CLINIC | Age: 80
End: 2025-05-06

## 2025-05-06 VITALS
TEMPERATURE: 98.6 F | DIASTOLIC BLOOD PRESSURE: 66 MMHG | OXYGEN SATURATION: 97 % | WEIGHT: 229 LBS | RESPIRATION RATE: 16 BRPM | HEIGHT: 66 IN | BODY MASS INDEX: 36.8 KG/M2 | HEART RATE: 68 BPM | SYSTOLIC BLOOD PRESSURE: 119 MMHG

## 2025-05-06 DIAGNOSIS — Z23 NEED FOR VACCINATION: ICD-10-CM

## 2025-05-06 DIAGNOSIS — I44.1 SECOND DEGREE AV BLOCK, MOBITZ TYPE I: Primary | ICD-10-CM

## 2025-05-06 DIAGNOSIS — I25.10 CORONARY ARTERY DISEASE DUE TO CALCIFIED CORONARY LESION: ICD-10-CM

## 2025-05-06 DIAGNOSIS — G47.33 OSA ON CPAP: ICD-10-CM

## 2025-05-06 DIAGNOSIS — I25.84 CORONARY ARTERY DISEASE DUE TO CALCIFIED CORONARY LESION: ICD-10-CM

## 2025-05-06 DIAGNOSIS — I44.1 MOBITZ TYPE I WENCKEBACH ATRIOVENTRICULAR BLOCK: ICD-10-CM

## 2025-05-06 PROCEDURE — 1126F AMNT PAIN NOTED NONE PRSNT: CPT | Performed by: INTERNAL MEDICINE

## 2025-05-06 PROCEDURE — 3078F DIAST BP <80 MM HG: CPT | Performed by: INTERNAL MEDICINE

## 2025-05-06 PROCEDURE — 99214 OFFICE O/P EST MOD 30 MIN: CPT | Performed by: INTERNAL MEDICINE

## 2025-05-06 PROCEDURE — 3074F SYST BP LT 130 MM HG: CPT | Performed by: INTERNAL MEDICINE

## 2025-05-06 ASSESSMENT — PAIN SCALES - GENERAL: PAINLEVEL_OUTOF10: NO PAIN (0)

## 2025-05-06 NOTE — PATIENT INSTRUCTIONS
No changes in your treatment for now.    Stay on the losartan and off the beta blockers.    Follow up with cardiology with respect to possible pacer or other treatment.    FLU and COVID this fall

## 2025-05-06 NOTE — PROGRESS NOTES
"  Assessment & Plan       Second degree AV block, Mobitz type I    Advised follow up with Cardiology with respect to treatment options.  Not dangerous but ongoing symptoms.     Patient Instructions   No changes in your treatment for now.    Stay on the losartan and off the beta blockers.    Follow up with cardiology with respect to possible pacer or other treatment.    FLU and COVID this fall          Hypertension / coronary artery disease:  Well controlled blood pressure and cholesterol.    Obstructive sleep apnea:  Ongoing continuous positive airway pressure.         Subjective   Johnny is a 79 year old, presenting for the following health issues:  Recheck Medication      5/6/2025     1:19 PM   Additional Questions   Roomed by Demetria Lamb CMA   Accompanied by N/A         5/6/2025     1:19 PM   Patient Reported Additional Medications   Patient reports taking the following new medications N/A     History of Present Illness       He eats 2-3 servings of fruits and vegetables daily.He consumes 1 sweetened beverage(s) daily.He exercises with enough effort to increase his heart rate 10 to 19 minutes per day.  He exercises with enough effort to increase his heart rate 4 days per week.   He is taking medications regularly.        Changing from beta blocker to losartan has not improved his symptoms.  Can wake him up from sleep: Feels cold.  Checks pulse and will feel the skipped beats. Tries to go back to sleep.  Denies any chest pain or other symptoms.      Has upcoming echocardiogram and Ziopatch.      Working out a lot with 8 pound weights; multiple reps.                 Review of Systems  Constitutional, HEENT, cardiovascular, pulmonary, GI, , musculoskeletal, neuro, skin, endocrine and psych systems are negative, except as otherwise noted.      Objective    /66 (BP Location: Right arm, Patient Position: Sitting, Cuff Size: Adult Large)   Pulse 68   Temp 98.6  F (37  C) (Oral)   Resp 16   Ht 1.676 m (5' 6\") "   Wt 103.9 kg (229 lb)   SpO2 97%   BMI 36.96 kg/m    Body mass index is 36.96 kg/m .  Physical Exam   GENERAL: alert and no distress  EYES: Eyes grossly normal to inspection, PERRL and conjunctivae and sclerae normal  HENT: ear canals and TM's normal, nose and mouth without ulcers or lesions  NECK: no adenopathy, no asymmetry, masses, or scars  RESP: lungs clear to auscultation - no rales, rhonchi or wheezes  CV: regular rate and rhythm, normal S1 S2, no S3 or S4, no murmur, click or rub, no peripheral edema  NEURO: Normal strength and tone, mentation intact and speech normal  PSYCH: mentation appears normal, affect normal/bright            Signed Electronically by: Yordan Hernandez MD

## 2025-05-06 NOTE — TELEPHONE ENCOUNTER
Pls let patient know that his EKG was reviewed with our heart rhythm specialist  Plan to continue monitoring if no concerning symptoms  (lightheadedness, poor exercise tolerance etc)  He can continue with his normal exercise regimen  Follow up as planned with heart monitor and ECHO  Thanks, ANGELINA

## 2025-05-07 ENCOUNTER — TELEPHONE (OUTPATIENT)
Dept: CARDIOLOGY | Facility: CLINIC | Age: 80
End: 2025-05-07
Payer: COMMERCIAL

## 2025-05-13 ENCOUNTER — MYC MEDICAL ADVICE (OUTPATIENT)
Dept: PEDIATRICS | Facility: CLINIC | Age: 80
End: 2025-05-13
Payer: COMMERCIAL

## 2025-05-16 ENCOUNTER — TELEPHONE (OUTPATIENT)
Dept: CARDIOLOGY | Facility: CLINIC | Age: 80
End: 2025-05-16
Payer: COMMERCIAL

## 2025-05-16 DIAGNOSIS — I45.9 HEART BLOCK: Primary | ICD-10-CM

## 2025-05-16 NOTE — TELEPHONE ENCOUNTER
Contacted patient and he denies feeling any dizziness or lightheadedness while wearing the monitor He states he was sleeping during this time. Will forward to June for review and will call patient back on Monday with any recommendations.  He will go to the ED if patient has any symptoms discussed above.  Note patient is up at [facility] for a week at a cabin.  Can be reached on cell phone.  Evelyn Khanna RN, [phone]

## 2025-05-19 NOTE — TELEPHONE ENCOUNTER
Called patient to review recommendations. Pt advised to see EP d/t Zio patch results. Pt asked if he should keep Echo scheduled, advised pt to still get testing done.     While on the phone w/ pt the call dropped. Pt states he was at cabin. Called pt back but no answer. Left detailed VM for pt to call if he had further questions. Sending message to scheduling to call pt and set up EP visit. Order placed by Rossana Walker CNP.     Evelia REVELES  [facility]

## 2025-05-19 NOTE — ADDENDUM NOTE
Addended by: MY ROLDAN on: 5/19/2025 02:33 PM     Modules accepted: Orders     Awake/Alert/Cooperative

## 2025-05-19 NOTE — TELEPHONE ENCOUNTER
Pls review ZioPatch mentioning 3rd degree AV block, appears he was sleeping  He is off beta-blockers  Pls advise for need of EP  thanks JS

## 2025-05-20 DIAGNOSIS — I25.10 CORONARY ARTERY DISEASE DUE TO CALCIFIED CORONARY LESION: ICD-10-CM

## 2025-05-20 DIAGNOSIS — I25.84 CORONARY ARTERY DISEASE DUE TO CALCIFIED CORONARY LESION: ICD-10-CM

## 2025-05-20 RX ORDER — LOSARTAN POTASSIUM 25 MG/1
25 TABLET ORAL DAILY
Qty: 30 TABLET | Refills: 1 | Status: SHIPPED | OUTPATIENT
Start: 2025-05-20

## 2025-05-22 DIAGNOSIS — E03.9 HYPOTHYROIDISM, UNSPECIFIED TYPE: ICD-10-CM

## 2025-05-22 DIAGNOSIS — G25.81 RLS (RESTLESS LEGS SYNDROME): ICD-10-CM

## 2025-05-22 RX ORDER — LEVOTHYROXINE SODIUM 25 UG/1
TABLET ORAL
Qty: 90 TABLET | Refills: 1 | Status: SHIPPED | OUTPATIENT
Start: 2025-05-22

## 2025-05-22 RX ORDER — PRAMIPEXOLE DIHYDROCHLORIDE 1 MG/1
TABLET ORAL
Qty: 180 TABLET | Refills: 2 | Status: SHIPPED | OUTPATIENT
Start: 2025-05-22

## 2025-05-25 DIAGNOSIS — I25.10 CORONARY ARTERY DISEASE DUE TO CALCIFIED CORONARY LESION: ICD-10-CM

## 2025-05-25 DIAGNOSIS — I25.84 CORONARY ARTERY DISEASE DUE TO CALCIFIED CORONARY LESION: ICD-10-CM

## 2025-05-26 ENCOUNTER — RESULTS FOLLOW-UP (OUTPATIENT)
Dept: CARDIOLOGY | Facility: CLINIC | Age: 80
End: 2025-05-26

## 2025-05-28 RX ORDER — ROSUVASTATIN CALCIUM 40 MG/1
40 TABLET, COATED ORAL AT BEDTIME
Qty: 90 TABLET | Refills: 3 | OUTPATIENT
Start: 2025-05-28

## 2025-05-29 ENCOUNTER — MYC REFILL (OUTPATIENT)
Dept: CARDIOLOGY | Facility: CLINIC | Age: 80
End: 2025-05-29
Payer: COMMERCIAL

## 2025-05-29 DIAGNOSIS — I25.84 CORONARY ARTERY DISEASE DUE TO CALCIFIED CORONARY LESION: ICD-10-CM

## 2025-05-29 DIAGNOSIS — I25.10 CORONARY ARTERY DISEASE DUE TO CALCIFIED CORONARY LESION: ICD-10-CM

## 2025-05-29 RX ORDER — ROSUVASTATIN CALCIUM 40 MG/1
40 TABLET, COATED ORAL AT BEDTIME
Qty: 90 TABLET | Refills: 3 | Status: SHIPPED | OUTPATIENT
Start: 2025-05-29

## 2025-07-02 ENCOUNTER — HOSPITAL ENCOUNTER (OUTPATIENT)
Dept: CARDIOLOGY | Facility: CLINIC | Age: 80
Discharge: HOME | End: 2025-07-02
Attending: NURSE PRACTITIONER
Payer: COMMERCIAL

## 2025-07-02 DIAGNOSIS — I25.810 CORONARY ARTERY DISEASE INVOLVING CORONARY BYPASS GRAFT OF NATIVE HEART WITHOUT ANGINA PECTORIS: ICD-10-CM

## 2025-07-02 LAB — LVEF ECHO: NORMAL

## 2025-07-02 PROCEDURE — 93306 TTE W/DOPPLER COMPLETE: CPT

## 2025-07-09 DIAGNOSIS — K21.9 GASTROESOPHAGEAL REFLUX DISEASE WITHOUT ESOPHAGITIS: ICD-10-CM

## 2025-07-09 RX ORDER — OMEPRAZOLE 20 MG/1
40 CAPSULE, DELAYED RELEASE ORAL DAILY
Qty: 90 CAPSULE | Refills: 1 | Status: SHIPPED | OUTPATIENT
Start: 2025-07-09

## 2025-07-17 DIAGNOSIS — I25.10 CORONARY ARTERY DISEASE DUE TO CALCIFIED CORONARY LESION: ICD-10-CM

## 2025-07-17 DIAGNOSIS — I25.84 CORONARY ARTERY DISEASE DUE TO CALCIFIED CORONARY LESION: ICD-10-CM

## 2025-07-17 RX ORDER — LOSARTAN POTASSIUM 25 MG/1
25 TABLET ORAL DAILY
Qty: 30 TABLET | Refills: 2 | Status: SHIPPED | OUTPATIENT
Start: 2025-07-17

## 2025-07-29 ENCOUNTER — OFFICE VISIT (OUTPATIENT)
Dept: URGENT CARE | Facility: URGENT CARE | Age: 80
End: 2025-07-29
Payer: COMMERCIAL

## 2025-07-29 VITALS
BODY MASS INDEX: 37.35 KG/M2 | HEART RATE: 94 BPM | OXYGEN SATURATION: 95 % | RESPIRATION RATE: 18 BRPM | TEMPERATURE: 99.1 F | HEIGHT: 66 IN | SYSTOLIC BLOOD PRESSURE: 138 MMHG | WEIGHT: 232.4 LBS | DIASTOLIC BLOOD PRESSURE: 71 MMHG

## 2025-07-29 DIAGNOSIS — S16.1XXA STRAIN OF NECK MUSCLE, INITIAL ENCOUNTER: Primary | ICD-10-CM

## 2025-07-29 PROCEDURE — 3075F SYST BP GE 130 - 139MM HG: CPT | Performed by: PHYSICIAN ASSISTANT

## 2025-07-29 PROCEDURE — 3078F DIAST BP <80 MM HG: CPT | Performed by: PHYSICIAN ASSISTANT

## 2025-07-29 PROCEDURE — 99213 OFFICE O/P EST LOW 20 MIN: CPT | Performed by: PHYSICIAN ASSISTANT

## 2025-07-29 RX ORDER — CYCLOBENZAPRINE HCL 10 MG
5-10 TABLET ORAL 3 TIMES DAILY PRN
Qty: 30 TABLET | Refills: 0 | Status: SHIPPED | OUTPATIENT
Start: 2025-07-29 | End: 2025-08-08

## 2025-07-29 NOTE — PATIENT INSTRUCTIONS
July 29, 2025 Urgent Care Plan:     1. Try over the counter Lidocaine patch to the trapezius muscle area where your maximal soreness is located (as we reviewed today)     2. Start the muscle relaxing medication (Flexeril) I prescribed for your today as tolerated (this medication can make you tired so you may only be able to take before bedtime)     3. Make an appointment to see your primary care provider next week for re-evaluation if you are not all better in the next week-or sooner if you have any new or worsening symptoms.     4. You can take over the counter Tyelnol 1,000 mg every 8 hours for pain. Do not take more than 3,000 mg of Tylenol in 24 hours.     5. Try alternating heat and ice.     6. Follow-up immediately if you develop any of the below:     You have a sudden severe worsening   If you develop sudden or severe headache   You begin to feel tingling or weakness in your arm   If you develop any chest pain or shortness of breath   You feel generally unwell or sick.  Your pain gets worse.

## 2025-07-29 NOTE — PROGRESS NOTES
ASSESSMENT/PLAN:    (S16.1XXA) Strain of neck muscle, initial encounter  (primary encounter diagnosis)  MDM: Current symptoms most consistent with trapezius muscle strain. Patient states he has tolerated Flexeril well in the past without any somnolent or other adverse SE. I advised he avoid NSAIDs like Ibuprofen and Aleve due to his personal cardiac history.  Please see below discharge summary/plan (which I reviewed with patient verbally today and [facility]).  Additionally, criteria for urgent and emergent follow-up was reviewed.      AVS/Plan:         July 29, 2025 Urgent Care Plan:     1. Try over the counter Lidocaine patch to the trapezius muscle area where your maximal soreness is located (as we reviewed today)     2. Start the muscle relaxing medication (Flexeril) I prescribed for your today as tolerated (this medication can make you tired so you may only be able to take before bedtime)     3. Make an appointment to see your primary care provider next week for re-evaluation if you are not all better in the next week-or sooner if you have any new or worsening symptoms.     4. You can take over the counter Tyelnol 1,000 mg every 8 hours for pain. Do not take more than 3,000 mg of Tylenol in 24 hours.     5. Try alternating heat and ice.     6. Follow-up immediately if you develop any of the below:     You have a sudden severe worsening   If you develop sudden or severe headache   You begin to feel tingling or weakness in your arm   If you develop any chest pain or shortness of breath   You feel generally unwell or sick.  Your pain gets worse.        This progress note has been dictated, with use of voice recognition software. Any grammatical, typographical, or context errors are unintentional and inherent to use of voice recognition software.  --------------    Chief Complaint   Patient presents with    Urgent Care     Pt reports a possible pinched nerve on his neck (back of neck),  "can't turn head far especially to the right side.  Happened 2 days ago and pain rate was 9 and now it's 7.       Toni Collado presents to clinic today for evaluation of right  posterior \"neck pain\" that radiates to right occiput and into right posterior shoulder area. Patient points to trapezius muscle distribution. Denies any fever or other systemic sxs.     Patient states he wonders if symptoms initially began when he was doing some upper body weight lifting prior to onset of symptoms-followed by sitting in a slouched position on the couch.  When he got up from some prolonged sitting in the self floats position on the couch he felt onset pain (points to right trapezius muscle distribution).  He has not had any midline pain.  He has not had any radiation of pain into the right upper extremity or  into the chest.  No associated acute or unusual headaches.    Patient has had problems with cervical radiculopathy on the left in the past (10+ years ago) and he states current symptoms are not similar to those symptoms.    Sxs are increased by: turning head to right and increased some with looking up and looking down. No problems turning head to left     Sxs are decreased by: keeping head in neutral position.         ROS:     Consitutional: No acute F/C or weakness  CARDIAC Denies any CP or SOB.   RESP: Denies any fever, cough or SOB   GI: Denies any abdominal pain   NEURO:  No headache. No UE numbness, pain, tingling or weakness.  No TIA or CVA sxs  SKIN: Denies rash or blister like lesions         Past Medical History:   Diagnosis Date    Adenomatous colon polyp 07/2019    Asymptomatic varicose veins of both lower extremities     Bulbar polio 1952    residual dysphagia    Cervical osteoarthritis     Chronic anxiety     Chronic depression     Chronic depression 09/19/2022    Familial tremor     Gastroesophageal reflux disease without esophagitis 04/12/2021    Hallux limitus of left foot     Localized, primary " "osteoarthritis of hand     Oropharyngeal dysphagia     Pre-op evaluation 06/15/2022    Primary osteoarthritis of left hip 06/15/2022    Primary osteoarthritis of left hip 06/15/2022    RLS (restless legs syndrome)     Sleep apnea, obstructive     CPAP    Status post coronary angiogram 06/25/2024    Status post total replacement of left hip     Varicose veins of left lower extremity        Current Outpatient Medications   Medication Sig Dispense Refill    acetaminophen (TYLENOL) 325 MG tablet Take 2 tablets (650 mg) by mouth every 4 hours as needed for mild pain 30 tablet 0    aspirin (ASA) 81 MG chewable tablet Take 2 tablets (162 mg) by mouth daily. 180 tablet 3    levothyroxine (SYNTHROID/LEVOTHROID) 25 MCG tablet TAKE 1 TABLET(25 MCG) BY MOUTH EVERY MORNING BEFORE BREAKFAST 90 tablet 1    losartan (COZAAR) 25 MG tablet TAKE 1 TABLET(25 MG) BY MOUTH DAILY 30 tablet 2    Multiple Vitamins-Minerals (MULTIVITAMIN PO) Take 1 tablet by mouth daily      omeprazole (PRILOSEC) 20 MG DR capsule TAKE 2 CAPSULES(40 MG) BY MOUTH DAILY 90 capsule 1    pramipexole (MIRAPEX) 1 MG tablet TAKE 1 TABLET BY MOUTH TWICE DAILY FOR  tablet 2    rosuvastatin (CRESTOR) 40 MG tablet Take 1 tablet (40 mg) by mouth at bedtime. 90 tablet 3    traZODone (DESYREL) 100 MG tablet Take 1 tablet (100 mg) by mouth at bedtime. 90 tablet 3     No current facility-administered medications for this visit.       Allergies   Allergen Reactions    Amoxicillin Other (See Comments) and Unknown     Facial and neck flushing, felt hot    Fluticasone-Salmeterol Cough     Bronchial spasms and coughing    Seasonal Allergies        OBJECTIVE:  /71   Pulse 94   Temp 99.1  F (37.3  C) (Oral)   Resp 18   Ht 1.676 m (5' 6\")   Wt 105.4 kg (232 lb 6.4 oz)   SpO2 95%   BMI 37.51 kg/m            GENERAL:  Very pleasant, comfortable and generally well appearing.  SKIN: No rashes.  Normal color.  Sclera clear.  CARDIAC:NORMAL - regular rate and rhythm " without murmur., normal s1/s2 and without extra heart sounds  RESP: Normal - CTA without rales, rhonchi, or wheezing.    CERVICAL/THORACIC SPINE: Non-tender over the cervical or thoracic vertebral processes.  Pt has diffuse, right sided trapezius tenderness and tension on exam. Neck is supple. No neck stiffness. Pt has FROM neck ,despite sxs,  but c/o discomfort with head turning to right and less so up and down. No pain with head turning to left. No nuchal rigidity. UE bicep and  strength is good and equal bilaterally. UE sensation intact to light touch, along multiple dermatomal distributions and into all distal fingertips bilaterally.  NEURO: Alert and oriented.  Normal speech and mentation.  CN II/XII grossly intact.  Gait within normal limits.

## 2025-07-29 NOTE — PROGRESS NOTES
Urgent Care Clinic Visit    Chief Complaint   Patient presents with    Urgent Care     Pt reports a possible pinched nerve on his neck (back of neck), can't turn head far especially to the right side.  Happened 2 days ago and pain rate was 9 and now it's 7.               7/29/2025    11:51 AM   Additional Questions   Roomed by henok levi   Accompanied by wife

## 2025-08-14 ENCOUNTER — LAB (OUTPATIENT)
Dept: LAB | Facility: CLINIC | Age: 80
End: 2025-08-14
Payer: COMMERCIAL

## 2025-08-14 ENCOUNTER — OFFICE VISIT (OUTPATIENT)
Dept: CARDIOLOGY | Facility: CLINIC | Age: 80
End: 2025-08-14
Payer: COMMERCIAL

## 2025-08-14 VITALS
DIASTOLIC BLOOD PRESSURE: 73 MMHG | WEIGHT: 228 LBS | SYSTOLIC BLOOD PRESSURE: 139 MMHG | HEART RATE: 60 BPM | BODY MASS INDEX: 36.64 KG/M2 | HEIGHT: 66 IN

## 2025-08-14 DIAGNOSIS — I44.1 MOBITZ TYPE I WENCKEBACH ATRIOVENTRICULAR BLOCK: ICD-10-CM

## 2025-08-14 DIAGNOSIS — C61 PROSTATE CANCER (H): ICD-10-CM

## 2025-08-14 DIAGNOSIS — I25.810 CORONARY ARTERY DISEASE INVOLVING CORONARY BYPASS GRAFT OF NATIVE HEART WITHOUT ANGINA PECTORIS: ICD-10-CM

## 2025-08-14 DIAGNOSIS — I45.9 HEART BLOCK: Primary | ICD-10-CM

## 2025-08-28 DIAGNOSIS — R97.20 ELEVATED PROSTATE SPECIFIC ANTIGEN (PSA): Primary | ICD-10-CM

## (undated) DEVICE — SU ETHIBOND 2-0 SHDA 30" X563H

## (undated) DEVICE — DRSG KERLIX 4 1/2"X4YDS ROLL 6715

## (undated) DEVICE — SU PROLENE 4-0 RB-1DA 36" 8557H

## (undated) DEVICE — TUBING SUCTION 12"X1/4" N612

## (undated) DEVICE — BLOWER/MISTER CLEARVIEW 22150

## (undated) DEVICE — SU PROLENE 5-0 RB-2DA 30" 8710H

## (undated) DEVICE — LINEN TOWEL PACK X6 WHITE 5487

## (undated) DEVICE — DRAPE NEW SLUSH/WARMER HUSHSLUSH 2.0 ESD340 ESD340

## (undated) DEVICE — MANIFOLD KIT ANGIO AUTOMATED 014613

## (undated) DEVICE — CANNULA PERFUSION AORTIC ROOT 22FR 20012

## (undated) DEVICE — DRAIN CHEST TUBE 32FR STR 8032

## (undated) DEVICE — SU ETHIBOND 0 CT-1 CR 8X18" CX21D

## (undated) DEVICE — SU PLEDGET SOFT TFE 3/8"X3/26"X1/16" PCP40

## (undated) DEVICE — Device

## (undated) DEVICE — LEAD ELEC MYOCARDIO PACING TEMPORARY MEDTRONIC

## (undated) DEVICE — LINEN TOWEL PACK X5 5464

## (undated) DEVICE — SURGICEL HEMOSTAT 2X14" 1951

## (undated) DEVICE — KIT ENDO VASOVIEW HEMOPRO 2 VH-4000

## (undated) DEVICE — PACK SURGICAL PROCEDURE CUSTOM 1/2IN X 3/8IN BB11W18R1

## (undated) DEVICE — KIT ENDO TURNOVER/PROCEDURE W/CLEAN A SCOPE LINERS 103888

## (undated) DEVICE — PUNCH AORTIC 4.0MMX8" RCB40

## (undated) DEVICE — TONGUE DEPRESSOR STERILE 6023

## (undated) DEVICE — INTRO SHEATH 4FRX10CM PINNACLE RSS402

## (undated) DEVICE — DEVICE ASSEMBLY SUCTION/ANTI COAG BTC93

## (undated) DEVICE — SU ETHIBOND 3-0 BBDA 36" X588H

## (undated) DEVICE — LINEN TOWEL PACK X30 5481

## (undated) DEVICE — ENDO TRAP POLYP E-TRAP 00711099

## (undated) DEVICE — KIT HAND CONTROL ANGIOTOUCH ACIST 65CM AT-P65

## (undated) DEVICE — SU VICRYL 3-0 FS-1 27" J442H

## (undated) DEVICE — CONNECTOR PERFUSION Y 3/8X3/8" W/O LL 6031

## (undated) DEVICE — DRAIN SUMP INTRACARDIAC 20FR 12" POOL TIP 12112

## (undated) DEVICE — NDL PERC ENTRY THINWALL 18GA 7.0" G00166

## (undated) DEVICE — SU VICRYL 2-0 CT-1 27" UND J259H

## (undated) DEVICE — PUMP CP37 QUANTUM CENTRIFUGAL BLOOD CP37V-V0

## (undated) DEVICE — SU MONOCRYL 3-0 PS-2 27" Y427H

## (undated) DEVICE — CATH DIAG 4FR ANG PIG 538453S

## (undated) DEVICE — SU VICRYL 0 CTX 36" J370H

## (undated) DEVICE — DEFIB PRO-PADZ LVP LQD GEL ADULT 8900-2105-01

## (undated) DEVICE — OXYGENATOR PERFUSION AFFINITY FUSION BB841

## (undated) DEVICE — GLOVE BIOGEL PI ULTRATOUCH SZ 7.5 41175

## (undated) DEVICE — RX SURGIFLO HEMOSTATIC MATRIX W/THROMBIN 8ML 2994

## (undated) DEVICE — GOWN IMPERVIOUS SPECIALTY XL/XLONG 39049

## (undated) DEVICE — DEVICE TISSUE STABILIZATION OCTOBASE 28707

## (undated) DEVICE — SOL NACL 0.9% IRRIG 1000ML BOTTLE 2F7124

## (undated) DEVICE — SU PROLENE 6-0 C-1DA 30" M8706

## (undated) DEVICE — CATH DIAG 4FR JL 4.5 538417

## (undated) DEVICE — LINEN LEG ROLL 5489

## (undated) DEVICE — BLADE KNIFE BEAVER MINI STR BEAVER6900

## (undated) DEVICE — SUCTION CANISTER MEDIVAC LINER 3000ML W/LID 65651-530

## (undated) DEVICE — ADH BIOGLUE CARTRIDGE 10ML BG3510-5-US

## (undated) DEVICE — ESU GROUND PAD ADULT W/CORD E7507

## (undated) DEVICE — TOTE ANGIO CORP PC15AT SAN32CC83O

## (undated) DEVICE — PACK OPEN HEART PV12OH524

## (undated) DEVICE — CANNULA PERFUSION ARTERIAL 20FR 12" 77420

## (undated) DEVICE — PREP CHLORAPREP W/ORANGE TINT 10.5ML 930715

## (undated) DEVICE — SU PROLENE 6-0 C-1DA 30" 8706H

## (undated) DEVICE — CABLE MYO/LEAD PACING BLUE DISP 019-535

## (undated) DEVICE — SU PROLENE 7-0 BV175-8 24" M8747

## (undated) DEVICE — SU STEEL 6 CCS 4X18" M654G

## (undated) DEVICE — SOL WATER IRRIG 1000ML BOTTLE 2F7114

## (undated) DEVICE — ESU EYE LOW TEMP AA17

## (undated) DEVICE — ENDO FORCEP ENDOJAW BIOPSY 2.8MMX160CM FB-220K

## (undated) DEVICE — DRAIN CHEST TUBE 28FR STR 8028

## (undated) DEVICE — SU PROLENE 4-0 SHDA 36" 8521H

## (undated) DEVICE — CABLE MYO/LEAD PACING WHITE DISP 019-530

## (undated) DEVICE — PROTECTOR ARM ONE-STEP TRENDELENBURG 40418

## (undated) DEVICE — SYR EAR BULB 3OZ 0035830

## (undated) DEVICE — KIT WASH CELL SAVING ATL2001

## (undated) DEVICE — DRAPE STERI TOWEL LG 1010

## (undated) DEVICE — SOMASENSOR CEREBRAL OXIMETER ADULT SAFB-SM

## (undated) DEVICE — TAPE UMBILICAL COTTON 30X1/16IN 2 STRAND 8619-03A 8886861903

## (undated) DEVICE — POSITIONER ASSIST ESSTECH 3S T401210S

## (undated) DEVICE — DECANTER BAG 2002S

## (undated) DEVICE — LEAD PACER MYOCARDIAL BIPOLAR TEMPORARY 53CM 6495F

## (undated) DEVICE — CATH ANGIO INFINITI 3DRC 4FRX100CM 538476

## (undated) DEVICE — COVER TABLE POLY 65X90" 8186

## (undated) DEVICE — RESERVOIR CELL SAVING BLOOD COLLECTION EL2120

## (undated) DEVICE — ENDO SNARE EXACTO COLD 9MM LOOP 2.4MMX230CM 00711115

## (undated) DEVICE — CANNULA VENOUS 2 STAGE 32-40FR

## (undated) DEVICE — SU PROLENE 3-0 SHDA 48" 8534H

## (undated) DEVICE — SPONGE RAY-TEC 4X8" 7318

## (undated) DEVICE — SUTURE BOOTS 051003PBX

## (undated) DEVICE — CLIP HORIZON MULTI SM YELLOW 001204

## (undated) RX ORDER — PAPAVERINE HYDROCHLORIDE 30 MG/ML
INJECTION INTRAMUSCULAR; INTRAVENOUS
Status: DISPENSED
Start: 2024-07-10

## (undated) RX ORDER — CEFAZOLIN SODIUM 1 G/3ML
INJECTION, POWDER, FOR SOLUTION INTRAMUSCULAR; INTRAVENOUS
Status: DISPENSED
Start: 2024-07-10

## (undated) RX ORDER — PROTAMINE SULFATE 10 MG/ML
INJECTION, SOLUTION INTRAVENOUS
Status: DISPENSED
Start: 2024-07-10

## (undated) RX ORDER — HEPARIN SODIUM 1000 [USP'U]/ML
INJECTION, SOLUTION INTRAVENOUS; SUBCUTANEOUS
Status: DISPENSED
Start: 2024-06-25

## (undated) RX ORDER — CHLORHEXIDINE GLUCONATE ORAL RINSE 1.2 MG/ML
SOLUTION DENTAL
Status: DISPENSED
Start: 2024-07-10

## (undated) RX ORDER — PROPOFOL 10 MG/ML
INJECTION, EMULSION INTRAVENOUS
Status: DISPENSED
Start: 2024-07-10

## (undated) RX ORDER — SIMETHICONE 40MG/0.6ML
SUSPENSION, DROPS(FINAL DOSAGE FORM)(ML) ORAL
Status: DISPENSED
Start: 2023-09-13

## (undated) RX ORDER — FENTANYL CITRATE 0.05 MG/ML
INJECTION, SOLUTION INTRAMUSCULAR; INTRAVENOUS
Status: DISPENSED
Start: 2024-07-10

## (undated) RX ORDER — HEPARIN SODIUM 1000 [USP'U]/ML
INJECTION, SOLUTION INTRAVENOUS; SUBCUTANEOUS
Status: DISPENSED
Start: 2024-07-10

## (undated) RX ORDER — FENTANYL CITRATE 50 UG/ML
INJECTION, SOLUTION INTRAMUSCULAR; INTRAVENOUS
Status: DISPENSED
Start: 2024-06-25

## (undated) RX ORDER — FAMOTIDINE 20 MG/1
TABLET, FILM COATED ORAL
Status: DISPENSED
Start: 2024-07-10

## (undated) RX ORDER — FENTANYL CITRATE 50 UG/ML
INJECTION, SOLUTION INTRAMUSCULAR; INTRAVENOUS
Status: DISPENSED
Start: 2024-07-10

## (undated) RX ORDER — CLINDAMYCIN PHOSPHATE 900 MG/50ML
INJECTION, SOLUTION INTRAVENOUS
Status: DISPENSED
Start: 2024-07-10

## (undated) RX ORDER — VASOPRESSIN 20 U/ML
INJECTION PARENTERAL
Status: DISPENSED
Start: 2024-07-10

## (undated) RX ORDER — VANCOMYCIN HYDROCHLORIDE 1 G/20ML
INJECTION, POWDER, LYOPHILIZED, FOR SOLUTION INTRAVENOUS
Status: DISPENSED
Start: 2024-07-10

## (undated) RX ORDER — FENTANYL CITRATE 50 UG/ML
INJECTION, SOLUTION INTRAMUSCULAR; INTRAVENOUS
Status: DISPENSED
Start: 2023-09-13

## (undated) RX ORDER — ASPIRIN 81 MG/1
TABLET, CHEWABLE ORAL
Status: DISPENSED
Start: 2024-07-10

## (undated) RX ORDER — VECURONIUM BROMIDE 1 MG/ML
INJECTION, POWDER, LYOPHILIZED, FOR SOLUTION INTRAVENOUS
Status: DISPENSED
Start: 2024-07-10